# Patient Record
Sex: FEMALE | Race: WHITE | NOT HISPANIC OR LATINO | Employment: OTHER | URBAN - METROPOLITAN AREA
[De-identification: names, ages, dates, MRNs, and addresses within clinical notes are randomized per-mention and may not be internally consistent; named-entity substitution may affect disease eponyms.]

---

## 2023-01-01 ENCOUNTER — HOSPITAL ENCOUNTER (INPATIENT)
Facility: CLINIC | Age: 68
LOS: 10 days | Discharge: HOME | DRG: 919 | End: 2023-01-11
Attending: SURGERY | Admitting: TRANSPLANT SURGERY
Payer: COMMERCIAL

## 2023-01-01 DIAGNOSIS — E44.0 MODERATE MALNUTRITION (H): ICD-10-CM

## 2023-01-01 DIAGNOSIS — S36.13XD INJURY OF BILE DUCT, SUBSEQUENT ENCOUNTER: ICD-10-CM

## 2023-01-01 DIAGNOSIS — K83.8 BILE LEAK, POSTOPERATIVE: ICD-10-CM

## 2023-01-01 DIAGNOSIS — S36.13XA BILE DUCT INJURY: ICD-10-CM

## 2023-01-01 DIAGNOSIS — K91.89 BILE LEAK, POSTOPERATIVE: ICD-10-CM

## 2023-01-01 DIAGNOSIS — S36.13XA INJURY OF BILE DUCT, INITIAL ENCOUNTER: Primary | ICD-10-CM

## 2023-01-01 DIAGNOSIS — R11.0 NAUSEA: ICD-10-CM

## 2023-01-01 LAB
ALBUMIN SERPL BCG-MCNC: 2.7 G/DL (ref 3.5–5.2)
ALP SERPL-CCNC: 366 U/L (ref 35–104)
ALT SERPL W P-5'-P-CCNC: 354 U/L (ref 10–35)
ANION GAP SERPL CALCULATED.3IONS-SCNC: 14 MMOL/L (ref 7–15)
AST SERPL W P-5'-P-CCNC: 60 U/L (ref 10–35)
BILIRUB DIRECT SERPL-MCNC: 1.68 MG/DL (ref 0–0.3)
BILIRUB SERPL-MCNC: 2.4 MG/DL
BILIRUB SERPL-MCNC: 2.4 MG/DL
BUN SERPL-MCNC: 6.4 MG/DL (ref 8–23)
CA-I BLD-MCNC: 4.5 MG/DL (ref 4.4–5.2)
CALCIUM SERPL-MCNC: 8.3 MG/DL (ref 8.8–10.2)
CHLORIDE SERPL-SCNC: 100 MMOL/L (ref 98–107)
CREAT SERPL-MCNC: 0.65 MG/DL (ref 0.51–0.95)
DEPRECATED HCO3 PLAS-SCNC: 22 MMOL/L (ref 22–29)
GFR SERPL CREATININE-BSD FRML MDRD: >90 ML/MIN/1.73M2
GLUCOSE SERPL-MCNC: 69 MG/DL (ref 70–99)
MAGNESIUM SERPL-MCNC: 1.8 MG/DL (ref 1.7–2.3)
PHOSPHATE SERPL-MCNC: 2.9 MG/DL (ref 2.5–4.5)
POTASSIUM SERPL-SCNC: 3.7 MMOL/L (ref 3.4–5.3)
PROT SERPL-MCNC: 5.2 G/DL (ref 6.4–8.3)
SODIUM SERPL-SCNC: 136 MMOL/L (ref 136–145)

## 2023-01-01 PROCEDURE — 82248 BILIRUBIN DIRECT: CPT

## 2023-01-01 PROCEDURE — 84100 ASSAY OF PHOSPHORUS: CPT

## 2023-01-01 PROCEDURE — 120N000011 HC R&B TRANSPLANT UMMC

## 2023-01-01 PROCEDURE — 250N000013 HC RX MED GY IP 250 OP 250 PS 637

## 2023-01-01 PROCEDURE — 85730 THROMBOPLASTIN TIME PARTIAL: CPT

## 2023-01-01 PROCEDURE — 36592 COLLECT BLOOD FROM PICC: CPT

## 2023-01-01 PROCEDURE — 85027 COMPLETE CBC AUTOMATED: CPT

## 2023-01-01 PROCEDURE — 82330 ASSAY OF CALCIUM: CPT

## 2023-01-01 PROCEDURE — 82247 BILIRUBIN TOTAL: CPT

## 2023-01-01 PROCEDURE — 85610 PROTHROMBIN TIME: CPT

## 2023-01-01 PROCEDURE — 83735 ASSAY OF MAGNESIUM: CPT

## 2023-01-01 RX ORDER — LORAZEPAM 0.5 MG/1
0.5 TABLET ORAL DAILY PRN
COMMUNITY

## 2023-01-01 RX ORDER — HEPARIN SODIUM,PORCINE 10 UNIT/ML
5-20 VIAL (ML) INTRAVENOUS
Status: DISCONTINUED | OUTPATIENT
Start: 2023-01-01 | End: 2023-01-11 | Stop reason: HOSPADM

## 2023-01-01 RX ORDER — NALOXONE HYDROCHLORIDE 0.4 MG/ML
0.2 INJECTION, SOLUTION INTRAMUSCULAR; INTRAVENOUS; SUBCUTANEOUS
Status: DISCONTINUED | OUTPATIENT
Start: 2023-01-01 | End: 2023-01-11 | Stop reason: HOSPADM

## 2023-01-01 RX ORDER — HYDROMORPHONE HCL IN WATER/PF 6 MG/30 ML
.2-.4 PATIENT CONTROLLED ANALGESIA SYRINGE INTRAVENOUS
Status: DISCONTINUED | OUTPATIENT
Start: 2023-01-01 | End: 2023-01-04

## 2023-01-01 RX ORDER — NALOXONE HYDROCHLORIDE 0.4 MG/ML
0.4 INJECTION, SOLUTION INTRAMUSCULAR; INTRAVENOUS; SUBCUTANEOUS
Status: DISCONTINUED | OUTPATIENT
Start: 2023-01-01 | End: 2023-01-11 | Stop reason: HOSPADM

## 2023-01-01 RX ORDER — VENLAFAXINE HYDROCHLORIDE 37.5 MG/1
112.5 CAPSULE, EXTENDED RELEASE ORAL DAILY
COMMUNITY

## 2023-01-01 RX ORDER — KETOROLAC TROMETHAMINE 15 MG/ML
15 INJECTION, SOLUTION INTRAMUSCULAR; INTRAVENOUS EVERY 6 HOURS PRN
Status: DISPENSED | OUTPATIENT
Start: 2023-01-01 | End: 2023-01-06

## 2023-01-01 RX ORDER — CALCIUM CARBONATE 500 MG/1
500-1000 TABLET, CHEWABLE ORAL DAILY PRN
Status: DISCONTINUED | OUTPATIENT
Start: 2023-01-01 | End: 2023-01-11 | Stop reason: HOSPADM

## 2023-01-01 RX ORDER — VENLAFAXINE HYDROCHLORIDE 37.5 MG/1
37.5 CAPSULE, EXTENDED RELEASE ORAL DAILY
Status: DISCONTINUED | OUTPATIENT
Start: 2023-01-02 | End: 2023-01-01

## 2023-01-01 RX ORDER — SODIUM CHLORIDE, SODIUM LACTATE, POTASSIUM CHLORIDE, CALCIUM CHLORIDE 600; 310; 30; 20 MG/100ML; MG/100ML; MG/100ML; MG/100ML
INJECTION, SOLUTION INTRAVENOUS CONTINUOUS
Status: DISCONTINUED | OUTPATIENT
Start: 2023-01-01 | End: 2023-01-04

## 2023-01-01 RX ORDER — HEPARIN SODIUM 5000 [USP'U]/.5ML
5000 INJECTION, SOLUTION INTRAVENOUS; SUBCUTANEOUS EVERY 8 HOURS
Status: DISCONTINUED | OUTPATIENT
Start: 2023-01-02 | End: 2023-01-03

## 2023-01-01 RX ORDER — FLUORIDE TOOTHPASTE
15 TOOTHPASTE DENTAL 4 TIMES DAILY PRN
Status: DISCONTINUED | OUTPATIENT
Start: 2023-01-01 | End: 2023-01-11 | Stop reason: HOSPADM

## 2023-01-01 RX ORDER — SIMETHICONE 80 MG
80 TABLET,CHEWABLE ORAL EVERY 6 HOURS PRN
Status: DISCONTINUED | OUTPATIENT
Start: 2023-01-01 | End: 2023-01-01

## 2023-01-01 RX ORDER — LOSARTAN POTASSIUM 100 MG/1
100 TABLET ORAL DAILY
Status: ON HOLD | COMMUNITY
Start: 2022-10-21 | End: 2023-01-11

## 2023-01-01 RX ORDER — PRAVASTATIN SODIUM 40 MG
40 TABLET ORAL DAILY
COMMUNITY

## 2023-01-01 RX ORDER — PIPERACILLIN SODIUM, TAZOBACTAM SODIUM 3; .375 G/15ML; G/15ML
3.38 INJECTION, POWDER, LYOPHILIZED, FOR SOLUTION INTRAVENOUS EVERY 6 HOURS
Status: DISCONTINUED | OUTPATIENT
Start: 2023-01-02 | End: 2023-01-05

## 2023-01-01 RX ORDER — HEPARIN SODIUM,PORCINE 10 UNIT/ML
5-20 VIAL (ML) INTRAVENOUS EVERY 24 HOURS
Status: DISCONTINUED | OUTPATIENT
Start: 2023-01-02 | End: 2023-01-11 | Stop reason: HOSPADM

## 2023-01-01 RX ORDER — KETOROLAC TROMETHAMINE 15 MG/ML
15 INJECTION, SOLUTION INTRAMUSCULAR; INTRAVENOUS EVERY 6 HOURS PRN
Status: DISCONTINUED | OUTPATIENT
Start: 2023-01-01 | End: 2023-01-01

## 2023-01-01 RX ADMIN — Medication 5 MG: at 22:59

## 2023-01-01 ASSESSMENT — ACTIVITIES OF DAILY LIVING (ADL)
DRESSING/BATHING_DIFFICULTY: NO
DIFFICULTY_EATING/SWALLOWING: NO
TOILETING_ISSUES: NO
CHANGE_IN_FUNCTIONAL_STATUS_SINCE_ONSET_OF_CURRENT_ILLNESS/INJURY: YES
CHANGE_IN_FUNCTIONAL_STATUS_SINCE_ONSET_OF_CURRENT_ILLNESS/INJURY: YES
VISION_MANAGEMENT: PRESCRIPTION GLASSES
ADLS_ACUITY_SCORE: 20
WEAR_GLASSES_OR_BLIND: YES
TOILETING_ISSUES: NO
HEARING_DIFFICULTY_OR_DEAF: NO
FALL_HISTORY_WITHIN_LAST_SIX_MONTHS: NO
WEAR_GLASSES_OR_BLIND: YES
DIFFICULTY_EATING/SWALLOWING: NO
WALKING_OR_CLIMBING_STAIRS_DIFFICULTY: NO
DIFFICULTY_COMMUNICATING: NO
DRESSING/BATHING_DIFFICULTY: NO
CONCENTRATING,_REMEMBERING_OR_MAKING_DECISIONS_DIFFICULTY: NO
DIFFICULTY_COMMUNICATING: NO
CONCENTRATING,_REMEMBERING_OR_MAKING_DECISIONS_DIFFICULTY: NO
HEARING_DIFFICULTY_OR_DEAF: NO
WALKING_OR_CLIMBING_STAIRS_DIFFICULTY: NO
DOING_ERRANDS_INDEPENDENTLY_DIFFICULTY: NO
FALL_HISTORY_WITHIN_LAST_SIX_MONTHS: NO

## 2023-01-02 ENCOUNTER — APPOINTMENT (OUTPATIENT)
Dept: PHYSICAL THERAPY | Facility: CLINIC | Age: 68
DRG: 919 | End: 2023-01-02
Attending: SURGERY
Payer: COMMERCIAL

## 2023-01-02 ENCOUNTER — APPOINTMENT (OUTPATIENT)
Dept: GENERAL RADIOLOGY | Facility: CLINIC | Age: 68
DRG: 919 | End: 2023-01-02
Attending: SURGERY
Payer: COMMERCIAL

## 2023-01-02 ENCOUNTER — APPOINTMENT (OUTPATIENT)
Dept: CT IMAGING | Facility: CLINIC | Age: 68
DRG: 919 | End: 2023-01-02
Attending: PHYSICIAN ASSISTANT
Payer: COMMERCIAL

## 2023-01-02 LAB
ALBUMIN SERPL BCG-MCNC: 2.7 G/DL (ref 3.5–5.2)
ALP SERPL-CCNC: 355 U/L (ref 35–104)
ALT SERPL W P-5'-P-CCNC: 325 U/L (ref 10–35)
AMYLASE BODY FLUID SOURCE: NORMAL
AMYLASE FLD-CCNC: 5 U/L
ANION GAP SERPL CALCULATED.3IONS-SCNC: 14 MMOL/L (ref 7–15)
APTT PPP: 31 SECONDS (ref 22–38)
AST SERPL W P-5'-P-CCNC: 57 U/L (ref 10–35)
BILIRUB SERPL-MCNC: 2.4 MG/DL
BUN SERPL-MCNC: 6.9 MG/DL (ref 8–23)
CALCIUM SERPL-MCNC: 8.4 MG/DL (ref 8.8–10.2)
CHLORIDE SERPL-SCNC: 100 MMOL/L (ref 98–107)
CREAT SERPL-MCNC: 0.61 MG/DL (ref 0.51–0.95)
CYCLE THRESHOLD (CT): 42.8
DEPRECATED HCO3 PLAS-SCNC: 22 MMOL/L (ref 22–29)
ERYTHROCYTE [DISTWIDTH] IN BLOOD BY AUTOMATED COUNT: 12.1 % (ref 10–15)
ERYTHROCYTE [DISTWIDTH] IN BLOOD BY AUTOMATED COUNT: 12.4 % (ref 10–15)
GFR SERPL CREATININE-BSD FRML MDRD: >90 ML/MIN/1.73M2
GLUCOSE SERPL-MCNC: 73 MG/DL (ref 70–99)
HCT VFR BLD AUTO: 28.1 % (ref 35–47)
HCT VFR BLD AUTO: 28.6 % (ref 35–47)
HGB BLD-MCNC: 9.2 G/DL (ref 11.7–15.7)
HGB BLD-MCNC: 9.3 G/DL (ref 11.7–15.7)
INR PPP: 1.15 (ref 0.85–1.15)
MCH RBC QN AUTO: 29.8 PG (ref 26.5–33)
MCH RBC QN AUTO: 30.2 PG (ref 26.5–33)
MCHC RBC AUTO-ENTMCNC: 32.5 G/DL (ref 31.5–36.5)
MCHC RBC AUTO-ENTMCNC: 32.7 G/DL (ref 31.5–36.5)
MCV RBC AUTO: 92 FL (ref 78–100)
MCV RBC AUTO: 92 FL (ref 78–100)
PLATELET # BLD AUTO: 299 10E3/UL (ref 150–450)
PLATELET # BLD AUTO: 312 10E3/UL (ref 150–450)
POTASSIUM SERPL-SCNC: 3.8 MMOL/L (ref 3.4–5.3)
PROT SERPL-MCNC: 5.1 G/DL (ref 6.4–8.3)
RBC # BLD AUTO: 3.05 10E6/UL (ref 3.8–5.2)
RBC # BLD AUTO: 3.12 10E6/UL (ref 3.8–5.2)
SARS-COV-2 RNA RESP QL NAA+PROBE: POSITIVE
SODIUM SERPL-SCNC: 136 MMOL/L (ref 136–145)
WBC # BLD AUTO: 7 10E3/UL (ref 4–11)
WBC # BLD AUTO: 7.9 10E3/UL (ref 4–11)

## 2023-01-02 PROCEDURE — 250N000011 HC RX IP 250 OP 636: Performed by: TRANSPLANT SURGERY

## 2023-01-02 PROCEDURE — 71045 X-RAY EXAM CHEST 1 VIEW: CPT | Mod: 26 | Performed by: RADIOLOGY

## 2023-01-02 PROCEDURE — 999N000007 HC SITE CHECK

## 2023-01-02 PROCEDURE — U0005 INFEC AGEN DETEC AMPLI PROBE: HCPCS | Performed by: SURGERY

## 2023-01-02 PROCEDURE — 258N000003 HC RX IP 258 OP 636

## 2023-01-02 PROCEDURE — 82150 ASSAY OF AMYLASE: CPT | Performed by: PHYSICIAN ASSISTANT

## 2023-01-02 PROCEDURE — 74177 CT ABD & PELVIS W/CONTRAST: CPT | Mod: 26 | Performed by: STUDENT IN AN ORGANIZED HEALTH CARE EDUCATION/TRAINING PROGRAM

## 2023-01-02 PROCEDURE — 250N000013 HC RX MED GY IP 250 OP 250 PS 637

## 2023-01-02 PROCEDURE — 97162 PT EVAL MOD COMPLEX 30 MIN: CPT | Mod: GP

## 2023-01-02 PROCEDURE — 97530 THERAPEUTIC ACTIVITIES: CPT | Mod: GP

## 2023-01-02 PROCEDURE — 85027 COMPLETE CBC AUTOMATED: CPT

## 2023-01-02 PROCEDURE — 80053 COMPREHEN METABOLIC PANEL: CPT

## 2023-01-02 PROCEDURE — C9113 INJ PANTOPRAZOLE SODIUM, VIA: HCPCS

## 2023-01-02 PROCEDURE — 97116 GAIT TRAINING THERAPY: CPT | Mod: GP

## 2023-01-02 PROCEDURE — 71045 X-RAY EXAM CHEST 1 VIEW: CPT

## 2023-01-02 PROCEDURE — 250N000011 HC RX IP 250 OP 636

## 2023-01-02 PROCEDURE — 250N000013 HC RX MED GY IP 250 OP 250 PS 637: Performed by: PHYSICIAN ASSISTANT

## 2023-01-02 PROCEDURE — 120N000011 HC R&B TRANSPLANT UMMC

## 2023-01-02 PROCEDURE — 74177 CT ABD & PELVIS W/CONTRAST: CPT

## 2023-01-02 PROCEDURE — 36415 COLL VENOUS BLD VENIPUNCTURE: CPT

## 2023-01-02 RX ORDER — CALCIUM CARBONATE 500(1250)
1 TABLET ORAL DAILY
COMMUNITY

## 2023-01-02 RX ORDER — IOPAMIDOL 755 MG/ML
111 INJECTION, SOLUTION INTRAVASCULAR ONCE
Status: COMPLETED | OUTPATIENT
Start: 2023-01-02 | End: 2023-01-02

## 2023-01-02 RX ORDER — AMOXICILLIN 250 MG
1 CAPSULE ORAL ONCE
Status: COMPLETED | OUTPATIENT
Start: 2023-01-02 | End: 2023-01-02

## 2023-01-02 RX ORDER — LORAZEPAM 0.5 MG/1
0.5 TABLET ORAL DAILY PRN
Status: DISCONTINUED | OUTPATIENT
Start: 2023-01-02 | End: 2023-01-02

## 2023-01-02 RX ORDER — ONDANSETRON 4 MG/1
4 TABLET, ORALLY DISINTEGRATING ORAL EVERY 8 HOURS PRN
Status: ON HOLD | COMMUNITY
End: 2023-01-11

## 2023-01-02 RX ORDER — AMOXICILLIN 250 MG
1 CAPSULE ORAL 2 TIMES DAILY PRN
Status: DISCONTINUED | OUTPATIENT
Start: 2023-01-02 | End: 2023-01-11 | Stop reason: HOSPADM

## 2023-01-02 RX ORDER — LORAZEPAM 0.5 MG/1
0.5 TABLET ORAL EVERY 8 HOURS PRN
Status: DISCONTINUED | OUTPATIENT
Start: 2023-01-02 | End: 2023-01-11 | Stop reason: HOSPADM

## 2023-01-02 RX ORDER — VENLAFAXINE HYDROCHLORIDE 37.5 MG/1
112.5 CAPSULE, EXTENDED RELEASE ORAL DAILY
Status: DISCONTINUED | OUTPATIENT
Start: 2023-01-02 | End: 2023-01-03

## 2023-01-02 RX ORDER — POLYETHYLENE GLYCOL 3350 17 G/17G
17 POWDER, FOR SOLUTION ORAL DAILY PRN
Status: DISCONTINUED | OUTPATIENT
Start: 2023-01-02 | End: 2023-01-11 | Stop reason: HOSPADM

## 2023-01-02 RX ORDER — ACETAMINOPHEN 325 MG/1
325-650 TABLET ORAL EVERY 6 HOURS PRN
Status: ON HOLD | COMMUNITY
End: 2023-04-09

## 2023-01-02 RX ORDER — NICOTINE POLACRILEX 4 MG
15-30 LOZENGE BUCCAL
Status: DISCONTINUED | OUTPATIENT
Start: 2023-01-02 | End: 2023-01-11 | Stop reason: HOSPADM

## 2023-01-02 RX ORDER — DEXTROSE MONOHYDRATE 25 G/50ML
25-50 INJECTION, SOLUTION INTRAVENOUS
Status: DISCONTINUED | OUTPATIENT
Start: 2023-01-02 | End: 2023-01-11 | Stop reason: HOSPADM

## 2023-01-02 RX ADMIN — LORAZEPAM 0.5 MG: 0.5 TABLET ORAL at 20:46

## 2023-01-02 RX ADMIN — PIPERACILLIN AND TAZOBACTAM 3.38 G: 3; .375 INJECTION, POWDER, LYOPHILIZED, FOR SOLUTION INTRAVENOUS at 02:58

## 2023-01-02 RX ADMIN — HEPARIN SODIUM 5000 UNITS: 5000 INJECTION, SOLUTION INTRAVENOUS; SUBCUTANEOUS at 16:18

## 2023-01-02 RX ADMIN — PIPERACILLIN AND TAZOBACTAM 3.38 G: 3; .375 INJECTION, POWDER, LYOPHILIZED, FOR SOLUTION INTRAVENOUS at 13:53

## 2023-01-02 RX ADMIN — PIPERACILLIN AND TAZOBACTAM 3.38 G: 3; .375 INJECTION, POWDER, LYOPHILIZED, FOR SOLUTION INTRAVENOUS at 20:06

## 2023-01-02 RX ADMIN — PANTOPRAZOLE SODIUM 40 MG: 40 INJECTION, POWDER, FOR SOLUTION INTRAVENOUS at 07:57

## 2023-01-02 RX ADMIN — SODIUM CHLORIDE, POTASSIUM CHLORIDE, SODIUM LACTATE AND CALCIUM CHLORIDE: 600; 310; 30; 20 INJECTION, SOLUTION INTRAVENOUS at 01:12

## 2023-01-02 RX ADMIN — PANTOPRAZOLE SODIUM 40 MG: 40 INJECTION, POWDER, FOR SOLUTION INTRAVENOUS at 20:06

## 2023-01-02 RX ADMIN — Medication 5 ML: at 07:44

## 2023-01-02 RX ADMIN — PIPERACILLIN AND TAZOBACTAM 3.38 G: 3; .375 INJECTION, POWDER, LYOPHILIZED, FOR SOLUTION INTRAVENOUS at 07:50

## 2023-01-02 RX ADMIN — LORAZEPAM 0.5 MG: 0.5 TABLET ORAL at 11:14

## 2023-01-02 RX ADMIN — Medication 15 ML: at 05:38

## 2023-01-02 RX ADMIN — PANTOPRAZOLE SODIUM 40 MG: 40 INJECTION, POWDER, FOR SOLUTION INTRAVENOUS at 01:10

## 2023-01-02 RX ADMIN — SODIUM CHLORIDE, POTASSIUM CHLORIDE, SODIUM LACTATE AND CALCIUM CHLORIDE: 600; 310; 30; 20 INJECTION, SOLUTION INTRAVENOUS at 16:34

## 2023-01-02 RX ADMIN — SENNOSIDES AND DOCUSATE SODIUM 1 TABLET: 50; 8.6 TABLET ORAL at 16:18

## 2023-01-02 RX ADMIN — IOPAMIDOL 111 ML: 755 INJECTION, SOLUTION INTRAVENOUS at 15:30

## 2023-01-02 ASSESSMENT — ACTIVITIES OF DAILY LIVING (ADL)
ADLS_ACUITY_SCORE: 21
ADLS_ACUITY_SCORE: 20
ADLS_ACUITY_SCORE: 21
ADLS_ACUITY_SCORE: 20
ADLS_ACUITY_SCORE: 21
ADLS_ACUITY_SCORE: 20

## 2023-01-02 NOTE — PROGRESS NOTES
"   01/02/23 1034   Appointment Info   Signing Clinician's Name / Credentials (PT) Lizz Ribera DPT   Rehab Comments (PT) abdominal precautions   Living Environment   People in Home spouse   Current Living Arrangements house   Home Accessibility stairs to enter home   Number of Stairs, Main Entrance 4   Stair Railings, Main Entrance railings on both sides of stairs   Transportation Anticipated family or friend will provide   Living Environment Comments Pt spouse able to provide 24/7 assist at home, 4-5 ÁNGEL,B railings, all needs met on main level. Walk in shower no shower chair   Self-Care   Usual Activity Tolerance moderate   Current Activity Tolerance moderate   Equipment Currently Used at Home none   Fall history within last six months no   Activity/Exercise/Self-Care Comment For past week or two pt has been only pivoting (at outside hospital). Pt reports d/t fatigue at home only amb household distances, does not use AD. IND ADLS.   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\"   Existing Precautions/Restrictions abdominal   Cognition   Affect/Mental Status (Cognition) WFL   Orientation Status (Cognition) oriented x 4   Pain Assessment   Patient Currently in Pain Yes, see Vital Sign flowsheet   Integumentary/Edema   Integumentary/Edema Comments Pt with 2 drains noted from abdomen   Posture    Posture Protracted shoulders   Range of Motion (ROM)   Range of Motion ROM is WFL   Strength (Manual Muscle Testing)   Strength " (Manual Muscle Testing) strength is WFL   Bed Mobility   Comment, (Bed Mobility) Pt [place] supine>sit   Transfers   Comment, (Transfers) Pt sit<>stand CGA   Gait/Stairs (Locomotion)   Comment, (Gait/Stairs) Pt takes lateral steps to chair w/ FWW CGA   Balance   Balance Comments Pt stands breifly w/o UE support, dynamic balance requires B UE support on FWW   Sensory Examination   Sensory Perception patient reports no sensory changes   Coordination   Coordination no deficits were identified   Muscle Tone   Muscle Tone no deficits were identified   Clinical Impression   Criteria for Skilled Therapeutic Intervention Yes, treatment indicated   PT Diagnosis (PT) impaired activity tolerance in presence of 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   Influenced by the following impairments pain, new abdominal  precautions, fatigue, decreased activity tolerance   Functional limitations due to impairments transfers, bed mobility, amb, stairs   Clinical Presentation (PT Evaluation Complexity) Evolving/Changing   Clinical Presentation Rationale pt with evolving status undergoing liver transplant workup   Clinical Decision Making (Complexity) moderate complexity   Planned Therapy Interventions (PT) gait training;balance training;bed mobility training;home exercise program;patient/family education;stair training;strengthening;transfer training;progressive activity/exercise;risk factor education;home program guidelines   Anticipated Equipment Needs at Discharge (PT) walker, rolling   Risk & Benefits of therapy have been explained evaluation/treatment results reviewed;care plan/treatment goals reviewed;risks/benefits reviewed;current/potential barriers reviewed;participants voiced agreement with care plan;participants included;patient;spouse/significant other   PT Total Evaluation Time   PT Eval, Moderate Complexity Minutes (46126) 10   Physical Therapy Goals   PT Frequency  6x/week   PT Predicted Duration/Target Date for Goal Attainment 01/06/23   PT Goals Bed Mobility;Transfers;Gait;Stairs;PT Goal 1   PT: Bed Mobility Supervision/stand-by assist;Within precautions;Supine to/from sit   PT: Transfers Supervision/stand-by assist;Within precautions;Sit to/from stand   PT: Gait Greater than 200 feet;Supervision/stand-by assist;Rolling walker   PT: Stairs 4 stairs;Rail on both sides   PT: Goal 1 Pt will accuratley verbalize abdominal precautions   Interventions   Interventions Quick Adds Gait Training;Therapeutic Activity   Therapeutic Activity   Therapeutic Activities: dynamic activities to improve functional performance Minutes (62923) 18   Treatment Detail/Skilled Intervention Educated pt on abdomnial precautions, provided written instruction on modifications to functional activities to maintain precautions. Facilaited supine>sit with education and cuing provided for log roll technique, pt overall requiring Min A. Facilatied sit<>stand x4 throughout session , cuing for avoiding extensive UE push off (10# restriction). Provided education on avoiding valsalva, cuing for sequencing exhale with exertion during transfers.   Gait Training   Gait Training Minutes (42805) 10   Symptoms Noted During/After Treatment (Gait Training) fatigue;shortness of breath   Treatment Detail/Skilled Intervention Faciliated amb forward/backwards stepping within room 6'x4. Pt demonstrates keeping good proximity to FWW, overall SBA but limited by SOB and fatigue (pt demonstrates PLB throughout). Pt declines progression to hallway amb d/t fatigue. Encoraged amb up to bathroom as opposed to commode for short but frequent bouts of amb w/ RN staff. VSS throghout relativley unchanged from baseline- see vitals flowsheet   PT Discharge Planning   PT Plan OOR amb, progress to stairs as appropriate   PT Discharge Recommendation (DC Rec) home with assist;home with home care physical therapy;home with outpatient physical  therapy   PT Rationale for DC Rec At this time pt amb short distances CGA-SBA however anticipate pt will progress quickly to navigate household distances with Ax1 provided by . Pending LOS and successful navigation of stairs.   PT Brief overview of current status Ax1 and FWW to bathroom, short distance hallway amb   Total Session Time   Timed Code Treatment Minutes 28   Total Session Time (sum of timed and untimed services) 38

## 2023-01-02 NOTE — PLAN OF CARE
Recovered covid, low threshold.   Vitals: stable room air.   Blood glucose: NA.   Pain/nausea: denies.   Diet: regular diet, NPO @ MN for possible ERCP.   Lines: PICC TL infusing LR-75.   : Upton OP was 1050, removed per order. PIV X 2 negative.   GI: BM x 1 x small today.   Drains: MELITA 1. OP zero. MELITA 2 OP 70 ml. Drain LLQ OP zero., dressing all cdi.    Skin: incision staples had a abd on, cdi. Staples on. Abd binder on.   Mobility: a x 1 x walker.   CT with oral contrast done today.

## 2023-01-02 NOTE — PROGRESS NOTES
"SPIRITUAL HEALTH SERVICES Progress Note  [facility] ([place]) 7a    Saw pt Zeinab Bermudez per Admission Request.     Distress and Loss - Pt and pt's  in the room affirmed it has been \"a long road\". Pt stated \"I have a lot of anxiety\".     Strengths, Coping, and Resources - Pt and  stated \"we need to take it one minute at a time\". They both stated they are very grateful for their friends and family who are supporting them in this time. \"We are on so many prayer chains\".     Meaning, Beliefs, and Spirituality - Pt and  welcomed prayer from this     Plan of Care -  will follow up per pt/staff/family request     Joann Richardson M.Div.  Chaplain Resident  Pager: [phone]    * [facility] remains available 24/7 for emergent requests/referrals, either by having the switchboard page the on-call  or by entering an ASAP/STAT consult in Epic (this will also page the on-call ). Routine Epic consults receive an initial response within 24 hours.*   "

## 2023-01-02 NOTE — PROGRESS NOTES
Transplant Surgery  Inpatient Daily Progress Note  01/02/2023    Assessment & Plan: Zeinab Bermudez is a 67 year old female s/p laparoscopic cholecystectomy on 12/11/11. She presented to the OSH ED on 12/27/22 with sepsis and bile peritonitis, underwent exploratory laparotomy, washout and drain placement on 12/28. ERCP on 12/29 showed possible bile duct transection. IR placed 3rd drain (left side) on 12/29/22 d/t biloma.  She was transferred to [facility] on 1/1/23 for hepatobiliary evaluation and management of bile duct injury.      Cardiorespiratory: VSS  -Hx of HTN, HLD. Hold PTA medications.    Hematology:   Anemia: Presented to OSH with Hgb 13, 2 weeks PTA Hgb was 15. Hgb 9.3 (9.2). ERCP finding duodenal ulcer with oozing. Monitor Hgb. Transfuse for Hgb < 7. Continue PPI.   GI/Nutrition:   S/p sri complicated by bile duct injury, bile Leak, intra-abdominal Infection: 12/11/22 status post cholecystectomy c/b sepsis and bile peritonitis with exploratory laparotomy, washout and drain placement on 12/28. ERCP on 12/29 showed possible bile duct transection. IR placed 3rd drain (left side) on 12/29/22 d/t biloma. Continue 2 surgical drains to suction (right) and IR drain to gravity (left). Bilious output from drains, minimal amount. Repeat CT scan with IV/PO contrast.  Continue antibiotic, see ID.   -TB 2.5, , , .   Moderate malnutrition in the context of acute illness: Nutrition consult. Regular diet with supplements after CT scan. NPO after midnight.  Fluid/Electrolytes: MIVF: LR 75 ml/hr. Replete electrolyte PRN  Endocrine: No issues  : Remove livingston (placed OSH)  Infectious disease: AVSS, WBC 7. Continue Zosyn. Follow up fluid cultures OSH.   Sepsis and bile peritonitis: Weaned off pressor 12/30. 12/29 fluid gram stain no organism, 1+ PMNs, 1+RBC; fluid bacterial culture no growth (will be loaded into care everywhere).   Neuro:   Acute pain: dilaudid IV PRN  Depression with anxiety: Restart PTA  "venlafaxine 24hr once patient tolerating diet (per pt request). Ativan PO TID PRN anxiety.  Prophylaxis: Heparin subcutaneous ppx  Activity: PT/OT  Disposition: 7A    Medical Decision Making: Medium  Subsequent visit 83787 (moderate level decision making)    JOANN/Fellow/Resident Provider: Sasha Drew PA-C, [id number]    Faculty: Desmond Ruano M.D.    __________________________________________________________________  Transplant History: Admitted 1/1/2023 for bile duct injury.  N/A, Postoperative day:      Interval History: History is obtained from the patient  Overnight events: Abdominal pain control fair. Pain limiting movement. Denies nausea. No SOB.     ROS:   A 10-point review of systems was negative except as noted above.    Meds:   heparin ANTICOAGULANT  5,000 Units Subcutaneous Q8H    heparin lock flush  5-20 mL Intracatheter Q24H    pantoprazole  40 mg Intravenous BID    piperacillin-tazobactam  3.375 g Intravenous Q6H    senna-docusate  1 tablet Oral Once    sodium chloride (PF)  10-40 mL Intracatheter Q8H    sodium chloride (PF)  10-40 mL Intracatheter Q8H    [Held by provider] venlafaxine  112.5 mg Oral Daily       Physical Exam:     Admit Weight: 82.2 kg (181 lb 3.2 oz)    Current vitals:   BP (!) 148/84 (BP Location: Left arm)   Pulse 92   Temp 97.5  F (36.4  C) (Oral)   Resp 18   Ht 1.575 m (5' 2\")   Wt 82.2 kg (181 lb 3.2 oz)   SpO2 98%   BMI 33.14 kg/m           Vital sign ranges:    Temp:  [97.5  F (36.4  C)-98.9  F (37.2  C)] 97.5  F (36.4  C)  Pulse:  [79-92] 92  Resp:  [18] 18  BP: (128-149)/(74-89) 148/84  SpO2:  [95 %-98 %] 98 %  Patient Vitals for the past 24 hrs:   BP Temp Temp src Pulse Resp SpO2 Height Weight   01/02/23 1415 (!) 148/84 97.5  F (36.4  C) Oral 92 18 98 % -- --   01/02/23 1006 128/83 98.2  F (36.8  C) Oral 83 18 95 % -- --   01/02/23 0536 138/74 98.3  F (36.8  C) Oral 84 18 95 % -- --   01/02/23 0153 (!) 140/75 98  F (36.7  C) Oral 79 18 95 % -- --   01/02/23 0025 " "-- -- -- -- -- -- 1.575 m (5' 2\") 82.2 kg (181 lb 3.2 oz)   01/01/23 2140 (!) 149/89 98.9  F (37.2  C) Oral 89 18 95 % -- --     General Appearance: in no apparent distress.   Skin: normal, dry  Heart: regular rate and rhythm  Lungs: NLB on RA  Abdomen: The abdomen is soft, generalized ttp, no peritoneal signs. MELITA x 2 to suction on right side, bilious output. IR pigtail drain to gravity on left side, bilious output. The wound is Healing well, no signs of infection.  : livingston, yellow urine output.   Extremities: edema: absent. }  Neurologic: awake, alert and oriented.     Data:   CMP  Recent Labs   Lab 01/02/23  0749 01/01/23  2220    136   POTASSIUM 3.8 3.7   CHLORIDE 100 100   CO2 22 22   GLC 73 69*   BUN 6.9* 6.4*   CR 0.61 0.65   GFRESTIMATED >90 >90   NASRIN 8.4* 8.3*   ICAW  --  4.5   MAG  --  1.8   PHOS  --  2.9   ALBUMIN 2.7* 2.7*   BILITOTAL 2.4* 2.4*  2.4*   ALKPHOS 355* 366*   AST 57* 60*   * 354*     CBC  Recent Labs   Lab 01/02/23  0749 01/01/23  2220   HGB 9.3* 9.2*   WBC 7.0 7.9    299     COAGS  Recent Labs   Lab 01/01/23  2340   INR 1.15   PTT 31      Urinalysis  No lab results found.    Attestation:  Patient has been seen with team and evaluated by me.   Vital signs, labs, medications and orders were reviewed.   When obtained, diagnostic images were reviewed by me and interpreted as above.    The care plan was discussed with the multidisciplinary team and I agree with the findings and plan in this note, with any differences recorded in blue.    .    .  "

## 2023-01-02 NOTE — PROGRESS NOTES
Admitted/transferred from: [facility]  Time of arrival on unit 2130  2 RN full  skin assessment completed by CAMELIA Schneider and CAMELIA Will  Skin assessment finding: issues found Midline incision closed with staples,  Three abdominal drains with scant drainage at site, L abdominal scab from previous procedure, small bumps/lesion on L lateral thigh   Interventions/actions: skin interventions Dressings changed per routine, standard incision and drain cares     Will continue to monitor.

## 2023-01-02 NOTE — PROGRESS NOTES
"BP (!) 148/84 (BP Location: Left arm)   Pulse 92   Temp 97.5  F (36.4  C) (Oral)   Resp 18   Ht 1.575 m (5' 2\")   Wt 82.2 kg (181 lb 3.2 oz)   SpO2 98%   BMI 33.14 kg/m      Shift: 3292-7178  Isolation Status: none  VS: stable on room, afebrile  Neuro: Aox4  Behaviors: calm, pleasent  BG: none  Labs: reviewed  Pain/Nausea: denies  PRN: Ativan for anxiety  Diet: NPO   IV Access/lines & drains: triple lumen PICC, Right JPx2 to bulb suction. Left drain to bulb suction   Infusion(s): lactated ringers 75 mL/hr  Skin: Midline incision covered with ABD pad, clean/dry and intact. Drains have dressing. Right MELITA dressing changed.  Mobility: Assist of one with walker    "

## 2023-01-02 NOTE — PROGRESS NOTES
CLINICAL NUTRITION SERVICES - ASSESSMENT NOTE     Nutrition Prescription    RECOMMENDATIONS FOR MDs/PROVIDERS TO ORDER:  If oral intake continues to be difficult d/t GI discomfort, consider nutrition support.  Consider TF if appropriate vs PN.      Malnutrition Status:    Moderate malnutrition in the context of acute illness    Recommendations already ordered by Registered Dietitian (RD):  Start Ensure Plus High Protein BID with meals    Future/Additional Recommendations:  TF recommendations if needed: Vivonex RTF (prior to any surgery for bile leak) @ 60 ml/hr   -Start at 15 ml/hr and advance by 15 ml/hr q 8 hours to goal rate    If bile leak repaired, recommend Vital 1.5 Jason @ goal of  35 ml/hr  (840 ml/day) + 1 pkt Prosource TF20 will Vital 1.5 Jason @ goal of  35ml/hr  (840ml/day)  will provide: 1340 kcals (23 kcal/kg), 76 g PRO (1.3g/kg), 641 ml free H20, 157 g CHO, and 5 g fiber daily.  -Start at 15 ml/hr and advance by 10 ml/hr q 8 hours to goal rate    TPN recommendations if needed:   Clinimix at 70 mL/hr (1680 mL/day via central line) with 250 mL of 20% IV lipids five times weekly provides 1550 kcals, 84 g protein, 252 g CHO, and 23% of kcals from fat on average daily.    -Start at 45 ml/hr x 24 hours, then advance to goal rate of 70 ml/hr if well tolerated       REASON FOR ASSESSMENT  Zeinab Bermudez is a/an 67 year old female assessed by the dietitian for Pharmacy/Nutrition to Start and Manage PN    NUTRITION HISTORY  Patient reports poor oral intake d/t cramping/abdominal pain and distention/fullness since surgery on 12/11. She and her  estimate she was eating less than 50% of usual intake.  Her last oral intake was on 12/27, a chocolate Ensure.      OSH was planning to start PN (Clinimix E)  @40 ml/hr x 24 hours with advancement to 83 ml/hr (goal) with IV lipids 2x/week.      CURRENT NUTRITION ORDERS  Diet: NPO  Intake/Tolerance: Pt feeling hungry.     LABS  BUN 6.4 (low)    MEDICATIONS  Protonix  "BID    ANTHROPOMETRICS  Height: 157.5 cm (5' 2\")  Most Recent Weight: 82.2 kg (181 lb 3.2 oz)    IBW: 50 kg  BMI: Obesity Grade I BMI 30-34.9  Weight History: 12/10 - 82.6 kg, 6/2022 - 85.3 kg; She reports UBW of 184#  Dosing Weight: 58 kg (adj wt based on IBW of 50 kg and actual wt of 82.2 kg)    ASSESSED NUTRITION NEEDS  Estimated Energy Needs: 1242-9941 kcals/day (20 - 25 kcals/kg)  Justification: Obese  Estimated Protein Needs: 70-87 grams protein/day (1.2 - 1.5 grams of pro/kg)  Justification: Increased needs  Estimated Fluid Needs: per MD    PHYSICAL FINDINGS  See malnutrition section below.    MALNUTRITION  % Intake: </= 50% for >/= 5 days (severe)  % Weight Loss: None noted  Subcutaneous Fat Loss: None observed  Muscle Loss: Posterior calf:  moderate  Fluid Accumulation/Edema: Mild  Malnutrition Diagnosis: Moderate malnutrition in the context of acute illness    NUTRITION DIAGNOSIS  Inadequate oral intake related to abdominal pain, fullness as evidenced by patient eating <50% of usual intakes for >5 days.     INTERVENTIONS  Implementation  Nutrition Education: Provided education on RD role, TPN/TF for nutrition support, oral supplements available when diet appropriate.     Collaboration with other providers -> Spoke with primary team, plan to allow patient to eat/use oral supplements.     Goals  Total avg nutritional intake to meet a minimum of 20 kcal/kg and 1.2 g PRO/kg daily (per dosing wt 58 kg).     Monitoring/Evaluation  Progress toward goals will be monitored and evaluated per protocol.    Nilda Max, MS, RD, LD, CCTD, CNSC  7A/Obs unit pager [phone]  Weekend pager [phone]      "

## 2023-01-02 NOTE — PROVIDER NOTIFICATION
Provider Sasha STANLEY notified of covid +. Plan to put in viral threshold.     Viral threshold not high enough and now considered low risk with covid recovered status.

## 2023-01-02 NOTE — PLAN OF CARE
Goal Outcome Evaluation:     Plan of Care Reviewed With: patient, spouce  Overall Patient Progress: no change    Time: 8406-9323  Neuros: A&O x4, pleasant. 5/5 strengths throughout, denies numbness/tingling.  Cardiac: WDL  Respiratory: WDL, stable on room air.  GI/: Upton in place with adequate output. Last bowel movement prior to admission, pt believes it was on 12/26.  Diet/Nausea: Tolerating NPO status, denies nausea.   Skin: Midline abdominal incision covered with ABD pad, clean/dry/intact. Drain sites covered with dressings, clean/dry/intact. Edema present in bilateral lower extremities.  LDA: Right triple lumen PICC, white lumen infusing LR @ 75mL/hr, grey lumen saline locked, red lumen saline locked. Right MELITA drain x2 to bulb suction. Left drain to bulb suction.  Labs: Reviewed  Pain: Denies  Activity: Assist x1 with walker.  New changes this shift: Pt  at bedside overnight. Pt complaining of dry mouth, PRN mouthwash given x1 this AM.  Plan: Possible ERCP today.

## 2023-01-02 NOTE — H&P
Transplant Surgery History and Physical    PATIENT NAME: Zeinab Bermudez  MRN: [id number]  DATE: 01/01/2023 11:55 PM  ATTENDING: Desmond Ruano MD    Assessment & Plan   Zeinab Bermudez is a 67 year old female who was transferred on 1/1/23 for hepatobiliary evaluation and liver transplant evaluation d/t a 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    # Common Bile Duct Transection; Bile Leak, Intra-Abdominal Infection  - Septic Shock at OSH  - ABX: Zosyn (12/28-**), MRSA negative at OSH.  - ERCP on 12/29/22: CBD transection  - NPO  - Daily LFTs and Biliary Labs    # Diet: NPO for Medical/Clinical Reasons Except for: Meds, Ice Chips  - Plan to start TPN via PICC. Pharm Consult placed.    # Fluids: I/Os; LR mIVF     # Pain: Hydromorphone IV PRN, Ketoralac IV PRN    # HTN: Holding PTA meds d/t recent pressor requirements at OSH.  # HLD: Holding PTA Pravastatin  # MDD, EDGAR: Holding PTA Venlafaxine    # GI PPX: Protonix IV BID  # DVT PPX: SubC Heparin 5K Q8H, SCDs  # Activity: Ambulate with Assist  # Code Status: Full Code    Plan was discussed with transplant fellow, who discussed with attending staff.    Lalo Morrow MD   General Surgery Resident    --------------------------------------------------------------------------------------    Chief Complaint   Common Bile Duct Injury    History of Present Illness      Zeinab Bermudez is a 67 year old female that presented 1/1/2023 to [facility] as a transfer from [facility] d/t common bile duct leak. Prior to this episode, she had acute cholecystitis which was treated with a lap cholecystectomy on 12/11/22.    After her discharge, Zeinab has been struggling clinically since her surgery, reporting poor PO intake, fatigue, and severe sharp and cramping abdominal pain w/ associated distension. She would eventually present to her local ED with abdominal pain to on 12/27/22, there she became dyspneic, hypoxemia, and  hypotensive on admission. She required pressors and underwent laparotomy 12/28/22. She was found to have bile leak with 2.5 liters of bile in the abdomen distributed into complex, partially walled off bilomas. She then underwent ERCP on 12/29/22, identifying a common bile duct transection. At this time there was also some concern for hepatic artery injury, which was not clearly identifiable on CT liver protocol. She was then transferred to [facility] on 1/1/23 for hepatobiliary evaluation and liver transplant evaluation as mentioned above.     PMH notable for: COVID-19 positive (12/10/22), Morbid Obesity, HTN, HLD, SAH, MDD, EDGAR.      Past Medical History    I have reviewed this patient's medical history and updated it with pertinent information if needed.   Past Medical History:   Diagnosis Date    Covid 2019 Positive 12/11/2022    Depressive disorder     HLD (hyperlipidemia)     Hypertension     SAH (subarachnoid hemorrhage) (H) 2017       Past Surgical History   I have reviewed this patient's surgical history and updated it with pertinent information if needed.  Past Surgical History:   Procedure Laterality Date    HYSTERECTOMY  2005    LAPAROSCOPIC CHOLECYSTECTOMY  12/11/2022    LAPAROTOMY EXPLORATORY  12/28/2022       Prior to Admission Medications   Prior to Admission Medications   Prescriptions Last Dose Informant Patient Reported? Taking?   LORazepam (ATIVAN) 0.5 MG tablet   Yes No   Sig: Take 0.5 mg by mouth   losartan (COZAAR) 100 MG tablet   Yes Yes   Sig: Take 100 mg by mouth   pravastatin (PRAVACHOL) 40 MG tablet   Yes No   Sig: Take 40 mg by mouth   venlafaxine (EFFEXOR XR) 37.5 MG 24 hr capsule   Yes No   Sig: Take 112.5 mg by mouth daily      Facility-Administered Medications: None     Allergies   No Known Allergies    Social History   I have reviewed this patient's social history and updated it with pertinent information if needed. Zeinab Bermudez  reports that she has never smoked. She has never used  smokeless tobacco. She reports that she does not currently use alcohol. She reports that she does not use drugs.    Family History   I have reviewed this patient's family history and updated it with pertinent information if needed.   Family History   Problem Relation Age of Onset    Chronic Obstructive Pulmonary Disease Mother     Stomach Cancer Mother     Heart Disease Father     Chronic Obstructive Pulmonary Disease Father     CABG Brother        Review of Systems   The 10 point Review of Systems is negative other than noted in the HPI or here.     Physical Exam   Temp: 98.9  F (37.2  C) Temp src: Oral BP: (!) 149/89 Pulse: 89   Resp: 18 SpO2: 95 % O2 Device: None (Room air)      Physical Exam:  Gen: Appears stated age, NAD, resting comfortably  HEENT: NC/AT, PERRL, EOMI, Sclera Anicteric, Hearing intact  Neuro: AOx3 (person, place, date), no focal deficits  Psych: Affect appropriate, Behavior appropriate, Cooperative  CV: HD Stable, Hypertensive, Normoacardic  Pulm: NWOB on RA  ABD: Soft, NT, ND. 2 MELITA drains and 1 Bag drain in place. ML incision closed with staples. Incisions CDI.  MSK: MAEx4, warm, PPP  Skin: No obvious rashes, jaundice, erythema  Wounds: ABD Dressing CDI    Data   Results for orders placed or performed during the hospital encounter of 01/01/23 (from the past 24 hour(s))   Ionized Calcium   Result Value Ref Range    Calcium Ionized 4.5 4.4 - 5.2 mg/dL   Phosphorus   Result Value Ref Range    Phosphorus 2.9 2.5 - 4.5 mg/dL   Magnesium   Result Value Ref Range    Magnesium 1.8 1.7 - 2.3 mg/dL   Comprehensive metabolic panel   Result Value Ref Range    Sodium 136 136 - 145 mmol/L    Potassium 3.7 3.4 - 5.3 mmol/L    Chloride 100 98 - 107 mmol/L    Carbon Dioxide (CO2) 22 22 - 29 mmol/L    Anion Gap 14 7 - 15 mmol/L    Urea Nitrogen 6.4 (L) 8.0 - 23.0 mg/dL    Creatinine 0.65 0.51 - 0.95 mg/dL    Calcium 8.3 (L) 8.8 - 10.2 mg/dL    Glucose 69 (L) 70 - 99 mg/dL    Alkaline Phosphatase 366 (H) 35 -  104 U/L    AST 60 (H) 10 - 35 U/L     (H) 10 - 35 U/L    Protein Total 5.2 (L) 6.4 - 8.3 g/dL    Albumin 2.7 (L) 3.5 - 5.2 g/dL    Bilirubin Total 2.4 (H) <=1.2 mg/dL    GFR Estimate >90 >60 mL/min/1.73m2   Bilirubin Direct and Total   Result Value Ref Range    Bilirubin Direct 1.68 (H) 0.00 - 0.30 mg/dL    Bilirubin Total 2.4 (H) <=1.2 mg/dL   Attestation:  Patient has been seen and evaluated by me. Need eval of biliary tree before how to approach.  Will start with ERCP and move to PTC if they can't reach liver.  Vital signs, labs, medications and orders were reviewed.   When obtained, diagnostic images were reviewed by me and interpreted as above.    The care plan was discussed with the multidisciplinary team and I agree with the findings and plan in this note, with any differences recorded in blue.    .

## 2023-01-02 NOTE — PHARMACY-ADMISSION MEDICATION HISTORY
Admission Medication History Completed by Pharmacy    See [facility] Admission Navigator for allergy information, preferred outpatient pharmacy, prior to admission medications and immunization status.     Medication History Sources:     Based on med history done at [facility] - by Hope WorthingtonD.......................12/27/2022 7:28 PM    Changes made to PTA medication list (reason):    Added: all medications added this admission    Deleted: None    Changed: None    Additional Information:    Unknown date of last administrations    Prior to Admission medications    Medication Sig Last Dose Taking? Auth Provider Long Term End Date   acetaminophen (TYLENOL) 325 MG tablet Take 325-650 mg by mouth every 6 hours as needed for mild pain Unknown Yes Unknown, Entered By History     calcium carbonate (OS-NASRIN) 500 MG tablet Take 1 tablet by mouth daily Unknown Yes Unknown, Entered By History     LORazepam (ATIVAN) 0.5 MG tablet Take 0.5 mg by mouth daily as needed for anxiety Unknown Yes Reported, Patient     losartan (COZAAR) 100 MG tablet Take 100 mg by mouth daily Unknown Yes Reported, Patient Yes    ondansetron (ZOFRAN ODT) 4 MG ODT tab Take 4 mg by mouth every 8 hours as needed for nausea Unknown Yes Unknown, Entered By History     Pediatric Multiple Vitamins (MULTIVITAMIN CHILDRENS, W/ FA, PO) Take 1 tablet by mouth daily Unknown Yes Unknown, Entered By History     pravastatin (PRAVACHOL) 40 MG tablet Take 40 mg by mouth daily Unknown Yes Reported, Patient Yes    venlafaxine (EFFEXOR XR) 37.5 MG 24 hr capsule Take 112.5 mg by mouth daily   Reported, Patient         Date completed: 01/02/23    Medication history completed by: Enzo Robb [facility]

## 2023-01-02 NOTE — PLAN OF CARE
Goal Outcome Evaluation:      Plan of Care Reviewed With: patient, spouse    Overall Patient Progress: no changeOverall Patient Progress: no change    Outcome Evaluation: Difficulty eating over the last 3 weeks with no PO intake in the last ~6 days.  Monitor PO intake/tolerance, consider nutrition support.

## 2023-01-02 NOTE — PROGRESS NOTES
BP (!) 149/89 (BP Location: Left arm)   Pulse 89   Temp 98.9  F (37.2  C) (Oral)   Resp 18   SpO2 95%     Shift: Arrival on unit at 2130 to 2330  Isolation Status: NA  VS: WDL on RA, afebrile  Neuro: Aox4  Behaviors: pleasant, cooperative with cares, able to make her needs known  BG: NA  Labs: labs pending  Pain/Nausea: denied pain and nausea  PRN: melatonin  Diet: NPO except for ice chips and medications  IV Access: RUE PICC - x-ray verification needed  Infusion(s): NA  Lines/Drains: RJP 1 with scant serous output, RJP2 with 25 mL bilious output, L drain to gravity with no measurable output.  GI/: Upton catheter in place with 430 mL yellow urine output  Skin: Midline incision approximatd with staples, three abdominal drains, small bumps/lesion L lateral upper thigh  Mobility: Assist of 1 with walker, reportedly has not done more than pivot from bed since previous procedure on 12/28  Events/Education: Arrived on unit, skin check and admission complete, settled into room  Plan: Possible ERCP 1/2.

## 2023-01-03 ENCOUNTER — ANESTHESIA (OUTPATIENT)
Dept: SURGERY | Facility: CLINIC | Age: 68
DRG: 919 | End: 2023-01-03
Payer: COMMERCIAL

## 2023-01-03 ENCOUNTER — APPOINTMENT (OUTPATIENT)
Dept: GENERAL RADIOLOGY | Facility: CLINIC | Age: 68
DRG: 919 | End: 2023-01-03
Attending: INTERNAL MEDICINE
Payer: COMMERCIAL

## 2023-01-03 ENCOUNTER — ANESTHESIA EVENT (OUTPATIENT)
Dept: SURGERY | Facility: CLINIC | Age: 68
DRG: 919 | End: 2023-01-03
Payer: COMMERCIAL

## 2023-01-03 LAB
ERCP: NORMAL
ERYTHROCYTE [DISTWIDTH] IN BLOOD BY AUTOMATED COUNT: 12.7 % (ref 10–15)
HCT VFR BLD AUTO: 28.8 % (ref 35–47)
HGB BLD-MCNC: 9.3 G/DL (ref 11.7–15.7)
HOLD SPECIMEN: NORMAL
MAGNESIUM SERPL-MCNC: 1.8 MG/DL (ref 1.7–2.3)
MCH RBC QN AUTO: 29.4 PG (ref 26.5–33)
MCHC RBC AUTO-ENTMCNC: 32.3 G/DL (ref 31.5–36.5)
MCV RBC AUTO: 91 FL (ref 78–100)
PHOSPHATE SERPL-MCNC: 2.9 MG/DL (ref 2.5–4.5)
PLATELET # BLD AUTO: 308 10E3/UL (ref 150–450)
RBC # BLD AUTO: 3.16 10E6/UL (ref 3.8–5.2)
WBC # BLD AUTO: 7.5 10E3/UL (ref 4–11)

## 2023-01-03 PROCEDURE — 250N000011 HC RX IP 250 OP 636: Performed by: NURSE ANESTHETIST, CERTIFIED REGISTERED

## 2023-01-03 PROCEDURE — 250N000009 HC RX 250: Performed by: NURSE ANESTHETIST, CERTIFIED REGISTERED

## 2023-01-03 PROCEDURE — C1769 GUIDE WIRE: HCPCS | Performed by: INTERNAL MEDICINE

## 2023-01-03 PROCEDURE — 36415 COLL VENOUS BLD VENIPUNCTURE: CPT

## 2023-01-03 PROCEDURE — 85018 HEMOGLOBIN: CPT

## 2023-01-03 PROCEDURE — 99223 1ST HOSP IP/OBS HIGH 75: CPT | Mod: 25 | Performed by: PHYSICIAN ASSISTANT

## 2023-01-03 PROCEDURE — 250N000009 HC RX 250: Performed by: INTERNAL MEDICINE

## 2023-01-03 PROCEDURE — C9113 INJ PANTOPRAZOLE SODIUM, VIA: HCPCS

## 2023-01-03 PROCEDURE — 360N000083 HC SURGERY LEVEL 3 W/ FLUORO, PER MIN: Performed by: INTERNAL MEDICINE

## 2023-01-03 PROCEDURE — 120N000011 HC R&B TRANSPLANT UMMC

## 2023-01-03 PROCEDURE — 999N000157 HC STATISTIC RCP TIME EA 10 MIN

## 2023-01-03 PROCEDURE — 83735 ASSAY OF MAGNESIUM: CPT | Performed by: PHYSICIAN ASSISTANT

## 2023-01-03 PROCEDURE — 710N000010 HC RECOVERY PHASE 1, LEVEL 2, PER MIN: Performed by: INTERNAL MEDICINE

## 2023-01-03 PROCEDURE — 250N000025 HC SEVOFLURANE, PER MIN: Performed by: INTERNAL MEDICINE

## 2023-01-03 PROCEDURE — 255N000002 HC RX 255 OP 636: Performed by: INTERNAL MEDICINE

## 2023-01-03 PROCEDURE — 370N000017 HC ANESTHESIA TECHNICAL FEE, PER MIN: Performed by: INTERNAL MEDICINE

## 2023-01-03 PROCEDURE — 258N000003 HC RX IP 258 OP 636: Performed by: NURSE ANESTHETIST, CERTIFIED REGISTERED

## 2023-01-03 PROCEDURE — 250N000011 HC RX IP 250 OP 636

## 2023-01-03 PROCEDURE — 272N000001 HC OR GENERAL SUPPLY STERILE: Performed by: INTERNAL MEDICINE

## 2023-01-03 PROCEDURE — 999N000141 HC STATISTIC PRE-PROCEDURE NURSING ASSESSMENT: Performed by: INTERNAL MEDICINE

## 2023-01-03 PROCEDURE — 84100 ASSAY OF PHOSPHORUS: CPT | Performed by: PHYSICIAN ASSISTANT

## 2023-01-03 PROCEDURE — 258N000003 HC RX IP 258 OP 636

## 2023-01-03 PROCEDURE — 999N000179 XR SURGERY CARM FLUORO LESS THAN 5 MIN W STILLS: Mod: TC

## 2023-01-03 PROCEDURE — 0FJB8ZZ INSPECTION OF HEPATOBILIARY DUCT, VIA NATURAL OR ARTIFICIAL OPENING ENDOSCOPIC: ICD-10-PCS | Performed by: INTERNAL MEDICINE

## 2023-01-03 RX ORDER — HYDROMORPHONE HYDROCHLORIDE 1 MG/ML
0.4 INJECTION, SOLUTION INTRAMUSCULAR; INTRAVENOUS; SUBCUTANEOUS EVERY 5 MIN PRN
Status: DISCONTINUED | OUTPATIENT
Start: 2023-01-03 | End: 2023-01-03 | Stop reason: HOSPADM

## 2023-01-03 RX ORDER — HYDRALAZINE HYDROCHLORIDE 20 MG/ML
2.5-5 INJECTION INTRAMUSCULAR; INTRAVENOUS EVERY 10 MIN PRN
Status: DISCONTINUED | OUTPATIENT
Start: 2023-01-03 | End: 2023-01-03 | Stop reason: HOSPADM

## 2023-01-03 RX ORDER — PROPOFOL 10 MG/ML
INJECTION, EMULSION INTRAVENOUS PRN
Status: DISCONTINUED | OUTPATIENT
Start: 2023-01-03 | End: 2023-01-03

## 2023-01-03 RX ORDER — HALOPERIDOL 5 MG/ML
1 INJECTION INTRAMUSCULAR
Status: DISCONTINUED | OUTPATIENT
Start: 2023-01-03 | End: 2023-01-03 | Stop reason: HOSPADM

## 2023-01-03 RX ORDER — FENTANYL CITRATE 50 UG/ML
50 INJECTION, SOLUTION INTRAMUSCULAR; INTRAVENOUS EVERY 5 MIN PRN
Status: DISCONTINUED | OUTPATIENT
Start: 2023-01-03 | End: 2023-01-03 | Stop reason: HOSPADM

## 2023-01-03 RX ORDER — MEPERIDINE HYDROCHLORIDE 25 MG/ML
12.5 INJECTION INTRAMUSCULAR; INTRAVENOUS; SUBCUTANEOUS EVERY 5 MIN PRN
Status: DISCONTINUED | OUTPATIENT
Start: 2023-01-03 | End: 2023-01-03 | Stop reason: HOSPADM

## 2023-01-03 RX ORDER — FENTANYL CITRATE 50 UG/ML
INJECTION, SOLUTION INTRAMUSCULAR; INTRAVENOUS PRN
Status: DISCONTINUED | OUTPATIENT
Start: 2023-01-03 | End: 2023-01-03

## 2023-01-03 RX ORDER — SODIUM CHLORIDE, SODIUM LACTATE, POTASSIUM CHLORIDE, CALCIUM CHLORIDE 600; 310; 30; 20 MG/100ML; MG/100ML; MG/100ML; MG/100ML
INJECTION, SOLUTION INTRAVENOUS CONTINUOUS
Status: DISCONTINUED | OUTPATIENT
Start: 2023-01-03 | End: 2023-01-03 | Stop reason: HOSPADM

## 2023-01-03 RX ORDER — HYDROMORPHONE HYDROCHLORIDE 1 MG/ML
0.2 INJECTION, SOLUTION INTRAMUSCULAR; INTRAVENOUS; SUBCUTANEOUS EVERY 5 MIN PRN
Status: DISCONTINUED | OUTPATIENT
Start: 2023-01-03 | End: 2023-01-03 | Stop reason: HOSPADM

## 2023-01-03 RX ORDER — INDOMETHACIN 50 MG/1
SUPPOSITORY RECTAL PRN
Status: DISCONTINUED | OUTPATIENT
Start: 2023-01-03 | End: 2023-01-03 | Stop reason: HOSPADM

## 2023-01-03 RX ORDER — ONDANSETRON 2 MG/ML
INJECTION INTRAMUSCULAR; INTRAVENOUS PRN
Status: DISCONTINUED | OUTPATIENT
Start: 2023-01-03 | End: 2023-01-03

## 2023-01-03 RX ORDER — FENTANYL CITRATE 50 UG/ML
25 INJECTION, SOLUTION INTRAMUSCULAR; INTRAVENOUS EVERY 5 MIN PRN
Status: DISCONTINUED | OUTPATIENT
Start: 2023-01-03 | End: 2023-01-03 | Stop reason: HOSPADM

## 2023-01-03 RX ORDER — ONDANSETRON 4 MG/1
4 TABLET, ORALLY DISINTEGRATING ORAL EVERY 30 MIN PRN
Status: DISCONTINUED | OUTPATIENT
Start: 2023-01-03 | End: 2023-01-03 | Stop reason: HOSPADM

## 2023-01-03 RX ORDER — LABETALOL HYDROCHLORIDE 5 MG/ML
10 INJECTION, SOLUTION INTRAVENOUS
Status: DISCONTINUED | OUTPATIENT
Start: 2023-01-03 | End: 2023-01-03 | Stop reason: HOSPADM

## 2023-01-03 RX ORDER — ONDANSETRON 2 MG/ML
4 INJECTION INTRAMUSCULAR; INTRAVENOUS EVERY 30 MIN PRN
Status: DISCONTINUED | OUTPATIENT
Start: 2023-01-03 | End: 2023-01-03 | Stop reason: HOSPADM

## 2023-01-03 RX ORDER — DIAZEPAM 10 MG/2ML
2.5 INJECTION, SOLUTION INTRAMUSCULAR; INTRAVENOUS
Status: DISCONTINUED | OUTPATIENT
Start: 2023-01-03 | End: 2023-01-03 | Stop reason: HOSPADM

## 2023-01-03 RX ORDER — HEPARIN SODIUM 5000 [USP'U]/.5ML
5000 INJECTION, SOLUTION INTRAVENOUS; SUBCUTANEOUS EVERY 8 HOURS
Status: DISCONTINUED | OUTPATIENT
Start: 2023-01-04 | End: 2023-01-11 | Stop reason: HOSPADM

## 2023-01-03 RX ORDER — IOPAMIDOL 510 MG/ML
INJECTION, SOLUTION INTRAVASCULAR PRN
Status: DISCONTINUED | OUTPATIENT
Start: 2023-01-03 | End: 2023-01-03 | Stop reason: HOSPADM

## 2023-01-03 RX ORDER — SODIUM CHLORIDE, SODIUM LACTATE, POTASSIUM CHLORIDE, CALCIUM CHLORIDE 600; 310; 30; 20 MG/100ML; MG/100ML; MG/100ML; MG/100ML
INJECTION, SOLUTION INTRAVENOUS CONTINUOUS PRN
Status: DISCONTINUED | OUTPATIENT
Start: 2023-01-03 | End: 2023-01-03

## 2023-01-03 RX ADMIN — PANTOPRAZOLE SODIUM 40 MG: 40 INJECTION, POWDER, FOR SOLUTION INTRAVENOUS at 20:56

## 2023-01-03 RX ADMIN — PIPERACILLIN AND TAZOBACTAM 3.38 G: 3; .375 INJECTION, POWDER, LYOPHILIZED, FOR SOLUTION INTRAVENOUS at 20:56

## 2023-01-03 RX ADMIN — HEPARIN SODIUM 5000 UNITS: 5000 INJECTION, SOLUTION INTRAVENOUS; SUBCUTANEOUS at 02:24

## 2023-01-03 RX ADMIN — SODIUM CHLORIDE, POTASSIUM CHLORIDE, SODIUM LACTATE AND CALCIUM CHLORIDE: 600; 310; 30; 20 INJECTION, SOLUTION INTRAVENOUS at 14:53

## 2023-01-03 RX ADMIN — HEPARIN SODIUM 5000 UNITS: 5000 INJECTION, SOLUTION INTRAVENOUS; SUBCUTANEOUS at 08:47

## 2023-01-03 RX ADMIN — MIDAZOLAM 2 MG: 1 INJECTION INTRAMUSCULAR; INTRAVENOUS at 14:44

## 2023-01-03 RX ADMIN — PIPERACILLIN AND TAZOBACTAM 3.38 G: 3; .375 INJECTION, POWDER, LYOPHILIZED, FOR SOLUTION INTRAVENOUS at 02:24

## 2023-01-03 RX ADMIN — PANTOPRAZOLE SODIUM 40 MG: 40 INJECTION, POWDER, FOR SOLUTION INTRAVENOUS at 08:41

## 2023-01-03 RX ADMIN — SUGAMMADEX 200 MG: 100 INJECTION, SOLUTION INTRAVENOUS at 15:33

## 2023-01-03 RX ADMIN — FENTANYL CITRATE 100 MCG: 50 INJECTION, SOLUTION INTRAMUSCULAR; INTRAVENOUS at 14:49

## 2023-01-03 RX ADMIN — PROPOFOL 90 MG: 10 INJECTION, EMULSION INTRAVENOUS at 14:51

## 2023-01-03 RX ADMIN — SODIUM CHLORIDE, POTASSIUM CHLORIDE, SODIUM LACTATE AND CALCIUM CHLORIDE: 600; 310; 30; 20 INJECTION, SOLUTION INTRAVENOUS at 20:56

## 2023-01-03 RX ADMIN — ONDANSETRON 4 MG: 2 INJECTION INTRAMUSCULAR; INTRAVENOUS at 15:32

## 2023-01-03 RX ADMIN — PIPERACILLIN AND TAZOBACTAM 3.38 G: 3; .375 INJECTION, POWDER, LYOPHILIZED, FOR SOLUTION INTRAVENOUS at 14:28

## 2023-01-03 RX ADMIN — PIPERACILLIN AND TAZOBACTAM 3.38 G: 3; .375 INJECTION, POWDER, LYOPHILIZED, FOR SOLUTION INTRAVENOUS at 08:41

## 2023-01-03 RX ADMIN — Medication 50 MG: at 14:53

## 2023-01-03 ASSESSMENT — ACTIVITIES OF DAILY LIVING (ADL)
ADLS_ACUITY_SCORE: 28
ADLS_ACUITY_SCORE: 28
ADLS_ACUITY_SCORE: 21
ADLS_ACUITY_SCORE: 28
ADLS_ACUITY_SCORE: 21
ADLS_ACUITY_SCORE: 28

## 2023-01-03 NOTE — CONSULTS
Advanced Endoscopy/Pancreaticobiliary Consultation      Date of Admission:  1/1/2023  Reason for Admission: Bile leak, transferred for hepatobiliary surgery evaluation  Date of Consult  1/3/2023   Requesting Physician:  Desmond Ruano MD           ASSESSMENT AND RECOMMENDATIONS:   Assessment:  67 year old female with PMH of HTN, hyperlipidemia and obesity who presented to OSH 12/10/22 with c/f acute cholecystitis s/p laparoscopic cholecystectomy on 12/11/11. She returned to the OSH ED on 12/27/22 with sepsis and bile peritonitis, underwent exploratory laparotomy, washout and drain placement on 12/28. ERCP on 12/29 showed possible bile duct transection. IR placed 3rd drain (left side) on 12/29/22 d/t biloma.  She was transferred to [facility] on 1/1/23 for hepatobiliary evaluation and management of bile duct injury.  GI AE consulted for ERCP for ongoing bile leak and further eval of bile duct injury.    #. Post sri bile leak with bile peritonitis s/p XL, washout and drain placement  #. Bile duct injury  #. Sepsis r/t biliary peritonitis, improving  #. Elevated liver tests  Ongoing bilious output in percutaneous drains (particularly Drain #2). C/f complete transection of CBD. OSH ERCP images reviewed and cholangiogram did not fill intrahepatics. Also with large leak at the common duct. Planning for repeat ERCP today. Liver tests elevated, mixed pattern likely combination of bile duct injury/transection as well as sepsis and bile leak. On Zosyn for biliary peritonitis. Hepatobiliary surgery team following for possible surgical diversion if unable to treat endoscopically.    #. Moderate malnutrition in the context of acute illness  Poor oral intake with illness and hospitalization over the past month. Dietitian involved today and recommending supplements.     Recommendations:  ERCP today (on OR add on list)  Hold anticoagulation for remainder of the day  Drain management per IR/surgery  Continue NPO status    Analgesia/Antiemetics per primary team  Discussed with primary surgery team JOANN (Lynn Leon PA-C)    Gastroenterology follow up recommendations: TBD    Thank you for involving us in this patient's care. Please do not hesitate to contact the GI service with any questions or concerns.     Pt seen and care plan discussed with Dr. Ardon and Dr. Mann, GI staff physician.        Hortensia Mahajan PA-C  Advanced Endoscopy/Pancreaticobiliary GI Service  [facility]  Text Page  -------------------------------------------------------------------------------------------------------------------       Reason for Consultation:   Bile duct injury           History of Present Illness:   Patient seen and examined at 1130. History is obtained from the patient and her  at bedside.    Zeinab Bermudez is a 67 year old female with a PMH significant for HTN, hyperlipidemia who initially presented to OSH on 12/10/2022 for abdominal pain, found to have acute cholecystitis, underwent laparoscopic cholecystectomy on 12/11/22. She returned to the hospital on 12/27 per recommendation from surgeon because of fatigue, poor oral intake and severe abdominal pain. Found to have septic shock r/t bile peritonitis/bile leak, required vasopressors, went for lap washout (2.5L of bile in the abdomen distributed into complex partially walled off bilomas, two drains left). Underwent ERCP (below) with c/f complete CBD transection. Additional IR perc drain placed on 12/29 for biloma management. Transferred to [facility] for further evaluation and hepatobiliary surgery consultation.    Currently the patient reports moderate abdominal pain but hasn't been taking any opioids. Her nutrition has been poor and likely has been losing weight. Denies nausea or vomiting. No fevers or chills today. Denies prior abdominal surgical history.         Previous Procedures:  ERCP 12/29/2022 ([facility], Dr Yohan Rangel)  1.   Large biliary leak at the level of the common bile duct insertion. Nonopacification of the intrahepatic ducts.              Past Medical History:   Reviewed and edited as appropriate  Past Medical History:   Diagnosis Date     Covid 2019 Positive 12/11/2022     Depressive disorder      HLD (hyperlipidemia)      Hypertension      SAH (subarachnoid hemorrhage) (H) 2017            Past Surgical History:   Reviewed and edited as appropriate   Past Surgical History:   Procedure Laterality Date     HYSTERECTOMY  2005     LAPAROSCOPIC CHOLECYSTECTOMY  12/11/2022     LAPAROTOMY EXPLORATORY  12/28/2022              Social History:   The patient lives in [place], [place] with her spouse  Employer: retired           Allergies:   Reviewed and edited as appropriate   No Known Allergies         Medications:     Current Facility-Administered Medications   Medication     artificial saliva (BIOTENE DRY MOUTHWASH) liquid 15 mL     calcium carbonate (TUMS) chewable tablet 500-1,000 mg     glucose gel 15-30 g    Or     dextrose 50 % injection 25-50 mL    Or     glucagon injection 1 mg     heparin ANTICOAGULANT injection 5,000 Units     heparin lock flush 10 UNIT/ML injection 5-20 mL     heparin lock flush 10 UNIT/ML injection 5-20 mL     HYDROmorphone (DILAUDID) injection 0.2-0.4 mg     ketorolac (TORADOL) injection 15 mg     lactated ringers infusion     LORazepam (ATIVAN) tablet 0.5 mg     melatonin sublingual tablet 5 mg     naloxone (NARCAN) injection 0.2 mg    Or     naloxone (NARCAN) injection 0.4 mg    Or     naloxone (NARCAN) injection 0.2 mg    Or     naloxone (NARCAN) injection 0.4 mg     pantoprazole (PROTONIX) IV push injection 40 mg     piperacillin-tazobactam (ZOSYN) 3.375 g vial to attach to  mL bag     polyethylene glycol (MIRALAX) Packet 17 g     senna-docusate (SENOKOT-S/PERICOLACE) 8.6-50 MG per tablet 1 tablet     sodium chloride (PF) 0.9% PF flush 10-20 mL     sodium chloride (PF) 0.9% PF flush 10-20 mL      sodium chloride (PF) 0.9% PF flush 10-40 mL     sodium chloride (PF) 0.9% PF flush 10-40 mL     [Held by provider] venlafaxine (EFFEXOR XR) 24 hr capsule 112.5 mg               Physical Exam:   Temp: 97.7  F (36.5  C) Temp src: Oral BP: 133/86 Pulse: 91   Resp: 18 SpO2: 96 % O2 Device: None (Room air)    Wt:   Wt Readings from Last 2 Encounters:   01/02/23 82.2 kg (181 lb 3.2 oz)        General: Pleasant female in NAD.  Answers appropriately.    HEENT: Head is AT/NC. Sclera mildly icteric. No conjunctival injection.  Oropharynx is clear, moist and w/o exudate or lesions.  Neck: No masses or thyromegaly.  Lungs: Clear to auscultation bilaterally.  No wheezes, rhonchi or crackles.    Heart: Regular rate and rhythm.  No murmurs, gallops or rubs.  Normal S1 and S2.  Abdomen: Soft, distended, mild tenderness upper abdomen, RUQ MELITA bulb Drain #1 with serosanguinous/bile tinged fluid, RUQ MELITA Drain  bulb #2 with large amount of dark bilious, MELITA Drain #3 LUQ with serous fluid  Skin: mild jaundice  Neurologic: Grossly non-focal.  CN 2-12 grossly intact.            Data:   Labs and imaging below were independently reviewed and interpreted    LAB WORK:    BMP  Recent Labs   Lab 01/02/23  0749 01/01/23  2220    136   POTASSIUM 3.8 3.7   CHLORIDE 100 100   NASRIN 8.4* 8.3*   CO2 22 22   BUN 6.9* 6.4*   CR 0.61 0.65   GLC 73 69*     CBC  Recent Labs   Lab 01/03/23  0612 01/02/23  0749 01/01/23  2220   WBC 7.5 7.0 7.9   RBC 3.16* 3.12* 3.05*   HGB 9.3* 9.3* 9.2*   HCT 28.8* 28.6* 28.1*   MCV 91 92 92   MCH 29.4 29.8 30.2   MCHC 32.3 32.5 32.7   RDW 12.7 12.4 12.1    312 299     INR  Recent Labs   Lab 01/01/23  2340   INR 1.15     LFTs  Recent Labs   Lab 01/02/23  0749 01/01/23  2220   ALKPHOS 355* 366*   AST 57* 60*   * 354*   BILITOTAL 2.4* 2.4*  2.4*   PROTTOTAL 5.1* 5.2*   ALBUMIN 2.7* 2.7*      PANCNo lab results found in last 7 days.    IMAGING:  EXAMINATION: CT ABDOMEN PELVIS W CONTRAST  1/2/2023 3:43 PM        HISTORY: Re-eval fluid collections, concern for CBD injury     COMPARISON: Outside CT abdomen and pelvis 12/29/2022     PROCEDURE COMMENTS: CT of the abdomen and pelvis was performed with  111 mL Isovue-370 intravenous contrast. Coronal and sagittal  reformatted images were obtained.     FINDINGS:  Lower thorax:   Slightly decreased bilateral pleural effusions, greater on the left  with associated compressive atelectasis.     Abdomen and pelvis:  Unchanged 2.3 cm hypoattenuating cyst in the right hepatic dome.  Unchanged geographic hypoattenuation of the right hepatic lobe. Stable  surgical drains terminating over the right hepatic dome and near the  gallbladder fossa with slightly decreased subcapsular fluid  collection. Interval placement of a new surgical drain within the  fluid collection along the greater curvature of the stomach which has  significantly decreased in size since prior exam of 12/29/2022 with  the largest pocket measuring 5.5 x 2.4 cm, previously measuring 10.9 x  9.4 cm when measured in a similar fashion. Cholecystectomy. Mild fatty  atrophy of the pancreas. The spleen and adrenal glands are normal in  appearance. Symmetric renal cortical enhancement. No hydronephrosis or  nephrolithiasis. The bladder is incompletely distended and contains a  Upton catheter and a small amount of intracystic gas, likely due to  recent catheterization. Decreased small volume simple ascites within  the abdomen and pelvis. Decreased inflammatory changes about the  duodenum and throughout the colon. There are no abnormally dilated  loops of large and small bowel. Oral contrast is seen throughout the  distal small bowel and throughout the colon. Multiple prominent but  nonenlarged retroperitoneal and portacaval lymph nodes. The major  intra-abdominal vasculature is widely patent. The infrarenal aorta is  normal in caliber. Small pneumoperitoneum, similar to prior possibly  from  intervention.     Bones:  Multilevel degenerative changes of the spine. No suspicious or  aggressive appearing bone lesions.                                                                      IMPRESSION:  1. Interval placement of surgical drain within the fluid collection  along the greater curvature of the stomach which has significantly  decreased in size compared to prior exam of 12/29/2022.  2. Additional surgical drains are unchanged in position with slight  decrease in the subhepatic fluid collection. No new fluid collections.  3. Decreased small volume simple ascites.  4. Decreased bilateral pleural effusions, greater on the left.  5. Decreased inflammatory changes about the duodenum and throughout  the colon.  6. Slight decrease in geographic appearing hepatic hypoattenuation in  the the inferior right lobe, possibly posttraumatic/inflammatory. May  consider attention on follow-up.        =======================================================================

## 2023-01-03 NOTE — PLAN OF CARE
"RN [id number]:    /86 (BP Location: Left arm)   Pulse 91   Temp 97.7  F (36.5  C) (Oral)   Resp 18   Ht 1.575 m (5' 2\")   Wt 82.2 kg (181 lb 3.2 oz)   SpO2 96%   BMI 33.14 kg/m      Status: admitted on 1/1/23 for hepatobiliary evaluation and management of bile duct injury  Pain/Nausea: denies pain and nausea  Mobility: SBA  Diet: NPO  Labs: reviewed  LDAs: MELITA x 3, R TL PICC  Skin/incisions: no new deficits found  Neuro: AOX4  Respiratory: WNL on room air  Cardiac: WNL  GI/: voids spontaneously without difficulty. Last BM 1/2  New Changes: patient left for ERCP today at 1320  Plan: continue with plan of care when patient gets back from procedure  "

## 2023-01-03 NOTE — CONSULTS
"    Interventional Radiology Consult Service Note  23     Consult Requested: Evaluate for drain repositioning s/p bile duct inury    67 year old female with history of lap CCY for acute cholecystitis 2022 c/b bile leak/sepsis/peritonitis necessitating ex lap, washout and two surgical MELITA on 2022.    Patient underwent ERCP  which showed possible bile duct transection and IR placed left sided pigtail drain on 2022 into biloma.      Patient transferred to [facility] 23 for management of bile duct injury.      Denies any trauma to drain, leaking, fevers or chills.      O:  /86 (BP Location: Left arm)   Pulse 91   Temp 97.7  F (36.5  C) (Oral)   Resp 18   Ht 1.575 m (5' 2\")   Wt 82.2 kg (181 lb 3.2 oz)   SpO2 96%   BMI 33.14 kg/m    General:  Stable.  In no acute distress.   at bedside  Neuro:  A&O x 3. Moves all extremities equally.  Abdomen:  Abdominal binder in place.  Soft, non-distended, non-tender  Drain:  Dressing CDI, retention suture intact.  accorddian collection bag with small amount of bilious fluid.          IMAGIN2023:  CT abdomen pelvis reviewed.      LABS:  Lab Results   Component Value Date    WBC 7.5 2023    HGB 9.3 (L) 2023    HCT 28.8 (L) 2023    MCV 91 2023     2023       Drain Outputs      A:  S/p IR aspiration and drainage catheter placement into perigastric fluid collection/biloma on 2022 at [facility] transferred here for management of bile leak.    Abdominal binder taken down and catheter oriented in a cephalad position and constricted by binder.  3 way stopcock and new MELITA bulb attached.  Catheter flushes and aspirates well. Catheter re-affixed to drain by gravity.       P:    -  Biloma Drain Management (with RN in room)              -Continue to flush 10 ml normal saline TID. Orders are in.                -if catheter appears clogged:                     -Flush 10 ml toward the patient " then aspirate and push fluid toward MELITA bulb.                           Always clear the line with 5-10 ml normal saline after aspiration.              -if leaking around the catheter, ok to flush 3-5ml toward the catheter BID.                -Record NET output under I/Os.  -can consider IR sinogram and or drain reposition if there is no output from drain after 24 hours.           Rani Nicolas PA-C  Physician Assistant  Interventional Radiology   Pager: [phone]             Statement Selected

## 2023-01-03 NOTE — PROGRESS NOTES
Transplant Surgery  Inpatient Daily Progress Note  01/03/2023    Assessment & Plan: Zeinab Bermudez is a 67 year old female s/p laparoscopic cholecystectomy on 12/11/11. She presented to the OSH ED on 12/27/22 with sepsis and bile peritonitis, underwent exploratory laparotomy, washout and drain placement on 12/28. ERCP on 12/29 showed possible bile duct transection. IR placed 3rd drain (left side) on 12/29/22 d/t biloma. She was transferred to [facility] on 1/1/23 for hepatobiliary evaluation and management of bile duct injury.      Cardiorespiratory: VSS  -Hx of HTN, HLD. Hold PTA medications.    Hematology:   Anemia: Presented to OSH with Hgb 13, 2 weeks PTA Hgb was 15. Hgb 9.3 (9.2). ERCP finding duodenal ulcer with oozing. Monitor Hgb. Transfuse for Hgb < 7. Continue PPI.   GI/Nutrition:   S/p sri complicated by bile duct injury, bile leak, intra-abdominal Infection: 12/11/22 status post cholecystectomy c/b sepsis and bile peritonitis with exploratory laparotomy, washout and drain placement on 12/28. ERCP on 12/29 showed possible bile duct transection. IR placed 3rd drain (left side) on 12/29/22 d/t biloma. Continue 2 surgical drains to suction (right) and IR drain to gravity on admission, changed to suction by IR (left). Bilious output from drains, minimal amount.   -1/2 CT scan with IV/PO contrast  -1/3 IR consulted and can consider IR sinogram and or drain reposition if there is no output from drain in next 24 hours.      -1/3 ERCP   -Continue antibiotic, see ID.   -TB 2.4, , , AST 57.   Moderate malnutrition in the context of acute illness: Nutrition consult. Regular diet with supplements after ERCP.   Fluid/Electrolytes: MIVF: LR 75 ml/hr. Replete electrolyte PRN  Endocrine: No issues  : Removed [place] (placed OSH)  Infectious disease: AVSS, WBC 7. Continue Zosyn.   Sepsis and bile peritonitis: Weaned off pressor 12/30. 12/29 fluid gram stain no organism, 1+ PMNs, 1+RBC; fluid bacterial culture no  "growth (will be loaded into care everywhere).   COVID-19: 12/10 COVID-19 positive at OSH, repeat 1/2 positive with CT 42.8. Currently asymptomatic.   Neuro:   Acute pain: dilaudid IV PRN  Depression with anxiety: Restart PTA venlafaxine 24hr once patient tolerating diet (per pt request). Ativan PO TID PRN anxiety.  Prophylaxis: Heparin subcutaneous ppx-on hold for ERCP today   Activity: PT/OT  Disposition: 7A    Medical Decision Making: Medium  Subsequent visit 84729 (moderate level decision making)    JOANN/Fellow/Resident Provider: Lynn Leon PA-C, [id number]    Faculty: Nelson Richard M.D.    __________________________________________________________________  Transplant History: Admitted 1/1/2023 for bile duct injury.    Interval History: History is obtained from the patient  Overnight events: Abdominal pain control fair. Denies nausea. No SOB.     ROS:   A 10-point review of systems was negative except as noted above.    Meds:    [Auto Hold] heparin ANTICOAGULANT  5,000 Units Subcutaneous Q8H     [Auto Hold] heparin lock flush  5-20 mL Intracatheter Q24H     [Auto Hold] pantoprazole  40 mg Intravenous BID     [Auto Hold] piperacillin-tazobactam  3.375 g Intravenous Q6H     [Auto Hold] sodium chloride (PF)  10-40 mL Intracatheter Q8H     [Auto Hold] sodium chloride (PF)  10-40 mL Intracatheter Q8H     [Held by provider] venlafaxine  112.5 mg Oral Daily       Physical Exam:     Admit Weight: 82.2 kg (181 lb 3.2 oz)    Current vitals:   /86 (BP Location: Left arm)   Pulse 91   Temp 97.7  F (36.5  C) (Oral)   Resp 18   Ht 1.575 m (5' 2\")   Wt 82.2 kg (181 lb 3.2 oz)   SpO2 96%   BMI 33.14 kg/m           Vital sign ranges:    Temp:  [97.7  F (36.5  C)-97.8  F (36.6  C)] 97.7  F (36.5  C)  Pulse:  [] 91  Resp:  [18] 18  BP: (132-136)/(74-86) 133/86  SpO2:  [95 %-96 %] 96 %  Patient Vitals for the past 24 hrs:   BP Temp Temp src Pulse Resp SpO2   01/03/23 1027 133/86 97.7  F (36.5  C) Oral 91 18 96 " %   01/03/23 0552 136/74 97.7  F (36.5  C) Oral 87 18 95 %   01/02/23 1809 132/84 97.8  F (36.6  C) Oral 101 18 95 %     General Appearance: in no apparent distress.   Skin: normal, dry  Heart: regular rate and rhythm  Lungs: NLB on RA  Abdomen: The abdomen is soft, generalized ttp, no peritoneal signs. MELITA x 2 to suction on right side, bilious output. IR pigtail drain to gravity on left side, bilious output. The wound is Healing well, no signs of infection.  : good UOP  Extremities: edema: absent.  Neurologic: awake, alert and oriented.     Data:   CMP  Recent Labs   Lab 01/03/23  0612 01/02/23  1351 01/02/23  0749 01/01/23  2220   NA  --   --  136 136   POTASSIUM  --   --  3.8 3.7   CHLORIDE  --   --  100 100   CO2  --   --  22 22   GLC  --   --  73 69*   BUN  --   --  6.9* 6.4*   CR  --   --  0.61 0.65   GFRESTIMATED  --   --  >90 >90   NASRIN  --   --  8.4* 8.3*   ICAW  --   --   --  4.5   MAG 1.8  --   --  1.8   PHOS 2.9  --   --  2.9   ALBUMIN  --   --  2.7* 2.7*   BILITOTAL  --   --  2.4* 2.4*  2.4*   ALKPHOS  --   --  355* 366*   AST  --   --  57* 60*   ALT  --   --  325* 354*   FAMY  --  5.0  --   --      CBC  Recent Labs   Lab 01/03/23  0612 01/02/23  0749   HGB 9.3* 9.3*   WBC 7.5 7.0    312     COAGS  Recent Labs   Lab 01/01/23  2340   INR 1.15   PTT 31      Urinalysis  No lab results found.

## 2023-01-03 NOTE — ANESTHESIA CARE TRANSFER NOTE
Patient: Zeinab Bermudez    Procedure: Procedure(s):  ENDOSCOPIC RETROGRADE CHOLANGIOPANCREATOGRAPHY       Diagnosis: Injury of bile duct, initial encounter [S36.13XA]  Diagnosis Additional Information: No value filed.    Anesthesia Type:   General     Note:    Oropharynx: oropharynx clear of all foreign objects  Level of Consciousness: awake  Oxygen Supplementation: nasal cannula    Independent Airway: airway patency satisfactory and stable  Dentition: dentition unchanged  Vital Signs Stable: post-procedure vital signs reviewed and stable  Report to RN Given: handoff report given  Patient transferred to: PACU    Handoff Report: Identifed the Patient, Identified the Reponsible Provider, Reviewed the pertinent medical history, Discussed the surgical course, Reviewed Intra-OP anesthesia mangement and issues during anesthesia, Set expectations for post-procedure period and Allowed opportunity for questions and acknowledgement of understanding      Vitals:  Vitals Value Taken Time   /60 01/03/23 1546   Temp 36.4  C (97.6  F) 01/03/23 1546   Pulse 100 01/03/23 1554   Resp 14 01/03/23 1554   SpO2 98 % 01/03/23 1554   Vitals shown include unvalidated device data.    Electronically Signed By: TOMY Mcdaniel CRNA  January 3, 2023  3:55 PM

## 2023-01-03 NOTE — OP NOTE
ERCP 01/03/2023  2:28 PM [address] [place] [address] [phone]     Endoscopy Department   _______________________________________________________________________________   Patient Name: Zeinab Bermudez           Procedure Date: 1/3/2023 2:28 PM   MRN: [id number]                       Account Number: [id number]   YOB: 1955              Admit Type: Inpatient   Age: 67                               Room:  OR    Gender: Female                        Note Status: Finalized   Attending MD: KENJI VIRGEN MD  Total Sedation Time:   _______________________________________________________________________________       Procedure:             ERCP   Indications:           Follow-up of bile leak, Treatment of bile leak   Providers:             KNEJI VIRGEN MD, Jennifer Vanderheyden   Patient Profile:       Ms Parisi is a 68yo woman transferred for management                          of a bile leak following a complex cholecystectomy.                          She has surgical drains and a recently placed IR                          drain. Previous ERCP suggested transection. She now                          proceeds to repeat ERCP for further evaluation and                          management.   Referring MD:          AJIME FERNANDEZ MD   Requesting Provider:   ADEEL NUGENT MD   Medicines:             Indomethacin 100 mg AK, General Anesthesia,                          Antibiotics as scheduled   Complications:         No immediate complications.   _______________________________________________________________________________   Procedure:             Pre-Anesthesia Assessment:                          - Prior to the procedure, a History and Physical was                          performed, and patient medications and allergies were                          reviewed. The patient is competent. The risks and                           benefits of the procedure and the sedation options and                          risks were discussed with the patient. All questions                          were answered and informed consent was obtained.                          Patient identification and proposed procedure were                          verified by the nurse in the pre-procedure area.                          Mental Status Examination: alert and oriented. Airway                          Examination: Mallampati Class II (the uvula but not                          tonsillar pillars visualized). Respiratory                          Examination: clear to auscultation. CV Examination:                          normal. ASA Grade Assessment: III - A patient with                          severe systemic disease. After reviewing the risks and                          benefits, the patient was deemed in satisfactory                          condition to undergo the procedure. The anesthesia                          plan was to use general anesthesia. Immediately prior                          to administration of medications, the patient was                          re-assessed for adequacy to receive sedatives. The                          heart rate, respiratory rate, oxygen saturations,                          blood pressure, adequacy of pulmonary ventilation, and                          response to care were monitored throughout the                          procedure. The physical status of the patient was                          re-assessed after the procedure. After obtaining                          informed consent, the scope was passed under direct                          vision. Throughout the procedure, the patient's blood                          pressure, pulse, and oxygen saturations were monitored                          continuously. The duodenoscope was introduced through                          the mouth, and used to  "inject contrast into and used                          to inject contrast into the bile duct. The ERCP was                          accomplished without difficulty. The patient tolerated                          the procedure well.                                                                                     Findings:        The patient was under general and prone. Limited white light imaging of        the foregut demonstrated a very large nonbleeding ulceraton of the sweep        without stigmata. There was a preexisting biliary sphincterotomy. The        bile duct was selectively deeply cannulated with the short-nosed        traction sphincterotome in concert with an 0.025\" Visiglide wire.        Contrast was injected and I personally interpreted the bile duct images.        Ductal flow of contrast was adequate, image quality was excellent and        contrast extended only to the common bile duct at the level of two        horizontal clips. There was significant extravasation of contrast        originating just below the clips. Neither the wire nor the contrast        injected under pressure delineated duct upstream of the clips. No        intraheptics opacified.                                                                                     Impression:            - Massive duodenal sweep ulceration                          - Patent biliary sphincterotomy                          - A complete transection is suspected at the level of                          two horizontal clips seemingly placed across the                          common duct with a marked defect just below, perhaps                          secondary to an original injury or erosion of the                          surgical drain   Recommendation:        - General anesthesia recovery with return to the floor                          when appropriate                          - All medications may resume without delay, as may her             "              previous diet and activity                          - Twice daily PPI for at least the next 30 days                          - A PTC may be consider as either a bridge to surgery                          and or possible means to recannulation though this                          will be difficult as the intrahepatics are likely                          decompressed                          - Our team will sign off but be happy to reconsult as                          requested                          - The findings and recommendations were discussed with                          the patient and their family                                                                                       electronically signed by PASCUAL Mann

## 2023-01-03 NOTE — ANESTHESIA PREPROCEDURE EVALUATION
Anesthesia Pre-Procedure Evaluation    Patient: Zeinab Bermudez   MRN: [id number] : 1955        Procedure : Procedure(s):  ENDOSCOPIC RETROGRADE CHOLANGIOPANCREATOGRAPHY          Past Medical History:   Diagnosis Date     Covid 2019 Positive 2022     Depressive disorder      HLD (hyperlipidemia)      Hypertension      SAH (subarachnoid hemorrhage) (H) 2017      Past Surgical History:   Procedure Laterality Date     HYSTERECTOMY  2005     LAPAROSCOPIC CHOLECYSTECTOMY  2022     LAPAROTOMY EXPLORATORY  2022      No Known Allergies   Social History     Tobacco Use     Smoking status: Never     Smokeless tobacco: Never   Substance Use Topics     Alcohol use: Not Currently      Wt Readings from Last 1 Encounters:   23 82.2 kg (181 lb 3.2 oz)        Anesthesia Evaluation   Pt has had prior anesthetic. Type: General.    No history of anesthetic complications       ROS/MED HX  ENT/Pulmonary:     (+) recent URI, resolved, Covid-19 12/10/22:     Neurologic: Comment: Hx SAH       Cardiovascular:     (+) hypertension-----    METS/Exercise Tolerance:     Hematologic:       Musculoskeletal:       GI/Hepatic: Comment: Bile leak after cholecystectomy and transection of CBD.  Concern for hepatic artery injury.      Renal/Genitourinary:       Endo:     (+) Obesity,     Psychiatric/Substance Use:     (+) psychiatric history anxiety and depression     Infectious Disease:       Malignancy:       Other:            Physical Exam    Airway        Mallampati: II   TM distance: > 3 FB   Neck ROM: full   Mouth opening: > 3 cm    Respiratory Devices and Support         Dental  no notable dental history         Cardiovascular   cardiovascular exam normal          Pulmonary   pulmonary exam normal                OUTSIDE LABS:  CBC:   Lab Results   Component Value Date    WBC 7.5 2023    WBC 7.0 2023    HGB 9.3 (L) 2023    HGB 9.3 (L) 2023    HCT 28.8 (L) 2023    HCT 28.6 (L)  01/02/2023     01/03/2023     01/02/2023     BMP:   Lab Results   Component Value Date     01/02/2023     01/01/2023    POTASSIUM 3.8 01/02/2023    POTASSIUM 3.7 01/01/2023    CHLORIDE 100 01/02/2023    CHLORIDE 100 01/01/2023    CO2 22 01/02/2023    CO2 22 01/01/2023    BUN 6.9 (L) 01/02/2023    BUN 6.4 (L) 01/01/2023    CR 0.61 01/02/2023    CR 0.65 01/01/2023    GLC 73 01/02/2023    GLC 69 (L) 01/01/2023     COAGS:   Lab Results   Component Value Date    PTT 31 01/01/2023    INR 1.15 01/01/2023     POC: No results found for: BGM, HCG, HCGS  HEPATIC:   Lab Results   Component Value Date    ALBUMIN 2.7 (L) 01/02/2023    PROTTOTAL 5.1 (L) 01/02/2023     (H) 01/02/2023    AST 57 (H) 01/02/2023    ALKPHOS 355 (H) 01/02/2023    BILITOTAL 2.4 (H) 01/02/2023     OTHER:   Lab Results   Component Value Date    NASRIN 8.4 (L) 01/02/2023    PHOS 2.9 01/03/2023    MAG 1.8 01/03/2023       Anesthesia Plan    ASA Status:  2   NPO Status:  NPO Appropriate    Anesthesia Type: General.     - Airway: ETT   Induction: Intravenous, Propofol.           Consents    Anesthesia Plan(s) and associated risks, benefits, and realistic alternatives discussed. Questions answered and patient/representative(s) expressed understanding.    - Discussed:     - Discussed with:  Patient      - Extended Intubation/Ventilatory Support Discussed: No.      - Patient is DNR/DNI Status: No    Use of blood products discussed: No .     Postoperative Care    Pain management: IV analgesics.   PONV prophylaxis: Ondansetron (or other 5HT-3), Dexamethasone or Solumedrol     Comments:           H&P reviewed: Unable to attach H&P to encounter due to EHR limitations. H&P Update: appropriate H&P reviewed, patient examined. No interval changes since H&P (within 30 days).         Wai Clemons MD

## 2023-01-03 NOTE — ANESTHESIA PROCEDURE NOTES
Airway       Patient location during procedure: OR       Procedure Start/Stop Times: 1/3/2023 2:59 PM  Staff -        CRNA: Marion Hall APRN CRNA       Performed By: CRNAIndications and Patient Condition       Induction type:intravenous       Mask difficulty assessment: 1 - vent by mask    Final Airway Details       Final airway type: endotracheal airway       Successful airway: ETT - single  Endotracheal Airway Details        ETT size (mm): 7.0       Cuffed: yes       Successful intubation technique: direct laryngoscopy       DL Blade Type: Rodriguez 2       Grade View of Cords: 1       Adjucts: stylet    Post intubation assessment        Placement verified by: capnometry, equal breath sounds and chest rise        Number of attempts at approach: 1       Number of other approaches attempted: 0       Secured with: pink tape       Ease of procedure: easy       Dentition: Intact and Unchanged    Medication(s) Administered   Medication Administration Time: 1/3/2023 2:59 PM

## 2023-01-03 NOTE — PROGRESS NOTES
CLINICAL NUTRITION SERVICES - BRIEF NOTE    See RD note from 1/2/2023 for full assessment.      Nutrition Prescription    Malnutrition Status:    Moderate malnutrition in the context of acute illness    Recommendations already ordered by Registered Dietitian (RD):  Oral supplement list provided, pt can order as desired.     Continue Ensure Enlive BID in addition when diet allowed.        EVALUATION OF THE PROGRESS TOWARD GOALS   Diet: NPO for procedures    Intake: Pt tolerated 100% of a turkey burger and bites of fruit/vegetables.  Also drank 1 Ensure and 4 oz of cranberry juice.       INTERVENTIONS  Spoke with patient/ re: PO tolerance.  She reports an improvement in her tolerance compared to prior to drain placement.  Not experiencing as much fullness.  She is hungry.  Provided list of oral supplement options, discussed that she may tolerate and absorb lower fat items better.      Monitoring/Evaluation  Progress toward goals will be monitored and evaluated per protocol.     Nilda Max, MS, RD, LD, CCTD, CNSC  7A/Obs unit pager [phone]  Weekend pager [phone]

## 2023-01-03 NOTE — PLAN OF CARE
"/84 (BP Location: Left arm)   Pulse 101   Temp 97.8  F (36.6  C) (Oral)   Resp 18   Ht 1.575 m (5' 2\")   Wt 82.2 kg (181 lb 3.2 oz)   SpO2 95%   BMI 33.14 kg/m      Shift: [id number]  Isolation Status: NA  Diet: NPO @ 0000 for IR procedure.  LDA: R TL PICC, 2 R JPs, L Bile drain  Infusion(s): LR @ 75mL/hr.  VS: AVSS on RA.  Pain/Nausea/PRN: No reported pain or nausea.  Lab: Alk Phos 355, , Bilirubin 2.4.  BG: NA  Neuro: A&O x4. Calls appropriately.  Behaviors: Calm, cooperative, and pleasant  Respiratory: Regular depth and pattern; unlabored; expansion symmetrical; breath sounds clear and equal bilaterally; no cough  Cardiac: Regular rhythm, S1, S2; no reported chest pain  GI/: LBM: 1/2. Voiding adequately. Upton removed yesterday. PVRs were both negative.  Skin: Drains CDI, Abdominal incision stapled and CHARLES.  Mobility: Up w/ SBA and a walker.  Plan: ERCP today.      "

## 2023-01-04 ENCOUNTER — APPOINTMENT (OUTPATIENT)
Dept: PHYSICAL THERAPY | Facility: CLINIC | Age: 68
DRG: 919 | End: 2023-01-04
Attending: SURGERY
Payer: COMMERCIAL

## 2023-01-04 LAB
ALBUMIN SERPL BCG-MCNC: 2.4 G/DL (ref 3.5–5.2)
ALP SERPL-CCNC: 247 U/L (ref 35–104)
ALT SERPL W P-5'-P-CCNC: 188 U/L (ref 10–35)
ANION GAP SERPL CALCULATED.3IONS-SCNC: 9 MMOL/L (ref 7–15)
AST SERPL W P-5'-P-CCNC: 58 U/L (ref 10–35)
BILIRUB DIRECT SERPL-MCNC: 1.16 MG/DL (ref 0–0.3)
BILIRUB SERPL-MCNC: 1.9 MG/DL
BUN SERPL-MCNC: 9.6 MG/DL (ref 8–23)
CALCIUM SERPL-MCNC: 7.9 MG/DL (ref 8.8–10.2)
CHLORIDE SERPL-SCNC: 110 MMOL/L (ref 98–107)
CREAT SERPL-MCNC: 0.72 MG/DL (ref 0.51–0.95)
DEPRECATED HCO3 PLAS-SCNC: 23 MMOL/L (ref 22–29)
ERYTHROCYTE [DISTWIDTH] IN BLOOD BY AUTOMATED COUNT: 13.3 % (ref 10–15)
GFR SERPL CREATININE-BSD FRML MDRD: >90 ML/MIN/1.73M2
GLUCOSE SERPL-MCNC: 91 MG/DL (ref 70–99)
HCT VFR BLD AUTO: 28 % (ref 35–47)
HGB BLD-MCNC: 8.9 G/DL (ref 11.7–15.7)
MAGNESIUM SERPL-MCNC: 2 MG/DL (ref 1.7–2.3)
MCH RBC QN AUTO: 29.9 PG (ref 26.5–33)
MCHC RBC AUTO-ENTMCNC: 31.8 G/DL (ref 31.5–36.5)
MCV RBC AUTO: 94 FL (ref 78–100)
PHOSPHATE SERPL-MCNC: 2.8 MG/DL (ref 2.5–4.5)
PLATELET # BLD AUTO: 260 10E3/UL (ref 150–450)
POTASSIUM SERPL-SCNC: 3.5 MMOL/L (ref 3.4–5.3)
PROT SERPL-MCNC: 4.7 G/DL (ref 6.4–8.3)
RBC # BLD AUTO: 2.98 10E6/UL (ref 3.8–5.2)
SODIUM SERPL-SCNC: 142 MMOL/L (ref 136–145)
WBC # BLD AUTO: 7.6 10E3/UL (ref 4–11)

## 2023-01-04 PROCEDURE — 250N000013 HC RX MED GY IP 250 OP 250 PS 637: Performed by: INTERNAL MEDICINE

## 2023-01-04 PROCEDURE — 83735 ASSAY OF MAGNESIUM: CPT | Performed by: INTERNAL MEDICINE

## 2023-01-04 PROCEDURE — 85027 COMPLETE CBC AUTOMATED: CPT | Performed by: INTERNAL MEDICINE

## 2023-01-04 PROCEDURE — 250N000011 HC RX IP 250 OP 636: Performed by: PHYSICIAN ASSISTANT

## 2023-01-04 PROCEDURE — 97530 THERAPEUTIC ACTIVITIES: CPT | Mod: GP

## 2023-01-04 PROCEDURE — 258N000003 HC RX IP 258 OP 636: Performed by: INTERNAL MEDICINE

## 2023-01-04 PROCEDURE — 36592 COLLECT BLOOD FROM PICC: CPT | Performed by: INTERNAL MEDICINE

## 2023-01-04 PROCEDURE — 250N000011 HC RX IP 250 OP 636: Performed by: INTERNAL MEDICINE

## 2023-01-04 PROCEDURE — 84100 ASSAY OF PHOSPHORUS: CPT | Performed by: INTERNAL MEDICINE

## 2023-01-04 PROCEDURE — C9113 INJ PANTOPRAZOLE SODIUM, VIA: HCPCS | Performed by: INTERNAL MEDICINE

## 2023-01-04 PROCEDURE — 97116 GAIT TRAINING THERAPY: CPT | Mod: GP

## 2023-01-04 PROCEDURE — 36415 COLL VENOUS BLD VENIPUNCTURE: CPT | Performed by: INTERNAL MEDICINE

## 2023-01-04 PROCEDURE — 120N000011 HC R&B TRANSPLANT UMMC

## 2023-01-04 PROCEDURE — 80053 COMPREHEN METABOLIC PANEL: CPT | Performed by: INTERNAL MEDICINE

## 2023-01-04 PROCEDURE — 99232 SBSQ HOSP IP/OBS MODERATE 35: CPT | Performed by: TRANSPLANT SURGERY

## 2023-01-04 PROCEDURE — 82248 BILIRUBIN DIRECT: CPT | Performed by: SURGERY

## 2023-01-04 RX ORDER — VENLAFAXINE HYDROCHLORIDE 37.5 MG/1
112.5 CAPSULE, EXTENDED RELEASE ORAL DAILY
Status: DISCONTINUED | OUTPATIENT
Start: 2023-01-04 | End: 2023-01-11 | Stop reason: HOSPADM

## 2023-01-04 RX ADMIN — HEPARIN SODIUM 5000 UNITS: 5000 INJECTION, SOLUTION INTRAVENOUS; SUBCUTANEOUS at 23:39

## 2023-01-04 RX ADMIN — PIPERACILLIN AND TAZOBACTAM 3.38 G: 3; .375 INJECTION, POWDER, LYOPHILIZED, FOR SOLUTION INTRAVENOUS at 15:04

## 2023-01-04 RX ADMIN — PIPERACILLIN AND TAZOBACTAM 3.38 G: 3; .375 INJECTION, POWDER, LYOPHILIZED, FOR SOLUTION INTRAVENOUS at 21:16

## 2023-01-04 RX ADMIN — PANTOPRAZOLE SODIUM 40 MG: 40 INJECTION, POWDER, FOR SOLUTION INTRAVENOUS at 21:15

## 2023-01-04 RX ADMIN — PANTOPRAZOLE SODIUM 40 MG: 40 INJECTION, POWDER, FOR SOLUTION INTRAVENOUS at 10:14

## 2023-01-04 RX ADMIN — SODIUM CHLORIDE, PRESERVATIVE FREE 10 ML: 5 INJECTION INTRAVENOUS at 23:39

## 2023-01-04 RX ADMIN — SODIUM CHLORIDE, POTASSIUM CHLORIDE, SODIUM LACTATE AND CALCIUM CHLORIDE: 600; 310; 30; 20 INJECTION, SOLUTION INTRAVENOUS at 09:17

## 2023-01-04 RX ADMIN — HEPARIN SODIUM 5000 UNITS: 5000 INJECTION, SOLUTION INTRAVENOUS; SUBCUTANEOUS at 17:46

## 2023-01-04 RX ADMIN — PIPERACILLIN AND TAZOBACTAM 3.38 G: 3; .375 INJECTION, POWDER, LYOPHILIZED, FOR SOLUTION INTRAVENOUS at 04:07

## 2023-01-04 RX ADMIN — PIPERACILLIN AND TAZOBACTAM 3.38 G: 3; .375 INJECTION, POWDER, LYOPHILIZED, FOR SOLUTION INTRAVENOUS at 10:19

## 2023-01-04 RX ADMIN — Medication 5 MG: at 22:35

## 2023-01-04 ASSESSMENT — ACTIVITIES OF DAILY LIVING (ADL)
ADLS_ACUITY_SCORE: 28

## 2023-01-04 NOTE — PLAN OF CARE
VS: stable  Neuro: A&O  Mobility: up assist x1  Discomfort: no complaints of pain or nausea   LDAs: PIV intact with MIVF and Anbx      MELITA #1 - yellow output, MELITA #2 - brown output, Drain #3 - yellow output.       Abdominal incision intact   Labs: hgb 8.9  : voiding  GI: last BM 1/3  Plan:  PTC procedure 1/6

## 2023-01-04 NOTE — PROGRESS NOTES
Immunosuppression Management Note:    Zeinab Bermudez is a 67 year old female who is seen today  for immunosuppression management     INelson MD, I have examined the patient with our JOANN/Fellow as part of a shared visit.   I participated in the rounds,  discussed and agree with the note and findings and  reviewed today's vital signs, medications, labs and imaging as noted in this note.  I  reviewed the  immunosuppression medications.  I personally provided a substantive portion of the care of this patient. I personally performed the immunosuppressive management of this patient, reviewed the overall  immunosuppression including drug levels, allograft function and provided the recommendations to adjust the dose to provide optimal levels to prevent rejection of the allograft and prevent toxicity to the organs. This was complex care due to the fresh allograft.      I spoke to the patient/family and explained below clinical details and answered all the questions    Transplant Surgery  Inpatient Daily Progress Note  01/04/2023    Assessment & Plan: Zeinab Bermudez is a 67 year old female s/p laparoscopic cholecystectomy on 12/11/11. She presented to the OSH ED on 12/27/22 with sepsis and bile peritonitis, underwent exploratory laparotomy, washout and drain placement on 12/28. ERCP on 12/29 showed possible bile duct transection. IR placed 3rd drain (left side) on 12/29/22 d/t biloma. She was transferred to [facility] on 1/1/23 for hepatobiliary evaluation and management of bile duct injury.      Cardiorespiratory: VSS  Hx HTN/HLD: Hold PTA medications.      Hematology:   Acute blood loss anemia: Presented to OSH with Hgb 13, 2 weeks PTA Hgb was 15. Hgb now ~9. ERCP finding duodenal ulcer with oozing. Monitor, transfuse for Hgb < 7. Continue PPI BID.     GI/Nutrition:   S/p sri complicated by bile duct injury, bile leak, intra-abdominal Infection: 12/11/22 status post cholecystectomy c/b sepsis and bile  peritonitis with exploratory laparotomy, washout and drain placement on 12/28. ERCP on 12/29 showed possible bile duct transection. IR placed 3rd drain (left side) on 12/29/22 d/t biloma. Continue 2 surgical drains to suction (right) and IR drain to gravity on admission, changed to suction by IR (left). Bilious output from drains, minimal amount.   -1/2 CT scan with IV/PO contrast  -1/3 IR consulted and can consider IR sinogram and or drain reposition if there is no output from drain in next 24 hours.      -1/3 ERCP: Complete transection is suspected at the level of two horizontal clips seemingly placed across the common duct with a marked defect just below. Consider PTC as a bridge to surgery.   -Continue antibiotic, see ID  -Tbili 1.9 (2.4), Alk Phos 247 (355),  (325), AST 58 (57)  -Plan for PTC placement 1/6  Moderate malnutrition in the context of acute illness: Nutrition consult. Regular diet with supplements.   Duodenal ulcer: Continue PPI BID x 30 days.     Fluid/Electrolytes: MIVF: LR 75 ml/hr. Replete electrolyte PRN    Endocrine: No issues    : No issues    Infectious disease: Afebrile, WBC 7  Sepsis and bile peritonitis: Weaned off pressor 12/30. 12/29 fluid gram stain no organism, 1+ PMNs, 1+RBC; fluid bacterial culture no growth (will be loaded into care everywhere). Continue Zosyn.   COVID-19: 12/10 COVID-19 positive at OSH, repeat 1/2 positive with CT 42.8. Currently asymptomatic. Recovered.     Neuro/Psych:  Acute pain: Stop dilaudid IV PRN (not using).   Depression with anxiety: Restart PTA venlafaxine 24hr once. Ativan PO TID PRN anxiety.    Prophylaxis: Heparin subcutaneous ppx    Activity: PT/OT    Disposition: 7A; PT recommending home with outpatient PT.     Medical Decision Making: Medium  Subsequent visit 22268 (moderate level decision making)    JOANN/Fellow/Resident Provider: Nilda Ruiz, NP [id number]    Faculty: Nelson Richard  "M.D.    __________________________________________________________________  Transplant History: Admitted 1/1/2023 for bile duct injury.    Interval History: History is obtained from the patient  Overnight events: No acute events overnight. Denies pain/nausea. Hungry.     ROS:   A 10-point review of systems was negative except as noted above.    Meds:   heparin ANTICOAGULANT  5,000 Units Subcutaneous Q8H    heparin lock flush  5-20 mL Intracatheter Q24H    pantoprazole  40 mg Intravenous BID    piperacillin-tazobactam  3.375 g Intravenous Q6H    sodium chloride (PF)  10 mL Irrigation Q8H    sodium chloride (PF)  10-40 mL Intracatheter Q8H    sodium chloride (PF)  10-40 mL Intracatheter Q8H       Physical Exam:     Admit Weight: 82.2 kg (181 lb 3.2 oz)    Current vitals:   /60 (BP Location: Left arm)   Pulse 95   Temp 98.5  F (36.9  C) (Oral)   Resp 20   Ht 1.575 m (5' 2\")   Wt 82.2 kg (181 lb 3.2 oz)   SpO2 96%   BMI 33.14 kg/m           Vital sign ranges:    Temp:  [97.6  F (36.4  C)-98.5  F (36.9  C)] 98.5  F (36.9  C)  Pulse:  [] 95  Resp:  [14-22] 20  BP: (105-130)/(60-83) 105/60  SpO2:  [93 %-98 %] 96 %  Patient Vitals for the past 24 hrs:   BP Temp Temp src Pulse Resp SpO2   01/04/23 1415 105/60 98.5  F (36.9  C) Oral 95 20 96 %   01/04/23 1039 105/64 97.6  F (36.4  C) Oral 82 20 93 %   01/04/23 0140 109/66 98  F (36.7  C) Oral 80 22 96 %   01/03/23 2126 113/64 97.8  F (36.6  C) Oral 85 22 95 %   01/03/23 1743 130/83 98.2  F (36.8  C) Axillary 81 18 95 %   01/03/23 1630 116/70 -- -- 91 16 97 %   01/03/23 1624 -- -- -- -- -- 96 %   01/03/23 1615 121/77 -- -- 96 18 96 %   01/03/23 1600 117/69 -- -- 97 22 98 %   01/03/23 1546 111/60 97.6  F (36.4  C) Oral 104 14 97 %     General Appearance: in no apparent distress.   Skin: normal, dry  Heart: regular rate and rhythm  Lungs: NLB on RA  Abdomen: The abdomen is soft, generalized ttp, no peritoneal signs. MELITA x 2 to suction on right side, bilious " output. IR pigtail drain to gravity on left side, bilious output. The wound is Healing well, no signs of infection.  : good UOP  Extremities: edema: absent.  Neurologic: awake, alert and oriented.     Data:   CMP  Recent Labs   Lab 01/04/23  0626 01/03/23  0612 01/02/23  1351 01/02/23  0749 01/01/23  2220     --   --  136 136   POTASSIUM 3.5  --   --  3.8 3.7   CHLORIDE 110*  --   --  100 100   CO2 23  --   --  22 22   GLC 91  --   --  73 69*   BUN 9.6  --   --  6.9* 6.4*   CR 0.72  --   --  0.61 0.65   GFRESTIMATED >90  --   --  >90 >90   NASRIN 7.9*  --   --  8.4* 8.3*   ICAW  --   --   --   --  4.5   MAG 2.0 1.8  --   --  1.8   PHOS 2.8 2.9  --   --  2.9   ALBUMIN 2.4*  --   --  2.7* 2.7*   BILITOTAL 1.9*  --   --  2.4* 2.4*  2.4*   ALKPHOS 247*  --   --  355* 366*   AST 58*  --   --  57* 60*   *  --   --  325* 354*   FAMY  --   --  5.0  --   --      CBC  Recent Labs   Lab 01/04/23  0626 01/03/23  0612   HGB 8.9* 9.3*   WBC 7.6 7.5    308     COAGS  Recent Labs   Lab 01/01/23  2340   INR 1.15   PTT 31      Urinalysis  No lab results found.

## 2023-01-04 NOTE — PLAN OF CARE
"/66 (BP Location: Left arm)   Pulse 80   Temp 98  F (36.7  C) (Oral)   Resp 22   Ht 1.575 m (5' 2\")   Wt 82.2 kg (181 lb 3.2 oz)   SpO2 96%   BMI 33.14 kg/m      Shift: [id number]  Isolation Status: NA  Diet: NPO @ 0000 for IR procedure.  LDA: R TL PICC, 2 R JPs, L MELITA w/ stopcock for q8h irrigation.  Infusion(s): LR @ 75mL/hr.  VS: AVSS on RA.  Pain/Nausea/PRN: No reported pain or nausea.  Lab:   BG: NA  Neuro: A&O x4. Calls appropriately.  Behaviors: Calm, cooperative, and pleasant  Respiratory: Regular depth and pattern; unlabored; expansion symmetrical; breath sounds clear and equal bilaterally; no cough  Cardiac: Regular rhythm, S1, S2; no reported chest pain  GI/: LBM: 1/3. Voiding adequately. Upton removed yesterday. PVRs were both negative.  Skin: Drains CDI, Abdominal incision stapled and CHARLES.  Mobility: Up w/ SBA and a walker.  Plan: PTC today.  "

## 2023-01-04 NOTE — CONSULTS
"    Interventional Radiology Consult Service Note  01/04/23     Patient is on IR schedule Friday 1/6/2023 with anesthesia for a IR biliary drain placement  Labs WNL for procedure. COVID record  Orders entered for procedure, NPO, and antibiotics (already on IV zosyn) have been entered.   Medications to be held include: subcutaneous heparin   Consent will be done prior to procedure.     Please contact the IR [place] RN at [phone] for estimated time of procedure.     Case discussed with IR attending Dr. Jozef Burton MD.     67 year old female with history of lap CCY for acute cholecystitis 12/11/2022 c/b bile leak/sepsis/peritonitis necessitating ex lap, washout and two surgical MELITA on 12/28/2022.     Patient underwent ERCP 12/29 which showed possible bile duct transection and IR placed left sided pigtail drain on 12/29/2022 into biloma.       Patient transferred to [facility] 1/1/23 for management of bile duct injury.      ERCP 1/3/2022 (Dr. Mann) demonstrated:  \"  Ductal flow of contrast was adequate, image quality was excellent and        contrast extended only to the common bile duct at the level of two        horizontal clips. There was significant extravasation of contrast        originating just below the clips. \"     Expected date of discharge: TBD    Vitals:   /64 (BP Location: Left arm)   Pulse 82   Temp 97.6  F (36.4  C) (Oral)   Resp 20   Ht 1.575 m (5' 2\")   Wt 82.2 kg (181 lb 3.2 oz)   SpO2 93%   BMI 33.14 kg/m      Pertinent Labs:     Lab Results   Component Value Date    WBC 7.6 01/04/2023    WBC 7.5 01/03/2023    WBC 7.0 01/02/2023     Lab Results   Component Value Date    HGB 8.9 01/04/2023    HGB 9.3 01/03/2023    HGB 9.3 01/02/2023     Lab Results   Component Value Date     01/04/2023     01/03/2023     01/02/2023     Lab Results   Component Value Date    INR 1.15 01/01/2023    PTT 31 01/01/2023     Lab Results   Component Value Date    POTASSIUM 3.5 01/04/2023    "   COVID-19 Antibody Results, Testing for Immunity    COVID-19 Antibody Results, Testing for Immunity   No data to display.         COVID-19 PCR Results    COVID-19 PCR Results 12/10/22 1/2/23   COVID-19 Virus by PCR (External Result) Positive (A)    SARS CoV2 PCR  Positive (A)   Cycle threshold  42.8   (A) Abnormal value       Comments are available for some flowsheets but are not being displayed.             Rani Nicolas PALeoncioC  Interventional Radiology  Pager: [phone]

## 2023-01-05 ENCOUNTER — APPOINTMENT (OUTPATIENT)
Dept: PHYSICAL THERAPY | Facility: CLINIC | Age: 68
DRG: 919 | End: 2023-01-05
Attending: SURGERY
Payer: COMMERCIAL

## 2023-01-05 LAB
ALBUMIN SERPL BCG-MCNC: 2.7 G/DL (ref 3.5–5.2)
ALP SERPL-CCNC: 236 U/L (ref 35–104)
ALT SERPL W P-5'-P-CCNC: 155 U/L (ref 10–35)
ANION GAP SERPL CALCULATED.3IONS-SCNC: 10 MMOL/L (ref 7–15)
AST SERPL W P-5'-P-CCNC: 45 U/L (ref 10–35)
BILIRUB DIRECT SERPL-MCNC: 1.09 MG/DL (ref 0–0.3)
BILIRUB FLD-MCNC: 4.3 MG/DL
BILIRUB SERPL-MCNC: 1.7 MG/DL
BILIRUBIN BODY FLUID SOURCE: NORMAL
BUN SERPL-MCNC: 9.7 MG/DL (ref 8–23)
CALCIUM SERPL-MCNC: 8.1 MG/DL (ref 8.8–10.2)
CHLORIDE SERPL-SCNC: 108 MMOL/L (ref 98–107)
CREAT SERPL-MCNC: 0.75 MG/DL (ref 0.51–0.95)
DEPRECATED HCO3 PLAS-SCNC: 22 MMOL/L (ref 22–29)
ERYTHROCYTE [DISTWIDTH] IN BLOOD BY AUTOMATED COUNT: 13.8 % (ref 10–15)
GFR SERPL CREATININE-BSD FRML MDRD: 88 ML/MIN/1.73M2
GLUCOSE SERPL-MCNC: 100 MG/DL (ref 70–99)
HCT VFR BLD AUTO: 28.6 % (ref 35–47)
HGB BLD-MCNC: 8.9 G/DL (ref 11.7–15.7)
MAGNESIUM SERPL-MCNC: 2.1 MG/DL (ref 1.7–2.3)
MCH RBC QN AUTO: 29.9 PG (ref 26.5–33)
MCHC RBC AUTO-ENTMCNC: 31.1 G/DL (ref 31.5–36.5)
MCV RBC AUTO: 96 FL (ref 78–100)
PHOSPHATE SERPL-MCNC: 2.7 MG/DL (ref 2.5–4.5)
PLATELET # BLD AUTO: 279 10E3/UL (ref 150–450)
POTASSIUM SERPL-SCNC: 3.3 MMOL/L (ref 3.4–5.3)
PROT SERPL-MCNC: 5 G/DL (ref 6.4–8.3)
RBC # BLD AUTO: 2.98 10E6/UL (ref 3.8–5.2)
SODIUM SERPL-SCNC: 140 MMOL/L (ref 136–145)
WBC # BLD AUTO: 7.1 10E3/UL (ref 4–11)

## 2023-01-05 PROCEDURE — 83735 ASSAY OF MAGNESIUM: CPT | Performed by: INTERNAL MEDICINE

## 2023-01-05 PROCEDURE — 82247 BILIRUBIN TOTAL: CPT | Performed by: PHYSICIAN ASSISTANT

## 2023-01-05 PROCEDURE — 250N000011 HC RX IP 250 OP 636: Performed by: INTERNAL MEDICINE

## 2023-01-05 PROCEDURE — 97530 THERAPEUTIC ACTIVITIES: CPT | Mod: GP

## 2023-01-05 PROCEDURE — 120N000011 HC R&B TRANSPLANT UMMC

## 2023-01-05 PROCEDURE — 84100 ASSAY OF PHOSPHORUS: CPT | Performed by: INTERNAL MEDICINE

## 2023-01-05 PROCEDURE — 97116 GAIT TRAINING THERAPY: CPT | Mod: GP

## 2023-01-05 PROCEDURE — 36415 COLL VENOUS BLD VENIPUNCTURE: CPT | Performed by: INTERNAL MEDICINE

## 2023-01-05 PROCEDURE — 250N000013 HC RX MED GY IP 250 OP 250 PS 637: Performed by: NURSE PRACTITIONER

## 2023-01-05 PROCEDURE — 250N000013 HC RX MED GY IP 250 OP 250 PS 637: Performed by: INTERNAL MEDICINE

## 2023-01-05 PROCEDURE — 250N000011 HC RX IP 250 OP 636: Performed by: PHYSICIAN ASSISTANT

## 2023-01-05 PROCEDURE — 99232 SBSQ HOSP IP/OBS MODERATE 35: CPT | Performed by: TRANSPLANT SURGERY

## 2023-01-05 PROCEDURE — C9113 INJ PANTOPRAZOLE SODIUM, VIA: HCPCS | Performed by: INTERNAL MEDICINE

## 2023-01-05 PROCEDURE — 82248 BILIRUBIN DIRECT: CPT | Performed by: NURSE PRACTITIONER

## 2023-01-05 PROCEDURE — 250N000013 HC RX MED GY IP 250 OP 250 PS 637: Performed by: PHYSICIAN ASSISTANT

## 2023-01-05 PROCEDURE — 85027 COMPLETE CBC AUTOMATED: CPT | Performed by: INTERNAL MEDICINE

## 2023-01-05 RX ORDER — POTASSIUM CHLORIDE 750 MG/1
40 TABLET, EXTENDED RELEASE ORAL ONCE
Status: COMPLETED | OUTPATIENT
Start: 2023-01-05 | End: 2023-01-05

## 2023-01-05 RX ORDER — URSODIOL 300 MG/1
300 CAPSULE ORAL 2 TIMES DAILY
Status: DISCONTINUED | OUTPATIENT
Start: 2023-01-05 | End: 2023-01-11 | Stop reason: HOSPADM

## 2023-01-05 RX ADMIN — POTASSIUM CHLORIDE 40 MEQ: 750 TABLET, EXTENDED RELEASE ORAL at 10:28

## 2023-01-05 RX ADMIN — AMOXICILLIN AND CLAVULANATE POTASSIUM 1 TABLET: 875; 125 TABLET, FILM COATED ORAL at 12:31

## 2023-01-05 RX ADMIN — SODIUM CHLORIDE, PRESERVATIVE FREE 5 ML: 5 INJECTION INTRAVENOUS at 23:59

## 2023-01-05 RX ADMIN — PIPERACILLIN AND TAZOBACTAM 3.38 G: 3; .375 INJECTION, POWDER, LYOPHILIZED, FOR SOLUTION INTRAVENOUS at 02:21

## 2023-01-05 RX ADMIN — PIPERACILLIN AND TAZOBACTAM 3.38 G: 3; .375 INJECTION, POWDER, LYOPHILIZED, FOR SOLUTION INTRAVENOUS at 08:37

## 2023-01-05 RX ADMIN — URSODIOL 300 MG: 300 CAPSULE ORAL at 21:29

## 2023-01-05 RX ADMIN — HEPARIN SODIUM 5000 UNITS: 5000 INJECTION, SOLUTION INTRAVENOUS; SUBCUTANEOUS at 08:36

## 2023-01-05 RX ADMIN — URSODIOL 300 MG: 300 CAPSULE ORAL at 12:31

## 2023-01-05 RX ADMIN — HEPARIN SODIUM 5000 UNITS: 5000 INJECTION, SOLUTION INTRAVENOUS; SUBCUTANEOUS at 17:33

## 2023-01-05 RX ADMIN — VENLAFAXINE HYDROCHLORIDE 112.5 MG: 37.5 CAPSULE, EXTENDED RELEASE ORAL at 08:34

## 2023-01-05 RX ADMIN — PANTOPRAZOLE SODIUM 40 MG: 40 INJECTION, POWDER, FOR SOLUTION INTRAVENOUS at 08:36

## 2023-01-05 RX ADMIN — POLYETHYLENE GLYCOL 3350 17 G: 17 POWDER, FOR SOLUTION ORAL at 08:34

## 2023-01-05 RX ADMIN — SENNOSIDES AND DOCUSATE SODIUM 1 TABLET: 8.6; 5 TABLET ORAL at 08:34

## 2023-01-05 RX ADMIN — AMOXICILLIN AND CLAVULANATE POTASSIUM 1 TABLET: 875; 125 TABLET, FILM COATED ORAL at 21:29

## 2023-01-05 RX ADMIN — LORAZEPAM 0.5 MG: 0.5 TABLET ORAL at 08:34

## 2023-01-05 RX ADMIN — PANTOPRAZOLE SODIUM 40 MG: 40 INJECTION, POWDER, FOR SOLUTION INTRAVENOUS at 21:29

## 2023-01-05 ASSESSMENT — ACTIVITIES OF DAILY LIVING (ADL)
ADLS_ACUITY_SCORE: 28

## 2023-01-05 NOTE — PROGRESS NOTES
Immunosuppression Management Note:    Zeinab Bermudez is a 67 year old female who is seen today  for immunosuppression management     INelson MD, I have examined the patient with our JOANN/Fellow as part of a shared visit.   I participated in the rounds,  discussed and agree with the note and findings and  reviewed today's vital signs, medications, labs and imaging as noted in this note.  I  reviewed the  immunosuppression medications.  I personally provided a substantive portion of the care of this patient. I personally performed the immunosuppressive management of this patient, reviewed the overall  immunosuppression including drug levels, allograft function and provided the recommendations to adjust the dose to provide optimal levels to prevent rejection of the allograft and prevent toxicity to the organs. This was complex care due to the fresh allograft.      I spoke to the patient/family and explained below clinical details and answered all the questions    Transplant Surgery  Inpatient Daily Progress Note  01/05/2023    Assessment & Plan: Zeinab Bermudez is a 67 year old female s/p laparoscopic cholecystectomy on 12/11/11. She presented to the OSH ED on 12/27/22 with sepsis and bile peritonitis, underwent exploratory laparotomy, washout and drain placement on 12/28. ERCP on 12/29 showed possible bile duct transection. IR placed 3rd drain (left side) on 12/29/22 d/t biloma. She was transferred to [facility] on 1/1/23 for hepatobiliary evaluation and management of bile duct injury.      Cardiorespiratory: VSS  Hx HTN/HLD: Hold PTA medications.      Hematology:   Anemia: Presented to OSH with Hgb 13, 2 weeks PTA Hgb was 15. Hgb now ~9. ERCP finding duodenal ulcer with oozing. Monitor, transfuse for Hgb < 7. Continue PPI BID.     GI/Nutrition:   S/p sri complicated by bile duct injury, bile leak, intra-abdominal Infection: 12/11/22 status post cholecystectomy c/b sepsis and bile peritonitis with  exploratory laparotomy, washout and drain placement on 12/28. ERCP on 12/29 showed possible bile duct transection. IR placed 3rd drain (left side) on 12/29/22 d/t biloma. Continue 2 surgical drains to suction (right) and IR drain to gravity on admission, changed to suction by IR (left). Bilious output from drains, minimal amount, 0.7 L total MELITA output yesterday.    -1/2 CT scan with IV/PO contrast  -1/3 IR consulted and can consider IR sinogram and or drain reposition if there is no output from drain in next 24 hours.      -1/3 ERCP: Complete transection is suspected at the level of two horizontal clips seemingly placed across the common duct with a marked defect just below. Consider PTC as a bridge to surgery.   -Change from Zosyn to Augmentin  -Tbili 1.7<-1.9 (2.4), Alk Phos 236<-247 (355), <-188 (325), AST 45<-58 (57)  -US abdomen to evaluate fluid collections  -Plan for PTC placement 1/6  -Send left MELITA for fluid bilirubin today  Moderate malnutrition in the context of acute illness: Nutrition consult. Regular diet with supplements.   Duodenal ulcer: Continue PPI BID x 30 days.     Fluid/Electrolytes: MIVF: LR 75 ml/hr.   Hypokalemia: replace    Endocrine: No issues    : No issues    Infectious disease: Afebrile, WBC ~7  Sepsis and bile peritonitis: Weaned off pressor 12/30. 12/29 fluid gram stain no organism, 1+ PMNs, 1+RBC; fluid bacterial culture no growth (will be loaded into care everywhere). Zosyn started on admission, change to Augmentin today.   COVID-19: 12/10 COVID-19 positive at OSH, repeat 1/2 positive with CT 42.8. Currently asymptomatic. Recovered.     Neuro/Psych:  Acute pain: Stopped dilaudid IV PRN (not using).   Depression with anxiety: Restart PTA venlafaxine 24hr once. Ativan PO TID PRN anxiety.    Prophylaxis: Heparin subcutaneous ppx    Activity: PT/OT    Disposition: 7A; PT recommending home with outpatient PT.     Medical Decision Making: Medium  Subsequent visit 09019 (moderate  "level decision making)    JOANN/Fellow/Resident Provider: Lynn Leon PA-C [id number]    Faculty: Nelson Richard M.D.    __________________________________________________________________  Transplant History: Admitted 1/1/2023 for bile duct injury.    Interval History: History is obtained from the patient  Overnight events: No acute events overnight. Denies pain/nausea. Hungry.     ROS:   A 10-point review of systems was negative except as noted above.    Meds:   heparin ANTICOAGULANT  5,000 Units Subcutaneous Q8H    heparin lock flush  5-20 mL Intracatheter Q24H    pantoprazole  40 mg Intravenous BID    piperacillin-tazobactam  3.375 g Intravenous Q6H    sodium chloride (PF)  10 mL Irrigation Q8H    sodium chloride (PF)  10-40 mL Intracatheter Q8H    sodium chloride (PF)  10-40 mL Intracatheter Q8H    ursodiol  300 mg Oral BID    venlafaxine  112.5 mg Oral Daily       Physical Exam:     Admit Weight: 82.2 kg (181 lb 3.2 oz)    Current vitals:   /73 (BP Location: Left arm)   Pulse 73   Temp 97.6  F (36.4  C) (Oral)   Resp 15   Ht 1.575 m (5' 2\")   Wt 82.2 kg (181 lb 3.2 oz)   SpO2 96%   BMI 33.14 kg/m           Vital sign ranges:    Temp:  [97.6  F (36.4  C)-98.5  F (36.9  C)] 97.6  F (36.4  C)  Pulse:  [73-95] 73  Resp:  [15-20] 15  BP: ()/(60-81) 116/73  SpO2:  [93 %-98 %] 96 %  Patient Vitals for the past 24 hrs:   BP Temp Temp src Pulse Resp SpO2   01/05/23 0647 116/73 97.6  F (36.4  C) Oral 73 15 96 %   01/05/23 0220 114/61 98  F (36.7  C) Oral 78 18 93 %   01/04/23 2133 117/69 98.5  F (36.9  C) Oral 89 20 94 %   01/04/23 1900 93/81 98.4  F (36.9  C) Oral 88 20 98 %   01/04/23 1415 105/60 98.5  F (36.9  C) Oral 95 20 96 %     General Appearance: in no apparent distress.   Skin: normal, dry  Heart: regular rate and rhythm  Lungs: NLB on RA  Abdomen: The abdomen is soft, generalized ttp, no peritoneal signs. MELITA x 2 to suction on right side, bilious output. IR pigtail drain to gravity on left " side, bilious output. The wound is Healing well, no signs of infection.  : good UOP  Extremities: edema: absent.  Neurologic: awake, alert and oriented.     Data:   CMP  Recent Labs   Lab 01/05/23  0611 01/04/23  0626 01/03/23  0612 01/02/23  1351 01/02/23  0749 01/01/23  2220    142  --   --    < > 136   POTASSIUM 3.3* 3.5  --   --    < > 3.7   CHLORIDE 108* 110*  --   --    < > 100   CO2 22 23  --   --    < > 22   * 91  --   --    < > 69*   BUN 9.7 9.6  --   --    < > 6.4*   CR 0.75 0.72  --   --    < > 0.65   GFRESTIMATED 88 >90  --   --    < > >90   NASRIN 8.1* 7.9*  --   --    < > 8.3*   ICAW  --   --   --   --   --  4.5   MAG 2.1 2.0   < >  --   --  1.8   PHOS 2.7 2.8   < >  --   --  2.9   ALBUMIN 2.7* 2.4*  --   --    < > 2.7*   BILITOTAL 1.7* 1.9*  --   --    < > 2.4*  2.4*   ALKPHOS 236* 247*  --   --    < > 366*   AST 45* 58*  --   --    < > 60*   * 188*  --   --    < > 354*   FAMY  --   --   --  5.0  --   --     < > = values in this interval not displayed.     CBC  Recent Labs   Lab 01/05/23  0611 01/04/23  0626   HGB 8.9* 8.9*   WBC 7.1 7.6    260     COAGS  Recent Labs   Lab 01/01/23  2340   INR 1.15   PTT 31      Urinalysis  No lab results found.

## 2023-01-05 NOTE — PLAN OF CARE
"BP 93/81 (BP Location: Left arm)   Pulse 88   Temp 98.4  F (36.9  C) (Oral)   Resp 20   Ht 1.575 m (5' 2\")   Wt 82.2 kg (181 lb 3.2 oz)   SpO2 98%   BMI 33.14 kg/m         0249-3731  AVSS on RA. Denies pain and nausea. Up with assist of one and walker. Adequate urine output. No BM today; but has been passing flatus. Regular diet this evening- tolerating food well.  by bedside- good support system; has permission to stay overnight. Right double lumen PICC saline locked. Receiving intermittent antibiotics. Abdominal incision- stapled and remains open to air. No drainage. 2 Right MELITA's to bulb suction. 1st MELITA bulb- small yellow output; 2nd MELITA bulb moderate output; remains brown. Left MELITA bulb to bulb suction with a stop cock; flushing Q8 hrs- per MD order. small output; yellow. Plan is NPO tomm night at midnight (1/5) in anticipating of having a percutaneous transhepatic cholangiography completed on 1/6. Patient and  are aware of plan. Continue plan of care, please notify MD with any changes.                           "

## 2023-01-05 NOTE — PLAN OF CARE
"/61 (BP Location: Left arm)   Pulse 78   Temp 98  F (36.7  C) (Oral)   Resp 18   Ht 1.575 m (5' 2\")   Wt 82.2 kg (181 lb 3.2 oz)   SpO2 93%   BMI 33.14 kg/m      Shift: 3076-6825  VS: VSS on RA, afebrile  Neuro: AOx4  BG: none  Labs: awaiting am labs  Respiratory: WNL  Pain/Nausea/PRN: denies nausea, not complaints of pain   Diet: regular, low appetite  LDA: PICC with TKO for abx, 2 grey lumens hep locked         2 R MELITA- one with generous brown output, other with minimal yellow         L drain with yellow/brown output, irrigating every shift  GI/: voids, LBM 1/2, passing gas  Skin: dressing change on bulb 2 x1,other dressings CDI  Mobility: SBA with walker  Plan: plan for IR drain placement 1/6    Handoff given to following RN.                          "

## 2023-01-05 NOTE — PLAN OF CARE
"/65 (BP Location: Left arm)   Pulse 81   Temp 97.9  F (36.6  C) (Oral)   Resp 18   Ht 1.575 m (5' 2\")   Wt 83.6 kg (184 lb 3.2 oz)   SpO2 98%   BMI 33.69 kg/m     Neuro: A/Ox3. C/o anxiety and had PRN Ativan   VS: VSS on RA  Pain: Denies  GI: On regular diet and tolerating good. Denies nausea. BMx1  : Voiding without difficulty  Skin/Drain: Abdominal incision intact, RLQ JPx2 intact with green/brown drainage, LLQ drain with serous drainage and sample sent to lab  R PICC intact SL  Activity - SBA with walker  Education - Infection prevention  Plan of Care - NPO after midnight for IR procedure and Abd ultrasound. Continue with POC    "

## 2023-01-06 ENCOUNTER — ANESTHESIA EVENT (OUTPATIENT)
Dept: SURGERY | Facility: CLINIC | Age: 68
DRG: 919 | End: 2023-01-06
Payer: COMMERCIAL

## 2023-01-06 ENCOUNTER — APPOINTMENT (OUTPATIENT)
Dept: ULTRASOUND IMAGING | Facility: CLINIC | Age: 68
DRG: 919 | End: 2023-01-06
Attending: NURSE PRACTITIONER
Payer: COMMERCIAL

## 2023-01-06 ENCOUNTER — ANESTHESIA (OUTPATIENT)
Dept: SURGERY | Facility: CLINIC | Age: 68
DRG: 919 | End: 2023-01-06
Payer: COMMERCIAL

## 2023-01-06 ENCOUNTER — APPOINTMENT (OUTPATIENT)
Dept: INTERVENTIONAL RADIOLOGY/VASCULAR | Facility: CLINIC | Age: 68
DRG: 919 | End: 2023-01-06
Attending: PHYSICIAN ASSISTANT
Payer: COMMERCIAL

## 2023-01-06 LAB
ABO/RH(D): NORMAL
ALBUMIN SERPL BCG-MCNC: 2.8 G/DL (ref 3.5–5.2)
ALP SERPL-CCNC: 218 U/L (ref 35–104)
ALT SERPL W P-5'-P-CCNC: 121 U/L (ref 10–35)
ANION GAP SERPL CALCULATED.3IONS-SCNC: 13 MMOL/L (ref 7–15)
ANTIBODY SCREEN: NEGATIVE
AST SERPL W P-5'-P-CCNC: 36 U/L (ref 10–35)
BILIRUB DIRECT SERPL-MCNC: 1.02 MG/DL (ref 0–0.3)
BILIRUB SERPL-MCNC: 1.6 MG/DL
BUN SERPL-MCNC: 7.6 MG/DL (ref 8–23)
CALCIUM SERPL-MCNC: 8.5 MG/DL (ref 8.8–10.2)
CHLORIDE SERPL-SCNC: 110 MMOL/L (ref 98–107)
CREAT SERPL-MCNC: 0.74 MG/DL (ref 0.51–0.95)
DEPRECATED CALCIDIOL+CALCIFEROL SERPL-MC: 28 UG/L (ref 20–75)
DEPRECATED HCO3 PLAS-SCNC: 19 MMOL/L (ref 22–29)
ERYTHROCYTE [DISTWIDTH] IN BLOOD BY AUTOMATED COUNT: 13.9 % (ref 10–15)
GFR SERPL CREATININE-BSD FRML MDRD: 89 ML/MIN/1.73M2
GLUCOSE SERPL-MCNC: 115 MG/DL (ref 70–99)
HCT VFR BLD AUTO: 29.2 % (ref 35–47)
HGB BLD-MCNC: 10 G/DL (ref 11.7–15.7)
HGB BLD-MCNC: 9.2 G/DL (ref 11.7–15.7)
HGB BLD-MCNC: 9.4 G/DL (ref 11.7–15.7)
MAGNESIUM SERPL-MCNC: 2 MG/DL (ref 1.7–2.3)
MCH RBC QN AUTO: 30.2 PG (ref 26.5–33)
MCHC RBC AUTO-ENTMCNC: 31.5 G/DL (ref 31.5–36.5)
MCV RBC AUTO: 96 FL (ref 78–100)
PHOSPHATE SERPL-MCNC: 2.8 MG/DL (ref 2.5–4.5)
PLATELET # BLD AUTO: 248 10E3/UL (ref 150–450)
POTASSIUM SERPL-SCNC: 3.7 MMOL/L (ref 3.4–5.3)
PROT SERPL-MCNC: 5.1 G/DL (ref 6.4–8.3)
RBC # BLD AUTO: 3.05 10E6/UL (ref 3.8–5.2)
SODIUM SERPL-SCNC: 142 MMOL/L (ref 136–145)
SPECIMEN EXPIRATION DATE: NORMAL
WBC # BLD AUTO: 6.5 10E3/UL (ref 4–11)

## 2023-01-06 PROCEDURE — 250N000011 HC RX IP 250 OP 636: Performed by: ANESTHESIOLOGY

## 2023-01-06 PROCEDURE — 86901 BLOOD TYPING SEROLOGIC RH(D): CPT | Performed by: NURSE ANESTHETIST, CERTIFIED REGISTERED

## 2023-01-06 PROCEDURE — C9113 INJ PANTOPRAZOLE SODIUM, VIA: HCPCS | Performed by: INTERNAL MEDICINE

## 2023-01-06 PROCEDURE — 250N000011 HC RX IP 250 OP 636: Performed by: NURSE ANESTHETIST, CERTIFIED REGISTERED

## 2023-01-06 PROCEDURE — 250N000011 HC RX IP 250 OP 636: Performed by: INTERNAL MEDICINE

## 2023-01-06 PROCEDURE — 258N000003 HC RX IP 258 OP 636: Performed by: ANESTHESIOLOGY

## 2023-01-06 PROCEDURE — 76705 ECHO EXAM OF ABDOMEN: CPT

## 2023-01-06 PROCEDURE — 84100 ASSAY OF PHOSPHORUS: CPT | Performed by: INTERNAL MEDICINE

## 2023-01-06 PROCEDURE — 80053 COMPREHEN METABOLIC PANEL: CPT | Performed by: INTERNAL MEDICINE

## 2023-01-06 PROCEDURE — 120N000011 HC R&B TRANSPLANT UMMC

## 2023-01-06 PROCEDURE — 83735 ASSAY OF MAGNESIUM: CPT | Performed by: INTERNAL MEDICINE

## 2023-01-06 PROCEDURE — 82248 BILIRUBIN DIRECT: CPT | Performed by: NURSE PRACTITIONER

## 2023-01-06 PROCEDURE — 36415 COLL VENOUS BLD VENIPUNCTURE: CPT

## 2023-01-06 PROCEDURE — 250N000013 HC RX MED GY IP 250 OP 250 PS 637: Performed by: INTERNAL MEDICINE

## 2023-01-06 PROCEDURE — 85018 HEMOGLOBIN: CPT

## 2023-01-06 PROCEDURE — 36592 COLLECT BLOOD FROM PICC: CPT | Performed by: PHYSICIAN ASSISTANT

## 2023-01-06 PROCEDURE — 250N000013 HC RX MED GY IP 250 OP 250 PS 637: Performed by: PHYSICIAN ASSISTANT

## 2023-01-06 PROCEDURE — 99232 SBSQ HOSP IP/OBS MODERATE 35: CPT | Performed by: TRANSPLANT SURGERY

## 2023-01-06 PROCEDURE — 47532 INJECTION FOR CHOLANGIOGRAM: CPT

## 2023-01-06 PROCEDURE — 250N000011 HC RX IP 250 OP 636: Performed by: PHYSICIAN ASSISTANT

## 2023-01-06 PROCEDURE — 0FJB3ZZ INSPECTION OF HEPATOBILIARY DUCT, PERCUTANEOUS APPROACH: ICD-10-PCS | Performed by: RADIOLOGY

## 2023-01-06 PROCEDURE — 250N000009 HC RX 250: Performed by: NURSE ANESTHETIST, CERTIFIED REGISTERED

## 2023-01-06 PROCEDURE — 999N000141 HC STATISTIC PRE-PROCEDURE NURSING ASSESSMENT

## 2023-01-06 PROCEDURE — 36592 COLLECT BLOOD FROM PICC: CPT

## 2023-01-06 PROCEDURE — 47531 INJECTION FOR CHOLANGIOGRAM: CPT | Mod: GC | Performed by: RADIOLOGY

## 2023-01-06 PROCEDURE — 255N000002 HC RX 255 OP 636: Performed by: STUDENT IN AN ORGANIZED HEALTH CARE EDUCATION/TRAINING PROGRAM

## 2023-01-06 PROCEDURE — C1769 GUIDE WIRE: HCPCS

## 2023-01-06 PROCEDURE — 250N000009 HC RX 250: Performed by: ANESTHESIOLOGY

## 2023-01-06 PROCEDURE — 370N000017 HC ANESTHESIA TECHNICAL FEE, PER MIN

## 2023-01-06 PROCEDURE — 85027 COMPLETE CBC AUTOMATED: CPT | Performed by: INTERNAL MEDICINE

## 2023-01-06 PROCEDURE — 710N000010 HC RECOVERY PHASE 1, LEVEL 2, PER MIN

## 2023-01-06 PROCEDURE — 255N000002 HC RX 255 OP 636: Performed by: RADIOLOGY

## 2023-01-06 PROCEDURE — 84446 ASSAY OF VITAMIN E: CPT | Performed by: PHYSICIAN ASSISTANT

## 2023-01-06 PROCEDURE — 250N000025 HC SEVOFLURANE, PER MIN

## 2023-01-06 PROCEDURE — 258N000003 HC RX IP 258 OP 636: Performed by: NURSE ANESTHETIST, CERTIFIED REGISTERED

## 2023-01-06 PROCEDURE — 272N000508 HC NEEDLE CR8

## 2023-01-06 PROCEDURE — 76705 ECHO EXAM OF ABDOMEN: CPT | Mod: 26 | Performed by: RADIOLOGY

## 2023-01-06 PROCEDURE — 82306 VITAMIN D 25 HYDROXY: CPT | Performed by: PHYSICIAN ASSISTANT

## 2023-01-06 PROCEDURE — 84590 ASSAY OF VITAMIN A: CPT | Performed by: PHYSICIAN ASSISTANT

## 2023-01-06 RX ORDER — HALOPERIDOL 5 MG/ML
1 INJECTION INTRAMUSCULAR
Status: DISCONTINUED | OUTPATIENT
Start: 2023-01-06 | End: 2023-01-06 | Stop reason: HOSPADM

## 2023-01-06 RX ORDER — SODIUM CHLORIDE, SODIUM GLUCONATE, SODIUM ACETATE, POTASSIUM CHLORIDE AND MAGNESIUM CHLORIDE 526; 502; 368; 37; 30 MG/100ML; MG/100ML; MG/100ML; MG/100ML; MG/100ML
INJECTION, SOLUTION INTRAVENOUS CONTINUOUS PRN
Status: DISCONTINUED | OUTPATIENT
Start: 2023-01-06 | End: 2023-01-06

## 2023-01-06 RX ORDER — LABETALOL HYDROCHLORIDE 5 MG/ML
10 INJECTION, SOLUTION INTRAVENOUS
Status: DISCONTINUED | OUTPATIENT
Start: 2023-01-06 | End: 2023-01-06 | Stop reason: HOSPADM

## 2023-01-06 RX ORDER — FENTANYL CITRATE 50 UG/ML
25 INJECTION, SOLUTION INTRAMUSCULAR; INTRAVENOUS EVERY 5 MIN PRN
Status: DISCONTINUED | OUTPATIENT
Start: 2023-01-06 | End: 2023-01-06 | Stop reason: HOSPADM

## 2023-01-06 RX ORDER — ONDANSETRON 2 MG/ML
INJECTION INTRAMUSCULAR; INTRAVENOUS PRN
Status: DISCONTINUED | OUTPATIENT
Start: 2023-01-06 | End: 2023-01-06

## 2023-01-06 RX ORDER — HYDROMORPHONE HCL IN WATER/PF 6 MG/30 ML
0.2 PATIENT CONTROLLED ANALGESIA SYRINGE INTRAVENOUS EVERY 5 MIN PRN
Status: DISCONTINUED | OUTPATIENT
Start: 2023-01-06 | End: 2023-01-06 | Stop reason: HOSPADM

## 2023-01-06 RX ORDER — DIAZEPAM 10 MG/2ML
2.5 INJECTION, SOLUTION INTRAMUSCULAR; INTRAVENOUS
Status: DISCONTINUED | OUTPATIENT
Start: 2023-01-06 | End: 2023-01-06 | Stop reason: HOSPADM

## 2023-01-06 RX ORDER — LIDOCAINE 40 MG/G
CREAM TOPICAL
Status: DISCONTINUED | OUTPATIENT
Start: 2023-01-06 | End: 2023-01-06

## 2023-01-06 RX ORDER — DEXAMETHASONE SODIUM PHOSPHATE 4 MG/ML
INJECTION, SOLUTION INTRA-ARTICULAR; INTRALESIONAL; INTRAMUSCULAR; INTRAVENOUS; SOFT TISSUE PRN
Status: DISCONTINUED | OUTPATIENT
Start: 2023-01-06 | End: 2023-01-06

## 2023-01-06 RX ORDER — HYDROMORPHONE HCL IN WATER/PF 6 MG/30 ML
0.4 PATIENT CONTROLLED ANALGESIA SYRINGE INTRAVENOUS EVERY 5 MIN PRN
Status: DISCONTINUED | OUTPATIENT
Start: 2023-01-06 | End: 2023-01-06 | Stop reason: HOSPADM

## 2023-01-06 RX ORDER — HYDRALAZINE HYDROCHLORIDE 20 MG/ML
2.5-5 INJECTION INTRAMUSCULAR; INTRAVENOUS EVERY 10 MIN PRN
Status: DISCONTINUED | OUTPATIENT
Start: 2023-01-06 | End: 2023-01-06 | Stop reason: HOSPADM

## 2023-01-06 RX ORDER — SODIUM CHLORIDE, SODIUM LACTATE, POTASSIUM CHLORIDE, CALCIUM CHLORIDE 600; 310; 30; 20 MG/100ML; MG/100ML; MG/100ML; MG/100ML
INJECTION, SOLUTION INTRAVENOUS CONTINUOUS
Status: DISCONTINUED | OUTPATIENT
Start: 2023-01-06 | End: 2023-01-06 | Stop reason: HOSPADM

## 2023-01-06 RX ORDER — MEPERIDINE HYDROCHLORIDE 50 MG/ML
12.5 INJECTION INTRAMUSCULAR; INTRAVENOUS; SUBCUTANEOUS EVERY 5 MIN PRN
Status: DISCONTINUED | OUTPATIENT
Start: 2023-01-06 | End: 2023-01-06 | Stop reason: HOSPADM

## 2023-01-06 RX ORDER — POTASSIUM CHLORIDE 750 MG/1
20 TABLET, EXTENDED RELEASE ORAL ONCE
Status: COMPLETED | OUTPATIENT
Start: 2023-01-06 | End: 2023-01-06

## 2023-01-06 RX ORDER — ONDANSETRON 2 MG/ML
4 INJECTION INTRAMUSCULAR; INTRAVENOUS EVERY 30 MIN PRN
Status: DISCONTINUED | OUTPATIENT
Start: 2023-01-06 | End: 2023-01-06 | Stop reason: HOSPADM

## 2023-01-06 RX ORDER — IODIXANOL 320 MG/ML
100 INJECTION, SOLUTION INTRAVASCULAR ONCE
Status: COMPLETED | OUTPATIENT
Start: 2023-01-06 | End: 2023-01-06

## 2023-01-06 RX ORDER — PROPOFOL 10 MG/ML
INJECTION, EMULSION INTRAVENOUS PRN
Status: DISCONTINUED | OUTPATIENT
Start: 2023-01-06 | End: 2023-01-06

## 2023-01-06 RX ORDER — LORAZEPAM 2 MG/ML
1 INJECTION INTRAMUSCULAR ONCE
Status: COMPLETED | OUTPATIENT
Start: 2023-01-06 | End: 2023-01-06

## 2023-01-06 RX ORDER — AMPICILLIN AND SULBACTAM 2; 1 G/1; G/1
3 INJECTION, POWDER, FOR SOLUTION INTRAMUSCULAR; INTRAVENOUS ONCE
Status: COMPLETED | OUTPATIENT
Start: 2023-01-06 | End: 2023-01-06

## 2023-01-06 RX ORDER — ONDANSETRON 4 MG/1
4 TABLET, ORALLY DISINTEGRATING ORAL EVERY 30 MIN PRN
Status: DISCONTINUED | OUTPATIENT
Start: 2023-01-06 | End: 2023-01-06 | Stop reason: HOSPADM

## 2023-01-06 RX ORDER — FENTANYL CITRATE 50 UG/ML
INJECTION, SOLUTION INTRAMUSCULAR; INTRAVENOUS PRN
Status: DISCONTINUED | OUTPATIENT
Start: 2023-01-06 | End: 2023-01-06

## 2023-01-06 RX ORDER — FENTANYL CITRATE 50 UG/ML
50 INJECTION, SOLUTION INTRAMUSCULAR; INTRAVENOUS EVERY 5 MIN PRN
Status: DISCONTINUED | OUTPATIENT
Start: 2023-01-06 | End: 2023-01-06 | Stop reason: HOSPADM

## 2023-01-06 RX ORDER — SODIUM CHLORIDE, SODIUM LACTATE, POTASSIUM CHLORIDE, CALCIUM CHLORIDE 600; 310; 30; 20 MG/100ML; MG/100ML; MG/100ML; MG/100ML
INJECTION, SOLUTION INTRAVENOUS CONTINUOUS PRN
Status: DISCONTINUED | OUTPATIENT
Start: 2023-01-06 | End: 2023-01-06

## 2023-01-06 RX ADMIN — PHENYLEPHRINE HYDROCHLORIDE 100 MCG: 10 INJECTION INTRAVENOUS at 13:51

## 2023-01-06 RX ADMIN — URSODIOL 300 MG: 300 CAPSULE ORAL at 21:36

## 2023-01-06 RX ADMIN — HEPARIN SODIUM 5000 UNITS: 5000 INJECTION, SOLUTION INTRAVENOUS; SUBCUTANEOUS at 08:50

## 2023-01-06 RX ADMIN — POTASSIUM CHLORIDE 20 MEQ: 750 TABLET, EXTENDED RELEASE ORAL at 22:30

## 2023-01-06 RX ADMIN — PHENYLEPHRINE HYDROCHLORIDE 100 MCG: 10 INJECTION INTRAVENOUS at 14:24

## 2023-01-06 RX ADMIN — Medication 20 MG: at 14:34

## 2023-01-06 RX ADMIN — PHENYLEPHRINE HYDROCHLORIDE 100 MCG: 10 INJECTION INTRAVENOUS at 14:03

## 2023-01-06 RX ADMIN — PANTOPRAZOLE SODIUM 40 MG: 40 INJECTION, POWDER, FOR SOLUTION INTRAVENOUS at 08:50

## 2023-01-06 RX ADMIN — AMPICILLIN AND SULBACTAM 3 G: 2; 1 INJECTION, POWDER, FOR SOLUTION INTRAMUSCULAR; INTRAVENOUS at 15:49

## 2023-01-06 RX ADMIN — PHENYLEPHRINE HYDROCHLORIDE 100 MCG: 10 INJECTION INTRAVENOUS at 16:44

## 2023-01-06 RX ADMIN — AMPICILLIN AND SULBACTAM 3 G: 2; 1 INJECTION, POWDER, FOR SOLUTION INTRAMUSCULAR; INTRAVENOUS at 13:37

## 2023-01-06 RX ADMIN — ONDANSETRON 4 MG: 2 INJECTION INTRAMUSCULAR; INTRAVENOUS at 16:03

## 2023-01-06 RX ADMIN — Medication 50 MG: at 13:25

## 2023-01-06 RX ADMIN — AMOXICILLIN AND CLAVULANATE POTASSIUM 1 TABLET: 875; 125 TABLET, FILM COATED ORAL at 21:36

## 2023-01-06 RX ADMIN — PHENYLEPHRINE HYDROCHLORIDE 100 MCG: 10 INJECTION INTRAVENOUS at 14:13

## 2023-01-06 RX ADMIN — HYDROMORPHONE HYDROCHLORIDE 0.5 MG: 1 INJECTION, SOLUTION INTRAMUSCULAR; INTRAVENOUS; SUBCUTANEOUS at 16:38

## 2023-01-06 RX ADMIN — AMOXICILLIN AND CLAVULANATE POTASSIUM 1 TABLET: 875; 125 TABLET, FILM COATED ORAL at 08:50

## 2023-01-06 RX ADMIN — SODIUM CHLORIDE, POTASSIUM CHLORIDE, SODIUM LACTATE AND CALCIUM CHLORIDE: 600; 310; 30; 20 INJECTION, SOLUTION INTRAVENOUS at 16:38

## 2023-01-06 RX ADMIN — IODIXANOL 100 ML: 320 INJECTION, SOLUTION INTRAVASCULAR at 16:59

## 2023-01-06 RX ADMIN — Medication 10 MG: at 15:21

## 2023-01-06 RX ADMIN — PHENYLEPHRINE HYDROCHLORIDE 100 MCG: 10 INJECTION INTRAVENOUS at 13:40

## 2023-01-06 RX ADMIN — URSODIOL 300 MG: 300 CAPSULE ORAL at 08:50

## 2023-01-06 RX ADMIN — SODIUM CHLORIDE, SODIUM GLUCONATE, SODIUM ACETATE, POTASSIUM CHLORIDE AND MAGNESIUM CHLORIDE: 526; 502; 368; 37; 30 INJECTION, SOLUTION INTRAVENOUS at 13:20

## 2023-01-06 RX ADMIN — HEPARIN SODIUM 5000 UNITS: 5000 INJECTION, SOLUTION INTRAVENOUS; SUBCUTANEOUS at 00:00

## 2023-01-06 RX ADMIN — HEPARIN SODIUM 5000 UNITS: 5000 INJECTION, SOLUTION INTRAVENOUS; SUBCUTANEOUS at 21:35

## 2023-01-06 RX ADMIN — KETOROLAC TROMETHAMINE 15 MG: 15 INJECTION, SOLUTION INTRAMUSCULAR; INTRAVENOUS at 19:59

## 2023-01-06 RX ADMIN — PROPOFOL 20 MG: 10 INJECTION, EMULSION INTRAVENOUS at 14:34

## 2023-01-06 RX ADMIN — PHENYLEPHRINE HYDROCHLORIDE 100 MCG: 10 INJECTION INTRAVENOUS at 13:46

## 2023-01-06 RX ADMIN — Medication 10 MG: at 16:35

## 2023-01-06 RX ADMIN — SODIUM CHLORIDE, PRESERVATIVE FREE 20 ML: 5 INJECTION INTRAVENOUS at 23:03

## 2023-01-06 RX ADMIN — DEXAMETHASONE SODIUM PHOSPHATE 4 MG: 4 INJECTION, SOLUTION INTRA-ARTICULAR; INTRALESIONAL; INTRAMUSCULAR; INTRAVENOUS; SOFT TISSUE at 13:24

## 2023-01-06 RX ADMIN — PHENYLEPHRINE HYDROCHLORIDE 150 MCG: 10 INJECTION INTRAVENOUS at 16:49

## 2023-01-06 RX ADMIN — PANTOPRAZOLE SODIUM 40 MG: 40 INJECTION, POWDER, FOR SOLUTION INTRAVENOUS at 21:35

## 2023-01-06 RX ADMIN — LORAZEPAM 0.5 MG: 0.5 TABLET ORAL at 08:49

## 2023-01-06 RX ADMIN — IODIXANOL 20 ML: 320 INJECTION, SOLUTION INTRAVASCULAR at 16:59

## 2023-01-06 RX ADMIN — PHENYLEPHRINE HYDROCHLORIDE 100 MCG: 10 INJECTION INTRAVENOUS at 16:56

## 2023-01-06 RX ADMIN — PROPOFOL 110 MG: 10 INJECTION, EMULSION INTRAVENOUS at 13:24

## 2023-01-06 RX ADMIN — LORAZEPAM 1 MG: 2 INJECTION INTRAMUSCULAR at 12:10

## 2023-01-06 RX ADMIN — PHENYLEPHRINE HYDROCHLORIDE 100 MCG: 10 INJECTION INTRAVENOUS at 16:58

## 2023-01-06 RX ADMIN — PROPOFOL 20 MG: 10 INJECTION, EMULSION INTRAVENOUS at 15:21

## 2023-01-06 RX ADMIN — PHENYLEPHRINE HYDROCHLORIDE 100 MCG: 10 INJECTION INTRAVENOUS at 13:57

## 2023-01-06 RX ADMIN — FENTANYL CITRATE 100 MCG: 50 INJECTION, SOLUTION INTRAMUSCULAR; INTRAVENOUS at 13:23

## 2023-01-06 RX ADMIN — SUGAMMADEX 200 MG: 100 INJECTION, SOLUTION INTRAVENOUS at 16:59

## 2023-01-06 RX ADMIN — SODIUM CHLORIDE, POTASSIUM CHLORIDE, SODIUM LACTATE AND CALCIUM CHLORIDE: 600; 310; 30; 20 INJECTION, SOLUTION INTRAVENOUS at 18:41

## 2023-01-06 ASSESSMENT — ACTIVITIES OF DAILY LIVING (ADL)
ADLS_ACUITY_SCORE: 28
ADLS_ACUITY_SCORE: 25
ADLS_ACUITY_SCORE: 28
ADLS_ACUITY_SCORE: 28
ADLS_ACUITY_SCORE: 25
ADLS_ACUITY_SCORE: 28
ADLS_ACUITY_SCORE: 25
ADLS_ACUITY_SCORE: 25

## 2023-01-06 NOTE — PLAN OF CARE
"/62 (BP Location: Left arm)   Pulse 89   Temp 98.4  F (36.9  C) (Oral)   Resp 18   Ht 1.575 m (5' 2\")   Wt 83.6 kg (184 lb 3.2 oz)   SpO2 97%   BMI 33.69 kg/m      Shift: 0037-2157  Isolation Status: none  VS: stable on room air, afebrile  Neuro: Aox4  Behaviors: calm, cooperative  BG: none  Labs: K+=3.3  Respiratory: WDL  Cardiac: WDL  Pain/Nausea: denies  Diet: NPO at midnight  IV Access: right triple lumen IJ  Infusion(s): none  Lines/Drains: 2 right Jps to bulb suction. Left drain - irrigate Q2  GI/: BM 1/5, voiding spontaneously without difficulty  Skin: midline abd incision  Mobility: SBA + walker   Plan: IR tomorrow      approved to stay overnights.   "

## 2023-01-06 NOTE — ANESTHESIA CARE TRANSFER NOTE
Patient: Zeinab Bermudez    Procedure: Procedure(s):  ANESTHESIA OUT OF OR FOR A BILLIARY DRAIN PLACEMENT@1200       Diagnosis: Bile leak [K83.9]  Diagnosis Additional Information: No value filed.    Anesthesia Type:   General     Note:    Oropharynx: oropharynx clear of all foreign objects and spontaneously breathing  Level of Consciousness: awake  Oxygen Supplementation: face mask  Level of Supplemental Oxygen (L/min / FiO2): 6  Independent Airway: airway patency satisfactory and stable  Dentition: dentition unchanged  Vital Signs Stable: post-procedure vital signs reviewed and stable  Report to RN Given: handoff report given  Patient transferred to: PACU    Handoff Report: Identifed the Patient, Identified the Reponsible Provider, Reviewed the pertinent medical history, Discussed the surgical course, Reviewed Intra-OP anesthesia mangement and issues during anesthesia, Set expectations for post-procedure period and Allowed opportunity for questions and acknowledgement of understanding      Vitals:  Vitals Value Taken Time   /75    Temp 37.1C    Pulse 78    Resp 16    SpO2 99        Electronically Signed By: TOMY Willard CRNA  January 6, 2023  5:22 PM

## 2023-01-06 NOTE — PLAN OF CARE
"/72 (BP Location: Left arm)   Pulse 76   Temp 98  F (36.7  C) (Oral)   Resp 18   Ht 1.575 m (5' 2\")   Wt 80.4 kg (177 lb 3.2 oz)   SpO2 97%   BMI 32.41 kg/m     Neuro: A/Ox3  VS: VSS on RA  Pain: c/o tolerable abdominal discomfort and declined pain meds  GI: NPO. Denies nausea. BMx1  : Voiding without difficulty  Skin/Drain: RLQ MELITA#1 no output, MELITA #2 site leaking/dressing changed 130ml green output. LLQ drain irrigated and has serous drainage  R PICC SL  Activity - SBA  Education: MELITA drain care to pt and her   Plan of Care - Pt is in IR getting another drain place in    "

## 2023-01-06 NOTE — PLAN OF CARE
"/78 (BP Location: Left arm)   Pulse 86   Temp 97.9  F (36.6  C) (Oral)   Resp 18   Ht 1.575 m (5' 2\")   Wt 80.4 kg (177 lb 3.2 oz)   SpO2 98%   BMI 32.41 kg/m      1933-2093  Neuro: Pt. alert & Ox4  Behavior: Pt. calm & cooperative with cares.  at bedside.  Activity: Pt. up with 1 & walker.  Vital: AVSS on RA  BG: N/A  LDAs: Right PICC SL, #1 MELITA w/ scant drainage, #2 MELITA w/ 50cc, left drain with 30.  Cardiac: WNL  Respiratory: LS clear  GI/: Pt. voiding good amounts, 1 stool this shift.   Skin: Incision stapled & CHARLES  Pain/Nausea: Pt. denies pain. Pt. c/o intermittent nausea & controlled with deep breathing.  Diet: NPO at midnight.   Labs/Imaging: Morning labs pending.   Plan: IR procedure & abdominal ultrasound. Continue to follow POC & notify provider with change in status.                         "

## 2023-01-06 NOTE — PROGRESS NOTES
Immunosuppression Management Note:    Zeinab Bermudez is a 67 year old female who is seen today  for immunosuppression management     INelson MD, I have examined the patient with our JOANN/Fellow as part of a shared visit.   I participated in the rounds,  discussed and agree with the note and findings and  reviewed today's vital signs, medications, labs and imaging as noted in this note.  I  reviewed the  immunosuppression medications.  I personally provided a substantive portion of the care of this patient. I personally performed the immunosuppressive management of this patient, reviewed the overall  immunosuppression including drug levels, allograft function and provided the recommendations to adjust the dose to provide optimal levels to prevent rejection of the allograft and prevent toxicity to the organs. This was complex care due to the fresh allograft.      I spoke to the patient/family and explained below clinical details and answered all the questions    Transplant Surgery  Inpatient Daily Progress Note  01/06/2023    Assessment & Plan: Zeinab Bermudez is a 67 year old female s/p laparoscopic cholecystectomy on 12/11/11. She presented to the OSH ED on 12/27/22 with sepsis and bile peritonitis, underwent exploratory laparotomy, washout and drain placement on 12/28. ERCP on 12/29 showed possible bile duct transection. IR placed 3rd drain (left side) on 12/29/22 d/t biloma. She was transferred to [facility] on 1/1/23 for hepatobiliary evaluation and management of bile duct injury.      Cardiorespiratory: VSS  Hx HTN/HLD: Hold PTA medications.      Hematology:   Anemia: Presented to OSH with Hgb 13, 2 weeks PTA Hgb was 15. Hgb now ~9. ERCP finding duodenal ulcer with oozing. Monitor, transfuse for Hgb < 7. Continue PPI BID.     GI/Nutrition:   S/p sri complicated by bile duct injury, bile leak, intra-abdominal Infection: 12/11/22 status post cholecystectomy c/b sepsis and bile peritonitis with  exploratory laparotomy, washout and drain placement on 12/28. ERCP on 12/29 showed possible bile duct transection. IR placed 3rd drain (left side) on 12/29/22 d/t biloma. Continue 2 surgical drains to suction (right) and IR drain to gravity on admission, changed to suction by IR (left). Bilious output from drains, minimal amount, 0.4 L total MELITA output yesterday.    -1/2 CT scan with IV/PO contrast  -1/3 IR consulted and can consider IR sinogram and or drain reposition if there is no output from drain in next 24 hours.      -1/3 ERCP: Complete transection is suspected at the level of two horizontal clips seemingly placed across the common duct with a marked defect just below. Consider PTC as a bridge to surgery.   -Changed from Zosyn to Augmentin on 1/5  -LFTs improving  -US abdomen to evaluate fluid collections  -Plan for PTC placement 1/6  -MELITA fluid bilirubin 4.3 (serum 1.7)   Moderate malnutrition in the context of acute illness: Nutrition consult. Regular diet with supplements.   Duodenal ulcer: Continue PPI BID x 30 days.     Fluid/Electrolytes: MIVF: LR 75 ml/hr.   Hypokalemia: replace    Endocrine: No issues    : No issues    Infectious disease: Afebrile, WBC ~7  Sepsis and bile peritonitis: Weaned off pressor 12/30. 12/29 fluid gram stain no organism, 1+ PMNs, 1+RBC; fluid bacterial culture no growth (will be loaded into care everywhere). Zosyn started on admission, change to Augmentin 1/6.   COVID-19: 12/10 COVID-19 positive at OSH, repeat 1/2 positive with CT 42.8. Currently asymptomatic. Recovered.     Neuro/Psych:  Acute pain: Stopped dilaudid IV PRN (not using).   Depression with anxiety: Restart PTA venlafaxine 24hr once. Ativan PO TID PRN anxiety.    Prophylaxis: Heparin subcutaneous ppx    Activity: PT/OT    Disposition: 7A; PT recommending home with outpatient PT.     Medical Decision Making: Medium  Subsequent visit 98514 (moderate level decision making)    JOANN/Fellow/Resident Provider: Lynn  "CARMEN Leon [id number]    Faculty: Nelson Richard M.D.    __________________________________________________________________  Transplant History: Admitted 1/1/2023 for bile duct injury.    Interval History: History is obtained from the patient  Overnight events: No acute events overnight. Denies pain/nausea. Hungry.     ROS:   A 10-point review of systems was negative except as noted above.    Meds:   amoxicillin-clavulanate  1 tablet Oral Q12H PRINCE (08/20)    heparin ANTICOAGULANT  5,000 Units Subcutaneous Q8H    heparin lock flush  5-20 mL Intracatheter Q24H    pantoprazole  40 mg Intravenous BID    sodium chloride (PF)  10 mL Irrigation Q8H    sodium chloride (PF)  10-40 mL Intracatheter Q8H    sodium chloride (PF)  10-40 mL Intracatheter Q8H    ursodiol  300 mg Oral BID    venlafaxine  112.5 mg Oral Daily       Physical Exam:     Admit Weight: 82.2 kg (181 lb 3.2 oz)    Current vitals:   /72 (BP Location: Left arm)   Pulse 76   Temp 98  F (36.7  C) (Oral)   Resp 18   Ht 1.575 m (5' 2\")   Wt 80.4 kg (177 lb 3.2 oz)   SpO2 97%   BMI 32.41 kg/m           Vital sign ranges:    Temp:  [97.9  F (36.6  C)-98.4  F (36.9  C)] 98  F (36.7  C)  Pulse:  [76-89] 76  Resp:  [18] 18  BP: (107-123)/(62-89) 118/72  SpO2:  [96 %-99 %] 97 %  Patient Vitals for the past 24 hrs:   BP Temp Temp src Pulse Resp SpO2 Weight   01/06/23 0925 118/72 98  F (36.7  C) Oral 76 18 97 % --   01/06/23 0516 -- -- -- -- -- -- 80.4 kg (177 lb 3.2 oz)   01/06/23 0515 116/78 97.9  F (36.6  C) Oral 86 18 98 % --   01/06/23 0119 107/65 98  F (36.7  C) Oral 88 18 97 % --   01/05/23 2210 118/62 98.4  F (36.9  C) Oral 89 18 97 % --   01/05/23 1848 123/79 98.3  F (36.8  C) Oral 89 18 96 % --   01/05/23 1436 113/65 97.9  F (36.6  C) Oral 81 18 98 % --   01/05/23 1124 114/89 97.9  F (36.6  C) Oral 77 18 99 % 83.6 kg (184 lb 3.2 oz)     General Appearance: in no apparent distress.   Skin: normal, dry  Heart: regular rate and rhythm  Lungs: NLB on " RA  Abdomen: The abdomen is soft, generalized ttp, no peritoneal signs. MELITA x 2 to suction on right side, bilious output. IR pigtail drain to gravity on left side, bilious output. The wound is Healing well, no signs of infection.  : good UOP  Extremities: edema: absent.  Neurologic: awake, alert and oriented.     Data:   CMP  Recent Labs   Lab 01/06/23  0453 01/05/23  0611 01/03/23  0612 01/02/23  1351 01/02/23  0749 01/01/23  2220    140   < >  --    < > 136   POTASSIUM 3.7 3.3*   < >  --    < > 3.7   CHLORIDE 110* 108*   < >  --    < > 100   CO2 19* 22   < >  --    < > 22   * 100*   < >  --    < > 69*   BUN 7.6* 9.7   < >  --    < > 6.4*   CR 0.74 0.75   < >  --    < > 0.65   GFRESTIMATED 89 88   < >  --    < > >90   NASRIN 8.5* 8.1*   < >  --    < > 8.3*   ICAW  --   --   --   --   --  4.5   MAG 2.0 2.1   < >  --   --  1.8   PHOS 2.8 2.7   < >  --   --  2.9   ALBUMIN 2.8* 2.7*   < >  --    < > 2.7*   BILITOTAL 1.6* 1.7*   < >  --    < > 2.4*  2.4*   ALKPHOS 218* 236*   < >  --    < > 366*   AST 36* 45*   < >  --    < > 60*   * 155*   < >  --    < > 354*   FAMY  --   --   --  5.0  --   --     < > = values in this interval not displayed.     CBC  Recent Labs   Lab 01/06/23  0453 01/05/23  0611   HGB 9.2* 8.9*   WBC 6.5 7.1    279     COAGS  Recent Labs   Lab 01/01/23  2340   INR 1.15   PTT 31      Urinalysis  No lab results found.

## 2023-01-06 NOTE — ANESTHESIA PROCEDURE NOTES
Airway       Patient location during procedure: OR       Procedure Start/Stop Times: 1/6/2023 1:26 PM  Staff -        CRNA: Hallie Isaac APRN CRNA       Performed By: CRNA  Consent for Airway        Urgency: elective  Indications and Patient Condition       Indications for airway management: ubaldo-procedural       Induction type:intravenous       Mask difficulty assessment: 1 - vent by mask    Final Airway Details       Final airway type: endotracheal airway       Successful airway: ETT - single  Endotracheal Airway Details        ETT size (mm): 7.0       Cuffed: yes       Successful intubation technique: direct laryngoscopy       DL Blade Type: Rodriguez 2       Grade View of Cords: 1       Adjucts: stylet       Position: Right       Measured from: gums/teeth       Secured at (cm): 21       Bite block used: None    Post intubation assessment        Placement verified by: capnometry, equal breath sounds and chest rise        Number of attempts at approach: 1       Secured with: pink tape       Ease of procedure: easy       Dentition: Intact and Unchanged    Medication(s) Administered   Medication Administration Time: 1/6/2023 1:26 PM

## 2023-01-06 NOTE — IR NOTE
Patient Name: Zeinab Bermudez  Medical Record Number: [id number]  Today's Date: 1/6/2023    Procedure: Biliary drain placement  Proceduralist: Dr. Burton, Dr. Albarran, Dr. Albright  Pathology present: NA    Procedure Start: 1350  Procedure end: 1700  Sedation medications administered: Anesthesia     Report given to: PACU, RN  : BENEDICT    Other Notes: Pt arrived to IR room 4 from . Consent reviewed. Pt denies any questions or concerns regarding procedure. Pt positioned supine and monitored per protocol. Pt tolerated procedure without any noted complications. Pt transferred back to PACU.

## 2023-01-06 NOTE — ANESTHESIA PREPROCEDURE EVALUATION
Anesthesia Pre-Procedure Evaluation    Patient: Zeinab Bermudez   MRN: [id number] : 1955        Procedure : Procedure(s):  ANESTHESIA OUT OF OR FOR A BILLIARY DRAIN PLACEMENT@1200          Past Medical History:   Diagnosis Date     Covid 2019 Positive 2022     Depressive disorder      HLD (hyperlipidemia)      Hypertension      SAH (subarachnoid hemorrhage) (H)       Past Surgical History:   Procedure Laterality Date     ENDOSCOPIC RETROGRADE CHOLANGIOPANCREATOGRAM N/A 1/3/2023    Procedure: ENDOSCOPIC RETROGRADE CHOLANGIOPANCREATOGRAPHY;  Surgeon: Michael Mann MD;  Location: [facility] OR     HYSTERECTOMY       LAPAROSCOPIC CHOLECYSTECTOMY  2022     LAPAROTOMY EXPLORATORY  2022      No Known Allergies   Social History     Tobacco Use     Smoking status: Never     Smokeless tobacco: Never   Substance Use Topics     Alcohol use: Not Currently      Wt Readings from Last 1 Encounters:   23 80.4 kg (177 lb 3.2 oz)        Anesthesia Evaluation   Pt has had prior anesthetic. Type: General.    No history of anesthetic complications       ROS/MED HX  ENT/Pulmonary:     (+) recent URI, resolved, Covid-19 12/10/22:     Neurologic: Comment: Hx SAH 2017      Cardiovascular:     (+) hypertension-----    METS/Exercise Tolerance:     Hematologic:       Musculoskeletal:       GI/Hepatic: Comment: Bile leak after cholecystectomy and transection of CBD.  Concern for hepatic artery injury.      Renal/Genitourinary:       Endo:     (+) Obesity,     Psychiatric/Substance Use:     (+) psychiatric history anxiety and depression     Infectious Disease:       Malignancy:       Other:            Physical Exam    Airway        Mallampati: II   TM distance: > 3 FB   Neck ROM: full   Mouth opening: > 3 cm    Respiratory Devices and Support         Dental  no notable dental history         Cardiovascular   cardiovascular exam normal          Pulmonary   pulmonary exam normal                OUTSIDE  LABS:  CBC:   Lab Results   Component Value Date    WBC 6.5 01/06/2023    WBC 7.1 01/05/2023    HGB 9.2 (L) 01/06/2023    HGB 8.9 (L) 01/05/2023    HCT 29.2 (L) 01/06/2023    HCT 28.6 (L) 01/05/2023     01/06/2023     01/05/2023     BMP:   Lab Results   Component Value Date     01/06/2023     01/05/2023    POTASSIUM 3.7 01/06/2023    POTASSIUM 3.3 (L) 01/05/2023    CHLORIDE 110 (H) 01/06/2023    CHLORIDE 108 (H) 01/05/2023    CO2 19 (L) 01/06/2023    CO2 22 01/05/2023    BUN 7.6 (L) 01/06/2023    BUN 9.7 01/05/2023    CR 0.74 01/06/2023    CR 0.75 01/05/2023     (H) 01/06/2023     (H) 01/05/2023     COAGS:   Lab Results   Component Value Date    PTT 31 01/01/2023    INR 1.15 01/01/2023     POC: No results found for: BGM, HCG, HCGS  HEPATIC:   Lab Results   Component Value Date    ALBUMIN 2.8 (L) 01/06/2023    PROTTOTAL 5.1 (L) 01/06/2023     (H) 01/06/2023    AST 36 (H) 01/06/2023    ALKPHOS 218 (H) 01/06/2023    BILITOTAL 1.6 (H) 01/06/2023     OTHER:   Lab Results   Component Value Date    NASRIN 8.5 (L) 01/06/2023    PHOS 2.8 01/06/2023    MAG 2.0 01/06/2023       Anesthesia Plan    ASA Status:  2      Anesthesia Type: General.     - Airway: ETT   Induction: Intravenous, Propofol.   Maintenance: Balanced.        Consents    Anesthesia Plan(s) and associated risks, benefits, and realistic alternatives discussed. Questions answered and patient/representative(s) expressed understanding.    - Discussed:     - Discussed with:  Patient      - Extended Intubation/Ventilatory Support Discussed: No.      - Patient is DNR/DNI Status: No    Use of blood products discussed: No .     Postoperative Care    Pain management: IV analgesics.     - Plan for long acting post-op opioid use   PONV prophylaxis: Ondansetron (or other 5HT-3), Dexamethasone or Solumedrol     Comments:                Wai Clemons MD

## 2023-01-06 NOTE — PROCEDURES
[facility]    Procedure: IR Procedure Note    Date/Time: 1/6/2023 5:07 PM  Performed by: Bud Albright MD  Authorized by: Magdiel Albarran MD       UNIVERSAL PROTOCOL   Site Marked: NA  Prior Images Obtained and Reviewed:  Yes  Required items: Required blood products, implants, devices and special equipment available    Patient identity confirmed:  Verbally with patient, arm band, provided demographic data and hospital-assigned identification number  Patient was reevaluated immediately before administering moderate or deep sedation or anesthesia  Confirmation Checklist:  Patient's identity using two indicators, relevant allergies, procedure was appropriate and matched the consent or emergent situation and correct equipment/implants were available  Time out: Immediately prior to the procedure a time out was called    Universal Protocol: the Joint Commission Universal Protocol was followed    Preparation: Patient was prepped and draped in usual sterile fashion       ANESTHESIA    Anesthesia: Local infiltration  Local Anesthetic:  Lidocaine 1% without epinephrine      SEDATION  Patient Sedated: Yes    Sedation Type:  Deep  Vital signs: Vital signs monitored during sedation    See dictated procedure note for full details.  Findings: Unsuccessful biliary drain placement. Central cholangiogram performed demonstrates transection of the peripheral common hepatic duct near bifurcation. Biliary leakage predominantly appears to be into the pigtail drain.     Specimens: none    Complications: None    Condition: Stable    Plan: Unsuccessful biliary drain placement. Central cholangiogram performed demonstrates transection of the peripheral common hepatic duct near bifurcation. Biliary leakage predominantly appears to be into the pigtail drain.       PROCEDURE  Describe Procedure: Unsuccessful biliary drain placement. Central cholangiogram performed demonstrates transection of  the peripheral common hepatic duct near bifurcation. Biliary leakage predominantly appears to be into the pigtail drain.   Patient Tolerance:  Patient tolerated the procedure well with no immediate complications  Length of time physician/provider present for 1:1 monitoring during sedation: 45

## 2023-01-07 LAB
ALBUMIN SERPL BCG-MCNC: 2.7 G/DL (ref 3.5–5.2)
ALP SERPL-CCNC: 201 U/L (ref 35–104)
ALT SERPL W P-5'-P-CCNC: 116 U/L (ref 10–35)
ANION GAP SERPL CALCULATED.3IONS-SCNC: 10 MMOL/L (ref 7–15)
AST SERPL W P-5'-P-CCNC: 58 U/L (ref 10–35)
BILIRUB DIRECT SERPL-MCNC: 0.85 MG/DL (ref 0–0.3)
BILIRUB SERPL-MCNC: 1.4 MG/DL
BUN SERPL-MCNC: 9.7 MG/DL (ref 8–23)
CALCIUM SERPL-MCNC: 8.5 MG/DL (ref 8.8–10.2)
CHLORIDE SERPL-SCNC: 107 MMOL/L (ref 98–107)
CREAT SERPL-MCNC: 0.67 MG/DL (ref 0.51–0.95)
DEPRECATED HCO3 PLAS-SCNC: 21 MMOL/L (ref 22–29)
ERYTHROCYTE [DISTWIDTH] IN BLOOD BY AUTOMATED COUNT: 14.3 % (ref 10–15)
GFR SERPL CREATININE-BSD FRML MDRD: >90 ML/MIN/1.73M2
GLUCOSE SERPL-MCNC: 81 MG/DL (ref 70–99)
HCT VFR BLD AUTO: 28.6 % (ref 35–47)
HGB BLD-MCNC: 8.6 G/DL (ref 11.7–15.7)
HGB BLD-MCNC: 8.8 G/DL (ref 11.7–15.7)
HGB BLD-MCNC: 8.8 G/DL (ref 11.7–15.7)
HGB BLD-MCNC: 9.6 G/DL (ref 11.7–15.7)
MAGNESIUM SERPL-MCNC: 2.2 MG/DL (ref 1.7–2.3)
MCH RBC QN AUTO: 29.4 PG (ref 26.5–33)
MCHC RBC AUTO-ENTMCNC: 30.1 G/DL (ref 31.5–36.5)
MCV RBC AUTO: 98 FL (ref 78–100)
PHOSPHATE SERPL-MCNC: 2.9 MG/DL (ref 2.5–4.5)
PLATELET # BLD AUTO: 243 10E3/UL (ref 150–450)
POTASSIUM SERPL-SCNC: 3.8 MMOL/L (ref 3.4–5.3)
PROT SERPL-MCNC: 5 G/DL (ref 6.4–8.3)
RBC # BLD AUTO: 2.93 10E6/UL (ref 3.8–5.2)
SODIUM SERPL-SCNC: 138 MMOL/L (ref 136–145)
WBC # BLD AUTO: 6.3 10E3/UL (ref 4–11)

## 2023-01-07 PROCEDURE — 36592 COLLECT BLOOD FROM PICC: CPT

## 2023-01-07 PROCEDURE — 250N000011 HC RX IP 250 OP 636: Performed by: PHYSICIAN ASSISTANT

## 2023-01-07 PROCEDURE — 250N000013 HC RX MED GY IP 250 OP 250 PS 637: Performed by: PHYSICIAN ASSISTANT

## 2023-01-07 PROCEDURE — 83735 ASSAY OF MAGNESIUM: CPT | Performed by: INTERNAL MEDICINE

## 2023-01-07 PROCEDURE — 82248 BILIRUBIN DIRECT: CPT | Performed by: NURSE PRACTITIONER

## 2023-01-07 PROCEDURE — 85018 HEMOGLOBIN: CPT | Performed by: INTERNAL MEDICINE

## 2023-01-07 PROCEDURE — 84100 ASSAY OF PHOSPHORUS: CPT | Performed by: INTERNAL MEDICINE

## 2023-01-07 PROCEDURE — 250N000011 HC RX IP 250 OP 636

## 2023-01-07 PROCEDURE — 80053 COMPREHEN METABOLIC PANEL: CPT | Performed by: NURSE PRACTITIONER

## 2023-01-07 PROCEDURE — 250N000011 HC RX IP 250 OP 636: Performed by: INTERNAL MEDICINE

## 2023-01-07 PROCEDURE — 36592 COLLECT BLOOD FROM PICC: CPT | Performed by: INTERNAL MEDICINE

## 2023-01-07 PROCEDURE — C9113 INJ PANTOPRAZOLE SODIUM, VIA: HCPCS | Performed by: INTERNAL MEDICINE

## 2023-01-07 PROCEDURE — 120N000011 HC R&B TRANSPLANT UMMC

## 2023-01-07 PROCEDURE — 250N000013 HC RX MED GY IP 250 OP 250 PS 637: Performed by: NURSE PRACTITIONER

## 2023-01-07 PROCEDURE — 36415 COLL VENOUS BLD VENIPUNCTURE: CPT

## 2023-01-07 PROCEDURE — 85027 COMPLETE CBC AUTOMATED: CPT

## 2023-01-07 PROCEDURE — 999N000157 HC STATISTIC RCP TIME EA 10 MIN

## 2023-01-07 PROCEDURE — 85018 HEMOGLOBIN: CPT

## 2023-01-07 PROCEDURE — 250N000013 HC RX MED GY IP 250 OP 250 PS 637: Performed by: INTERNAL MEDICINE

## 2023-01-07 PROCEDURE — 99232 SBSQ HOSP IP/OBS MODERATE 35: CPT | Performed by: TRANSPLANT SURGERY

## 2023-01-07 RX ORDER — PROCHLORPERAZINE MALEATE 5 MG
5 TABLET ORAL EVERY 6 HOURS PRN
Status: DISCONTINUED | OUTPATIENT
Start: 2023-01-07 | End: 2023-01-11 | Stop reason: HOSPADM

## 2023-01-07 RX ORDER — ONDANSETRON 4 MG/1
4 TABLET, ORALLY DISINTEGRATING ORAL EVERY 6 HOURS PRN
Status: DISCONTINUED | OUTPATIENT
Start: 2023-01-07 | End: 2023-01-11 | Stop reason: HOSPADM

## 2023-01-07 RX ORDER — HYDROMORPHONE HYDROCHLORIDE 2 MG/1
2 TABLET ORAL
Status: DISCONTINUED | OUTPATIENT
Start: 2023-01-07 | End: 2023-01-11 | Stop reason: HOSPADM

## 2023-01-07 RX ORDER — ACETAMINOPHEN 325 MG/1
650 TABLET ORAL EVERY 4 HOURS PRN
Status: DISCONTINUED | OUTPATIENT
Start: 2023-01-07 | End: 2023-01-11 | Stop reason: HOSPADM

## 2023-01-07 RX ORDER — ONDANSETRON 2 MG/ML
4 INJECTION INTRAMUSCULAR; INTRAVENOUS EVERY 6 HOURS PRN
Status: DISCONTINUED | OUTPATIENT
Start: 2023-01-07 | End: 2023-01-11 | Stop reason: HOSPADM

## 2023-01-07 RX ORDER — OXYCODONE HYDROCHLORIDE 5 MG/1
5 TABLET ORAL EVERY 4 HOURS PRN
Status: DISCONTINUED | OUTPATIENT
Start: 2023-01-07 | End: 2023-01-07

## 2023-01-07 RX ORDER — PROCHLORPERAZINE 25 MG
12.5 SUPPOSITORY, RECTAL RECTAL EVERY 12 HOURS PRN
Status: DISCONTINUED | OUTPATIENT
Start: 2023-01-07 | End: 2023-01-11 | Stop reason: HOSPADM

## 2023-01-07 RX ORDER — ONDANSETRON 2 MG/ML
4 INJECTION INTRAMUSCULAR; INTRAVENOUS EVERY 6 HOURS PRN
Status: DISCONTINUED | OUTPATIENT
Start: 2023-01-07 | End: 2023-01-07

## 2023-01-07 RX ADMIN — URSODIOL 300 MG: 300 CAPSULE ORAL at 07:33

## 2023-01-07 RX ADMIN — HYDROMORPHONE HYDROCHLORIDE 2 MG: 2 TABLET ORAL at 09:01

## 2023-01-07 RX ADMIN — AMOXICILLIN AND CLAVULANATE POTASSIUM 1 TABLET: 875; 125 TABLET, FILM COATED ORAL at 07:32

## 2023-01-07 RX ADMIN — HEPARIN SODIUM 5000 UNITS: 5000 INJECTION, SOLUTION INTRAVENOUS; SUBCUTANEOUS at 06:26

## 2023-01-07 RX ADMIN — AMOXICILLIN AND CLAVULANATE POTASSIUM 1 TABLET: 875; 125 TABLET, FILM COATED ORAL at 19:55

## 2023-01-07 RX ADMIN — SODIUM CHLORIDE, PRESERVATIVE FREE 5 ML: 5 INJECTION INTRAVENOUS at 23:10

## 2023-01-07 RX ADMIN — HEPARIN SODIUM 5000 UNITS: 5000 INJECTION, SOLUTION INTRAVENOUS; SUBCUTANEOUS at 15:18

## 2023-01-07 RX ADMIN — PANTOPRAZOLE SODIUM 40 MG: 40 INJECTION, POWDER, FOR SOLUTION INTRAVENOUS at 19:56

## 2023-01-07 RX ADMIN — PANTOPRAZOLE SODIUM 40 MG: 40 INJECTION, POWDER, FOR SOLUTION INTRAVENOUS at 07:32

## 2023-01-07 RX ADMIN — SENNOSIDES AND DOCUSATE SODIUM 1 TABLET: 8.6; 5 TABLET ORAL at 19:55

## 2023-01-07 RX ADMIN — URSODIOL 300 MG: 300 CAPSULE ORAL at 19:55

## 2023-01-07 RX ADMIN — ONDANSETRON 4 MG: 4 TABLET, ORALLY DISINTEGRATING ORAL at 20:42

## 2023-01-07 RX ADMIN — HEPARIN SODIUM 5000 UNITS: 5000 INJECTION, SOLUTION INTRAVENOUS; SUBCUTANEOUS at 23:11

## 2023-01-07 ASSESSMENT — ACTIVITIES OF DAILY LIVING (ADL)
ADLS_ACUITY_SCORE: 28

## 2023-01-07 NOTE — PROGRESS NOTES
Immunosuppression Management Note:    Zeinab Bermudez is a 67 year old female who is seen today  for immunosuppression management     INelson MD, I have examined the patient with our JOANN/Fellow as part of a shared visit.   I participated in the rounds,  discussed and agree with the note and findings and  reviewed today's vital signs, medications, labs and imaging as noted in this note.  I  reviewed the  immunosuppression medications.  I personally provided a substantive portion of the care of this patient. I personally performed the immunosuppressive management of this patient, reviewed the overall  immunosuppression including drug levels, allograft function and provided the recommendations to adjust the dose to provide optimal levels to prevent rejection of the allograft and prevent toxicity to the organs. This was complex care due to the fresh allograft.      I spoke to the patient/family and explained below clinical details and answered all the questions    Transplant Surgery  Inpatient Daily Progress Note  01/07/2023    Assessment & Plan: Zeinab Bermudez is a 67 year old female s/p laparoscopic cholecystectomy on 12/11/11. She presented to the OSH ED on 12/27/22 with sepsis and bile peritonitis, underwent exploratory laparotomy, washout and drain placement on 12/28. ERCP on 12/29 showed possible bile duct transection. IR placed 3rd drain (left side) on 12/29/22 d/t biloma. She was transferred to [facility] on 1/1/23 for hepatobiliary evaluation and management of bile duct injury.      Cardiorespiratory: VSS  Hx HTN/HLD: Hold PTA medications.      Hematology:   Anemia: Presented to OSH with Hgb 13, 2 weeks PTA Hgb was 15. Hgb now ~9. ERCP finding duodenal ulcer with oozing. Monitor, transfuse for Hgb < 7. Continue PPI BID.   -Hgb stable after IR procedure yesterday.      GI/Nutrition:   S/p sri complicated by bile duct injury, bile leak, intra-abdominal Infection: 12/11/22 status post cholecystectomy  c/b sepsis and bile peritonitis with exploratory laparotomy, washout and drain placement on 12/28. ERCP on 12/29 showed possible bile duct transection. IR placed 3rd drain (left side) on 12/29/22 d/t biloma. Continue 2 surgical drains to suction (right) and IR drain to gravity on admission, changed to suction by IR (left). Bilious output from drains, minimal amount, from drain. LFTs stable.  MELITA fluid bilirubin 4.3 (serum 1.7)    -1/2 CT scan with IV/PO contrast  -1/3 IR consulted and can consider IR sinogram and or drain reposition if there is no output from drain in next 24 hours.      -1/3 ERCP: Complete transection is suspected at the level of two horizontal clips seemingly placed across the common duct with a marked defect just below. Consider PTC as a bridge to surgery.   -Changed from Zosyn to Augmentin on 1/5  -LFTs improving  -US abdomen to evaluate fluid collections  -Unable to place PTC placement on 1/6, plan to repeat attempt for PTC placement next week. Transplant surgeon will discuss with IR staff.     Moderate malnutrition in the context of acute illness: Nutrition consult. Regular diet with supplements.   Duodenal ulcer: Continue PPI BID x 30 days.     Fluid/Electrolytes: Encourage PO fluids. Replete electrolytes PRN.   Hypokalemia: resolved    Endocrine: No issues    : No issues    Infectious disease: Afebrile, WBC ~7  Sepsis and bile peritonitis: Weaned off pressor 12/30. 12/29 fluid gram stain no organism, 1+ PMNs, 1+RBC; fluid bacterial culture no growth (will be loaded into care everywhere). Zosyn started on admission, change to Augmentin 1/6.   COVID-19: 12/10 COVID-19 positive at OSH, repeat 1/2 positive with CT 42.8. Currently asymptomatic. Recovered.     Neuro/Psych:  Acute pain: Pain after PTC attempt. Dilaudid PO PRN and tylenol PRN.  Depression with anxiety:  PTA venlafaxine 24hr HOLD per patient. Ativan PO TID PRN anxiety.    Prophylaxis: Heparin subcutaneous ppx    Activity:  "PT/OT    Disposition: 7A; PT recommending home with outpatient PT.     Medical Decision Making: Medium  Subsequent visit 61910 (moderate level decision making)    JOANN/Fellow/Resident Provider: ROMERO Loyola [id number]    Faculty: Nelson Richard M.D.    __________________________________________________________________  Transplant History: Admitted 1/1/2023 for bile duct injury.    Interval History: History is obtained from the patient  Overnight events: No acute events overnight. Pain after procedure yesterday.     ROS:   A 10-point review of systems was negative except as noted above.    Meds:   amoxicillin-clavulanate  1 tablet Oral Q12H PRINCE (08/20)    heparin ANTICOAGULANT  5,000 Units Subcutaneous Q8H    heparin lock flush  5-20 mL Intracatheter Q24H    pantoprazole  40 mg Intravenous BID    sodium chloride (PF)  10 mL Irrigation Q8H    sodium chloride (PF)  10-40 mL Intracatheter Q8H    sodium chloride (PF)  10-40 mL Intracatheter Q8H    ursodiol  300 mg Oral BID    [Held by provider] venlafaxine  112.5 mg Oral Daily       Physical Exam:     Admit Weight: 82.2 kg (181 lb 3.2 oz)    Current vitals:   /60 (BP Location: Left arm)   Pulse 78   Temp 98.3  F (36.8  C) (Oral)   Resp 20   Ht 1.575 m (5' 2\")   Wt 80.4 kg (177 lb 3.2 oz)   SpO2 94%   BMI 32.41 kg/m           Vital sign ranges:    Temp:  [97.6  F (36.4  C)-99.1  F (37.3  C)] 98.3  F (36.8  C)  Pulse:  [73-88] 78  Resp:  [12-21] 20  BP: ()/(54-69) 108/60  FiO2 (%):  [100 %] 100 %  SpO2:  [94 %-99 %] 94 %  Patient Vitals for the past 24 hrs:   BP Temp Temp src Pulse Resp SpO2   01/07/23 1024 108/60 98.3  F (36.8  C) Oral 78 20 94 %   01/07/23 0533 98/62 -- -- 73 -- --   01/07/23 0155 94/60 97.7  F (36.5  C) Oral 73 21 94 %   01/06/23 2230 95/57 97.6  F (36.4  C) Oral 78 15 96 %   01/06/23 2000 108/67 -- -- 87 14 96 %   01/06/23 1930 96/54 -- -- 88 14 96 %   01/06/23 1900 112/62 -- -- 84 17 --   01/06/23 1830 109/69 -- -- 84 14 94 % "   01/06/23 1815 107/63 98.2  F (36.8  C) Temporal 86 18 94 %   01/06/23 1800 114/69 -- -- 85 12 99 %   01/06/23 1745 114/68 -- -- 86 17 --   01/06/23 1716 119/62 99.1  F (37.3  C) Temporal 86 -- --     General Appearance: in no apparent distress.   Skin: normal, dry  Heart: regular rate and rhythm  Lungs: NLB on RA  Abdomen: The abdomen is soft, generalized ttp, no peritoneal signs. MELITA x 2 to suction on right side, bilious output. IR pigtail drain to gravity on left side, bilious output. The wound is Healing well, no signs of infection.  : good UOP  Extremities: edema: absent.  Neurologic: awake, alert and oriented.     Data:   CMP  Recent Labs   Lab 01/07/23  0532 01/06/23  0453 01/03/23  0612 01/02/23  1351 01/02/23  0749 01/01/23  2220    142   < >  --    < > 136   POTASSIUM 3.8 3.7   < >  --    < > 3.7   CHLORIDE 107 110*   < >  --    < > 100   CO2 21* 19*   < >  --    < > 22   GLC 81 115*   < >  --    < > 69*   BUN 9.7 7.6*   < >  --    < > 6.4*   CR 0.67 0.74   < >  --    < > 0.65   GFRESTIMATED >90 89   < >  --    < > >90   NASRIN 8.5* 8.5*   < >  --    < > 8.3*   ICAW  --   --   --   --   --  4.5   MAG 2.2 2.0   < >  --   --  1.8   PHOS 2.9 2.8   < >  --   --  2.9   ALBUMIN 2.7* 2.8*   < >  --    < > 2.7*   BILITOTAL 1.4* 1.6*   < >  --    < > 2.4*  2.4*   ALKPHOS 201* 218*   < >  --    < > 366*   AST 58* 36*   < >  --    < > 60*   * 121*   < >  --    < > 354*   FAMY  --   --   --  5.0  --   --     < > = values in this interval not displayed.     CBC  Recent Labs   Lab 01/07/23  1331 01/07/23  1115 01/07/23  0532 01/06/23  1754 01/06/23  0453   HGB 9.6* 8.8* 8.6*   < > 9.2*   WBC  --   --  6.3  --  6.5   PLT  --   --  243  --  248    < > = values in this interval not displayed.     COAGS  Recent Labs   Lab 01/01/23  2340   INR 1.15   PTT 31      Urinalysis  No lab results found.

## 2023-01-07 NOTE — ANESTHESIA POSTPROCEDURE EVALUATION
Patient: Zeinab Bermudez    Procedure: Procedure(s):  ANESTHESIA OUT OF OR FOR A BILLIARY DRAIN PLACEMENT@1200       Anesthesia Type:  General    Note:  Disposition: Inpatient   Postop Pain Control: Uneventful            Sign Out: Well controlled pain   PONV: No   Neuro/Psych: Uneventful            Sign Out: Acceptable/Baseline neuro status   Airway/Respiratory: Uneventful            Sign Out: Acceptable/Baseline resp. status   CV/Hemodynamics: Uneventful            Sign Out: Acceptable CV status; No obvious hypovolemia; No obvious fluid overload   Other NRE: NONE   DID A NON-ROUTINE EVENT OCCUR? No           Last vitals:  Vitals Value Taken Time   BP 97/78 01/06/23 1845   Temp 36.8  C (98.2  F) 01/06/23 1815   Pulse 84 01/06/23 1849   Resp 11 01/06/23 1849   SpO2 96 % 01/06/23 1849   Vitals shown include unvalidated device data.    Electronically Signed By: René Wahl MD  January 6, 2023  6:50 PM

## 2023-01-07 NOTE — PLAN OF CARE
"VS: BP 95/57 (BP Location: Left arm)   Pulse 78   Temp 97.6  F (36.4  C) (Oral)   Resp 15   Ht 1.575 m (5' 2\")   Wt 80.4 kg (177 lb 3.2 oz)   SpO2 96%   BMI 32.41 kg/m      Shift [id number]  Neuro: alert and oriented   Cardio: systolic in the 90's   Respiratory: RA   GI/: continent of bladder and LBM was 1/6  Skin: has midline incision that is intact with staples and 2 lap sites with surgical glue   Diet:   Clears and advance as tolerated, was able to tolerate the clears   Labs: HGB 8.8 checked every 4 hours   BG: none   LDA:  Triple lumen PICC Hep lock   Mobility:  Assist 1 with walker   Pain: abdominal pain   PRN medications: Toradol X1 was effective   Changes: had a dizzy spell while standing at the toilet and has been encouraged to call when needing help   Plan of Care:  Pending                         "

## 2023-01-07 NOTE — PLAN OF CARE
"/69 (BP Location: Left arm)   Pulse 83   Temp 99.1  F (37.3  C) (Oral)   Resp 20   Ht 1.575 m (5' 2\")   Wt 80.4 kg (177 lb 3.2 oz)   SpO2 96%   BMI 32.41 kg/m      Shift: 7a-730p  VS: stable on RA, afebrile  Cardiac: WDL, HRR, CMS intact. Cap refill < 3 seconds.   Neuro: Aox4, calm and cooperative.   BG: none, 81 with AM labs.   Labs: Hgb 9.6, , AST 58, Total bili 1.4  Respiratory: WDL, maintains oxygen saturation > 90% on RA  Pain/Nausea/PRN: Had some abdominal pain this AM, received oral dilaudid PRN. Denies nausea this shift.   Diet: Regular and tolerates well.   LDA: PICC hep locked between medications. MELITA x 3 with small to moderate output dependent on site, see flowsheets. Left abdominal MELITA drain irrigated.   GI/: Continent of bowel and bladder. Voids urine without difficulty. LBM was 1/6/23  Skin: Midline abdominal incision approximated with staples, no drainage or s/s of infection noted. Has two lap sites of abdomen that are approximated with staples with no drainage or s/s of infection noted. MELITA drain sites x 3 covered with dressing that is C/D/I, site cares done.   Mobility: Up with SBA  Plan: Continue to monitor.     Will give report to oncoming nurse. Pt left in stable condition, care relinquished at this time.     "

## 2023-01-08 ENCOUNTER — APPOINTMENT (OUTPATIENT)
Dept: EDUCATION SERVICES | Facility: CLINIC | Age: 68
DRG: 919 | End: 2023-01-08
Attending: TRANSPLANT SURGERY
Payer: COMMERCIAL

## 2023-01-08 ENCOUNTER — APPOINTMENT (OUTPATIENT)
Dept: PHYSICAL THERAPY | Facility: CLINIC | Age: 68
DRG: 919 | End: 2023-01-08
Attending: SURGERY
Payer: COMMERCIAL

## 2023-01-08 LAB
A-TOCOPHEROL VIT E SERPL-MCNC: 7.3 MG/L
ALBUMIN SERPL BCG-MCNC: 3 G/DL (ref 3.5–5.2)
ALP SERPL-CCNC: 207 U/L (ref 35–104)
ALT SERPL W P-5'-P-CCNC: 96 U/L (ref 10–35)
ANION GAP SERPL CALCULATED.3IONS-SCNC: 11 MMOL/L (ref 7–15)
ANNOTATION COMMENT IMP: NORMAL
AST SERPL W P-5'-P-CCNC: 36 U/L (ref 10–35)
BETA+GAMMA TOCOPHEROL SERPL-MCNC: 0.8 MG/L
BILIRUB DIRECT SERPL-MCNC: 0.91 MG/DL (ref 0–0.3)
BILIRUB SERPL-MCNC: 1.5 MG/DL
BUN SERPL-MCNC: 11.9 MG/DL (ref 8–23)
CALCIUM SERPL-MCNC: 8.5 MG/DL (ref 8.8–10.2)
CHLORIDE SERPL-SCNC: 108 MMOL/L (ref 98–107)
CREAT SERPL-MCNC: 0.72 MG/DL (ref 0.51–0.95)
DEPRECATED HCO3 PLAS-SCNC: 20 MMOL/L (ref 22–29)
ERYTHROCYTE [DISTWIDTH] IN BLOOD BY AUTOMATED COUNT: 14.5 % (ref 10–15)
GFR SERPL CREATININE-BSD FRML MDRD: >90 ML/MIN/1.73M2
GLUCOSE SERPL-MCNC: 98 MG/DL (ref 70–99)
HCT VFR BLD AUTO: 29.7 % (ref 35–47)
HGB BLD-MCNC: 9.1 G/DL (ref 11.7–15.7)
MAGNESIUM SERPL-MCNC: 2.1 MG/DL (ref 1.7–2.3)
MCH RBC QN AUTO: 30.1 PG (ref 26.5–33)
MCHC RBC AUTO-ENTMCNC: 30.6 G/DL (ref 31.5–36.5)
MCV RBC AUTO: 98 FL (ref 78–100)
PHOSPHATE SERPL-MCNC: 3 MG/DL (ref 2.5–4.5)
PLATELET # BLD AUTO: 238 10E3/UL (ref 150–450)
POTASSIUM SERPL-SCNC: 3.9 MMOL/L (ref 3.4–5.3)
PROT SERPL-MCNC: 5.4 G/DL (ref 6.4–8.3)
RBC # BLD AUTO: 3.02 10E6/UL (ref 3.8–5.2)
RETINYL PALMITATE SERPL-MCNC: 0.03 MG/L
SODIUM SERPL-SCNC: 139 MMOL/L (ref 136–145)
VIT A SERPL-MCNC: 0.48 MG/L
WBC # BLD AUTO: 6.4 10E3/UL (ref 4–11)

## 2023-01-08 PROCEDURE — 83735 ASSAY OF MAGNESIUM: CPT | Performed by: INTERNAL MEDICINE

## 2023-01-08 PROCEDURE — 250N000013 HC RX MED GY IP 250 OP 250 PS 637: Performed by: INTERNAL MEDICINE

## 2023-01-08 PROCEDURE — 120N000011 HC R&B TRANSPLANT UMMC

## 2023-01-08 PROCEDURE — 999N000044 HC STATISTIC CVC DRESSING CHANGE

## 2023-01-08 PROCEDURE — 999N000007 HC SITE CHECK

## 2023-01-08 PROCEDURE — 250N000011 HC RX IP 250 OP 636

## 2023-01-08 PROCEDURE — 97116 GAIT TRAINING THERAPY: CPT | Mod: GP | Performed by: REHABILITATION PRACTITIONER

## 2023-01-08 PROCEDURE — 85027 COMPLETE CBC AUTOMATED: CPT | Performed by: INTERNAL MEDICINE

## 2023-01-08 PROCEDURE — 82248 BILIRUBIN DIRECT: CPT | Performed by: NURSE PRACTITIONER

## 2023-01-08 PROCEDURE — 84100 ASSAY OF PHOSPHORUS: CPT | Performed by: INTERNAL MEDICINE

## 2023-01-08 PROCEDURE — 250N000013 HC RX MED GY IP 250 OP 250 PS 637: Performed by: PHYSICIAN ASSISTANT

## 2023-01-08 PROCEDURE — 250N000011 HC RX IP 250 OP 636: Performed by: PHYSICIAN ASSISTANT

## 2023-01-08 PROCEDURE — 80053 COMPREHEN METABOLIC PANEL: CPT | Performed by: NURSE PRACTITIONER

## 2023-01-08 PROCEDURE — 250N000013 HC RX MED GY IP 250 OP 250 PS 637: Performed by: NURSE PRACTITIONER

## 2023-01-08 PROCEDURE — 36415 COLL VENOUS BLD VENIPUNCTURE: CPT | Performed by: INTERNAL MEDICINE

## 2023-01-08 PROCEDURE — 97530 THERAPEUTIC ACTIVITIES: CPT | Mod: GP | Performed by: REHABILITATION PRACTITIONER

## 2023-01-08 RX ORDER — PANTOPRAZOLE SODIUM 40 MG/1
40 TABLET, DELAYED RELEASE ORAL
Status: DISCONTINUED | OUTPATIENT
Start: 2023-01-08 | End: 2023-01-11 | Stop reason: HOSPADM

## 2023-01-08 RX ADMIN — URSODIOL 300 MG: 300 CAPSULE ORAL at 08:17

## 2023-01-08 RX ADMIN — VENLAFAXINE HYDROCHLORIDE 112.5 MG: 37.5 CAPSULE, EXTENDED RELEASE ORAL at 08:22

## 2023-01-08 RX ADMIN — ONDANSETRON 4 MG: 4 TABLET, ORALLY DISINTEGRATING ORAL at 14:41

## 2023-01-08 RX ADMIN — AMOXICILLIN AND CLAVULANATE POTASSIUM 1 TABLET: 875; 125 TABLET, FILM COATED ORAL at 19:33

## 2023-01-08 RX ADMIN — SENNOSIDES AND DOCUSATE SODIUM 1 TABLET: 8.6; 5 TABLET ORAL at 19:32

## 2023-01-08 RX ADMIN — HEPARIN SODIUM 5000 UNITS: 5000 INJECTION, SOLUTION INTRAVENOUS; SUBCUTANEOUS at 06:14

## 2023-01-08 RX ADMIN — HEPARIN SODIUM 5000 UNITS: 5000 INJECTION, SOLUTION INTRAVENOUS; SUBCUTANEOUS at 22:07

## 2023-01-08 RX ADMIN — AMOXICILLIN AND CLAVULANATE POTASSIUM 1 TABLET: 875; 125 TABLET, FILM COATED ORAL at 08:17

## 2023-01-08 RX ADMIN — PROCHLORPERAZINE EDISYLATE 5 MG: 5 INJECTION INTRAMUSCULAR; INTRAVENOUS at 20:19

## 2023-01-08 RX ADMIN — HEPARIN SODIUM 5000 UNITS: 5000 INJECTION, SOLUTION INTRAVENOUS; SUBCUTANEOUS at 13:56

## 2023-01-08 RX ADMIN — PANTOPRAZOLE SODIUM 40 MG: 40 TABLET, DELAYED RELEASE ORAL at 16:23

## 2023-01-08 RX ADMIN — HYDROMORPHONE HYDROCHLORIDE 2 MG: 2 TABLET ORAL at 06:08

## 2023-01-08 RX ADMIN — PANTOPRAZOLE SODIUM 40 MG: 40 TABLET, DELAYED RELEASE ORAL at 08:21

## 2023-01-08 RX ADMIN — URSODIOL 300 MG: 300 CAPSULE ORAL at 19:33

## 2023-01-08 ASSESSMENT — ACTIVITIES OF DAILY LIVING (ADL)
ADLS_ACUITY_SCORE: 28

## 2023-01-08 NOTE — PLAN OF CARE
"VS: /69 (BP Location: Left arm)   Pulse 77   Temp 98.7  F (37.1  C) (Oral)   Resp 16   Ht 1.575 m (5' 2\")   Wt 80.4 kg (177 lb 3.2 oz)   SpO2 92%   BMI 32.41 kg/m        Neuro: alert and oriented X4   Cardio: WNL   Respiratory: RA   GI/: continent of bladder and no BM   Skin: abdominal incision midline intact with staples and 3 lap sites intact    Diet:   Regular and is tolerating it   Labs: pending AM labs   BG: none   LDA: X2 MELITA on R and X1 on L has triple lumen PICC hep lock     Mobility:  SBA with walker   Pain:incisional pain   PRN medications: Dilaudid X1   Changes: none   Plan of Care:  IR possible Monday for another drain placement.            "

## 2023-01-08 NOTE — CONSULTS
Met with patient and her  Karan for drain care education. Demonstrated on a model site cares, dressing change, anchoring the tube, flushing with saline, and emptying the drain. We discussed the differences between to tubes on her right and the tube on her left in that the one on the left is to gravity and being irrigated with saline and the ones on the right are to suction with no irrigation. We also discussed tube safety and when to call with concerns. Karan plans to assist with drain cares at home. He was able to return demonstrate on the model all skills without difficulty. Both learners were engaged in learning and asked good questions throughout class. Zeinab verbalized feeling anxious about caring for the drains at home. I encouraged both of them to be hands on with drain cares along with their bedside nurse as able in order to gain confidence in their skills. Zeinab and Karan both verbalized understanding of the information given.     Literature given: Handwashing and Skin Care, Drainage Tube Home Care Instructions, Flushing Your Drain with Saline.

## 2023-01-08 NOTE — PROGRESS NOTES
Transplant Surgery  Inpatient Daily Progress Note  01/08/2023    Assessment & Plan: Zeinab Bermudez is a 67 year old female s/p laparoscopic cholecystectomy on 12/11/11. She presented to the OSH ED on 12/27/22 with sepsis and bile peritonitis, underwent exploratory laparotomy, washout and drain placement on 12/28. ERCP on 12/29 showed possible bile duct transection. IR placed 3rd drain (left side) on 12/29/22 d/t biloma. She was transferred to [facility] on 1/1/23 for hepatobiliary evaluation and management of bile duct injury.      Cardiorespiratory: VSS  Hx HTN/HLD: Hold PTA medications.      Hematology:   Anemia: Presented to OSH with Hgb 13, 2 weeks PTA Hgb was 15. Hgb now ~9. ERCP finding duodenal ulcer with oozing. Monitor, transfuse for Hgb < 7. Continue PPI BID.   -Hgb stable after IR procedure.     GI/Nutrition:   S/p sri complicated by bile duct injury, bile leak, intra-abdominal Infection: 12/11/22 status post cholecystectomy c/b sepsis and bile peritonitis with exploratory laparotomy, washout and drain placement on 12/28. ERCP on 12/29 showed possible bile duct transection. IR placed 3rd drain (left side) on 12/29/22 d/t biloma. Continue 2 surgical drains to suction (right) and IR drain to gravity on admission, changed to suction by IR (left). Bilious output from drains, minimal amount, from drain. LFTs stable.  MELITA fluid bilirubin 4.3 (serum 1.7). LFTs stable, TB 0.9, Alk phos 207.     -1/2 CT scan with IV/PO contrast  -1/3 IR consulted and can consider IR sinogram and or drain reposition if there is no output from drain in next 24 hours.      -1/3 ERCP: Complete transection is suspected at the level of two horizontal clips seemingly placed across the common duct with a marked defect just below. Consider PTC as a bridge to surgery.   -Changed from Zosyn to Augmentin on 1/5  -LFTs improving  -US abdomen to evaluate fluid collections  -Unable to place PTC placement on 1/6, plan to repeat attempt for PTC  placement next week. Transplant surgeon will discuss with IR staff.     Moderate malnutrition in the context of acute illness: Nutrition consult. Regular diet with supplements.   Duodenal ulcer: Continue PPI BID x 30 days.     Fluid/Electrolytes: Encourage PO fluids. Replete electrolytes PRN.   Hypokalemia: resolved    Endocrine: No issues    : No issues    Infectious disease: Afebrile, WBC ~7  Sepsis and bile peritonitis: Weaned off pressor 12/30. 12/29 fluid gram stain no organism, 1+ PMNs, 1+RBC; fluid bacterial culture no growth (will be loaded into care everywhere). Zosyn started on admission, change to Augmentin 1/6, continue today.   COVID-19: 12/10 COVID-19 positive at OSH, repeat 1/2 positive with CT 42.8. Currently asymptomatic. Recovered.     Neuro/Psych:  Acute pain: Pain after PTC attempt. Dilaudid PO PRN and tylenol PRN.  Depression with anxiety:  PTA venlafaxine 24hr, restarted. Ativan PO TID PRN anxiety.    Prophylaxis: Heparin subcutaneous ppx    Activity: PT/OT    Disposition: 7A; PT recommending home with outpatient PT.     Medical Decision Making: Medium  Subsequent visit 29333 (moderate level decision making)    JOANN/Fellow/Resident Provider: ROMERO Loyola [id number]    Faculty: Nelson Richard M.D.    __________________________________________________________________  Transplant History: Admitted 1/1/2023 for bile duct injury.    Interval History: History is obtained from the patient  Overnight events: No acute events overnight. Encouraged ambulation and improving nutrition intake. Patient requested to see dietitian for ideas. Pt having difficulty finding supplement she likes.     ROS:   A 10-point review of systems was negative except as noted above.    Meds:    amoxicillin-clavulanate  1 tablet Oral Q12H PRINCE (08/20)     heparin ANTICOAGULANT  5,000 Units Subcutaneous Q8H     heparin lock flush  5-20 mL Intracatheter Q24H     pantoprazole  40 mg Intravenous BID     sodium chloride (PF)  10  "mL Irrigation Q8H     sodium chloride (PF)  10-40 mL Intracatheter Q8H     sodium chloride (PF)  10-40 mL Intracatheter Q8H     ursodiol  300 mg Oral BID     [Held by provider] venlafaxine  112.5 mg Oral Daily       Physical Exam:     Admit Weight: 82.2 kg (181 lb 3.2 oz)    Current vitals:   /62 (BP Location: Left arm)   Pulse 76   Temp 98  F (36.7  C) (Oral)   Resp 16   Ht 1.575 m (5' 2\")   Wt 80.4 kg (177 lb 3.2 oz)   SpO2 94%   BMI 32.41 kg/m           Vital sign ranges:    Temp:  [98  F (36.7  C)-99.1  F (37.3  C)] 98  F (36.7  C)  Pulse:  [76-83] 76  Resp:  [16-20] 16  BP: (106-120)/(60-69) 106/62  SpO2:  [92 %-98 %] 94 %  Patient Vitals for the past 24 hrs:   BP Temp Temp src Pulse Resp SpO2   01/08/23 0208 106/62 98  F (36.7  C) Oral 76 16 94 %   01/07/23 2214 120/69 98.7  F (37.1  C) Oral 77 16 92 %   01/07/23 1749 114/67 98  F (36.7  C) Oral 82 18 98 %   01/07/23 1418 111/69 99.1  F (37.3  C) Oral 83 20 96 %   01/07/23 1024 108/60 98.3  F (36.8  C) Oral 78 20 94 %     General Appearance: in no apparent distress.   Skin: normal, dry  Heart: well perfused  Lungs: NLB on RA  Abdomen: The abdomen is soft, generalized ttp, no peritoneal signs.MELITA x 2 to suction on right side, bilious output. IR pigtail drain to gravity on left side, bilious output. The wound is Healing well, no signs of infection.  : good UOP  Extremities: edema: absent.  Neurologic: awake, alert and oriented.     Data:   CMP  Recent Labs   Lab 01/07/23  0532 01/06/23  0453 01/03/23  0612 01/02/23  1351 01/02/23  0749 01/01/23  2220    142   < >  --    < > 136   POTASSIUM 3.8 3.7   < >  --    < > 3.7   CHLORIDE 107 110*   < >  --    < > 100   CO2 21* 19*   < >  --    < > 22   GLC 81 115*   < >  --    < > 69*   BUN 9.7 7.6*   < >  --    < > 6.4*   CR 0.67 0.74   < >  --    < > 0.65   GFRESTIMATED >90 89   < >  --    < > >90   NASRIN 8.5* 8.5*   < >  --    < > 8.3*   ICAW  --   --   --   --   --  4.5   MAG 2.2 2.0   < >  --   --  " 1.8   PHOS 2.9 2.8   < >  --   --  2.9   ALBUMIN 2.7* 2.8*   < >  --    < > 2.7*   BILITOTAL 1.4* 1.6*   < >  --    < > 2.4*  2.4*   ALKPHOS 201* 218*   < >  --    < > 366*   AST 58* 36*   < >  --    < > 60*   * 121*   < >  --    < > 354*   FAMY  --   --   --  5.0  --   --     < > = values in this interval not displayed.     CBC  Recent Labs   Lab 01/07/23  1331 01/07/23  1115 01/07/23  0532 01/06/23  1754 01/06/23  0453   HGB 9.6* 8.8* 8.6*   < > 9.2*   WBC  --   --  6.3  --  6.5   PLT  --   --  243  --  248    < > = values in this interval not displayed.     COAGS  Recent Labs   Lab 01/01/23  2340   INR 1.15   PTT 31      Urinalysis  No lab results found.

## 2023-01-08 NOTE — PLAN OF CARE
"/67 (BP Location: Left arm)   Pulse 79   Temp 97.8  F (36.6  C) (Oral)   Resp 18   Ht 1.575 m (5' 2\")   Wt 80.4 kg (177 lb 3.2 oz)   SpO2 95%   BMI 32.41 kg/m      Shift: 7a-3p  VS: stable on RA, afebrile  Cardiac: WDL, HRR, CMS intact. Cap refill < 3 seconds.   Neuro: Aox4, calm and cooperative.   BG: none, 81 with AM labs.   Labs: Hgb 9.1, ALT 96, AST 36, Total bili 1.5  Respiratory: WDL, maintains oxygen saturation > 90% on RA  Pain/Nausea/PRN: Had some abdominal pain this AM, Has oral dilaudid PRN. Denies nausea this shift.   Diet: Regular and tolerates well.   LDA: PICC hep locked between medications. MELITA x 3 with small to moderate output dependent on site, see flowsheets. Left abdominal MELITA drain irrigated and to gravity.   GI/: Continent of bowel and bladder. Voids urine without difficulty. LBM was 1/8/23 and noted to be soft formed heron colored stool  Skin: Midline abdominal incision approximated with staples, no drainage or s/s of infection noted. Has two lap sites of abdomen that are approximated with staples with no drainage or s/s of infection noted. MELITA drain sites x 3 covered with dressing that is C/D/I, site cares done.   Mobility: Up with SBA  Plan: Continue to monitor.      Will give report to oncoming nurse. Pt left in stable condition, care relinquished at this time.  "

## 2023-01-09 ENCOUNTER — APPOINTMENT (OUTPATIENT)
Dept: PHYSICAL THERAPY | Facility: CLINIC | Age: 68
DRG: 919 | End: 2023-01-09
Attending: SURGERY
Payer: COMMERCIAL

## 2023-01-09 LAB
ALBUMIN SERPL BCG-MCNC: 3 G/DL (ref 3.5–5.2)
ALP SERPL-CCNC: 207 U/L (ref 35–104)
ALT SERPL W P-5'-P-CCNC: 78 U/L (ref 10–35)
ANION GAP SERPL CALCULATED.3IONS-SCNC: 10 MMOL/L (ref 7–15)
AST SERPL W P-5'-P-CCNC: 26 U/L (ref 10–35)
BILIRUB DIRECT SERPL-MCNC: 0.9 MG/DL (ref 0–0.3)
BILIRUB SERPL-MCNC: 1.4 MG/DL
BUN SERPL-MCNC: 9.1 MG/DL (ref 8–23)
CALCIUM SERPL-MCNC: 8.6 MG/DL (ref 8.8–10.2)
CHLORIDE SERPL-SCNC: 108 MMOL/L (ref 98–107)
CREAT SERPL-MCNC: 0.63 MG/DL (ref 0.51–0.95)
DEPRECATED HCO3 PLAS-SCNC: 21 MMOL/L (ref 22–29)
ERYTHROCYTE [DISTWIDTH] IN BLOOD BY AUTOMATED COUNT: 14.4 % (ref 10–15)
GFR SERPL CREATININE-BSD FRML MDRD: >90 ML/MIN/1.73M2
GLUCOSE SERPL-MCNC: 95 MG/DL (ref 70–99)
HCT VFR BLD AUTO: 30.5 % (ref 35–47)
HGB BLD-MCNC: 9.5 G/DL (ref 11.7–15.7)
MAGNESIUM SERPL-MCNC: 2.1 MG/DL (ref 1.7–2.3)
MCH RBC QN AUTO: 30.3 PG (ref 26.5–33)
MCHC RBC AUTO-ENTMCNC: 31.1 G/DL (ref 31.5–36.5)
MCV RBC AUTO: 97 FL (ref 78–100)
PHOSPHATE SERPL-MCNC: 3 MG/DL (ref 2.5–4.5)
PLATELET # BLD AUTO: 236 10E3/UL (ref 150–450)
POTASSIUM SERPL-SCNC: 3.8 MMOL/L (ref 3.4–5.3)
PROT SERPL-MCNC: 5.4 G/DL (ref 6.4–8.3)
RBC # BLD AUTO: 3.14 10E6/UL (ref 3.8–5.2)
SODIUM SERPL-SCNC: 139 MMOL/L (ref 136–145)
WBC # BLD AUTO: 5.6 10E3/UL (ref 4–11)

## 2023-01-09 PROCEDURE — 250N000011 HC RX IP 250 OP 636: Performed by: INTERNAL MEDICINE

## 2023-01-09 PROCEDURE — 97116 GAIT TRAINING THERAPY: CPT | Mod: GP

## 2023-01-09 PROCEDURE — 84100 ASSAY OF PHOSPHORUS: CPT | Performed by: INTERNAL MEDICINE

## 2023-01-09 PROCEDURE — 120N000011 HC R&B TRANSPLANT UMMC

## 2023-01-09 PROCEDURE — 250N000013 HC RX MED GY IP 250 OP 250 PS 637: Performed by: PHYSICIAN ASSISTANT

## 2023-01-09 PROCEDURE — 99232 SBSQ HOSP IP/OBS MODERATE 35: CPT | Mod: FS | Performed by: PHYSICIAN ASSISTANT

## 2023-01-09 PROCEDURE — 250N000013 HC RX MED GY IP 250 OP 250 PS 637: Performed by: NURSE PRACTITIONER

## 2023-01-09 PROCEDURE — 250N000011 HC RX IP 250 OP 636: Performed by: PHYSICIAN ASSISTANT

## 2023-01-09 PROCEDURE — 80053 COMPREHEN METABOLIC PANEL: CPT | Performed by: NURSE PRACTITIONER

## 2023-01-09 PROCEDURE — 97530 THERAPEUTIC ACTIVITIES: CPT | Mod: GP

## 2023-01-09 PROCEDURE — 83735 ASSAY OF MAGNESIUM: CPT | Performed by: INTERNAL MEDICINE

## 2023-01-09 PROCEDURE — 85027 COMPLETE CBC AUTOMATED: CPT | Performed by: INTERNAL MEDICINE

## 2023-01-09 PROCEDURE — 82248 BILIRUBIN DIRECT: CPT | Performed by: NURSE PRACTITIONER

## 2023-01-09 PROCEDURE — 36415 COLL VENOUS BLD VENIPUNCTURE: CPT | Performed by: INTERNAL MEDICINE

## 2023-01-09 RX ORDER — MULTIPLE VITAMINS W/ MINERALS TAB 9MG-400MCG
1 TAB ORAL DAILY
Status: DISCONTINUED | OUTPATIENT
Start: 2023-01-09 | End: 2023-01-11 | Stop reason: HOSPADM

## 2023-01-09 RX ADMIN — AMOXICILLIN AND CLAVULANATE POTASSIUM 1 TABLET: 875; 125 TABLET, FILM COATED ORAL at 09:18

## 2023-01-09 RX ADMIN — VENLAFAXINE HYDROCHLORIDE 112.5 MG: 37.5 CAPSULE, EXTENDED RELEASE ORAL at 09:19

## 2023-01-09 RX ADMIN — PANTOPRAZOLE SODIUM 40 MG: 40 TABLET, DELAYED RELEASE ORAL at 17:02

## 2023-01-09 RX ADMIN — URSODIOL 300 MG: 300 CAPSULE ORAL at 09:19

## 2023-01-09 RX ADMIN — Medication 1 TABLET: at 15:06

## 2023-01-09 RX ADMIN — HEPARIN SODIUM 5000 UNITS: 5000 INJECTION, SOLUTION INTRAVENOUS; SUBCUTANEOUS at 22:13

## 2023-01-09 RX ADMIN — URSODIOL 300 MG: 300 CAPSULE ORAL at 19:56

## 2023-01-09 RX ADMIN — PANTOPRAZOLE SODIUM 40 MG: 40 TABLET, DELAYED RELEASE ORAL at 09:18

## 2023-01-09 RX ADMIN — SODIUM CHLORIDE, PRESERVATIVE FREE 15 ML: 5 INJECTION INTRAVENOUS at 00:15

## 2023-01-09 RX ADMIN — AMOXICILLIN AND CLAVULANATE POTASSIUM 1 TABLET: 875; 125 TABLET, FILM COATED ORAL at 19:56

## 2023-01-09 RX ADMIN — HEPARIN SODIUM 5000 UNITS: 5000 INJECTION, SOLUTION INTRAVENOUS; SUBCUTANEOUS at 06:37

## 2023-01-09 RX ADMIN — HEPARIN SODIUM 5000 UNITS: 5000 INJECTION, SOLUTION INTRAVENOUS; SUBCUTANEOUS at 15:06

## 2023-01-09 ASSESSMENT — ACTIVITIES OF DAILY LIVING (ADL)
ADLS_ACUITY_SCORE: 24
ADLS_ACUITY_SCORE: 28
ADLS_ACUITY_SCORE: 28
ADLS_ACUITY_SCORE: 24
ADLS_ACUITY_SCORE: 28
ADLS_ACUITY_SCORE: 24
ADLS_ACUITY_SCORE: 28
ADLS_ACUITY_SCORE: 24
ADLS_ACUITY_SCORE: 28
ADLS_ACUITY_SCORE: 24
DEPENDENT_IADLS:: INDEPENDENT

## 2023-01-09 NOTE — ANESTHESIA POSTPROCEDURE EVALUATION
Patient: Zeinab Bermudez    Procedure: Procedure(s):  ENDOSCOPIC RETROGRADE CHOLANGIOPANCREATOGRAPHY       Anesthesia Type:  General    Note:  Disposition: Outpatient   Postop Pain Control: Uneventful            Sign Out: Well controlled pain   PONV: No   Neuro/Psych: Uneventful            Sign Out: Acceptable/Baseline neuro status   Airway/Respiratory: Uneventful            Sign Out: Acceptable/Baseline resp. status   CV/Hemodynamics: Uneventful            Sign Out: Acceptable CV status   Other NRE: NONE   DID A NON-ROUTINE EVENT OCCUR? No           Last vitals:  Vitals Value Taken Time   /70 01/03/23 1630   Temp 36.4  C (97.6  F) 01/03/23 1546   Pulse 91 01/03/23 1630   Resp 16 01/03/23 1630   SpO2 97 % 01/03/23 1630       Electronically Signed By: Christopher J. Behrens, MD  January 9, 2023  2:26 PM

## 2023-01-09 NOTE — CONSULTS
Care Management Initial Consult    General Information  Assessment completed with: Patient, Care Team Member, -chart review, Tima  Type of CM/SW Visit: Initial Assessment    Primary Care Provider verified and updated as needed: Yes (Dr Jairo Ocampo, [facility])   Readmission within the last 30 days:        Reason for Consult: discharge planning, health care directive  Advance Care Planning: Advance Care Planning Reviewed: questions answered (discussed with patient, short and long form given)          Communication Assessment  Patient's communication style: spoken language (English or Bilingual)    Hearing Difficulty or Deaf: no   Wear Glasses or Blind: yes    Cognitive  Cognitive/Neuro/Behavioral: WDL  Level of Consciousness: alert  Arousal Level: opens eyes spontaneously  Orientation: oriented x 4     Best Language: 0 - No aphasia  Speech: clear, spontaneous, logical    Living Environment:   People in home: spouse     Current living Arrangements: house      Able to return to prior arrangements: yes       Family/Social Support:  Care provided by: self  Provides care for: no one  Marital Status:             Description of Support System: Supportive, Involved    Support Assessment: Adequate family and caregiver support    Current Resources:   Patient receiving home care services: No     Community Resources: None  Equipment currently used at home: walker, rolling  Supplies currently used at home: None    Employment/Financial:  Employment Status: retired        Financial Concerns: No concerns identified           Lifestyle & Psychosocial Needs:  Social Determinants of Health     Tobacco Use: Low Risk      Smoking Tobacco Use: Never     Smokeless Tobacco Use: Never     Passive Exposure: Not on file   Alcohol Use: Not on file   Financial Resource Strain: Not on file   Food Insecurity: Not on file   Transportation Needs: Not on file   Physical Activity: Not on file   Stress: Not  on file   Social Connections: Not on file   Intimate Partner Violence: Not on file   Depression: Not on file   Housing Stability: Not on file       Functional Status:  Prior to admission patient needed assistance:   Dependent ADLs:: Ambulation-walker  Dependent IADLs:: Independent  Assesssment of Functional Status: Not at baseline with ADL Functioning    Mental Health Status:  Mental Health Status: Current Concern  Mental Health Management: Medication, Psychiatrist, Other (see comment) (client states some concern with anxiety at recent health issues, She resumed anti anxiety and anti depressant meds today)    Chemical Dependency Status:  Chemical Dependency Status: No Current Concerns             Values/Beliefs:  Spiritual, Cultural Beliefs, Taoism Practices, Values that affect care:    Description of Beliefs that Will Affect Care: Alex            Additional Information:  Patient transferred on 1/1/23 from [facility] s/p laparoscopic cholecysctectomy on 12/11/22.  She had  sepsis and bile peritonitis. She underwent exploratory laparotomy, washout and drain placement on 12/28/22. IR consulted.   She has 3 lori drains, per care team rounds anticipate that client will be discharged to home with home RN and PT services.  Final discontinue date pending medical stability.  Possible PTC drain placement.  Patient completed PLC drain education on 1/8/23.  Noted per chart review patient requesting information on Health Care Directives.    Met with patient,  Introduced RN Care Coordination role.  Reviewed anticipated plan for discharge.  Patient aware of final discharge plan pending medical stability.  Reviewed/discussed recommendation for PT and RN home services, provided patient with Medicare.gov star rated homecare agencies.  Agreed to follow up with patient closer to discharge to address homecare plan.  Provided patient with healthcare documents.  Patient notes no other concerns at this time.      Mi  Apryl RN

## 2023-01-09 NOTE — PROGRESS NOTES
Transplant Surgery  Inpatient Daily Progress Note  01/09/2023    Assessment & Plan: Zeinab Bermudez is a 67 year old female s/p laparoscopic cholecystectomy on 12/11/11. She presented to the OSH ED on 12/27/22 with sepsis and bile peritonitis, underwent exploratory laparotomy, washout and drain placement on 12/28. ERCP on 12/29 showed possible bile duct transection. IR placed 3rd drain (left side) on 12/29/22 d/t biloma. She was transferred to [facility] on 1/1/23 for hepatobiliary evaluation and management of bile duct injury.      Cardiorespiratory: VSS  Hx HTN/HLD: Hold PTA medications.      Hematology:   Anemia: Presented to OSH with Hgb 13, 2 weeks PTA Hgb was 15. Hgb now ~9. ERCP finding duodenal ulcer with oozing. Monitor, transfuse for Hgb < 7. Continue PPI BID.   -Hgb stable after IR procedure.      GI/Nutrition:   S/p sri complicated by bile duct injury, bile leak, intra-abdominal Infection: 12/11/22 status post cholecystectomy c/b sepsis and bile peritonitis with exploratory laparotomy, washout and drain placement on 12/28. ERCP on 12/29 showed possible bile duct transection. IR placed 3rd drain (left side) on 12/29/22 d/t biloma. Continue 2 surgical drains to suction (right) and IR drain to gravity on admission, changed to suction by IR (left). Bilious output from drains, minimal amount, from drain. LFTs stable. MELITA fluid bilirubin 4.3 (serum 1.7). LFTs stable, TB 1.4, Alk phos 207.     -1/2 CT scan with IV/PO contrast  -1/3 IR consulted and can consider IR sinogram and or drain reposition if there is no output from drain in next 24 hours.      -1/3 ERCP: Complete transection is suspected at the level of two horizontal clips seemingly placed across the common duct with a marked defect just below. Consider PTC as a bridge to surgery.   -Changed from Zosyn to Augmentin on 1/5  -LFTs improving  -US abdomen to evaluate fluid collections  -Unable to place PTC placement on 1/6. Transplant surgeon will discuss plan  with IR staff.     Moderate malnutrition in the context of acute illness: Nutrition consult. Regular diet with supplements.   Duodenal ulcer: Continue PPI BID x 30 days.     Fluid/Electrolytes: Encourage PO fluids. Replete electrolytes PRN.   Hypokalemia: resolved    Endocrine: No issues    : No issues    Infectious disease: Afebrile, WBC WNL  Sepsis and bile peritonitis: Weaned off pressor 12/30. 12/29 fluid gram stain no organism, 1+ PMNs, 1+RBC; fluid bacterial culture no growth (will be loaded into care everywhere). Zosyn started on admission, changed to Augmentin 1/6, continue today.   COVID-19: 12/10 COVID-19 positive at OSH, repeat 1/2 positive with CT 42.8. Currently asymptomatic. Recovered.     Neuro/Psych:  Acute pain: Pain after PTC attempt. Dilaudid PO PRN and tylenol PRN.  Depression with anxiety:  PTA venlafaxine 24hr, restarted. Ativan PO TID PRN anxiety.    Prophylaxis: Heparin subcutaneous ppx    Activity: PT/OT    Disposition: 7A; PT recommending home with outpatient PT.     Medical Decision Making: Medium  Subsequent visit 39859 (moderate level decision making)    JOANN/Fellow/Resident Provider: Lynn Leon, PAC 6425    Faculty: Simon Carrillo M.D.    __________________________________________________________________  Transplant History: Admitted 1/1/2023 for bile duct injury.    Interval History: History is obtained from the patient  Overnight events: No acute events overnight. Minimal appetite and nausea over the past 2 days.     ROS:   A 10-point review of systems was negative except as noted above.    Meds:    amoxicillin-clavulanate  1 tablet Oral Q12H PRINCE (08/20)     heparin ANTICOAGULANT  5,000 Units Subcutaneous Q8H     heparin lock flush  5-20 mL Intracatheter Q24H     pantoprazole  40 mg Oral BID AC     sodium chloride (PF)  10 mL Irrigation Q8H     sodium chloride (PF)  10-40 mL Intracatheter Q8H     sodium chloride (PF)  10-40 mL Intracatheter Q8H     ursodiol  300 mg Oral BID      "venlafaxine  112.5 mg Oral Daily       Physical Exam:     Admit Weight: 82.2 kg (181 lb 3.2 oz)    Current vitals:   /71 (BP Location: Left arm)   Pulse 80   Temp 97.6  F (36.4  C) (Oral)   Resp 18   Ht 1.575 m (5' 2\")   Wt 80.4 kg (177 lb 3.2 oz)   SpO2 95%   BMI 32.41 kg/m           Vital sign ranges:    Temp:  [97.6  F (36.4  C)-98.4  F (36.9  C)] 97.6  F (36.4  C)  Pulse:  [71-84] 80  Resp:  [16-18] 18  BP: (104-123)/(61-76) 117/71  SpO2:  [93 %-99 %] 95 %  Patient Vitals for the past 24 hrs:   BP Temp Temp src Pulse Resp SpO2   01/09/23 0941 117/71 97.6  F (36.4  C) Oral 80 18 95 %   01/09/23 0557 123/69 98.4  F (36.9  C) Oral 71 18 93 %   01/09/23 0151 118/73 98  F (36.7  C) Oral 77 18 94 %   01/08/23 2143 104/61 97.9  F (36.6  C) Oral 77 18 93 %   01/08/23 1743 112/71 97.8  F (36.6  C) Oral 83 16 96 %   01/08/23 1348 117/76 98.1  F (36.7  C) Oral 84 16 99 %     General Appearance: in no apparent distress.   Skin: normal, dry  Heart: well perfused  Lungs: NLB on RA  Abdomen: The abdomen is soft, generalized ttp, no peritoneal signs.MELITA x 2 to suction on right side, bilious output. IR pigtail drain to suction on left side, bilious output. The wound is Healing well, no signs of infection.  : good UOP  Extremities: edema: absent.  Neurologic: awake, alert and oriented.     Data:   CMP  Recent Labs   Lab 01/09/23  0556 01/08/23  0858 01/03/23  0612 01/02/23  1351    139   < >  --    POTASSIUM 3.8 3.9   < >  --    CHLORIDE 108* 108*   < >  --    CO2 21* 20*   < >  --    GLC 95 98   < >  --    BUN 9.1 11.9   < >  --    CR 0.63 0.72   < >  --    GFRESTIMATED >90 >90   < >  --    NASRIN 8.6* 8.5*   < >  --    MAG 2.1 2.1   < >  --    PHOS 3.0 3.0   < >  --    ALBUMIN 3.0* 3.0*   < >  --    BILITOTAL 1.4* 1.5*   < >  --    ALKPHOS 207* 207*   < >  --    AST 26 36*   < >  --    ALT 78* 96*   < >  --    FAMY  --   --   --  5.0    < > = values in this interval not displayed.     CBC  Recent Labs   Lab " 01/09/23  0556 01/08/23  0858   HGB 9.5* 9.1*   WBC 5.6 6.4    238     COAGS  No lab results found in last 7 days.    Invalid input(s): XA   Urinalysis  No lab results found.

## 2023-01-09 NOTE — PLAN OF CARE
"/78 (BP Location: Left arm)   Pulse 80   Temp 98  F (36.7  C) (Oral)   Resp 18   Ht 1.575 m (5' 2\")   Wt 80.4 kg (177 lb 3.2 oz)   SpO2 97%   BMI 32.41 kg/m     Neuro: A/Ox3  VS: VSS on RA  Pain: c/o abdominal discomfort but declined pain meds  GI: On regular diet and tolerating good. Denies nausea. BMx1  : Voiding without difficulty  Skin/Drain: RLQ MELITA intact #1 with serous drainage, #2 with green/bile drainage, LLQ intact with serous drainage  R PICC SL  Activity - Denies  Plan of Care - Continue with POC    "

## 2023-01-09 NOTE — PROGRESS NOTES
"CLINICAL NUTRITION SERVICES - REASSESSMENT NOTE     Nutrition Prescription    RECOMMENDATIONS FOR MDs/PROVIDERS TO ORDER:  Recommend resuming Vitamin D3 supplement - patient reported taking 2,000 international unit(s) at home.    Consider supplemental TF if not meeting 60% of energy and protein needs at 870 kcal and 42 g protein.    Malnutrition Status:    Moderate malnutrition in the context of acute illness or injury.    Recommendations already ordered by Registered Dietitian (RD):  Medical food supplement therapy - ordered Special K bar with meals  Ordered updated zinc level  Multivitamin/Mineral: ordered multivitamin.  Ordered calorie counts.    Future/Additional Recommendations:  TF recommendations if needed: Vivonex RTF (prior to any surgery for bile leak) @ 60 ml/hr   -Start at 15 ml/hr and advance by 15 ml/hr q 8 hours to goal rate     EVALUATION OF THE PROGRESS TOWARD GOALS   Diet: Regular  Ensure with meals  Intake:   - The patient reports eating between 25-50% of meals. She reports some nausea after meals and feels full sooner.  - Patient complains of altered taste.  - Does not like strawberry or chocolate Ensure but tolerates 1 ensure clear per day.  -  Patient reported taking 2,000 international unit(s) of Vitamin D3 at home.       NEW FINDINGS   1/3: ERCP - Complete transection of common bile duct, \"massive\" duodenal sweep ulcer  1/6: Unsuccessful biliary drain placement. Plan is to reattempt drain placement soon.   Potential surgery in 2 months to repair bile duct injury.      Labs:  LFTs improving  Fat soluble vitamin levels checked 1/6:  Vit A: 0.48 (normal)  Vit D: 28 (low normal)  Vit E: 7.3 (normal)    Medications:  Senna-docusate tablet x2/day.    BMs:  x2 on 1/8.  x1 on 1/9.    Weights:  01/06/23  80.4 kg (177 lb 3.2 oz) Standing scale   01/05/23  83.6 kg (184 lb 3.2 oz) --   01/02/23  82.2 kg (181 lb 3.2 oz) Standing scale       MALNUTRITION  % Intake: </= 50% for >/= 5 days (severe)  % Weight " Loss: 1-2% in 1 week (moderate)  Subcutaneous Fat Loss: None observed  Muscle Loss: Posterior calf:  mild  Fluid Accumulation/Edema: None noted  Malnutrition Diagnosis: Moderate malnutrition in the context of acute illness or injury.    Previous Goals   Total avg nutritional intake to meet a minimum of 20 kcal/kg and 1.2 g PRO/kg daily (per dosing wt 58 kg).  Evaluation: Not met    Previous Nutrition Diagnosis  Inadequate oral intake related to abdominal pain, fullness as evidenced by patient eating <50% of usual intakes for >5 days.   Evaluation: No change    CURRENT NUTRITION DIAGNOSIS  Inadequate oral intake related to abdominal pain, fullness as evidenced by patient eating <50% of usual intakes for >5 days.     INTERVENTIONS  Implementation  Medical food supplement therapy - ordered Special K bar with meals, Ensure between meals  Nutrition education for recommended modifications - educated on food composition to help optimize nutrition, provided fat content of menu items.  Ordered calorie counts.  Ordered zinc level.  Briefly discussed [place] tube for supplemental nutrition.  Ordered multivitamin.    Goals  Total avg nutritional intake to meet 696 kcal and 42 g protein (60% of energy and protein needs).    Monitoring/Evaluation  Progress toward goals will be monitored and evaluated per protocol.    Haley Middleton MS  Dietetic Intern    I have read and agree with the above re-assessment, evaluation, interventions and recommendations.    Nilda Max, MS, RD, LD, CCTD, CNSC  7A/Obs unit pager [phone]  Weekend pager [phone]

## 2023-01-09 NOTE — PLAN OF CARE
"VS: /61 (BP Location: Left arm)   Pulse 77   Temp 97.9  F (36.6  C) (Oral)   Resp 18   Ht 1.575 m (5' 2\")   Wt 80.4 kg (177 lb 3.2 oz)   SpO2 93%   BMI 32.41 kg/m        Neuro: alert and oriented X4    Cardio: WNL   Respiratory: RA   GI/: had small BM and pass some gas, has abdominal fullness and some nausea, continent of bladder   Skin: has midline inision with staples   Diet:   NPO at midnight   Labs: pendinf AM   BG: none   LDA: triple lumen PICC hep lock    Mobility:  SBA walker   Pain: denied pain   PRN medications: compazine and was effective, senna X1   Changes: none   Plan of Care:  possible drain placement in IR.            "

## 2023-01-09 NOTE — PLAN OF CARE
"/71 (BP Location: Left arm)   Pulse 83   Temp 97.8  F (36.6  C) (Oral)   Resp 16   Ht 1.575 m (5' 2\")   Wt 80.4 kg (177 lb 3.2 oz)   SpO2 96%   BMI 32.41 kg/m      Shift: 6825-8306  VS: VSS on RA, afebrile  Neuro: AOx4  BG: none  Respiratory: WNL  Pain/Nausea/PRN: denies pain, nausea with meals, zofran given before my shift. Pt with abdominal fullness and not passing gas,  paged about simethicone, Dr came to assess, soft abdomen, continue to monitor  Diet: reg, minimal appetite. Pt likes clear mixed berry ensure, order modified; NPO at midnight  LDA: PICC hep locked           L MELITA (irrigated)           R Jpx2 with large bulb with 150 ml output  GI/: voids, small BM today  Skin: MELITA sites CDI  Mobility: SBA  Plan: potential IR procedure tomorrow    Handoff given to following RN.                   "

## 2023-01-10 ENCOUNTER — APPOINTMENT (OUTPATIENT)
Dept: PHYSICAL THERAPY | Facility: CLINIC | Age: 68
DRG: 919 | End: 2023-01-10
Attending: SURGERY
Payer: COMMERCIAL

## 2023-01-10 ENCOUNTER — APPOINTMENT (OUTPATIENT)
Dept: ULTRASOUND IMAGING | Facility: CLINIC | Age: 68
DRG: 919 | End: 2023-01-10
Attending: SURGERY
Payer: COMMERCIAL

## 2023-01-10 PROBLEM — K83.8 BILE LEAK, POSTOPERATIVE: Status: ACTIVE | Noted: 2023-01-10

## 2023-01-10 PROBLEM — K91.89 BILE LEAK, POSTOPERATIVE: Status: ACTIVE | Noted: 2023-01-10

## 2023-01-10 PROBLEM — E44.0 MODERATE MALNUTRITION (H): Status: ACTIVE | Noted: 2023-01-10

## 2023-01-10 PROBLEM — K65.3: Status: ACTIVE | Noted: 2023-01-10

## 2023-01-10 LAB
ALBUMIN SERPL BCG-MCNC: 3.3 G/DL (ref 3.5–5.2)
ALP SERPL-CCNC: 224 U/L (ref 35–104)
ALT SERPL W P-5'-P-CCNC: 67 U/L (ref 10–35)
ANION GAP SERPL CALCULATED.3IONS-SCNC: 11 MMOL/L (ref 7–15)
AST SERPL W P-5'-P-CCNC: 36 U/L (ref 10–35)
BILIRUB DIRECT SERPL-MCNC: 0.96 MG/DL (ref 0–0.3)
BILIRUB SERPL-MCNC: 1.6 MG/DL
BUN SERPL-MCNC: 10.1 MG/DL (ref 8–23)
CALCIUM SERPL-MCNC: 8.4 MG/DL (ref 8.8–10.2)
CHLORIDE SERPL-SCNC: 106 MMOL/L (ref 98–107)
CREAT SERPL-MCNC: 0.61 MG/DL (ref 0.51–0.95)
DEPRECATED HCO3 PLAS-SCNC: 20 MMOL/L (ref 22–29)
ERYTHROCYTE [DISTWIDTH] IN BLOOD BY AUTOMATED COUNT: 14.7 % (ref 10–15)
GFR SERPL CREATININE-BSD FRML MDRD: >90 ML/MIN/1.73M2
GLUCOSE SERPL-MCNC: 95 MG/DL (ref 70–99)
HCT VFR BLD AUTO: 30.9 % (ref 35–47)
HGB BLD-MCNC: 10 G/DL (ref 11.7–15.7)
MAGNESIUM SERPL-MCNC: 2.3 MG/DL (ref 1.7–2.3)
MCH RBC QN AUTO: 30.5 PG (ref 26.5–33)
MCHC RBC AUTO-ENTMCNC: 32.4 G/DL (ref 31.5–36.5)
MCV RBC AUTO: 94 FL (ref 78–100)
PHOSPHATE SERPL-MCNC: 2.5 MG/DL (ref 2.5–4.5)
PLATELET # BLD AUTO: 218 10E3/UL (ref 150–450)
POTASSIUM SERPL-SCNC: 3.9 MMOL/L (ref 3.4–5.3)
PROT SERPL-MCNC: 5.8 G/DL (ref 6.4–8.3)
RBC # BLD AUTO: 3.28 10E6/UL (ref 3.8–5.2)
SODIUM SERPL-SCNC: 137 MMOL/L (ref 136–145)
WBC # BLD AUTO: 6.3 10E3/UL (ref 4–11)

## 2023-01-10 PROCEDURE — 82248 BILIRUBIN DIRECT: CPT | Performed by: NURSE PRACTITIONER

## 2023-01-10 PROCEDURE — 250N000013 HC RX MED GY IP 250 OP 250 PS 637: Performed by: PHYSICIAN ASSISTANT

## 2023-01-10 PROCEDURE — 85027 COMPLETE CBC AUTOMATED: CPT | Performed by: INTERNAL MEDICINE

## 2023-01-10 PROCEDURE — 83735 ASSAY OF MAGNESIUM: CPT | Performed by: INTERNAL MEDICINE

## 2023-01-10 PROCEDURE — 84630 ASSAY OF ZINC: CPT | Performed by: PHYSICIAN ASSISTANT

## 2023-01-10 PROCEDURE — 36592 COLLECT BLOOD FROM PICC: CPT | Performed by: PHYSICIAN ASSISTANT

## 2023-01-10 PROCEDURE — 93975 VASCULAR STUDY: CPT | Mod: 26 | Performed by: RADIOLOGY

## 2023-01-10 PROCEDURE — 250N000011 HC RX IP 250 OP 636: Performed by: PHYSICIAN ASSISTANT

## 2023-01-10 PROCEDURE — 250N000013 HC RX MED GY IP 250 OP 250 PS 637: Performed by: NURSE PRACTITIONER

## 2023-01-10 PROCEDURE — 120N000011 HC R&B TRANSPLANT UMMC

## 2023-01-10 PROCEDURE — 250N000011 HC RX IP 250 OP 636: Performed by: INTERNAL MEDICINE

## 2023-01-10 PROCEDURE — 93975 VASCULAR STUDY: CPT

## 2023-01-10 PROCEDURE — 99232 SBSQ HOSP IP/OBS MODERATE 35: CPT | Mod: FS | Performed by: PHYSICIAN ASSISTANT

## 2023-01-10 PROCEDURE — 97116 GAIT TRAINING THERAPY: CPT | Mod: GP

## 2023-01-10 PROCEDURE — 84100 ASSAY OF PHOSPHORUS: CPT | Performed by: INTERNAL MEDICINE

## 2023-01-10 PROCEDURE — 250N000013 HC RX MED GY IP 250 OP 250 PS 637: Performed by: INTERNAL MEDICINE

## 2023-01-10 PROCEDURE — 250N000009 HC RX 250: Performed by: PHYSICIAN ASSISTANT

## 2023-01-10 PROCEDURE — 80053 COMPREHEN METABOLIC PANEL: CPT | Performed by: NURSE PRACTITIONER

## 2023-01-10 PROCEDURE — 250N000011 HC RX IP 250 OP 636

## 2023-01-10 RX ORDER — LIDOCAINE HYDROCHLORIDE 10 MG/ML
10 INJECTION, SOLUTION EPIDURAL; INFILTRATION; INTRACAUDAL; PERINEURAL ONCE
Status: COMPLETED | OUTPATIENT
Start: 2023-01-10 | End: 2023-01-10

## 2023-01-10 RX ADMIN — Medication 5 MG: at 21:25

## 2023-01-10 RX ADMIN — Medication 1 TABLET: at 08:36

## 2023-01-10 RX ADMIN — AMOXICILLIN AND CLAVULANATE POTASSIUM 1 TABLET: 875; 125 TABLET, FILM COATED ORAL at 20:20

## 2023-01-10 RX ADMIN — URSODIOL 300 MG: 300 CAPSULE ORAL at 08:35

## 2023-01-10 RX ADMIN — AMOXICILLIN AND CLAVULANATE POTASSIUM 1 TABLET: 875; 125 TABLET, FILM COATED ORAL at 08:36

## 2023-01-10 RX ADMIN — URSODIOL 300 MG: 300 CAPSULE ORAL at 20:20

## 2023-01-10 RX ADMIN — LORAZEPAM 0.5 MG: 0.5 TABLET ORAL at 05:21

## 2023-01-10 RX ADMIN — SODIUM CHLORIDE, PRESERVATIVE FREE 5 ML: 5 INJECTION INTRAVENOUS at 23:48

## 2023-01-10 RX ADMIN — PANTOPRAZOLE SODIUM 40 MG: 40 TABLET, DELAYED RELEASE ORAL at 17:06

## 2023-01-10 RX ADMIN — HEPARIN SODIUM 5000 UNITS: 5000 INJECTION, SOLUTION INTRAVENOUS; SUBCUTANEOUS at 06:40

## 2023-01-10 RX ADMIN — LIDOCAINE HYDROCHLORIDE 10 ML: 10 INJECTION, SOLUTION EPIDURAL; INFILTRATION; INTRACAUDAL; PERINEURAL at 14:51

## 2023-01-10 RX ADMIN — ONDANSETRON 4 MG: 4 TABLET, ORALLY DISINTEGRATING ORAL at 08:35

## 2023-01-10 RX ADMIN — PANTOPRAZOLE SODIUM 40 MG: 40 TABLET, DELAYED RELEASE ORAL at 08:37

## 2023-01-10 RX ADMIN — SODIUM CHLORIDE, PRESERVATIVE FREE 15 ML: 5 INJECTION INTRAVENOUS at 00:30

## 2023-01-10 RX ADMIN — VENLAFAXINE HYDROCHLORIDE 112.5 MG: 37.5 CAPSULE, EXTENDED RELEASE ORAL at 08:36

## 2023-01-10 ASSESSMENT — ACTIVITIES OF DAILY LIVING (ADL)
ADLS_ACUITY_SCORE: 24
ADLS_ACUITY_SCORE: 25
ADLS_ACUITY_SCORE: 24
ADLS_ACUITY_SCORE: 28
ADLS_ACUITY_SCORE: 24
ADLS_ACUITY_SCORE: 24
ADLS_ACUITY_SCORE: 25
ADLS_ACUITY_SCORE: 28
ADLS_ACUITY_SCORE: 28
ADLS_ACUITY_SCORE: 25
ADLS_ACUITY_SCORE: 24
ADLS_ACUITY_SCORE: 24

## 2023-01-10 NOTE — DISCHARGE SUMMARY
[facility]    Discharge Summary  Solid Organ Transplant    Date of Admission:  1/1/2023  Date of Discharge:  1/11/2023  Discharging Provider: Susan Pisano NP    Discharge Diagnoses   Principal Problem:    Bile duct injury  Active Problems:    Moderate malnutrition (H)    Bile leak, postoperative    Peritonitis due to bile (H)    Gastrointestinal hemorrhage associated with duodenal ulcer      Follow-ups Needed After Discharge       Unresulted Labs Ordered in the Past 30 Days of this Admission     Date and Time Order Name Status Description    1/9/2023 11:30 PM Zinc In process       These results will be followed up by transplant    Discharge Disposition   Discharged to home  Condition at discharge: Stable     Hospital Course   Zeinab Bermudez is a 67 year old female who underwent a laparoscopic cholecystectomy on 12/11/22 who presented to her local ED with peritonitis, and sepsis due to a bile leak. She was transferred to [facility] 1/1/2023 for hepatobiliary evaluation and management of bile duct injury.  The following problems were addressed during her hospitalization:    Hx HTN/HLD: Hold PTA medications.       Anemia of chronic illness:  Presented to OSH with Hgb 13. ERCP identified a duodenal ulcer with oozing.  Continue PPI BID. Hgb stable ~10 at discharge. No active bleeding.     S/p sri complicated by bile duct injury, bile leak, intra-abdominal Infection: 12/11/22 status post cholecystectomy c/b sepsis and bile peritonitis with exploratory laparotomy, washout and drain placement on 12/28. ERCP on 12/29 showed possible bile duct transection. IR placed 3rd drain (left side) on 12/29/22 d/t biloma. Continue 2 surgical drains to suction (right) , and IR drain to suction (left). Bilious output from drains, minimal amount except for right lateral drain with ~300 output/day. MELITA fluid bilirubin 4.3 (serum 1.7).    -Transaminases remain mildly elevated but trending down  by discharge, TB stable 1.4-1.6, Alk Phos 220s    Imaging/Procedures:   -1/2 CT scan with IV/PO contrast.  -1/3 IR consulted  -1/3 ERCP: Complete transection is suspected at the level of two horizontal clips seemingly placed across the common duct with a marked defect just below. Consider PTC as a bridge to surgery.   -Unable to place PTC placement on 1/6, will attempt again in ~1week, awaiting schedule by IR   - 1/10 US: anterior branch of the right hepatic artery is patent but with a somewhat blunted waveform and spectral broadening. This is nonspecific and could represent possible upstream stenosis/spasm or other abnormality affecting hemodynamics      Moderate malnutrition in the context of acute illness: Nutrition consulted. Regular diet with supplements. Consider supplemental TF if not meeting 60% of energy and protein needs at 870 kcal and 42 g protein. At discharge, calorie counts were averaging 600 ofe and 30g P.     Duodenal ulcer: Continue PPI BID x 30 days. Antiemetic PRN.     Hypokalemia: resolved, K 3.9 at discharge    Sepsis and bile peritonitis: Presented to OSH 12/27 septic requiring pressors. Weaned off pressors 12/30 prior to transfer. 12/29 fluid gram stain no organism, 1+ PMNs, 1+RBC; fluid bacterial culture no growth. Empirc Zosyn started on admission, transitioned to  Augmentin 1/6,plan to continue an additional 1 week duration with EOT 1/18. Remains Afebrile, normal WBC.    COVID-19: 12/10 COVID-19 positive at OSH, repeat 1/2 positive with CT 42.8. Currently asymptomatic. Recovered.      Acute pain: following PTC attempt requiring tylenol and dilaudid. Resolved at discharge. She will not discharge with a pain prescription. Continue Tylenol PRN.    Depression with anxiety:  PTA venlafaxine 24hr, restarted. Ativan PO TID PRN anxiety.        Consultations This Hospital Stay   ADVANCE DIRECTIVE IP CONSULT  MEDICATION HISTORY IP PHARMACY CONSULT  PHYSICAL THERAPY ADULT IP  CONSULT  PHARMACY/NUTRITION TO START AND MANAGE TPN  INTERVENTIONAL RADIOLOGY ADULT/PEDS IP CONSULT  NUTRITION SERVICES ADULT IP CONSULT  INTERVENTIONAL RADIOLOGY ADULT/PEDS IP CONSULT  GI PANCREATICOBILIARY ADULT IP CONSULT  INTERVENTIONAL RADIOLOGY ADULT/PEDS IP CONSULT  PATIENT LEARNING CENTER IP CONSULT  CARE MANAGEMENT / SOCIAL WORK IP CONSULT    Code Status   Full Code    Time Spent on this Encounter   I, Susan Pisano NP, personally saw the patient today and spent greater than 30 minutes discharging this patient.       Susan Pisano NP  [facility]  ______________________________________________________________________    Physical Exam   Temp: 98.4  F (36.9  C) Temp src: Oral BP: 113/71 Pulse: 78   Resp: 16 SpO2: 97 % O2 Device: None (Room air)    Vitals:    01/02/23 0025 01/05/23 1124 01/06/23 0516   Weight: 82.2 kg (181 lb 3.2 oz) 83.6 kg (184 lb 3.2 oz) 80.4 kg (177 lb 3.2 oz)     Vital Signs with Ranges  Temp:  [97.8  F (36.6  C)-98.6  F (37  C)] 98.4  F (36.9  C)  Pulse:  [66-80] 78  Resp:  [16] 16  BP: ()/(55-71) 113/71  SpO2:  [95 %-97 %] 97 %  I/O last 3 completed shifts:  In: 630 [P.O.:600; I.V.:30]  Out: 1645 [Urine:1200; Drains:445]      General Appearance: in no apparent distress.   Skin: normal, dry  Heart: well perfused  Lungs: NLB on RA  Abdomen: The abdomen is soft, generalized ttp, no peritoneal signs. MELITA x 2 to suction on right side, bilious output. IR pigtail drain to suction on left side, bilious output. The wound is well approximated. Staples removed on 1/11.Healing well, no signs of infection.  : good UOP  Extremities: edema: absent.   Neurologic: awake, alert and oriented.     Primary Care Physician   Jairo Ocampo    Discharge Orders      IR Biliary Drain Placement    Non-dilated biliary system with bile leak post cholecystectomy, needs diversion. Failed 1st attempt 1/6.     Physical Therapy Referral      Home Care Referral       Reason for your hospital stay    Bile duct injury     Activity    .[facility]     Adult [facility] Follow-up and recommended labs and tests    Follow up with Dr. Desmond Ruano, at the [facility] surgery center within 1-2 weeks for hospital follow- up. You are currently scheduled for 1/19/22 @ 10:15am with labs: CBC with diff, CMP, INR    Follow up with Interventional Radiology 2/10 @ 6am    Appointments on [place] and/or [facility] (with [facility] or [facility] provider or service). Call [phone] if you haven't heard regarding these appointments within 7 days of discharge.     When to contact your care team    WHEN TO CONTACT YOUR  COORDINATOR:  Notify the fellow if you have pain over your liver, increased redness or drainage from your MELITA insertion sites, change in MELITA drain output to include increase in output, cloudy, bloody drainage, fever greater than 101F, or jaundice (yellowing of the skin).  Notify the fellow immediately if you are ever unable to take your immunosuppressive medications for any reason.  To reach the fellow, please call the hospital switchboard at [phone] and ask to have the liver transplant surgery fellow paged for urgent medical questions, or present to the emergency department.     Tubes and drains    You are going home with the following tubes or drains: MELITA drain.  Follow these instructions  to care for your tube:  Empty and record MELITA drain daily and as needed. Record color and volumes of output. Bring volumes to clinic.     Diet    Follow this diet upon discharge:  Regular Diet Adult       Significant Results and Procedures   Most Recent 3 CBC's:  Recent Labs   Lab Test 01/11/23  0644 01/10/23  0937 01/09/23  0556   WBC 5.5 6.3 5.6   HGB 10.4* 10.0* 9.5*   MCV 96 94 97    218 236     Most Recent 3 BMP's:  Recent Labs   Lab Test 01/11/23  0644 01/10/23  0937 01/09/23  0556    137 139   POTASSIUM 3.9 3.9 3.8   CHLORIDE 107 106 108*   CO2 21* 20* 21*   BUN 12.5 10.1 9.1   CR 0.67  0.61 0.63   ANIONGAP 11 11 10   NASRIN 8.7* 8.4* 8.6*   GLC 92 95 95     Most Recent 2 LFT's:  Recent Labs   Lab Test 01/11/23  0644 01/10/23  0937   AST 37* 36*   ALT 63* 67*   ALKPHOS 220* 224*   BILITOTAL 1.5* 1.6*     Most Recent 3 INR's:  Recent Labs   Lab Test 01/01/23  2340   INR 1.15   ,   Results for orders placed or performed during the hospital encounter of 01/01/23   XR Chest Port 1 View    Narrative    Exam: XR CHEST PORT 1 VIEW, 1/2/2023 12:19 AM    Indication: PICC positioning, placed at OSH    Comparison: None    Findings:   Portable semiupright frontal view of the chest. Partially visualized  right upper quadrant abdominal drain. Right upper extremity PICC tip  near the cavoatrial junction. Right greater than left perihilar and  bibasilar opacities. No appreciable pneumothorax. Trachea is midline.  Heart size is normal.       Impression    Impression:   1. Right upper extremity PICC tip near the cavoatrial junction.  2. Right greater than left perihilar and bibasilar opacities, likely  combination of edema and atelectasis. Infection is in the  differential.    I have personally reviewed the examination and initial interpretation  and I agree with the findings.    KLAUDIA CONDON MD         SYSTEM ID:  [id number]   CT Abdomen Pelvis w Contrast    Narrative    EXAMINATION: CT ABDOMEN PELVIS W CONTRAST  1/2/2023 3:43 PM      HISTORY: Re-eval fluid collections, concern for CBD injury    COMPARISON: Outside CT abdomen and pelvis 12/29/2022    PROCEDURE COMMENTS: CT of the abdomen and pelvis was performed with  111 mL Isovue-370 intravenous contrast. Coronal and sagittal  reformatted images were obtained.    FINDINGS:  Lower thorax:   Slightly decreased bilateral pleural effusions, greater on the left  with associated compressive atelectasis.    Abdomen and pelvis:    Unchanged 2.3 cm hypoattenuation along the right hepatic dome (series  3, image 17). Slight decreased geographic hypoattenuation of the  right  hepatic lobe, measuring 4.5 cm compared to 6.5 cm when measured in a  similar manner (series 3, image 62). Stable surgical drains  terminating over the right hepatic dome and near the gallbladder fossa  with slightly decreased subcapsular fluid collection. Similar  appearing subcapsular fluid along the left lobe measuring about 3.8 cm  (series 3, image 39). Interval placement of a new surgical drain  within the fluid collection along the greater curvature of the stomach  which has significantly decreased in size since prior exam of  12/29/2022 with the largest pocket measuring 5.5 x 2.4 cm, previously  measuring 10.9 x 9.4 cm when measured in a similar fashion.  Cholecystectomy. Mild fatty atrophy of the pancreas. The spleen and  adrenal glands are normal in appearance. Symmetric renal cortical  enhancement. No hydronephrosis or nephrolithiasis. The bladder is  incompletely distended and contains a Upton catheter and a small  amount of intraluminal gas, likely due to recent catheterization.  Decreased small volume simple ascites within the abdomen and pelvis.  Decreased inflammatory changes about the duodenum and throughout the  colon. There are no abnormally dilated loops of large and small bowel.  Oral contrast is seen throughout the distal small bowel and throughout  the colon. Multiple prominent but nonenlarged retroperitoneal and  portacaval lymph nodes. The major intra-abdominal vasculature is  widely patent. The infrarenal aorta is normal in caliber. Small  pneumoperitoneum, similar to prior possibly from intervention.    Bones:  Multilevel degenerative changes of the spine. No suspicious or  aggressive appearing bone lesions.      Impression    IMPRESSION:  1. Interval placement of surgical drain within the fluid collection  along the greater curvature of the stomach which has significantly  decreased in size compared to prior exam of 12/29/2022.  2. Additional surgical drains are unchanged in  position with similar  perihepatic fluid collection. No new fluid collections.  3. Slight decreased small volume simple ascites.  4. Slightly decreased bilateral pleural effusions, greater on the  left.  5. Mild decreased inflammatory changes/edema about the duodenum and  throughout the colon.  6. Slight decrease in geographic appearing hepatic hypoattenuation in  the the inferior right lobe, indeterminate, possibly  posttraumatic/inflammatory. Consider attention on follow-up.    I have personally reviewed the examination and initial interpretation  and I agree with the findings.    GIOVANY BARKLEY MD         SYSTEM ID:  [id number]   XR Surgery SURINDER Fluoro Less Than 5 Min w Stills    Narrative    This exam was marked as non-reportable because it will not be read by a   radiologist or a [facility] non-radiologist provider.         IR Biliary Drain Placement    Narrative    PROCEDURES 1/6/2023:  1. Percutaneous transhepatic cholangiogram.  2. Unsuccessful placement of biliary drain, aborted    History: Bile leak with transected bile duct after ccy    Comparison: 1/2/2023    Staff: Magdiel Albarran MD, Jozef Burton MD    Fellow/Resident: LAILA Albright M.D.    Sedation: Patient was placed under general anesthesia for the  procedure and monitored by the General anesthesia team    Medications:  1% lidocaine 10 ml local anesthesia    Fluoroscopy time: 44.5 minutes    Complications: None    Contrast: No intravenous contrast administered.    PROCEDURE: The patient understood the limitations, alternatives, and  risks of the procedure and requested the procedure be performed. Both  written and oral consent were obtained.    The right upper quadrant was prepped and draped in the usual sterile  fashion. Lidocaine solution was used for local anesthesia.     Using ultrasound and fluoroscopic guidance numerous attempts were made  to perform percutaneous transhepatic access of peripheral biliary duct  however unsuccessful in opacifying  peripheral biliary duct.    Then under ultrasound guidance the more central right hepatic duct was  accessed. Cholangiogram performed through the access needle  demonstrated opacification of the central right and left hepatic duct  and segmental hepatic branches. There is noted complete transection of  the peripheral common hepatic duct near the bifurcation. There is  experiencing of flow of contrast directly into the peritoneal pigtail  drain without significant stasis of contrast in the peritoneum.    Despite additional attempts at fluoroscopic access of the opacified  hepatic ducts, access into a peripheral biliary duct was unsuccessful  due to a limited suitable opacified peripheral biliary ducts for  access. Decision made to complete the procedure.      Impression    IMPRESSION:   1. Unsuccessful drain placement due to inability to access a  peripheral anterior duct despite numerous attempts under ultrasound  fluoroscopic guidance.  2. Central right hepatic duct accessed and cholangiogram performed  demonstrating peripheral common hepatic duct transection just below  the right and left duct confluence. There is noted contrast  extravasation entering into the surgical drain without free leakage  into the peritoneum.    Plan:  - Anterior right duct could not be accessed. Left sided ducts are too  high to access.     I have personally reviewed the examination and initial interpretation  and I agree with the findings.    [facility]         SYSTEM ID:  [id number]   US Abdomen Limited    Narrative    EXAMINATION: Limited Abdominal Ultrasound, 1/6/2023 8:54 AM     COMPARISON: CT abdomen and pelvis 1/2/2023    HISTORY: lap?CCY?for acute cholecystitis 12/11/2022?c/b bile  leak/sepsis s/p ex lap, washout and two surgical MELITA on 12/28/2022.  ERCP with transection of CBD. Assess for interval change of fluid  collections.     FINDINGS: Limited sonographic evaluation of the right upper quadrant  secondary to midline  incision and right-sided drain and bandaging.    Fluid: Trace ascites. Small right pleural effusion.     Liver: The left lobe of the liver is not well seen. Limited  visualization of known subcapsular fluid collection along the left  hepatic lobe measuring approximately 2.1 x 0.5 x 1.9 cm.  Redemonstration of known hypoechoic focus at the dome of the right  liver lobe measuring 2.5 x 2.3 x 1.5 cm. Echogenic focus in the right  liver lobe measuring up to 7 mm, not definitively visualized on prior  imaging, possibly correlating with focal hypoattenuating area in the  central right hepatic lobe, series 4 image 63.The previously described  geographic hypoattenuation in the inferior right lobe is not well  visualized on today's exam. The liver demonstrates normal echotexture,  measuring 14.3 cm in length. The main portal vein is patent with flow  toward the liver.    Gallbladder: Surgically absent.    Bile Ducts: Both the intra- and extrahepatic biliary system are of  normal caliber.  The common bile duct measures 4 mm in diameter.    Pancreas: Not well seen.    Kidney: The right kidney measures 9.4 cm long. There is no  hydronephrosis or hydroureter, no shadowing renal calculi, cystic  lesion or mass.       Impression    IMPRESSION:     1.  Redemonstration of subcapsular fluid collection and hypoechoic  right dome focus. Difficult to evaluate interval change given exam  limitations as described above and differences in imaging modality.  Recommend follow up ultrasound if clinically indicated.    2.  Echogenic right liver lobe focus measuring 7mm, not definitely  previously seen, possibly correlating with a low-density punctate area  in the central right hepatic lobe. Attention on follow-up.    3.  Trace ascites.    4.  Small right pleural effusion.     I have personally reviewed the examination and initial interpretation  and I agree with the findings.    CARLIE SANTOS MD         SYSTEM ID:  [id number]   US Abdominal  Doppler Complete    Narrative    EXAMINATION: US ABDOMEN OR PELVIS DOPPLER COMPLETE 1/10/2023 8:13 AM     COMPARISON: Right upper quadrant ultrasound 1/6/2023    HISTORY: Evaluate for right hepatic artery injury.    TECHNIQUE: The right upper quadrant vasculature was scanned in  standard fashion with specialized ultrasound transducer(s) using both  gray-scale, color Doppler, and spectral flow techniques.    Findings:    Extrahepatic portal vein flow is antegrade at 23 cm/s.  Right portal vein flow is antegrade, measuring 38 cm/s.  Left portal vein flow is antegrade, measuring 12 cm/s.    Flow in the hepatic artery is towards the liver and:  94 cm/s peak systolic  0.61 resistive index.   The proximal right hepatic artery is patent with peak systolic flow of  116 cm/s and a resistive index of 0.63.   The posterior branch of the right hepatic artery is patent with peak  systolic flow of 68 cm/s and a resistive index of 0.8. The distal  anterior branch of the right hepatic artery is patent with peak  systolic flow of 28 cm/s and resistive index of 0.62, however there is  blunting of the waveform and spectral broadening when compared to the  posterior right hepatic artery branch.    The left hepatic artery is not well seen.    The splenic vein is not visualized.  The left, middle, and right  hepatic veins are patent with flow towards the IVC. The IVC is patent  with flow towards the heart.        Impression    Impression:   The anterior branch of the right hepatic artery is patent but with a  somewhat blunted waveform and spectral broadening. This is nonspecific  and could represent possible upstream stenosis/spasm or other  abnormality affecting hemodynamics. Recommend CTA if there is  continued clinical concern for vascular injury.    I have personally reviewed the examination and initial interpretation  and I agree with the findings.    CARLIE SANTOS MD         SYSTEM ID:  [id number]       Discharge Medications    Current Discharge Medication List      START taking these medications    Details   amoxicillin-clavulanate (AUGMENTIN) 875-125 MG tablet Take 1 tablet by mouth every 12 hours  Qty: 14 tablet, Refills: 0    Associated Diagnoses: Bile leak, postoperative      melatonin 5 MG sublingual tablet Place 1 tablet (5 mg) under the tongue At Bedtime  Qty: 30 tablet, Refills: 0    Associated Diagnoses: Injury of bile duct, initial encounter      pantoprazole (PROTONIX) 40 MG EC tablet Take 1 tablet (40 mg) by mouth 2 times daily (before meals)  Qty: 51 tablet, Refills: 0    Associated Diagnoses: Injury of bile duct, initial encounter      ursodiol (ACTIGALL) 300 MG capsule Take 1 capsule (300 mg) by mouth 2 times daily  Qty: 60 capsule, Refills: 1    Associated Diagnoses: Injury of bile duct, initial encounter         CONTINUE these medications which have CHANGED    Details   ondansetron (ZOFRAN ODT) 4 MG ODT tab Take 1 tablet (4 mg) by mouth every 6 hours as needed for nausea or vomiting  Qty: 15 tablet, Refills: 0    Associated Diagnoses: Nausea         CONTINUE these medications which have NOT CHANGED    Details   acetaminophen (TYLENOL) 325 MG tablet Take 325-650 mg by mouth every 6 hours as needed for mild pain      calcium carbonate (OS-NASRIN) 500 MG tablet Take 1 tablet by mouth daily      LORazepam (ATIVAN) 0.5 MG tablet Take 0.5 mg by mouth daily as needed for anxiety      Pediatric Multiple Vitamins (MULTIVITAMIN CHILDRENS, W/ FA, PO) Take 1 tablet by mouth daily      pravastatin (PRAVACHOL) 40 MG tablet Take 40 mg by mouth daily      venlafaxine (EFFEXOR XR) 37.5 MG 24 hr capsule Take 112.5 mg by mouth daily         STOP taking these medications       losartan (COZAAR) 100 MG tablet Comments:   Reason for Stopping:             Allergies   No Known Allergies

## 2023-01-10 NOTE — PROGRESS NOTES
Transplant Surgery  Inpatient Daily Progress Note  01/10/2023    Assessment & Plan: Zeinab Bermudez is a 67 year old female s/p laparoscopic cholecystectomy on 12/11/11. She presented to the OSH ED on 12/27/22 with sepsis and bile peritonitis, underwent exploratory laparotomy, washout and drain placement on 12/28. ERCP on 12/29 showed possible bile duct transection. IR placed 3rd drain (left side) on 12/29/22 d/t biloma. She was transferred to [facility] on 1/1/23 for hepatobiliary evaluation and management of bile duct injury.      Cardiorespiratory: VSS  Hx HTN/HLD: Hold PTA medications.      Hematology:   Anemia: Presented to OSH with Hgb 13, 2 weeks PTA Hgb was 15. Hgb now ~9. ERCP finding duodenal ulcer with oozing. Monitor, transfuse for Hgb < 7. Continue PPI BID.   -Hgb stable after IR procedure.      GI/Nutrition:   S/p sri complicated by bile duct injury, bile leak, intra-abdominal Infection: 12/11/22 status post cholecystectomy c/b sepsis and bile peritonitis with exploratory laparotomy, washout and drain placement on 12/28. ERCP on 12/29 showed possible bile duct transection. IR placed 3rd drain (left side) on 12/29/22 d/t biloma. Continue 2 surgical drains to suction (right) and IR drain to gravity on admission, changed to suction by IR (left). Bilious output from drains, minimal amount, from drain. LFTs stable. MELITA fluid bilirubin 4.3 (serum 1.7). LFTs stable, TB 1.4, Alk phos 207.     -1/2 CT scan with IV/PO contrast  -1/3 IR consulted and can consider IR sinogram and or drain reposition if there is no output from drain in next 24 hours.      -1/3 ERCP: Complete transection is suspected at the level of two horizontal clips seemingly placed across the common duct with a marked defect just below. Consider PTC as a bridge to surgery.   -Changed from Zosyn to Augmentin on 1/5  -LFTs improving  -US abdomen to evaluate fluid collections  -Unable to place PTC placement on 1/6, will attempt again in ~1week,  awaiting schedule by IR   - 1/10 repeat US     Moderate malnutrition in the context of acute illness: Nutrition consult. Regular diet with supplements.   Duodenal ulcer: Continue PPI BID x 30 days.     Fluid/Electrolytes: Encourage PO fluids. Replete electrolytes PRN.   Hypokalemia: resolved    Endocrine: No issues    : No issues    Infectious disease: Afebrile, WBC WNL  Sepsis and bile peritonitis: Weaned off pressor 12/30. 12/29 fluid gram stain no organism, 1+ PMNs, 1+RBC; fluid bacterial culture no growth (will be loaded into care everywhere). Zosyn started on admission, changed to Augmentin 1/6, continue today.   COVID-19: 12/10 COVID-19 positive at OSH, repeat 1/2 positive with CT 42.8. Currently asymptomatic. Recovered.     Neuro/Psych:  Acute pain: Pain after PTC attempt. Dilaudid PO PRN and tylenol PRN.  Depression with anxiety:  PTA venlafaxine 24hr, restarted. Ativan PO TID PRN anxiety.    Prophylaxis: Heparin subcutaneous ppx    Activity: PT/OT    Disposition: 7A; PT recommending home with outpatient PT.     Medical Decision Making: Medium  Subsequent visit 44382 (moderate level decision making)    JOANN/Fellow/Resident Provider: Lynn Leon, PAC [id number]    Faculty: Simon Carrillo M.D.    __________________________________________________________________  Transplant History: Admitted 1/1/2023 for bile duct injury.    Interval History: History is obtained from the patient  Overnight events: No acute events overnight. Minimal appetite and nausea over the past 2 days.     ROS:   A 10-point review of systems was negative except as noted above.    Meds:    amoxicillin-clavulanate  1 tablet Oral Q12H [facility] (08/20)     heparin ANTICOAGULANT  5,000 Units Subcutaneous Q8H     heparin lock flush  5-20 mL Intracatheter Q24H     melatonin  5 mg Sublingual At Bedtime     multivitamin w/minerals  1 tablet Oral Daily     pantoprazole  40 mg Oral BID AC     sodium chloride (PF)  10 mL Irrigation Q8H     sodium chloride (PF)   "10-40 mL Intracatheter Q8H     sodium chloride (PF)  10-40 mL Intracatheter Q8H     ursodiol  300 mg Oral BID     venlafaxine  112.5 mg Oral Daily       Physical Exam:     Admit Weight: 82.2 kg (181 lb 3.2 oz)    Current vitals:   /74 (BP Location: Left arm)   Pulse 88   Temp 98.7  F (37.1  C) (Oral)   Resp 16   Ht 1.575 m (5' 2\")   Wt 80.4 kg (177 lb 3.2 oz)   SpO2 97%   BMI 32.41 kg/m           Vital sign ranges:    Temp:  [98  F (36.7  C)-99.3  F (37.4  C)] 98.7  F (37.1  C)  Pulse:  [75-88] 88  Resp:  [16-18] 16  BP: (106-129)/(74-78) 129/74  SpO2:  [97 %-98 %] 97 %  Patient Vitals for the past 24 hrs:   BP Temp Temp src Pulse Resp SpO2   01/10/23 0548 129/74 98.7  F (37.1  C) Oral 88 16 97 %   01/10/23 0200 115/75 98  F (36.7  C) Oral 75 18 98 %   01/09/23 2210 106/77 98.9  F (37.2  C) Oral 80 16 98 %   01/09/23 1809 123/77 99.3  F (37.4  C) Oral 79 16 97 %   01/09/23 1353 125/78 98  F (36.7  C) Oral 80 18 97 %     General Appearance: in no apparent distress.   Skin: normal, dry  Heart: well perfused  Lungs: NLB on RA  Abdomen: The abdomen is soft, generalized ttp, no peritoneal signs. MELITA x 2 to suction on right side, bilious output. IR pigtail drain to suction on left side, bilious output. The wound is Healing well, no signs of infection.  : good UOP  Extremities: edema: absent.  Neurologic: awake, alert and oriented.     Data:   CMP  Recent Labs   Lab 01/10/23  0937 01/09/23  0556    139   POTASSIUM 3.9 3.8   CHLORIDE 106 108*   CO2 20* 21*   GLC 95 95   BUN 10.1 9.1   CR 0.61 0.63   GFRESTIMATED >90 >90   NASRIN 8.4* 8.6*   MAG 2.3 2.1   PHOS 2.5 3.0   ALBUMIN 3.3* 3.0*   BILITOTAL 1.6* 1.4*   ALKPHOS 224* 207*   AST 36* 26   ALT 67* 78*     CBC  Recent Labs   Lab 01/10/23  0937 01/09/23  0556   HGB 10.0* 9.5*   WBC 6.3 5.6    236     COAGS  No lab results found in last 7 days.    Invalid input(s): XA   Urinalysis  No lab results found.    "

## 2023-01-10 NOTE — PROGRESS NOTES
CLINICAL NUTRITION SERVICES - BRIEF NOTE     Nutrition Prescription    RECOMMENDATIONS FOR MDs/PROVIDERS TO ORDER:  Recommend continuing MVI with minerals + vitamin D 2000 international unit(s) (pt reported taking this at home) at discharge.      Consider supplemental TF if not meeting 60% of energy and protein needs at 870 kcal and 42 g protein.    Malnutrition Status:    Moderate malnutrition in the context of acute illness or injury    Recommendations already ordered by Registered Dietitian (RD):  Continue oral supplements as ordered    Future/Additional Recommendations:  Smaller, more frequent meals.  Drink Ensure Clear (sips throughout the day).  Avoid excessive water/fluids around meals to allow for adequate intake.      Encourage monitoring PO intake to assess adequacy.       EVALUATION OF THE PROGRESS TOWARD GOALS   Diet: Regular + Ensure Clear + special K protein bar    Intake: Yesterday ate 532 kcal (46% needs), 30 grams protein (43% needs)     NEW FINDINGS   Reviewed calorie count from yesterday with patient.  Discussed strategies to increase oral intake (small/frequent meals, sipping on Ensure Clear over water, calorie/protein goals, oral supplement choices).      She notes that her insurance notified her that she can receive mom's meals after discharge.  Discussed appropriate choices/options through this.      Provided coupons for oral supplements.     Monitoring/Evaluation  Progress toward goals will be monitored and evaluated per protocol.     Nilda Max, MS, RD, LD, CCTD, CNSC  7A/Obs unit pager [phone]  Weekend pager [phone]

## 2023-01-10 NOTE — PROGRESS NOTES
IR follow-up note.    Discussion between Dr. Ruano and Dr. Babin 1/9 regarding willingness to re-attempt biliary drain placement with Dr. Haney and Dr. Babin. Original attempt 1/6 was technically challenging given overlying stomach, non-dilated biliary system and high puncture even into small, central ducts. To have both providers available and general anesthesia with CT room, first available is 2/10 at 0800. Patient should check in at 0600 to 3C here at the hospital.    RN updated by IR scheduling regarding appts and page to [phone].    Martha Hsu DNP, APRN  Interventional Radiology   IR on-call pager: [phone]

## 2023-01-10 NOTE — DISCHARGE INSTRUCTIONS
"Nutrition recommendations:  Aim for ~1200 calories per day.    Aim for 70-90 grams protein per day.   Consider using \"my fitness pal\" tobi on phone/tablet to help monitor your intake for a few days.     Inpatient dietitian email: [email or web address]  "
normal...

## 2023-01-10 NOTE — PROGRESS NOTES
Calorie Count  Intake recorded for: 1/9  Total Kcals: 532 Total Protein: 30g  Kcals from Hospital Food: 532  Protein: 30g  Kcals from Outside Food (average):0 Protein: 0g  # Meals Ordered from Kitchen: 2 meals   # Meals Recorded: 2 meals (First - 50% grilled chicken sandwich w/ fixings, less than 25% irina food cake w/ strawberries)       (Second - 100% turkey sausage link, 75% coffee, 50% Burkinan toast w/ syrup, apple, blueberry muffin)   # Supplements Recorded: 75% 1 Special K Protein Bar

## 2023-01-10 NOTE — PROGRESS NOTES
Care Management Follow Up    Length of Stay (days): 9    Expected Discharge Date: 01/11/2023     Concerns to be Addressed: discharge planning, other (see comments) (health care directive information given to patient)     Patient plan of care discussed at interdisciplinary rounds: Yes    Anticipated Discharge Disposition: Home     Anticipated Discharge Services: home care   Anticipated Discharge DME:  Dressing supplies    Patient/family educated on Medicare website which has current facility and service quality ratings:  yes  Education Provided on the Discharge Plan:  yes  Patient/Family in Agreement with the Plan:  yes    Referrals Placed by CM/SW:  Home care      Additional Information:  Per care team rounds anticipate discharge to home tomorrow.  Plan for home health RN, PT services.      Met with pt and spouse.  Pt open to having home care RN, PT services.  Pt requests we initially refer to the following agencies.  Pt aware we may need to expand to other home care agencies as well.    Initiated referrals to:    [facility] [phone]  [facility] [phone]  [facility] [phone]  [facility] [phone]    Pt and spouse requesting if they would be able to have a couple of hospital gowns for home use as it would be easier for pt to manage her drains at home.   Pt requesting a commode and gait belt for discharge.  Discussed that drain flushes are not generally covered by insurance cost around $1 per syringe.  Discussed that we can send initial dressing supplies with patient and pt can work with home care on securing additional supplies once home.    Updated Estela RN and paged PT regarding equipment request.    Updated BC ALICIA Mcallister [phone] on discharge plan.      1545: Received call from [facility].  Agency is able to accept with estimated start of care within 24-48 hours of hospital discharge.   Updated pt/spouse and ALMA Bailey.  Plan for discharge on 1/11.       Home Care Agency Accepted:  [facility] [phone], fax [phone]      Home Care Referrals pending:  [facility]  [facility]  [facility]  [facility]    Home Care declined:  [facility] d/t staffing  [facility] d/t staffing  [facility] - staffing and at capacity with pt payor          Shabnam Ludwig, RN BSN, PHN, ACM-RN  7A RN Care Coordinator  Phone: [phone]  Pager [phone]    To contact the weekend RNCC  [place] (0800 - 1630) Saturday and Sunday    Units: 4A, 4C, 4E, 5A and 5B- Pager 1: [phone]    Units: 6A, 6B, 6C, 6D- Pager 2: [phone]    Units: 7A, 7B, 7C, 7D, and 5C-Pager 3: [phone]    [facility] ([phone]) Saturday and Sunday    Units: 5 Ortho, 8A, 10 ICU, & Pediatric Units-Pager 4: [phone]    1/10/2023 1:34 PM

## 2023-01-10 NOTE — PLAN OF CARE
"VS: /77 (BP Location: Left arm)   Pulse 79   Temp 99.3  F (37.4  C) (Oral)   Resp 16   Ht 1.575 m (5' 2\")   Wt 80.4 kg (177 lb 3.2 oz)   SpO2 97%   BMI 32.41 kg/m      [id number]  Neuro: alert and oriented X4   Cardio: WNL   Respiratory: RA   GI/: continent of bowel and bladder LBM was 1/9/23  Skin: has abdominal incision with staples intact and 2 lap sites   Diet:  regular and NPO at midnight.    BG: none   LDA: PICC is SL, X2 MELITA and X1 L MELITA irrigated very shift   Mobility:  SBA   Pain: abdominal pain no pain medication.     PRN medications: none   Changes: T Max 99.3  Plan of Care:  possible IR in the AM for possible drain placement. US for ABD.            "

## 2023-01-10 NOTE — PLAN OF CARE
"/77   Pulse 82   Temp 98.5  F (36.9  C)   Resp 16   Ht 1.575 m (5' 2\")   Wt 80.4 kg (177 lb 3.2 oz)   SpO2 97%   BMI 32.41 kg/m     Neuro: A/Ox3  VS: VSS on RA  Pain: Denies  GI: NPO most of the morning now on Regular diet and tolerating good. C/o nausea in AM treated by Zofran PO. BMx1  : Voiding without difficulty  PICC SL  Skin/Drains - RLQ MELITA intact, #1 no drainage, MELITA #2 with bile/green drainage. LLQ with serous drainage and it was irrigated  Activity - Independent in room  Education - Pt's  demonstrated understanding/ good practice of MELITA care/dressing change  Plan of Care - Discharging tomorrow and MELITA care supplies given to pt's     "

## 2023-01-10 NOTE — PLAN OF CARE
"/74 (BP Location: Left arm)   Pulse 88   Temp 98.7  F (37.1  C) (Oral)   Resp 16   Ht 1.575 m (5' 2\")   Wt 80.4 kg (177 lb 3.2 oz)   SpO2 97%   BMI 32.41 kg/m       9826-3497  Neuro: Pt. alert & Ox4  Behavior: Pt. calm & cooperative with cares. Anxious this am & given prn Ativan x1.  at bedside.  Activity: Pt. up with 1 & walker.  Vital: AVSS on RA  BG: N/A  LDAs: Right PICC HL, 2 right JPs, #1 with 0cc, #2 with 150cc, left MELITA irrigated per order with 10cc output.  Cardiac: WNL  Respiratory: LS clear  GI/: Pt. voiding good amounts,  no stool this shift. Last BM 1/9.  Skin: Incision stapled & CHARLES  Pain/Nausea: Pt. denies pain. Pt. denies nausea.  Diet: NPO at midnight.   Labs/Imaging: Morning labs pending.   Plan: IR procedure & abdominal ultrasound. Continue to follow POC & notify provider with change in status.     "

## 2023-01-11 VITALS
HEART RATE: 79 BPM | DIASTOLIC BLOOD PRESSURE: 69 MMHG | SYSTOLIC BLOOD PRESSURE: 98 MMHG | HEIGHT: 62 IN | BODY MASS INDEX: 32.61 KG/M2 | TEMPERATURE: 98.4 F | RESPIRATION RATE: 16 BRPM | OXYGEN SATURATION: 97 % | WEIGHT: 177.2 LBS

## 2023-01-11 PROBLEM — K26.4 GASTROINTESTINAL HEMORRHAGE ASSOCIATED WITH DUODENAL ULCER: Status: ACTIVE | Noted: 2023-01-11

## 2023-01-11 LAB
ALBUMIN SERPL BCG-MCNC: 3.2 G/DL (ref 3.5–5.2)
ALP SERPL-CCNC: 220 U/L (ref 35–104)
ALT SERPL W P-5'-P-CCNC: 63 U/L (ref 10–35)
ANION GAP SERPL CALCULATED.3IONS-SCNC: 11 MMOL/L (ref 7–15)
AST SERPL W P-5'-P-CCNC: 37 U/L (ref 10–35)
BILIRUB DIRECT SERPL-MCNC: 0.87 MG/DL (ref 0–0.3)
BILIRUB SERPL-MCNC: 1.5 MG/DL
BUN SERPL-MCNC: 12.5 MG/DL (ref 8–23)
CALCIUM SERPL-MCNC: 8.7 MG/DL (ref 8.8–10.2)
CHLORIDE SERPL-SCNC: 107 MMOL/L (ref 98–107)
CREAT SERPL-MCNC: 0.67 MG/DL (ref 0.51–0.95)
DEPRECATED HCO3 PLAS-SCNC: 21 MMOL/L (ref 22–29)
ERYTHROCYTE [DISTWIDTH] IN BLOOD BY AUTOMATED COUNT: 15 % (ref 10–15)
GFR SERPL CREATININE-BSD FRML MDRD: >90 ML/MIN/1.73M2
GLUCOSE SERPL-MCNC: 92 MG/DL (ref 70–99)
HCT VFR BLD AUTO: 32.4 % (ref 35–47)
HGB BLD-MCNC: 10.4 G/DL (ref 11.7–15.7)
MAGNESIUM SERPL-MCNC: 2.3 MG/DL (ref 1.7–2.3)
MCH RBC QN AUTO: 30.7 PG (ref 26.5–33)
MCHC RBC AUTO-ENTMCNC: 32.1 G/DL (ref 31.5–36.5)
MCV RBC AUTO: 96 FL (ref 78–100)
PHOSPHATE SERPL-MCNC: 3.1 MG/DL (ref 2.5–4.5)
PLATELET # BLD AUTO: 218 10E3/UL (ref 150–450)
POTASSIUM SERPL-SCNC: 3.9 MMOL/L (ref 3.4–5.3)
PROT SERPL-MCNC: 5.6 G/DL (ref 6.4–8.3)
RBC # BLD AUTO: 3.39 10E6/UL (ref 3.8–5.2)
SODIUM SERPL-SCNC: 139 MMOL/L (ref 136–145)
WBC # BLD AUTO: 5.5 10E3/UL (ref 4–11)
ZINC SERPL-MCNC: 99.4 UG/DL

## 2023-01-11 PROCEDURE — 250N000013 HC RX MED GY IP 250 OP 250 PS 637: Performed by: PHYSICIAN ASSISTANT

## 2023-01-11 PROCEDURE — 82248 BILIRUBIN DIRECT: CPT | Performed by: NURSE PRACTITIONER

## 2023-01-11 PROCEDURE — 36415 COLL VENOUS BLD VENIPUNCTURE: CPT | Performed by: INTERNAL MEDICINE

## 2023-01-11 PROCEDURE — 85027 COMPLETE CBC AUTOMATED: CPT | Performed by: INTERNAL MEDICINE

## 2023-01-11 PROCEDURE — 80053 COMPREHEN METABOLIC PANEL: CPT | Performed by: INTERNAL MEDICINE

## 2023-01-11 PROCEDURE — 84100 ASSAY OF PHOSPHORUS: CPT | Performed by: INTERNAL MEDICINE

## 2023-01-11 PROCEDURE — 83735 ASSAY OF MAGNESIUM: CPT | Performed by: INTERNAL MEDICINE

## 2023-01-11 PROCEDURE — 250N000011 HC RX IP 250 OP 636: Performed by: INTERNAL MEDICINE

## 2023-01-11 PROCEDURE — 250N000013 HC RX MED GY IP 250 OP 250 PS 637: Performed by: NURSE PRACTITIONER

## 2023-01-11 RX ORDER — PANTOPRAZOLE SODIUM 40 MG/1
40 TABLET, DELAYED RELEASE ORAL
Qty: 51 TABLET | Refills: 0 | Status: SHIPPED | OUTPATIENT
Start: 2023-01-11 | End: 2023-02-06

## 2023-01-11 RX ORDER — ONDANSETRON 4 MG/1
4 TABLET, ORALLY DISINTEGRATING ORAL EVERY 6 HOURS PRN
Qty: 15 TABLET | Refills: 0 | Status: SHIPPED | OUTPATIENT
Start: 2023-01-11

## 2023-01-11 RX ORDER — URSODIOL 300 MG/1
300 CAPSULE ORAL 2 TIMES DAILY
Qty: 60 CAPSULE | Refills: 1 | Status: ON HOLD | OUTPATIENT
Start: 2023-01-11 | End: 2023-03-24

## 2023-01-11 RX ADMIN — Medication 5 ML: at 06:39

## 2023-01-11 RX ADMIN — Medication 1 TABLET: at 09:07

## 2023-01-11 RX ADMIN — URSODIOL 300 MG: 300 CAPSULE ORAL at 09:07

## 2023-01-11 RX ADMIN — VENLAFAXINE HYDROCHLORIDE 112.5 MG: 37.5 CAPSULE, EXTENDED RELEASE ORAL at 09:07

## 2023-01-11 RX ADMIN — AMOXICILLIN AND CLAVULANATE POTASSIUM 1 TABLET: 875; 125 TABLET, FILM COATED ORAL at 09:07

## 2023-01-11 RX ADMIN — PANTOPRAZOLE SODIUM 40 MG: 40 TABLET, DELAYED RELEASE ORAL at 09:07

## 2023-01-11 ASSESSMENT — ACTIVITIES OF DAILY LIVING (ADL)
ADLS_ACUITY_SCORE: 28

## 2023-01-11 NOTE — PROGRESS NOTES
Care Management Discharge Note    Discharge Date: 01/11/2023       Discharge Disposition: Home Care    Discharge Services: None    Discharge DME: None    Discharge Transportation: family or friend will provide    Patient/family educated on Medicare website which has current facility and service quality ratings: yes    Education Provided on the Discharge Plan:  yes  Persons Notified of Discharge Plans: patient  Patient/Family in Agreement with the Plan: yes    Handoff Referral Completed: Yes    Additional Information:  Pt cleared to discharge home today. Transplant team requesting pt be scheduled with Dr. Ruano in transplant clinic in two weeks.  Updated HELDER Vallejo/B CHW.  Updated [facility] [phone], Fax [phone].  Edgardo is able to pull discharge orders from Epic.  No additional needs noted.    1050: Notified by CHW unable to schedule appointment.  Writer spoke with [facility] Transplant clinic scheduler.  First available clinic appointment 1/19 at 10:30 a.m.  Appointment confirmed for 1/19 at 10:30 a.m..        Shabnam Ludwig RN BSN, PHN, ACM-RN  7A RN Care Coordinator  Phone: [phone]  Pager [phone]    To contact the weekend RNCC  [place] (0800 - 1630) Saturday and Sunday    Units: 4A, 4C, 4E, 5A and 5B- Pager 1: [phone]    Units: 6A, 6B, 6C, 6D- Pager 2: [phone]    Units: 7A, 7B, 7C, 7D, and 5C-Pager 3: [phone]    [facility] ([phone]) Saturday and Sunday    Units: 5 Ortho, 8A, 10 ICU, & Pediatric Units-Pager 4: [phone]    1/11/2023 10:36 AM

## 2023-01-11 NOTE — PLAN OF CARE
"Vital signs:  Temp: 98.4  F (36.9  C) Temp src: Oral BP: 98/69 Pulse: 79   Resp: 16 SpO2: 97 % O2 Device: None (Room air) Oxygen Delivery: 1 LPM Height: 157.5 cm (5' 2\") Weight: 80.4 kg (177 lb 3.2 oz)    Discharge orders received, AVS reviewed with patient and spouse, all questions answered. PICC line removed, staples removed from midline incision per order. Lower incision appearing to not be fully healed, NP notifed, steri strips applied. Patient off the unit in stable condition via wheelchair to discharge pharmacy, all personal items taken from the room.     Problem: Pain Acute  Goal: Optimal Pain Control and Function  Outcome: Met  Intervention: Prevent or Manage Pain  Recent Flowsheet Documentation  Taken 1/11/2023 0900 by Halle Gaines, RN  Medication Review/Management: medications reviewed   Goal Outcome Evaluation:      Plan of Care Reviewed With: patient, spouse    Overall Patient Progress: improvingOverall Patient Progress: improving           "

## 2023-01-11 NOTE — PROGRESS NOTES
Pt being discharged 1/11. Orders from 1/10 to have RN pull PICC. Pt requested to keep PICC in until  after am lab draws. Provider notified. Provider ok ed PICC to stay in until 1/11.

## 2023-01-11 NOTE — PROGRESS NOTES
Calorie Count  Intake recorded for: 1/10  Total Kcals: 674 Total Protein: 27g  Kcals from Hospital Food: 674  Protein: 27g  Kcals from Outside Food (average):0 Protein: 0g  # Meals Ordered from Kitchen: 2 meals   # Meals Recorded: 2 meals (First - 100% tomato soup w/ saltines, 50% deli sandwich w/ ham, lettuce & tomato)    (Second - 100% cranberry juice, mashed potatoes w/ gravy, 45% chicken tenders, 30% wheat roll, less than 25% carrots)  # Supplements Recorded: 30% 1 Ensure Clear

## 2023-01-11 NOTE — PLAN OF CARE
Physical Therapy Discharge Summary    Reason for therapy discharge:    Discharged to home with home therapy.    Progress towards therapy goal(s). See goals on Care Plan in [facility] electronic health record for goal details.  Goals partially met.  Barriers to achieving goals:   discharge from facility.    Therapy recommendation(s):    Continued therapy is recommended.  Rationale/Recommendations:  Pt will benefit from further PT to continue to improve safety and independence with functional mobility.

## 2023-01-11 NOTE — PLAN OF CARE
"Goal Outcome Evaluation:       /68 (BP Location: Left arm)   Pulse 69   Temp 98.6  F (37  C) (Oral)   Resp 16   Ht 1.575 m (5' 2\")   Wt 80.4 kg (177 lb 3.2 oz)   SpO2 96%   BMI 32.41 kg/m      Shift: 6421-9656  VS: Vitals stable on room air, afebrile  Neuro: Alert and oriented x4   Labs: Awaiting AM labs   Pain/Nausea: Denies pain and nausea   Diet: Regular diet w/ ofe counts   IV Access: PICC heparin locked   Lines/Drains: L MELITA irrigated with 10cc, 2 R MELITA drains, RLQ 5 ml output, RLQ #2 200 ml  GI/: Voiding without difficulty, last BM 1/10    Skin: Incision w/ staples- CDI   Mobility: Independent in room   Plan: Continue with POC and notify team with any changes. Discharge 1/11                  "

## 2023-01-12 ENCOUNTER — PATIENT OUTREACH (OUTPATIENT)
Dept: CARE COORDINATION | Facility: CLINIC | Age: 68
End: 2023-01-12

## 2023-01-12 NOTE — PROGRESS NOTES
Clinic Care Coordination Contact  [facility]: Post-Discharge Note  SITUATION                                                      Admission:    Admission Date: 01/01/23   Reason for Admission: Bile duct injury  Discharge:   Discharge Date: 01/11/23  Discharge Diagnosis: Bile duct injury    Moderate malnutrition (H)    Bile leak, postoperative    Peritonitis due to bile (H)    Gastrointestinal hemorrhage associated with duodenal ulcer    BACKGROUND                                                      Per hospital discharge summary and inpatient provider notes:    Hospital Course     Zeinab Bermudez is a 67 year old female who underwent a laparoscopic cholecystectomy on 12/11/22 who presented to her local ED with peritonitis, and sepsis due to a bile leak. She was transferred to [facility] 1/1/2023 for hepatobiliary evaluation and management of bile duct injury.  The following problems were addressed during her hospitalization:     Hx HTN/HLD: Hold PTA medications.       Anemia of chronic illness:  Presented to OSH with Hgb 13. ERCP identified a duodenal ulcer with oozing.  Continue PPI BID. Hgb stable ~10 at discharge. No active bleeding.     S/p sri complicated by bile duct injury, bile leak, intra-abdominal Infection: 12/11/22 status post cholecystectomy c/b sepsis and bile peritonitis with exploratory laparotomy, washout and drain placement on 12/28. ERCP on 12/29 showed possible bile duct transection. IR placed 3rd drain (left side) on 12/29/22 d/t biloma. Continue 2 surgical drains to suction (right) , and IR drain to suction (left). Bilious output from drains, minimal amount except for right lateral drain with ~300 output/day. MELITA fluid bilirubin 4.3 (serum 1.7).    -Transaminases remain mildly elevated but trending down by discharge, TB stable 1.4-1.6, Alk Phos 220s     Imaging/Procedures:   -1/2 CT scan with IV/PO contrast.  -1/3 IR consulted  -1/3 ERCP: Complete transection is suspected at the level of two  "horizontal clips seemingly placed across the common duct with a marked defect just below. Consider PTC as a bridge to surgery.   -Unable to place PTC placement on 1/6, will attempt again in ~1week, awaiting schedule by IR   - 1/10 US: anterior branch of the right hepatic artery is patent but with a somewhat blunted waveform and spectral broadening. This is nonspecific and could represent possible upstream stenosis/spasm or other abnormality affecting hemodynamics      Moderate malnutrition in the context of acute illness: Nutrition consulted. Regular diet with supplements. Consider supplemental TF if not meeting 60% of energy and protein needs at 870 kcal and 42 g protein. At discharge, calorie counts were averaging 600 ofe and 30g P.      Duodenal ulcer: Continue PPI BID x 30 days. Antiemetic PRN.     Hypokalemia: resolved, K 3.9 at discharge     Sepsis and bile peritonitis: Presented to OSH 12/27 septic requiring pressors. Weaned off pressors 12/30 prior to transfer. 12/29 fluid gram stain no organism, 1+ PMNs, 1+RBC; fluid bacterial culture no growth. Empirc Zosyn started on admission, transitioned to  Augmentin 1/6,plan to continue an additional 1 week duration with EOT 1/18. Remains Afebrile, normal WBC.     COVID-19: 12/10 COVID-19 positive at OSH, repeat 1/2 positive with CT 42.8. Currently asymptomatic. Recovered.      Acute pain: following PTC attempt requiring tylenol and dilaudid. Resolved at discharge. She will not discharge with a pain prescription. Continue Tylenol PRN.     Depression with anxiety:  PTA venlafaxine 24hr, restarted. Ativan PO TID PRN anxiety.    ASSESSMENT           Discharge Assessment  How are you doing now that you are home?: Pt states feeling \"about the same, had a fairly good night sleep; woke up with some pain and took Tylenol and slept good after that.\"  Home care RN & PT were both out today for SOC visits.  Will be getting meals delivered from [employer] starting tomorrow.  " "Working on eating but still doesn't have much of an appetite, foods \"don't taste good always, eating what I can and trying different foods to see what I like/can eat.\"  Has 3 drains in place, and on 2/10/23 IR will attempt to place a 4th drain in preparation to repair the bile duct, no date scheduled yet.   is helping with the drain and wound cares.  States she has some \"general discomfort but not pain.\"  States no questions/concerns and feels she's doing okay overall.  How are your symptoms? (Red Flag symptoms escalate to triage hotline per guidelines): Unchanged  Do you feel your condition is stable enough to be safe at home until your provider visit?: Yes  Does the patient have their discharge instructions? : Yes  Does the patient have questions regarding their discharge instructions? : No  Were you started on any new medications or were there changes to any of your previous medications? : Yes  Does the patient have all of their medications?: Yes  Do you have questions regarding any of your medications? : No  Do you have all of your needed medical supplies or equipment (DME)?  (i.e. oxygen tank, CPAP, cane, etc.): Yes  Discharge follow-up appointment scheduled within 14 calendar days? : Yes  Discharge Follow Up Appointment Date: 01/19/23  Discharge Follow Up Appointment Scheduled with?: Specialty Care Provider         Post-op (Clinicians Only)  Did the patient have surgery or a procedure: Yes  Incision: healing;closed (\"Staples removed yesterday before d/c - placed steri strips over the area and otherwise incision looks good.\")  Drainage: Yes (Has 3 MELITA drains)  Drainage Color: yellow (\"One drains dark green bile, and other 2 drains are draining an orangish yellow color.\")  Incision Drainage Amount:  (No incision drainage, only drainage is from the MELITA drains)  Bleeding: none  Fever: No  Chills: No  Redness: No  Warmth: No  Swelling: No  Incision site pain: No  Closure: other (Steri strips)  Eating & " "Drinking:  (Eating \"what I can, not much tastes good yet but trying different foods.\")  PO Intake: regular diet  Additional Symptoms: nausea;decreased appetite (Nausea off/on, no vomiting aside from \"gagging\" at times.  Has Zofran at home if needed, hasn't taken any yet)  Bowel Function: loose stools (Passing a little bit of gas, doesn't feel constipated but hasn't had a BM in 2 days.)  Date of last BM: 01/10/23  Urinary Status: voiding without complaint/concerns        PLAN                                                      Outpatient Plan:      Follow up with Dr. Desmond Ruano, at the [facility] surgery center within 1-2 weeks for hospital follow- up. You are currently  scheduled for 1/19/22 @ 10:15am with labs: CBC with diff, CMP, INR    Follow up with Interventional Radiology 2/10 @ 6am    Home Care Referral    Future Appointments   Date Time Provider Department Center   1/19/2023 10:30 AM Desmond Ruano MD [facility]   2/10/2023  8:00 AM [address]         For any urgent concerns, please contact our 24 hour nurse triage line: [phone] ([phone])         Julieta Cortez RN                "

## 2023-01-19 ENCOUNTER — LAB (OUTPATIENT)
Dept: LAB | Facility: CLINIC | Age: 68
End: 2023-01-19
Payer: COMMERCIAL

## 2023-01-19 ENCOUNTER — OFFICE VISIT (OUTPATIENT)
Dept: TRANSPLANT | Facility: CLINIC | Age: 68
End: 2023-01-19
Attending: TRANSPLANT SURGERY
Payer: COMMERCIAL

## 2023-01-19 VITALS
SYSTOLIC BLOOD PRESSURE: 127 MMHG | BODY MASS INDEX: 29.98 KG/M2 | HEART RATE: 106 BPM | OXYGEN SATURATION: 96 % | WEIGHT: 163.9 LBS | DIASTOLIC BLOOD PRESSURE: 84 MMHG

## 2023-01-19 DIAGNOSIS — S36.13XD INJURY OF BILE DUCT, SUBSEQUENT ENCOUNTER: Primary | ICD-10-CM

## 2023-01-19 DIAGNOSIS — S36.13XD INJURY OF BILE DUCT, SUBSEQUENT ENCOUNTER: ICD-10-CM

## 2023-01-19 LAB
ALBUMIN SERPL BCG-MCNC: 4.2 G/DL (ref 3.5–5.2)
ALP SERPL-CCNC: 207 U/L (ref 35–104)
ALT SERPL W P-5'-P-CCNC: 34 U/L (ref 10–35)
AST SERPL W P-5'-P-CCNC: 27 U/L (ref 10–35)
BILIRUB DIRECT SERPL-MCNC: 0.59 MG/DL (ref 0–0.3)
BILIRUB SERPL-MCNC: 1.4 MG/DL
ERYTHROCYTE [DISTWIDTH] IN BLOOD BY AUTOMATED COUNT: 14.5 % (ref 10–15)
HCT VFR BLD AUTO: 42 % (ref 35–47)
HGB BLD-MCNC: 14.2 G/DL (ref 11.7–15.7)
MCH RBC QN AUTO: 30.3 PG (ref 26.5–33)
MCHC RBC AUTO-ENTMCNC: 33.8 G/DL (ref 31.5–36.5)
MCV RBC AUTO: 90 FL (ref 78–100)
PLATELET # BLD AUTO: 300 10E3/UL (ref 150–450)
PROT SERPL-MCNC: 7.5 G/DL (ref 6.4–8.3)
RBC # BLD AUTO: 4.68 10E6/UL (ref 3.8–5.2)
WBC # BLD AUTO: 6.8 10E3/UL (ref 4–11)

## 2023-01-19 PROCEDURE — 82040 ASSAY OF SERUM ALBUMIN: CPT | Performed by: TRANSPLANT SURGERY

## 2023-01-19 PROCEDURE — 99213 OFFICE O/P EST LOW 20 MIN: CPT | Performed by: TRANSPLANT SURGERY

## 2023-01-19 PROCEDURE — 85027 COMPLETE CBC AUTOMATED: CPT | Performed by: PATHOLOGY

## 2023-01-19 PROCEDURE — 36415 COLL VENOUS BLD VENIPUNCTURE: CPT | Performed by: TRANSPLANT SURGERY

## 2023-01-19 PROCEDURE — 99212 OFFICE O/P EST SF 10 MIN: CPT | Performed by: TRANSPLANT SURGERY

## 2023-01-19 PROCEDURE — G0463 HOSPITAL OUTPT CLINIC VISIT: HCPCS | Performed by: TRANSPLANT SURGERY

## 2023-01-19 ASSESSMENT — PAIN SCALES - GENERAL: PAINLEVEL: NO PAIN (0)

## 2023-01-19 NOTE — PROGRESS NOTES
Ms Bermudez is a 68 yo woman with transected bile duct awaiting PTC tube  Placement to control bile drainage.  Now has a surgically placed tube next to bile duct.  Drainage about 400--500 ml/d  No fever chills  No viet pain.  Eating ok.  Off antibiotics, no sig drainage from other two perc drains.  One with < 10 ml/d pulled.  LFTS stable, no evidence of biliary obstruction at present    Currently scheduled for PTC placement 10 Feb.  Will see if can be moved up.    /84   Pulse 106   Wt 74.3 kg (163 lb 14.4 oz)   SpO2 96%   BMI 29.98 kg/m    Abd soft non tender    Current Outpatient Medications   Medication     acetaminophen (TYLENOL) 325 MG tablet     calcium carbonate (OS-NASRIN) 500 MG tablet     LORazepam (ATIVAN) 0.5 MG tablet     melatonin 5 MG sublingual tablet     ondansetron (ZOFRAN ODT) 4 MG ODT tab     pantoprazole (PROTONIX) 40 MG EC tablet     Pediatric Multiple Vitamins (MULTIVITAMIN CHILDRENS, W/ FA, PO)     pravastatin (PRAVACHOL) 40 MG tablet     ursodiol (ACTIGALL) 300 MG capsule     venlafaxine (EFFEXOR XR) 37.5 MG 24 hr capsule     amoxicillin-clavulanate (AUGMENTIN) 875-125 MG tablet     No current facility-administered medications for this visit.        Latest Reference Range & Units 01/19/23 11:48   Alkaline Phosphatase 35 - 104 U/L 207 (H)   ALT 10 - 35 U/L 34   AST 10 - 35 U/L 27   Bilirubin Direct 0.00 - 0.30 mg/dL 0.59 (H)   Bilirubin Total <=1.2 mg/dL 1.4 (H)   (H): Data is abnormally high   Latest Reference Range & Units 01/19/23 11:48   WBC 4.0 - 11.0 10e3/uL 6.8   Hemoglobin 11.7 - 15.7 g/dL 14.2   Hematocrit 35.0 - 47.0 % 42.0   Platelet Count 150 - 450 10e3/uL 300       RTC 2 weeks.  Repeat CBC and LFTs.  Will contact IR to see if they can move scheduled PTC earlier.  Want to control biliary tree.    20 min visit: 5 prep, 10 f2f, 10 documentation and orders.

## 2023-01-19 NOTE — LETTER
1/19/2023         RE: Zeinab Bermudez  [address] [address] [facility] [address]        Dear Colleague,    Thank you for referring your patient, Zeinab Bermudez, to the [facility]. Please see a copy of my visit note below.    Ms Bermudez is a 68 yo woman with transected bile duct awaiting PTC tube  Placement to control bile drainage.  Now has a surgically placed tube next to bile duct.  Drainage about 400--500 ml/d  No fever chills  No viet pain.  Eating ok.  Off antibiotics, no sig drainage from other two perc drains.  One with < 10 ml/d pulled.  LFTS stable, no evidence of biliary obstruction at present    Currently scheduled for PTC placement 10 Feb.  Will see if can be moved up.    /84   Pulse 106   Wt 74.3 kg (163 lb 14.4 oz)   SpO2 96%   BMI 29.98 kg/m    Abd soft non tender    Current Outpatient Medications   Medication     acetaminophen (TYLENOL) 325 MG tablet     calcium carbonate (OS-NASRIN) 500 MG tablet     LORazepam (ATIVAN) 0.5 MG tablet     melatonin 5 MG sublingual tablet     ondansetron (ZOFRAN ODT) 4 MG ODT tab     pantoprazole (PROTONIX) 40 MG EC tablet     Pediatric Multiple Vitamins (MULTIVITAMIN CHILDRENS, W/ FA, PO)     pravastatin (PRAVACHOL) 40 MG tablet     ursodiol (ACTIGALL) 300 MG capsule     venlafaxine (EFFEXOR XR) 37.5 MG 24 hr capsule     amoxicillin-clavulanate (AUGMENTIN) 875-125 MG tablet     No current facility-administered medications for this visit.        Latest Reference Range & Units 01/19/23 11:48   Alkaline Phosphatase 35 - 104 U/L 207 (H)   ALT 10 - 35 U/L 34   AST 10 - 35 U/L 27   Bilirubin Direct 0.00 - 0.30 mg/dL 0.59 (H)   Bilirubin Total <=1.2 mg/dL 1.4 (H)   (H): Data is abnormally high   Latest Reference Range & Units 01/19/23 11:48   WBC 4.0 - 11.0 10e3/uL 6.8   Hemoglobin 11.7 - 15.7 g/dL 14.2   Hematocrit 35.0 - 47.0 % 42.0   Platelet Count 150 - 450 10e3/uL 300       RTC 2 weeks.  Repeat CBC and LFTs.  Will contact IR to see if  they can move scheduled PTC earlier.  Want to control biliary tree.    20 min visit: 5 prep, 10 f2f, 10 documentation and orders.      Again, thank you for allowing me to participate in the care of your patient.        Sincerely,        Desmond Ruano MD

## 2023-01-30 DIAGNOSIS — S36.13XD INJURY OF BILE DUCT, SUBSEQUENT ENCOUNTER: Primary | ICD-10-CM

## 2023-02-02 ENCOUNTER — OFFICE VISIT (OUTPATIENT)
Dept: TRANSPLANT | Facility: CLINIC | Age: 68
End: 2023-02-02
Attending: TRANSPLANT SURGERY
Payer: COMMERCIAL

## 2023-02-02 ENCOUNTER — LAB (OUTPATIENT)
Dept: LAB | Facility: CLINIC | Age: 68
End: 2023-02-02
Payer: COMMERCIAL

## 2023-02-02 VITALS
HEART RATE: 98 BPM | WEIGHT: 161.2 LBS | TEMPERATURE: 97.8 F | RESPIRATION RATE: 18 BRPM | BODY MASS INDEX: 29.48 KG/M2 | DIASTOLIC BLOOD PRESSURE: 74 MMHG | SYSTOLIC BLOOD PRESSURE: 107 MMHG | OXYGEN SATURATION: 98 %

## 2023-02-02 DIAGNOSIS — S36.13XD INJURY OF BILE DUCT, SUBSEQUENT ENCOUNTER: ICD-10-CM

## 2023-02-02 DIAGNOSIS — S36.13XD INJURY OF BILE DUCT, SUBSEQUENT ENCOUNTER: Primary | ICD-10-CM

## 2023-02-02 LAB
ALBUMIN SERPL BCG-MCNC: 3.9 G/DL (ref 3.5–5.2)
ALP SERPL-CCNC: 123 U/L (ref 35–104)
ALT SERPL W P-5'-P-CCNC: 23 U/L (ref 10–35)
AST SERPL W P-5'-P-CCNC: 23 U/L (ref 10–35)
BASOPHILS # BLD AUTO: 0 10E3/UL (ref 0–0.2)
BASOPHILS NFR BLD AUTO: 0 %
BILIRUB DIRECT SERPL-MCNC: 0.48 MG/DL (ref 0–0.3)
BILIRUB SERPL-MCNC: 0.9 MG/DL
EOSINOPHIL # BLD AUTO: 0.1 10E3/UL (ref 0–0.7)
EOSINOPHIL NFR BLD AUTO: 1 %
ERYTHROCYTE [DISTWIDTH] IN BLOOD BY AUTOMATED COUNT: 13.7 % (ref 10–15)
HCT VFR BLD AUTO: 40 % (ref 35–47)
HGB BLD-MCNC: 13.4 G/DL (ref 11.7–15.7)
IMM GRANULOCYTES # BLD: 0 10E3/UL
IMM GRANULOCYTES NFR BLD: 0 %
LYMPHOCYTES # BLD AUTO: 1.9 10E3/UL (ref 0.8–5.3)
LYMPHOCYTES NFR BLD AUTO: 21 %
MCH RBC QN AUTO: 29.6 PG (ref 26.5–33)
MCHC RBC AUTO-ENTMCNC: 33.5 G/DL (ref 31.5–36.5)
MCV RBC AUTO: 89 FL (ref 78–100)
MONOCYTES # BLD AUTO: 0.6 10E3/UL (ref 0–1.3)
MONOCYTES NFR BLD AUTO: 7 %
NEUTROPHILS # BLD AUTO: 6.6 10E3/UL (ref 1.6–8.3)
NEUTROPHILS NFR BLD AUTO: 71 %
NRBC # BLD AUTO: 0 10E3/UL
NRBC BLD AUTO-RTO: 0 /100
PLATELET # BLD AUTO: 218 10E3/UL (ref 150–450)
PROT SERPL-MCNC: 7 G/DL (ref 6.4–8.3)
RBC # BLD AUTO: 4.52 10E6/UL (ref 3.8–5.2)
WBC # BLD AUTO: 9.2 10E3/UL (ref 4–11)

## 2023-02-02 PROCEDURE — 36415 COLL VENOUS BLD VENIPUNCTURE: CPT | Performed by: PATHOLOGY

## 2023-02-02 PROCEDURE — 85025 COMPLETE CBC W/AUTO DIFF WBC: CPT | Performed by: PATHOLOGY

## 2023-02-02 PROCEDURE — 99213 OFFICE O/P EST LOW 20 MIN: CPT | Performed by: TRANSPLANT SURGERY

## 2023-02-02 PROCEDURE — 80076 HEPATIC FUNCTION PANEL: CPT | Performed by: PATHOLOGY

## 2023-02-02 PROCEDURE — G0463 HOSPITAL OUTPT CLINIC VISIT: HCPCS | Performed by: TRANSPLANT SURGERY

## 2023-02-02 ASSESSMENT — PAIN SCALES - GENERAL: PAINLEVEL: NO PAIN (1)

## 2023-02-02 NOTE — LETTER
2/2/2023         RE: Zeinab Bermudez  [address] [address] [address]        Dear Colleague,    Thank you for referring your patient, Zeinab Bermudez, to the [facility]. Please see a copy of my visit note below.    Woman with transected bile duct.  Awaiting PTC and control of BD drainage.  No fever, no change in drainage.  Off antibiotics.  Eating ok.   Latest Reference Range & Units 02/02/23 08:56   Alkaline Phosphatase 35 - 104 U/L 123 (H)   ALT 10 - 35 U/L 23   AST 10 - 35 U/L 23   Bilirubin Direct 0.00 - 0.30 mg/dL 0.48 (H)   Bilirubin Total <=1.2 mg/dL 0.9   (H): Data is abnormally high    /74 (BP Location: Left arm, Patient Position: Sitting, Cuff Size: Adult Large)   Pulse 98   Temp 97.8  F (36.6  C) (Oral)   Resp 18   Wt 73.1 kg (161 lb 3.2 oz)   SpO2 98%   BMI 29.48 kg/m    Abd soft, nontender  L drain removed as there is/was no drainage.    I/P  1. Transected BD. Assx at present.  Is almost 2 months now.  2. Await PTC.  3. Get contrast CT after PTC to assess clearance of abd fluid collections.    20 min. 5 min review, 15 f2f and discussion, 10 doc      Again, thank you for allowing me to participate in the care of your patient.        Sincerely,        Desmond Ruano MD

## 2023-02-02 NOTE — PROGRESS NOTES
Woman with transected bile duct.  Awaiting PTC and control of BD drainage.  No fever, no change in drainage.  Off antibiotics.  Eating ok.   Latest Reference Range & Units 02/02/23 08:56   Alkaline Phosphatase 35 - 104 U/L 123 (H)   ALT 10 - 35 U/L 23   AST 10 - 35 U/L 23   Bilirubin Direct 0.00 - 0.30 mg/dL 0.48 (H)   Bilirubin Total <=1.2 mg/dL 0.9   (H): Data is abnormally high    /74 (BP Location: Left arm, Patient Position: Sitting, Cuff Size: Adult Large)   Pulse 98   Temp 97.8  F (36.6  C) (Oral)   Resp 18   Wt 73.1 kg (161 lb 3.2 oz)   SpO2 98%   BMI 29.48 kg/m    Abd soft, nontender  L drain removed as there is/was no drainage.    I/P  1. Transected BD. Assx at present.  Is almost 2 months now.  2. Await PTC.  3. Get contrast CT after PTC to assess clearance of abd fluid collections.    20 min. 5 min review, 15 f2f and discussion, 10 doc

## 2023-02-02 NOTE — NURSING NOTE
"Chief Complaint   Patient presents with     RECHECK     transected bile duct      Vital signs:  Temp: 97.8  F (36.6  C) Temp src: Oral BP: 107/74 Pulse: 98   Resp: 18 SpO2: 98 %       Weight: 73.1 kg (161 lb 3.2 oz)  Estimated body mass index is 29.48 kg/m  as calculated from the following:    Height as of 1/2/23: 1.575 m (5' 2\").    Weight as of this encounter: 73.1 kg (161 lb 3.2 oz).        Hortensia Cardoza, [facility]  2/2/2023 10:50 AM      "

## 2023-02-03 DIAGNOSIS — S36.13XA INJURY OF BILE DUCT, INITIAL ENCOUNTER: Primary | ICD-10-CM

## 2023-02-06 ENCOUNTER — APPOINTMENT (OUTPATIENT)
Dept: CT IMAGING | Facility: CLINIC | Age: 68
DRG: 920 | End: 2023-02-06
Attending: EMERGENCY MEDICINE
Payer: COMMERCIAL

## 2023-02-06 ENCOUNTER — HOSPITAL ENCOUNTER (INPATIENT)
Facility: CLINIC | Age: 68
LOS: 1 days | Discharge: HOME HEALTH | DRG: 920 | End: 2023-02-07
Attending: EMERGENCY MEDICINE | Admitting: SURGERY
Payer: COMMERCIAL

## 2023-02-06 DIAGNOSIS — K91.89 BILE LEAK, POSTOPERATIVE: Primary | ICD-10-CM

## 2023-02-06 DIAGNOSIS — Z46.82 ADMISSION FOR BILIARY DRAINAGE TUBE PLACEMENT: ICD-10-CM

## 2023-02-06 DIAGNOSIS — Z11.52 ENCOUNTER FOR SCREENING LABORATORY TESTING FOR SEVERE ACUTE RESPIRATORY SYNDROME CORONAVIRUS 2 (SARS-COV-2): ICD-10-CM

## 2023-02-06 DIAGNOSIS — K83.8 BILE LEAK, POSTOPERATIVE: Primary | ICD-10-CM

## 2023-02-06 PROBLEM — T85.520A BILIARY DRAIN DISPLACEMENT, INITIAL ENCOUNTER: Status: ACTIVE | Noted: 2023-02-06

## 2023-02-06 LAB
ALBUMIN SERPL BCG-MCNC: 3.5 G/DL (ref 3.5–5.2)
ALP SERPL-CCNC: 348 U/L (ref 35–104)
ALT SERPL W P-5'-P-CCNC: 130 U/L (ref 10–35)
ANION GAP SERPL CALCULATED.3IONS-SCNC: 13 MMOL/L (ref 7–15)
AST SERPL W P-5'-P-CCNC: 201 U/L (ref 10–35)
ATRIAL RATE - MUSE: 118 BPM
BASOPHILS # BLD AUTO: 0 10E3/UL (ref 0–0.2)
BASOPHILS NFR BLD AUTO: 0 %
BILIRUB SERPL-MCNC: 1 MG/DL
BUN SERPL-MCNC: 10.8 MG/DL (ref 8–23)
CALCIUM SERPL-MCNC: 9.4 MG/DL (ref 8.8–10.2)
CHLORIDE SERPL-SCNC: 106 MMOL/L (ref 98–107)
CREAT SERPL-MCNC: 0.94 MG/DL (ref 0.51–0.95)
DEPRECATED HCO3 PLAS-SCNC: 18 MMOL/L (ref 22–29)
DIASTOLIC BLOOD PRESSURE - MUSE: NORMAL MMHG
EOSINOPHIL # BLD AUTO: 0 10E3/UL (ref 0–0.7)
EOSINOPHIL NFR BLD AUTO: 0 %
ERYTHROCYTE [DISTWIDTH] IN BLOOD BY AUTOMATED COUNT: 13.2 % (ref 10–15)
GFR SERPL CREATININE-BSD FRML MDRD: 67 ML/MIN/1.73M2
GLUCOSE SERPL-MCNC: 133 MG/DL (ref 70–99)
HCT VFR BLD AUTO: 36.8 % (ref 35–47)
HGB BLD-MCNC: 11.9 G/DL (ref 11.7–15.7)
IMM GRANULOCYTES # BLD: 0 10E3/UL
IMM GRANULOCYTES NFR BLD: 0 %
INTERPRETATION ECG - MUSE: NORMAL
LACTATE SERPL-SCNC: 1.5 MMOL/L (ref 0.7–2)
LIPASE SERPL-CCNC: 17 U/L (ref 13–60)
LIPASE SERPL-CCNC: 22 U/L (ref 13–60)
LYMPHOCYTES # BLD AUTO: 0.8 10E3/UL (ref 0.8–5.3)
LYMPHOCYTES NFR BLD AUTO: 20 %
MCH RBC QN AUTO: 29.3 PG (ref 26.5–33)
MCHC RBC AUTO-ENTMCNC: 32.3 G/DL (ref 31.5–36.5)
MCV RBC AUTO: 91 FL (ref 78–100)
MONOCYTES # BLD AUTO: 0.3 10E3/UL (ref 0–1.3)
MONOCYTES NFR BLD AUTO: 9 %
NEUTROPHILS # BLD AUTO: 2.9 10E3/UL (ref 1.6–8.3)
NEUTROPHILS NFR BLD AUTO: 71 %
NRBC # BLD AUTO: 0 10E3/UL
NRBC BLD AUTO-RTO: 0 /100
P AXIS - MUSE: 46 DEGREES
PLATELET # BLD AUTO: 212 10E3/UL (ref 150–450)
POTASSIUM SERPL-SCNC: 4.2 MMOL/L (ref 3.4–5.3)
PR INTERVAL - MUSE: 118 MS
PROT SERPL-MCNC: 6.6 G/DL (ref 6.4–8.3)
QRS DURATION - MUSE: 62 MS
QT - MUSE: 312 MS
QTC - MUSE: 437 MS
R AXIS - MUSE: 30 DEGREES
RBC # BLD AUTO: 4.06 10E6/UL (ref 3.8–5.2)
SARS-COV-2 RNA RESP QL NAA+PROBE: NEGATIVE
SODIUM SERPL-SCNC: 137 MMOL/L (ref 136–145)
SYSTOLIC BLOOD PRESSURE - MUSE: NORMAL MMHG
T AXIS - MUSE: 48 DEGREES
VENTRICULAR RATE- MUSE: 118 BPM
WBC # BLD AUTO: 4 10E3/UL (ref 4–11)

## 2023-02-06 PROCEDURE — 93005 ELECTROCARDIOGRAM TRACING: CPT

## 2023-02-06 PROCEDURE — 99222 1ST HOSP IP/OBS MODERATE 55: CPT | Performed by: SURGERY

## 2023-02-06 PROCEDURE — 36415 COLL VENOUS BLD VENIPUNCTURE: CPT | Performed by: EMERGENCY MEDICINE

## 2023-02-06 PROCEDURE — 93010 ELECTROCARDIOGRAM REPORT: CPT | Performed by: EMERGENCY MEDICINE

## 2023-02-06 PROCEDURE — 250N000013 HC RX MED GY IP 250 OP 250 PS 637: Performed by: NURSE PRACTITIONER

## 2023-02-06 PROCEDURE — 83690 ASSAY OF LIPASE: CPT | Performed by: NURSE PRACTITIONER

## 2023-02-06 PROCEDURE — 258N000003 HC RX IP 258 OP 636

## 2023-02-06 PROCEDURE — 99285 EMERGENCY DEPT VISIT HI MDM: CPT | Mod: 25

## 2023-02-06 PROCEDURE — 83690 ASSAY OF LIPASE: CPT | Performed by: EMERGENCY MEDICINE

## 2023-02-06 PROCEDURE — 250N000011 HC RX IP 250 OP 636: Performed by: EMERGENCY MEDICINE

## 2023-02-06 PROCEDURE — 83605 ASSAY OF LACTIC ACID: CPT | Performed by: NURSE PRACTITIONER

## 2023-02-06 PROCEDURE — 258N000003 HC RX IP 258 OP 636: Performed by: EMERGENCY MEDICINE

## 2023-02-06 PROCEDURE — 74177 CT ABD & PELVIS W/CONTRAST: CPT | Mod: 26 | Performed by: RADIOLOGY

## 2023-02-06 PROCEDURE — 120N000011 HC R&B TRANSPLANT UMMC

## 2023-02-06 PROCEDURE — 85025 COMPLETE CBC W/AUTO DIFF WBC: CPT | Performed by: EMERGENCY MEDICINE

## 2023-02-06 PROCEDURE — U0005 INFEC AGEN DETEC AMPLI PROBE: HCPCS | Performed by: NURSE PRACTITIONER

## 2023-02-06 PROCEDURE — 250N000011 HC RX IP 250 OP 636: Performed by: NURSE PRACTITIONER

## 2023-02-06 PROCEDURE — 250N000013 HC RX MED GY IP 250 OP 250 PS 637

## 2023-02-06 PROCEDURE — 80053 COMPREHEN METABOLIC PANEL: CPT | Performed by: EMERGENCY MEDICINE

## 2023-02-06 PROCEDURE — 36415 COLL VENOUS BLD VENIPUNCTURE: CPT | Performed by: NURSE PRACTITIONER

## 2023-02-06 PROCEDURE — 99285 EMERGENCY DEPT VISIT HI MDM: CPT | Mod: 25 | Performed by: EMERGENCY MEDICINE

## 2023-02-06 PROCEDURE — 74177 CT ABD & PELVIS W/CONTRAST: CPT

## 2023-02-06 PROCEDURE — C9803 HOPD COVID-19 SPEC COLLECT: HCPCS

## 2023-02-06 RX ORDER — CALCIUM CARBONATE 500(1250)
1 TABLET ORAL DAILY
Status: DISCONTINUED | OUTPATIENT
Start: 2023-02-07 | End: 2023-02-07 | Stop reason: HOSPADM

## 2023-02-06 RX ORDER — SODIUM CHLORIDE, SODIUM LACTATE, POTASSIUM CHLORIDE, CALCIUM CHLORIDE 600; 310; 30; 20 MG/100ML; MG/100ML; MG/100ML; MG/100ML
INJECTION, SOLUTION INTRAVENOUS CONTINUOUS
Status: DISCONTINUED | OUTPATIENT
Start: 2023-02-06 | End: 2023-02-07

## 2023-02-06 RX ORDER — PRAVASTATIN SODIUM 20 MG
40 TABLET ORAL DAILY
Status: DISCONTINUED | OUTPATIENT
Start: 2023-02-07 | End: 2023-02-07 | Stop reason: HOSPADM

## 2023-02-06 RX ORDER — PIPERACILLIN SODIUM, TAZOBACTAM SODIUM 3; .375 G/15ML; G/15ML
3.38 INJECTION, POWDER, LYOPHILIZED, FOR SOLUTION INTRAVENOUS EVERY 6 HOURS
Status: DISCONTINUED | OUTPATIENT
Start: 2023-02-06 | End: 2023-02-07

## 2023-02-06 RX ORDER — DIPHENHYDRAMINE HCL 50 MG
50 CAPSULE ORAL ONCE
Status: DISCONTINUED | OUTPATIENT
Start: 2023-02-06 | End: 2023-02-07

## 2023-02-06 RX ORDER — ACETAMINOPHEN 325 MG/1
975 TABLET ORAL EVERY 8 HOURS PRN
Status: DISCONTINUED | OUTPATIENT
Start: 2023-02-06 | End: 2023-02-07 | Stop reason: HOSPADM

## 2023-02-06 RX ORDER — VENLAFAXINE HYDROCHLORIDE 37.5 MG/1
112.5 CAPSULE, EXTENDED RELEASE ORAL DAILY
Status: DISCONTINUED | OUTPATIENT
Start: 2023-02-07 | End: 2023-02-07 | Stop reason: HOSPADM

## 2023-02-06 RX ORDER — ONDANSETRON 4 MG/1
4 TABLET, ORALLY DISINTEGRATING ORAL EVERY 6 HOURS PRN
Status: DISCONTINUED | OUTPATIENT
Start: 2023-02-06 | End: 2023-02-07 | Stop reason: HOSPADM

## 2023-02-06 RX ORDER — LIDOCAINE 40 MG/G
CREAM TOPICAL
Status: DISCONTINUED | OUTPATIENT
Start: 2023-02-06 | End: 2023-02-07 | Stop reason: HOSPADM

## 2023-02-06 RX ORDER — PIPERACILLIN SODIUM, TAZOBACTAM SODIUM 4; .5 G/20ML; G/20ML
4.5 INJECTION, POWDER, LYOPHILIZED, FOR SOLUTION INTRAVENOUS ONCE
Status: COMPLETED | OUTPATIENT
Start: 2023-02-06 | End: 2023-02-06

## 2023-02-06 RX ORDER — URSODIOL 300 MG/1
300 CAPSULE ORAL 2 TIMES DAILY
Status: DISCONTINUED | OUTPATIENT
Start: 2023-02-06 | End: 2023-02-07 | Stop reason: HOSPADM

## 2023-02-06 RX ORDER — DIPHENHYDRAMINE HYDROCHLORIDE 50 MG/ML
25 INJECTION INTRAMUSCULAR; INTRAVENOUS EVERY 6 HOURS PRN
Status: DISCONTINUED | OUTPATIENT
Start: 2023-02-06 | End: 2023-02-07 | Stop reason: HOSPADM

## 2023-02-06 RX ORDER — DIPHENHYDRAMINE HCL 25 MG
25 CAPSULE ORAL EVERY 6 HOURS PRN
Status: DISCONTINUED | OUTPATIENT
Start: 2023-02-06 | End: 2023-02-07 | Stop reason: HOSPADM

## 2023-02-06 RX ORDER — ACETAMINOPHEN 325 MG/1
650 TABLET ORAL EVERY 4 HOURS PRN
Status: DISCONTINUED | OUTPATIENT
Start: 2023-02-06 | End: 2023-02-07 | Stop reason: HOSPADM

## 2023-02-06 RX ORDER — LORAZEPAM 0.5 MG/1
0.5 TABLET ORAL DAILY PRN
Status: DISCONTINUED | OUTPATIENT
Start: 2023-02-06 | End: 2023-02-07 | Stop reason: HOSPADM

## 2023-02-06 RX ORDER — IOPAMIDOL 755 MG/ML
97 INJECTION, SOLUTION INTRAVASCULAR ONCE
Status: COMPLETED | OUTPATIENT
Start: 2023-02-06 | End: 2023-02-06

## 2023-02-06 RX ADMIN — DIPHENHYDRAMINE HYDROCHLORIDE 25 MG: 25 CAPSULE ORAL at 23:07

## 2023-02-06 RX ADMIN — IOPAMIDOL 97 ML: 755 INJECTION, SOLUTION INTRAVENOUS at 11:43

## 2023-02-06 RX ADMIN — SODIUM CHLORIDE, POTASSIUM CHLORIDE, SODIUM LACTATE AND CALCIUM CHLORIDE: 600; 310; 30; 20 INJECTION, SOLUTION INTRAVENOUS at 20:07

## 2023-02-06 RX ADMIN — ONDANSETRON 4 MG: 4 TABLET, ORALLY DISINTEGRATING ORAL at 16:57

## 2023-02-06 RX ADMIN — URSODIOL 300 MG: 300 CAPSULE ORAL at 19:38

## 2023-02-06 RX ADMIN — PIPERACILLIN AND TAZOBACTAM 3.38 G: 3; .375 INJECTION, POWDER, LYOPHILIZED, FOR SOLUTION INTRAVENOUS at 23:48

## 2023-02-06 RX ADMIN — ACETAMINOPHEN 650 MG: 325 TABLET ORAL at 20:02

## 2023-02-06 RX ADMIN — SODIUM CHLORIDE, POTASSIUM CHLORIDE, SODIUM LACTATE AND CALCIUM CHLORIDE 1000 ML: 600; 310; 30; 20 INJECTION, SOLUTION INTRAVENOUS at 18:58

## 2023-02-06 RX ADMIN — PIPERACILLIN SODIUM AND TAZOBACTAM SODIUM 4.5 G: 4; .5 INJECTION, POWDER, LYOPHILIZED, FOR SOLUTION INTRAVENOUS at 16:17

## 2023-02-06 ASSESSMENT — ACTIVITIES OF DAILY LIVING (ADL)
ADLS_ACUITY_SCORE: 35

## 2023-02-06 NOTE — H&P
[facility]    History and Physical  Solid Organ Transplant     Date of Admission:  2/6/2023    Assessment & Plan   Zeinab Bermudez is a 67 year old female who presents with a non functioning bile drain. She has a known history of laparoscopic cholecystectomy on 12/11/22 with known bile duct injury and history of bile leak with a non functioning biliary drain and a new abdominal fluid collection and Elevated LFTs.     S/p sri complicated by bile duct injury, bile leak, intra-abdominal Infection: 12/11/22 status post cholecystectomy c/b sepsis and bile peritonitis with exploratory laparotomy, washout and drain placement on 12/28. ERCP on 12/29 showed possible bile duct transection. IR placed 3rd drain (left side) on 12/29/22 d/t biloma.  2 surgical drains were placed,  to suction (right) , and IR drain to suction (left). The Left drain was removed on 2/2/23 due to lack of drainage. The right drain had been draining bilious output until  2/5 ~1600. Planned to undergo a PTC on 2/10.  Will consult IR today. Start zosyn.    Transaminitis/Elevated Alk Phos:  -Transaminases had trended down and had normalized by 1/19.  They were normal at last OP appt on 2/2. They are elevated today with /. (123 on 2/2) with normal bilirubin (1). Continue Andrea BID.     Moderate malnutrition in the context of acute illness: Admits to keeping up with a low fat diet, attempting a high protein diet. Declined a nutrition consult . Resume low fat diet. Npo at midnight for IR.   Resume Antiemetics PRN.     Depression with anxiety: Resume  PTA venlafaxine 24hr, restarted. Ativan PO PRN anxiety.         Diet: NPO per Anesthesia Guidelines for Procedure/Surgery Except for: Meds  Low Fat Diet    DVT Prophylaxis: Pneumatic Compression Devices  Upton Catheter: Not present  Lines: None     Cardiac Monitoring: None  Code Status:   Full    Clinically Significant Risk Factors Present on  "Admission                       # Overweight: Estimated body mass index is 28.9 kg/m  as calculated from the following:    Height as of this encounter: 1.575 m (5' 2\").    Weight as of this encounter: 71.7 kg (158 lb).           Disposition Plan      Expected Discharge Date: 02/07/2023                The patient's care was discussed with the Attending Physician, Dr. Judd Patrick.      Susan Pisano NP     Attestation: I saw and examined the patient with Susan Pisano NP, and the transplant team. I independently reviewed all pertinent laboratory and imaging results and made independent management decisions including immunosuppression management. I spent 30 minutes in care of this patient.  Judd Patrick MD    ______________________________________________________________________    Chief Complaint   Non functioning drain to RUQ    History is obtained from the patient    History of Present Illness   Zeinab Bermudez is a 67 year old female with PMH significant for Depression, acute cholecystitis s/p lap sri 12/11/22 at an OSH. She developed abdominal pain and presented to her local hospital 12/27 septic. She was found to have a CBD transection, bile leak of 2.5L, biloma. Underwent an ERCP on 12/29/22, stabilized and transferred to [facility] for further hepatobiliary evaluation and management of bile duct injury.  PTC attempted 1/6 but unsuccessful. She was hospitalized until 1/11. She was discharged with 2 surgical drains with plans to undergo a PTC later this week as an OP. She was seen in clinic 2/2 where her Left drain was removed due to no output. Her right drain was noted to have stopped draining ~1600 2/5 and they presented to the ED for evaluation.    She denies any current fever,chills, abdominal pain. She reports her appetite is fair. She is ambulatory within the home and believes to be getting stronger.        Physical Exam   Vital Signs: Temp: 98.4  F (36.9  C) Temp src: Oral BP: 120/76 Pulse: " 84   Resp: 16 SpO2: 97 % O2 Device: None (Room air)    Weight: 158 lbs 0 oz    Constitutional: awake, alert, cooperative, no apparent distress, and appears stated age  Respiratory: No increased work of breathing, good air exchange, clear to auscultation bilaterally, no crackles or wheezing  Cardiovascular: normal apical pulses  and regular rate and rhythm  GI: surgical scar with small eschar, healing well, normal bowel sounds, soft, non-distended, non-tender, no masses palpated. MELITA to right abdomen without output. Bile staining on bulb.  Skin: normal skin color, texture, turgor and no redness, warmth, or swelling  Musculoskeletal: no lower extremity pitting edema present  motor strength is 5 out of 5 all extremities bilaterally  Neurologic: Awake, alert, oriented to name, place and time.     Neuropsychiatric: General: normal, calm and normal eye contact    Medical Decision Making       55 MINUTES SPENT BY ME on the date of service doing chart review, history, exam, documentation & further activities per the note.      Data     I have personally reviewed the following data over the past 24 hrs:    4.0  \   11.9   / 212     137 106 10.8 /  133 (H)   4.2 18 (L) 0.94 \       ALT: 130 (H) AST: 201 (H) AP: 348 (H) TBILI: 1.0   ALB: 3.5 TOT PROTEIN: 6.6 LIPASE: 22       Imaging results reviewed over the past 24 hrs:   Recent Results (from the past 24 hour(s))   CT Abdomen Pelvis w Contrast    Narrative    EXAMINATION: CT ABDOMEN PELVIS W CONTRAST, 2/6/2023 11:58 AM    TECHNIQUE:  Helical CT images from the lung bases through the  symphysis pubis were obtained with IV contrast. Contrast dose:  iopamidol (ISOVUE-370) solution 97 mL    COMPARISON: CT 1/2/2023; drain placement attempt images 1/6/2023    HISTORY: Biliary drain not draining.    FINDINGS:  Drain/right upper quadrant: Percutaneous right upper quadrant surgical  drain, tip is at the anteromedial aspect of the fluid collection  within the gallbladder fossa. The  collection in the gallbladder fossa  measures 4.8 x 1.2 x 1.7 cm (AP, transverse, craniocaudal) and is  walled off with mildly enhancing walls. Mild stranding of the omentum  and mesentery in the right upper quadrant. Small splenic collection  measuring 2.2 x 2.7 x 2.1 cm (3/35). Additional small fluid  collections along the greater curvature of the stomach seen on images  39 and 50.    There is a thin subcapsular fluid collection at the inferior hepatic  lobe measuring 2.7 x 0.8 x 1.2 cm (transverse, craniocaudal, AP).    Liver: Stable well-circumscribed hypoattenuating lesion in hepatic  segment 4, likely a hemangioma or cyst. Mild heterogeneous enhancement  in the lower right hepatic lobe, suspect parenchymal inflammation.    Biliary tree: No intrahepatic or extrahepatic biliary ductal dilation.  Cholecystectomy clips.    Spleen: Unremarkable.    Pancreas: No acute or suspicious abnormality.    Adrenals: Normal.    Kidneys: No calculi. No solid or cystic renal lesions. Unremarkable  urinary bladder.    Pelvic organs: Hysterectomy. No pelvic masses.    Free fluid/free air: None.    Lymph nodes: No lymphadenopathy.    Vessels: Unremarkable.    Bones/soft tissues: No acute or suspicious abnormality. Degenerative  changes in the spine was passed at L5-S1.      Impression    IMPRESSION:   1. Right upper quadrant percutaneous drain tip is located in the  gallbladder fossa at the superior medial aspect of a new contained  fluid collection in the gallbladder fossa which measures 4.8 x 1.2 x  1.7 cm. Differential includes a biloma or abscess.  2. Decreased size of the thin fluid collection at the inferior aspect  of the right hepatic lobe. Additional small fluid perisplenic and  perigastric collections.  3. Improved appearance of the irregular enhancement in the right  hepatic lobe parenchyma, suspect improving infection/inflammation.    I have personally reviewed the examination and initial interpretation  and I agree  with the findings.    PARAM GASPAR MD         SYSTEM ID:  [id number]

## 2023-02-06 NOTE — CONSULTS
"INTERVENTIONAL RADIOLOGY CONSULT    In brief, patient is a 67-year-old female with past medical history of laparoscopic cholecystectomy on 12/11/2022 complicated by bile duct transection requiring ex lap, washout, and MELITA drain placement.  Patient is on IR schedule II 10/20/2023 for percutaneous transhepatic biliary drain with Dr. Haney and Dr. Babin.    Patient presented to the emergency department today for concerns of absent drainage from her existing drain.  Per transplant surgery, she otherwise feels well.  She does not appear septic.  IR consulted regarding percutaneous drainage of a small fluid collection in the gallbladder fossa identified on CT today adjacent to her existing drain.    On imaging, the fluid collection is very small   a, suggesting drain dysfunction.  There are no imaging findings to suggest that this a developing abscess and likely represents a small residual bile collection.  Furthermore, it is immediately adjacent to the existing drain.  The drain has not been flushed.    PLAN: At the moment,  IR would not offer aspiration or new drain placement.  Recommend conservative management and trial of drain flush. The patient will remain on the schedule for PTC placement on 2/10/2023.    Patient discussed with Dr. Haney.    Bernard Contreras MD  Interventional Radiology Fellow, PGY-6.      /76   Pulse 84   Temp 98.4  F (36.9  C) (Oral)   Resp 16   Ht 1.575 m (5' 2\")   Wt 71.7 kg (158 lb)   SpO2 100%   BMI 28.90 kg/m    Recent Labs   Lab Test 02/06/23  1014   WBC 4.0   HGB 11.9        Lab Results   Component Value Date    INR 1.15 01/01/2023     Bernard Contreras MD  Interventional Radiology Fellow  IR pass pager: [phone]    "

## 2023-02-06 NOTE — ED PROVIDER NOTES
History     Chief Complaint   Patient presents with     Flank Pain     Pt has external drain to drain her bile, has not drained anything since 1600 yesterday. Dr. Ruano is her provider and he told her to come to the er to get checked out     HPI  Zeinab Bermudez is a 67 year old female with a past medical history of GI bleed, laparoscopic cholecystectomy for cholecystectomy 12/11/22 c/b bile duct injury/bile leak/sepsis requiring exploratory laparotomy (bile duct transection was identified on ERCP done 12/29/22), washout, and MELITA placement, covid 19 infection, depression, HLD, HTN, SAH who presents to the emergency department with a chief complaint of bile drain problem. The pt states that it has not drained anything since 1600 yesterday. She contacted her provider who advised her to come here for evaluation. Biliary drain has been in place since 12/28/22. It was previously draining 300+ ml per day, on 2/5, it was a bit less than that, and since 4 PM yesterday, there has been almost no drainage. No bleeding or drainage from insertion site, no abdominal pain, nausea, or fevers. Pt states at one point she had 3 drains in place, other drains were removed due to no output.     The pt presents to the ER today accompanied by her .     I reviewed pt's records from her last hospitalization when she was admitted to transplant team here. She was discharged on 1/10/23. GI notes from ERCP here were also reviewed. It was noted that pt's transaminases were trending down by time of discharge. Pt is awaiting PTC tube placement 2/10.     I have reviewed the Medications, Allergies, Past Medical and Surgical History, and Social History in the [facility] system.    Past Medical History:   Diagnosis Date     Covid 2019 Positive 12/11/2022     Depressive disorder      HLD (hyperlipidemia)      Hypertension      SAH (subarachnoid hemorrhage) (H) 2017     Past Surgical History:   Procedure Laterality Date     ENDOSCOPIC RETROGRADE  CHOLANGIOPANCREATOGRAM N/A 1/3/2023    Procedure: ENDOSCOPIC RETROGRADE CHOLANGIOPANCREATOGRAPHY;  Surgeon: Michael Mann MD;  Location: [facility] OR     HYSTERECTOMY  2005     IR BILIARY DRAIN PLACEMENT  1/6/2023     LAPAROSCOPIC CHOLECYSTECTOMY  12/11/2022     LAPAROTOMY EXPLORATORY  12/28/2022     No current facility-administered medications for this encounter.     Current Outpatient Medications   Medication     acetaminophen (TYLENOL) 325 MG tablet     amoxicillin-clavulanate (AUGMENTIN) 875-125 MG tablet     calcium carbonate (OS-NASRIN) 500 MG tablet     LORazepam (ATIVAN) 0.5 MG tablet     melatonin 5 MG sublingual tablet     ondansetron (ZOFRAN ODT) 4 MG ODT tab     Pediatric Multiple Vitamins (MULTIVITAMIN CHILDRENS, W/ FA, PO)     pravastatin (PRAVACHOL) 40 MG tablet     ursodiol (ACTIGALL) 300 MG capsule     venlafaxine (EFFEXOR XR) 37.5 MG 24 hr capsule     No Known Allergies  Past medical history, past surgical history, medications, and allergies were reviewed with the patient. Additional pertinent items: None    Social History     Socioeconomic History     Marital status:      Spouse name: Not on file     Number of children: 2     Years of education: Not on file     Highest education level: Not on file   Occupational History     Not on file   Tobacco Use     Smoking status: Never     Smokeless tobacco: Never   Substance and Sexual Activity     Alcohol use: Not Currently     Drug use: Never     Sexual activity: Not on file   Other Topics Concern     Not on file   Social History Narrative     Not on file     Social Determinants of Health     Financial Resource Strain: Not on file   Food Insecurity: Not on file   Transportation Needs: Not on file   Physical Activity: Not on file   Stress: Not on file   Social Connections: Not on file   Intimate Partner Violence: Not on file   Housing Stability: Not on file     Social history was reviewed with the patient. Additional pertinent items: None    Review  "of Systems  A medically appropriate review of systems was performed with pertinent positives and negatives noted in the HPI, and all other systems negative.    Physical Exam   BP: 118/75  Pulse: 101  Temp: 97.5  F (36.4  C)  Resp: 16  Height: 157.5 cm (5' 2\")  Weight: 71.7 kg (158 lb)  SpO2: 97 %      General: Well nourished, well developed, NAD  HEENT: EOMI, anicteric. NCAT, MMM  Neck: no jugular venous distension, supple, nl ROM  Cardiac: Regular rate and rhythm. No murmurs, rubs, or gallops. Normal S1, S2.  Intact peripheral pulses  Pulm: CTAB, no stridor, wheezes, rales, rhonchi  Abd: Soft, nontender, nondistended.  No masses palpated. No drainage in MELITA drain   Skin: Warm and dry to the touch.  No rash  Extremities: No LE edema, no cyanosis, w/w/p  Neuro: A&Ox3, no gross focal deficits    ED Course        Procedures                           Labs Ordered and Resulted from Time of ED Arrival to Time of ED Departure   COMPREHENSIVE METABOLIC PANEL - Abnormal       Result Value    Sodium 137      Potassium 4.2      Chloride 106      Carbon Dioxide (CO2) 18 (*)     Anion Gap 13      Urea Nitrogen 10.8      Creatinine 0.94      Calcium 9.4      Glucose 133 (*)     Alkaline Phosphatase 348 (*)      (*)      (*)     Protein Total 6.6      Albumin 3.5      Bilirubin Total 1.0      GFR Estimate 67     LIPASE - Normal    Lipase 22     COVID-19 VIRUS (CORONAVIRUS) BY PCR - Normal    SARS CoV2 PCR Negative     LIPASE - Normal    Lipase 17     LACTIC ACID WHOLE BLOOD - Normal    Lactic Acid 1.5     CBC WITH PLATELETS AND DIFFERENTIAL    WBC Count 4.0      RBC Count 4.06      Hemoglobin 11.9      Hematocrit 36.8      MCV 91      MCH 29.3      MCHC 32.3      RDW 13.2      Platelet Count 212      % Neutrophils 71      % Lymphocytes 20      % Monocytes 9      % Eosinophils 0      % Basophils 0      % Immature Granulocytes 0      NRBCs per 100 WBC 0      Absolute Neutrophils 2.9      Absolute Lymphocytes 0.8      " Absolute Monocytes 0.3      Absolute Eosinophils 0.0      Absolute Basophils 0.0      Absolute Immature Granulocytes 0.0      Absolute NRBCs 0.0              Results for orders placed or performed during the hospital encounter of 02/06/23 (from the past 24 hour(s))   Lipase   Result Value Ref Range    Lipase 17 13 - 60 U/L   Asymptomatic COVID-19 Virus (Coronavirus) by PCR Nasopharyngeal    Specimen: Nasopharyngeal; Swab   Result Value Ref Range    SARS CoV2 PCR Negative Negative    Narrative    Testing was performed using the [employer] Xpress SARS-CoV-2 Assay on the Cepheid Gene-Xpert Instrument Systems. Additional information about this Emergency Use Authorization (EUA) assay can be found via the Lab Guide. This test should be ordered for the detection of SARS-CoV-2 in individuals who meet SARS-CoV-2 clinical and/or epidemiological criteria as well as from individuals without symptoms or other reasons to suspect COVID-19. Test performance for asymptomatic patients has only been established in anterior nasal swab specimens. This test is for in vitro diagnostic use under the FDA EUA for laboratories certified under CLIA to perform high complexity testing. This test has not been FDA cleared or approved. A negative result does not rule out the presence of PCR inhibitors in the specimen or target RNA concentration below the limit of detection for the assay. The possibility of a false negative should be considered if the patient's recent exposure or clinical presentation suggests COVID-19. This test was validated by [facility] [employer]. These Laboratories are certified under the Clinical Laboratory Improvement Amendments (CLIA) as qualified to perform high complexity testing.     EKG 12-lead, complete   Result Value Ref Range    Systolic Blood Pressure  mmHg    Diastolic Blood Pressure  mmHg    Ventricular Rate 118 BPM    Atrial Rate 118 BPM    RI Interval 118 ms    QRS Duration 62 ms     ms    QTc 437 ms     P Axis 46 degrees    R AXIS 30 degrees    T Axis 48 degrees    Interpretation ECG       Sinus tachycardia  Possible Left atrial enlargement  Borderline ECG  Unconfirmed report - interpretation of this ECG is computer generated - see medical record for final interpretation    Confirmed by - EMERGENCY ROOM, PHYSICIAN ([id number]),  URIEL SENIOR ([id number]) on 2/6/2023 6:09:20 PM     Lactic acid whole blood   Result Value Ref Range    Lactic Acid 1.5 0.7 - 2.0 mmol/L   CBC with platelets   Result Value Ref Range    WBC Count 6.3 4.0 - 11.0 10e3/uL    RBC Count 3.84 3.80 - 5.20 10e6/uL    Hemoglobin 11.4 (L) 11.7 - 15.7 g/dL    Hematocrit 35.0 35.0 - 47.0 %    MCV 91 78 - 100 fL    MCH 29.7 26.5 - 33.0 pg    MCHC 32.6 31.5 - 36.5 g/dL    RDW 13.8 10.0 - 15.0 %    Platelet Count 215 150 - 450 10e3/uL   Basic metabolic panel   Result Value Ref Range    Sodium 137 136 - 145 mmol/L    Potassium 4.0 3.4 - 5.3 mmol/L    Chloride 106 98 - 107 mmol/L    Carbon Dioxide (CO2) 21 (L) 22 - 29 mmol/L    Anion Gap 10 7 - 15 mmol/L    Urea Nitrogen 13.1 8.0 - 23.0 mg/dL    Creatinine 1.10 (H) 0.51 - 0.95 mg/dL    Calcium 8.9 8.8 - 10.2 mg/dL    Glucose 95 70 - 99 mg/dL    GFR Estimate 55 (L) >60 mL/min/1.73m2   Magnesium   Result Value Ref Range    Magnesium 2.1 1.7 - 2.3 mg/dL   Phosphorus   Result Value Ref Range    Phosphorus 4.2 2.5 - 4.5 mg/dL   INR   Result Value Ref Range    INR 1.27 (H) 0.85 - 1.15   Hepatic panel   Result Value Ref Range    Protein Total 5.6 (L) 6.4 - 8.3 g/dL    Albumin 3.0 (L) 3.5 - 5.2 g/dL    Bilirubin Total 0.9 <=1.2 mg/dL    Alkaline Phosphatase 266 (H) 35 - 104 U/L    AST 81 (H) 10 - 35 U/L     (H) 10 - 35 U/L    Bilirubin Direct 0.48 (H) 0.00 - 0.30 mg/dL       Labs, vital signs, and imaging studies were reviewed by me.    Medications   lidocaine 1 % 1 mL (has no administration in time range)   lidocaine (LMX4) cream (has no administration in time range)   sodium chloride (PF) 0.9% PF flush 3 mL  (has no administration in time range)   sodium chloride (PF) 0.9% PF flush 3 mL (3 mLs Intracatheter Given 2/7/23 0745)   calcium carbonate 500 mg (elemental) (OSCAL) tablet 500 mg (500 mg Oral Given 2/7/23 0744)   LORazepam (ATIVAN) tablet 0.5 mg (has no administration in time range)   ondansetron (ZOFRAN ODT) ODT tab 4 mg (4 mg Oral Given 2/6/23 1657)   childrens multivitamin with iron (FLINTSTONES COMPLETE) chewable tablet 1 tablet (1 tablet Oral Given 2/7/23 0750)   pravastatin (PRAVACHOL) tablet 40 mg (40 mg Oral Given 2/7/23 0744)   ursodiol (ACTIGALL) capsule 300 mg (300 mg Oral Given 2/7/23 0744)   venlafaxine (EFFEXOR XR) 24 hr capsule 112.5 mg (112.5 mg Oral Given 2/7/23 0744)   acetaminophen (TYLENOL) tablet 650 mg (650 mg Oral Given 2/6/23 2002)   acetaminophen (TYLENOL) tablet 975 mg (has no administration in time range)   diphenhydrAMINE (BENADRYL) capsule 25 mg (25 mg Oral Given 2/6/23 2307)     Or   diphenhydrAMINE (BENADRYL) injection 25 mg ( Intravenous See Alternative 2/6/23 2307)   ciprofloxacin (CIPRO) tablet 500 mg (has no administration in time range)   iopamidol (ISOVUE-370) solution 97 mL (97 mLs Intravenous Given 2/6/23 1143)   sodium chloride (PF) 0.9% PF flush 74 mL (74 mLs Intravenous Given 2/6/23 1143)   piperacillin-tazobactam (ZOSYN) 4.5 g vial to attach to  mL bag (0 g Intravenous Stopped 2/6/23 1645)   lactated ringers BOLUS 1,000 mL (1,000 mLs Intravenous New Bag 2/6/23 1858)       Assessments & Plan (with Medical Decision Making)   Zeinab Bermudez is a 67 year old female who presents with bile drain that is no longer functioning. Differential includes drain displacement, blockage. CT ordered to evaluate for biloma or abscess formation. Labs ordered to further evaluate the pt as well. Pt denies need for pain/nausea medication.     CT shows reaccummulation of fluid, tip of drain in gallbladder fossa    Labs remarkable for increased AST/ALT/alk phos, bili remains wnl    Medical  Decision Making  The patient presented with a problem that is a chronic illness mild to moderate exacerbation, progression, or side effect of treatment.    The patient's evaluation involved:  an assessment requiring an independent historian (pt's history was d/w her  as well (as he has been the one helping her with drain changes))  review of external note(s) from 1 sources (see HPI, transplant notes)  ordering and/or review of 3+ test(s) in this encounter (see separate area of note for details)  review of 3+ test result(s) ordered prior to this encounter (see separate area of note for details)  independent interpretation of testing performed by another health professional (CT)  discussion of management or test interpretation with another health professional (transplant surgery)    The patient's management involved prescription drug management (including medications given in the ED), a decision regarding minor procedure/surgery with identified risk factors and a decision regarding hospitalization.    I have reviewed the nursing notes.    I have reviewed the findings, diagnosis, plan and need for follow up with the patient.    Patient discussed with transplant surgery, to be admitted to their service for further management. They recommend zosyn for antibiotic coverage. They will consult IR for repeat drain placement. Plan was discussed with patient who understands and agrees with plan.    Current Discharge Medication List      START taking these medications    Details   ciprofloxacin (CIPRO) 500 MG tablet Take 1 tablet (500 mg) by mouth every 12 hours for 3 days  Qty: 6 tablet, Refills: 0    Associated Diagnoses: Bile leak, postoperative             Final diagnoses:   Admission for biliary drainage tube placement       Julieta Basilio MD  2/6/2023   [facility] EMERGENCY DEPARTMENT     Julieta Basilio MD  02/07/23 2699

## 2023-02-06 NOTE — ED TRIAGE NOTES
Pt ambulatory to triage with family. Pt has biliary drain present since 12/28/2022. Yesterday around 1600 the drain stopped working. They contacted her physician Dr. Desmond Ruano and he instructed her to come to the ER to be evaluated. VSS.      Triage Assessment     Row Name 02/06/23 0931       Triage Assessment (Adult)    Airway WDL WDL       Respiratory WDL    Respiratory WDL WDL       Skin Circulation/Temperature WDL    Skin Circulation/Temperature WDL WDL       Cardiac WDL    Cardiac WDL WDL       Peripheral/Neurovascular WDL    Peripheral Neurovascular WDL WDL       Cognitive/Neuro/Behavioral WDL    Cognitive/Neuro/Behavioral WDL WDL

## 2023-02-07 VITALS
HEIGHT: 62 IN | DIASTOLIC BLOOD PRESSURE: 55 MMHG | SYSTOLIC BLOOD PRESSURE: 93 MMHG | RESPIRATION RATE: 22 BRPM | HEART RATE: 85 BPM | WEIGHT: 161.8 LBS | OXYGEN SATURATION: 92 % | TEMPERATURE: 98 F | BODY MASS INDEX: 29.77 KG/M2

## 2023-02-07 PROBLEM — F41.8 DEPRESSION WITH ANXIETY: Status: ACTIVE | Noted: 2023-02-07

## 2023-02-07 LAB
ALBUMIN SERPL BCG-MCNC: 3 G/DL (ref 3.5–5.2)
ALP SERPL-CCNC: 266 U/L (ref 35–104)
ALT SERPL W P-5'-P-CCNC: 120 U/L (ref 10–35)
ANION GAP SERPL CALCULATED.3IONS-SCNC: 10 MMOL/L (ref 7–15)
AST SERPL W P-5'-P-CCNC: 81 U/L (ref 10–35)
BILIRUB DIRECT SERPL-MCNC: 0.48 MG/DL (ref 0–0.3)
BILIRUB SERPL-MCNC: 0.9 MG/DL
BUN SERPL-MCNC: 13.1 MG/DL (ref 8–23)
CALCIUM SERPL-MCNC: 8.9 MG/DL (ref 8.8–10.2)
CHLORIDE SERPL-SCNC: 106 MMOL/L (ref 98–107)
CREAT SERPL-MCNC: 1.1 MG/DL (ref 0.51–0.95)
DEPRECATED HCO3 PLAS-SCNC: 21 MMOL/L (ref 22–29)
ERYTHROCYTE [DISTWIDTH] IN BLOOD BY AUTOMATED COUNT: 13.8 % (ref 10–15)
GFR SERPL CREATININE-BSD FRML MDRD: 55 ML/MIN/1.73M2
GLUCOSE SERPL-MCNC: 95 MG/DL (ref 70–99)
HCT VFR BLD AUTO: 35 % (ref 35–47)
HGB BLD-MCNC: 11.4 G/DL (ref 11.7–15.7)
INR PPP: 1.27 (ref 0.85–1.15)
MAGNESIUM SERPL-MCNC: 2.1 MG/DL (ref 1.7–2.3)
MCH RBC QN AUTO: 29.7 PG (ref 26.5–33)
MCHC RBC AUTO-ENTMCNC: 32.6 G/DL (ref 31.5–36.5)
MCV RBC AUTO: 91 FL (ref 78–100)
PHOSPHATE SERPL-MCNC: 4.2 MG/DL (ref 2.5–4.5)
PLATELET # BLD AUTO: 215 10E3/UL (ref 150–450)
POTASSIUM SERPL-SCNC: 4 MMOL/L (ref 3.4–5.3)
PROT SERPL-MCNC: 5.6 G/DL (ref 6.4–8.3)
RBC # BLD AUTO: 3.84 10E6/UL (ref 3.8–5.2)
SODIUM SERPL-SCNC: 137 MMOL/L (ref 136–145)
WBC # BLD AUTO: 6.3 10E3/UL (ref 4–11)

## 2023-02-07 PROCEDURE — 85014 HEMATOCRIT: CPT | Performed by: NURSE PRACTITIONER

## 2023-02-07 PROCEDURE — 85610 PROTHROMBIN TIME: CPT | Performed by: NURSE PRACTITIONER

## 2023-02-07 PROCEDURE — 83735 ASSAY OF MAGNESIUM: CPT | Performed by: NURSE PRACTITIONER

## 2023-02-07 PROCEDURE — 250N000011 HC RX IP 250 OP 636: Performed by: NURSE PRACTITIONER

## 2023-02-07 PROCEDURE — 250N000013 HC RX MED GY IP 250 OP 250 PS 637: Performed by: NURSE PRACTITIONER

## 2023-02-07 PROCEDURE — 82248 BILIRUBIN DIRECT: CPT | Performed by: SURGERY

## 2023-02-07 PROCEDURE — 36415 COLL VENOUS BLD VENIPUNCTURE: CPT | Performed by: NURSE PRACTITIONER

## 2023-02-07 PROCEDURE — 250N000013 HC RX MED GY IP 250 OP 250 PS 637

## 2023-02-07 PROCEDURE — 84100 ASSAY OF PHOSPHORUS: CPT | Performed by: NURSE PRACTITIONER

## 2023-02-07 PROCEDURE — 82310 ASSAY OF CALCIUM: CPT | Performed by: NURSE PRACTITIONER

## 2023-02-07 PROCEDURE — 87077 CULTURE AEROBIC IDENTIFY: CPT | Performed by: NURSE PRACTITIONER

## 2023-02-07 PROCEDURE — 99239 HOSP IP/OBS DSCHRG MGMT >30: CPT | Performed by: SURGERY

## 2023-02-07 RX ORDER — CIPROFLOXACIN 250 MG/1
500 TABLET, FILM COATED ORAL EVERY 12 HOURS SCHEDULED
Status: DISCONTINUED | OUTPATIENT
Start: 2023-02-07 | End: 2023-02-07 | Stop reason: HOSPADM

## 2023-02-07 RX ORDER — CIPROFLOXACIN 500 MG/1
500 TABLET, FILM COATED ORAL EVERY 12 HOURS
Qty: 6 TABLET | Refills: 0 | Status: SHIPPED | OUTPATIENT
Start: 2023-02-07 | End: 2023-02-10

## 2023-02-07 RX ADMIN — VENLAFAXINE HYDROCHLORIDE 112.5 MG: 37.5 CAPSULE, EXTENDED RELEASE ORAL at 07:44

## 2023-02-07 RX ADMIN — PRAVASTATIN SODIUM 40 MG: 20 TABLET ORAL at 07:44

## 2023-02-07 RX ADMIN — DIPHENHYDRAMINE HYDROCHLORIDE 25 MG: 25 CAPSULE ORAL at 12:12

## 2023-02-07 RX ADMIN — URSODIOL 300 MG: 300 CAPSULE ORAL at 07:44

## 2023-02-07 RX ADMIN — Medication 500 MG: at 07:44

## 2023-02-07 RX ADMIN — CIPROFLOXACIN 500 MG: 250 TABLET, FILM COATED ORAL at 12:07

## 2023-02-07 RX ADMIN — PIPERACILLIN AND TAZOBACTAM 3.38 G: 3; .375 INJECTION, POWDER, LYOPHILIZED, FOR SOLUTION INTRAVENOUS at 10:11

## 2023-02-07 RX ADMIN — PIPERACILLIN AND TAZOBACTAM 3.38 G: 3; .375 INJECTION, POWDER, LYOPHILIZED, FOR SOLUTION INTRAVENOUS at 04:10

## 2023-02-07 RX ADMIN — Medication 1 TABLET: at 07:50

## 2023-02-07 ASSESSMENT — ACTIVITIES OF DAILY LIVING (ADL)
ADLS_ACUITY_SCORE: 35
DEPENDENT_IADLS:: INDEPENDENT
ADLS_ACUITY_SCORE: 35

## 2023-02-07 NOTE — PROGRESS NOTES
"[id number]    Pt is alert and oriented with non-functioning bile drain since yesterday. With mild intermittent abd pain 4/10, with Tylenol 650mg PRN given, no swelling or redness on the site, On Abx Zosyn with Benadryl. To keep NPO at MN, all nees attended.    VS: /63   Pulse 79   Temp 97.5  F (36.4  C) (Oral)   Resp 20   Ht 1.575 m (5' 2\")   Wt 71.7 kg (158 lb)   SpO2 98%   BMI 28.90 kg/m     "

## 2023-02-07 NOTE — DISCHARGE SUMMARY
[facility]    Discharge Summary  Transplant Surgery    Date of Admission:  2/6/2023  Date of Discharge:  2/7/2023  Discharging Provider: Susan Pisano NP / Judd Patrick MD    Attestation: I saw and examined the patient with Susan Pisano NP, and the transplant team. I independently reviewed all pertinent laboratory and imaging results and made independent management decisions including immunosuppression management. I spent 30 minutes in care of this patient.  Judd Patrick MD    Discharge Diagnoses   Active Problems:    Bile duct injury    Moderate malnutrition (H)    Bile leak, postoperative    Biliary drain displacement, initial encounter    Depression with anxiety      History of Present Illness   Zeinab Bermudez is a 67 year old female with a history of laparoscopic cholecystectomy on 12/11/22 at OSH complicated by bile duct injury s/p Ex lap, washout, and drain placement 12/28/22. Recent admission to [facility] 1/1-1/11/23 with peritonitis and sepsis d/t bile leak s/p drain placement x2 and unsuccessful PTC placement. She presented 2/7/23 with a non-functioning bile drain.     Hospital Course   S/p Lap sri complicated by bile duct injury, bile leak, intra-abdominal Infection: Underwent lap cholecystecomy 12/11/22 c/b sepsis and bile peritonitis with exploratory laparotomy, washout and drain placement on 12/28. ERCP on 12/29 showed possible bile duct transection. IR placed 3rd drain (left side) on 12/29/22 d/t biloma. She then had 2 surgical drains to suction (right), and one IR drain to suction (left). The Left drain was removed on 2/2/23 due to lack of drainage. The right drain had been draining bilious output until 2/5 ~1600. Planned to undergo a PTC on 2/10.  IR consulted on admission, no plans for intervention at this time as CT scan demonstrates likely small residual bile collection immediately adjacent to existing drain. Started on Zosyn.  Plan:transition to Ciprofloxacin BID until PTC  placement as scheduled 2/10/23. She will flush drain BID. Bile sent for culture with G. Stain +GPC, culture pending at discharge.      Transaminitis/Elevated Alk Phos: Transaminases had trended down and had normalized by 1/19.  They were normal at last OP appt on 2/2. Elevated on admission with /. (123 on 2/2) with normal bilirubin (1). LFTs remain elevated but were trending down. Continue Ursodiol BID.     Moderate malnutrition in the context of acute illness: Admits to keeping up with a low fat diet, attempting a high protein diet. Declined a nutrition consult. Continue current diet and multivitamin.       Depression with anxiety: Continue PTA venlafaxine 24hr. Ativan PO PRN      Discharge Disposition   Discharged to home   Condition at discharge: Stable    Pending Results   These results will be followed up by transplant fellow  Unresulted Labs Ordered in the Past 30 Days of this Admission     Date and Time Order Name Status Description    2/7/2023 11:08 AM Body fluid, unsp Aerobic Bacterial Culture Routine with Gram Stain Preliminary         Primary Care Physician   Jairo Ocampo    Physical Exam   Temp: 98  F (36.7  C) Temp src: Oral BP: 93/55 Pulse: 85   Resp: 22 SpO2: 92 % O2 Device: None (Room air) Oxygen Delivery: 2 LPM  Vitals:    02/06/23 0931 02/07/23 0109   Weight: 71.7 kg (158 lb) 73.4 kg (161 lb 12.8 oz)     Vital Signs with Ranges  Temp:  [97.5  F (36.4  C)-98  F (36.7  C)] 98  F (36.7  C)  Pulse:  [] 85  Resp:  [20-28] 22  BP: ()/(55-72) 93/55  SpO2:  [92 %-100 %] 92 %  I/O last 3 completed shifts:  In: 630 [P.O.:480; I.V.:150]  Out: 975 [Urine:500; Drains:475]       Constitutional: awake, alert, cooperative, no apparent distress, and appears stated age  Respiratory: No increased work of breathing, good air exchange, clear to auscultation bilaterally, no crackles or wheezing  Cardiovascular: normal apical pulses  and  regular rate and rhythm  GI: surgical scar with small eschar, healing well, normal bowel sounds, soft, non-distended, non-tender, no masses palpated. MELITA to right abdomen with bilious output, no obvious sediment.  Skin: normal skin color, texture, turgor and no redness, warmth, or swelling  Musculoskeletal: no lower extremity pitting edema present  motor strength is 5 out of 5 all extremities bilaterally  Neurologic: Awake, alert, oriented to name, place and time.     Neuropsychiatric: General: normal, calm and normal eye contact     Consultations This Hospital Stay   INTERVENTIONAL RADIOLOGY ADULT/PEDS IP CONSULT  INTERVENTIONAL RADIOLOGY ADULT/PEDS IP CONSULT  CARE MANAGEMENT / SOCIAL WORK IP CONSULT    Time Spent on this Encounter   I have spent less than 30 minutes on this discharge.    Discharge Orders   Discharge Medications   Current Discharge Medication List      START taking these medications    Details   ciprofloxacin (CIPRO) 500 MG tablet Take 1 tablet (500 mg) by mouth every 12 hours for 3 days  Qty: 6 tablet, Refills: 0    Associated Diagnoses: Bile leak, postoperative         CONTINUE these medications which have NOT CHANGED    Details   acetaminophen (TYLENOL) 325 MG tablet Take 325-650 mg by mouth every 6 hours as needed for mild pain      calcium carbonate (OS-NASRIN) 500 MG tablet Take 1 tablet by mouth daily      LORazepam (ATIVAN) 0.5 MG tablet Take 0.5 mg by mouth daily as needed for anxiety      melatonin 5 MG sublingual tablet Place 1 tablet (5 mg) under the tongue At Bedtime  Qty: 30 tablet, Refills: 0    Associated Diagnoses: Injury of bile duct, initial encounter      ondansetron (ZOFRAN ODT) 4 MG ODT tab Take 1 tablet (4 mg) by mouth every 6 hours as needed for nausea or vomiting  Qty: 15 tablet, Refills: 0    Associated Diagnoses: Nausea      Pediatric Multiple Vitamins (MULTIVITAMIN CHILDRENS, W/ FA, PO) Take 1 tablet by mouth daily      pravastatin (PRAVACHOL) 40 MG tablet Take 40 mg by  mouth daily      ursodiol (ACTIGALL) 300 MG capsule Take 1 capsule (300 mg) by mouth 2 times daily  Qty: 60 capsule, Refills: 1    Associated Diagnoses: Injury of bile duct, initial encounter      venlafaxine (EFFEXOR XR) 37.5 MG 24 hr capsule Take 112.5 mg by mouth daily                Resume Home Care Services    [facility] [phone], Fax [phone].   Home PT     Reason for your hospital stay    Nonfunctioning biliary drain     Activity    Encourage ambulation, Walk at least four times a day     When to contact your care team    WHEN TO CONTACT PROVIDER:     Notify your provider if you have abdominal pain, increased, change in color of drainage, or nonfunctioning biliary drain, fever greater than 101F, or yellowing of skin or eyes.     If you have concerns, please call the hospital switchboard at [phone] and ask to have the liver transplant fellow on-call paged. If you have a life-threatening emergency, go to the nearest emergency room.     Wound care and dressings    Instructions to care for your wound at home: keep wound clean and dry.     Adult [facility] Follow-up and recommended labs and tests    Follow up with Interventional Radiology as planned on 2/10/23 for PTC, arrive at 6AM  Will need to get a contrast CT after PTC to assess clearance of abd fluid collections    Appointments on [facility] and/or [facility] (with [facility] or [facility] provider or service). Call [phone] if you haven't heard regarding these appointments within 7 days of discharge.     Tubes and drains    You are going home with the following tubes or drains: MELITA drain.  Follow these instructions  to care for your tube:  Flush drain with 10cc of Normal saline twice daily. Empty and record MELITA drain daily and as needed. Record color and volumes of output.     Diet    Follow this diet upon discharge:        Low Fat Diet         Data   Results for orders placed or performed during the hospital encounter of 02/06/23   CT  Abdomen Pelvis w Contrast    Narrative    EXAMINATION: CT ABDOMEN PELVIS W CONTRAST, 2/6/2023 11:58 AM    TECHNIQUE:  Helical CT images from the lung bases through the  symphysis pubis were obtained with IV contrast. Contrast dose:  iopamidol (ISOVUE-370) solution 97 mL    COMPARISON: CT 1/2/2023; drain placement attempt images 1/6/2023    HISTORY: Biliary drain not draining.    FINDINGS:  Drain/right upper quadrant: Percutaneous right upper quadrant surgical  drain, tip is at the anteromedial aspect of the fluid collection  within the gallbladder fossa. The collection in the gallbladder fossa  measures 4.8 x 1.2 x 1.7 cm (AP, transverse, craniocaudal) and is  walled off with mildly enhancing walls. Mild stranding of the omentum  and mesentery in the right upper quadrant. Small splenic collection  measuring 2.2 x 2.7 x 2.1 cm (3/35). Additional small fluid  collections along the greater curvature of the stomach seen on images  39 and 50.    There is a thin subcapsular fluid collection at the inferior hepatic  lobe measuring 2.7 x 0.8 x 1.2 cm (transverse, craniocaudal, AP).    Liver: Stable well-circumscribed hypoattenuating lesion in hepatic  segment 4, likely a hemangioma or cyst. Mild heterogeneous enhancement  in the lower right hepatic lobe, suspect parenchymal inflammation.    Biliary tree: No intrahepatic or extrahepatic biliary ductal dilation.  Cholecystectomy clips.    Spleen: Unremarkable.    Pancreas: No acute or suspicious abnormality.    Adrenals: Normal.    Kidneys: No calculi. No solid or cystic renal lesions. Unremarkable  urinary bladder.    Pelvic organs: Hysterectomy. No pelvic masses.    Free fluid/free air: None.    Lymph nodes: No lymphadenopathy.    Vessels: Unremarkable.    Bones/soft tissues: No acute or suspicious abnormality. Degenerative  changes in the spine was passed at L5-S1.      Impression    IMPRESSION:   1. Right upper quadrant percutaneous drain tip is located in  the  gallbladder fossa at the superior medial aspect of a new contained  fluid collection in the gallbladder fossa which measures 4.8 x 1.2 x  1.7 cm. Differential includes a biloma or abscess.  2. Decreased size of the thin fluid collection at the inferior aspect  of the right hepatic lobe. Additional small fluid perisplenic and  perigastric collections.  3. Improved appearance of the irregular enhancement in the right  hepatic lobe parenchyma, suspect improving infection/inflammation.    I have personally reviewed the examination and initial interpretation  and I agree with the findings.    PARAM GASPAR MD         SYSTEM ID:  [id number]     Most Recent 3 CBC's:  Recent Labs   Lab Test 02/07/23  0637 02/06/23  1014 02/02/23  0856   WBC 6.3 4.0 9.2   HGB 11.4* 11.9 13.4   MCV 91 91 89    212 218     Most Recent 3 BMP's:  Recent Labs   Lab Test 02/07/23  0637 02/06/23  1014 01/11/23  0644    137 139   POTASSIUM 4.0 4.2 3.9   CHLORIDE 106 106 107   CO2 21* 18* 21*   BUN 13.1 10.8 12.5   CR 1.10* 0.94 0.67   ANIONGAP 10 13 11   NASRIN 8.9 9.4 8.7*   GLC 95 133* 92     Most Recent 2 LFT's:  Recent Labs   Lab Test 02/07/23  0637 02/06/23  1014   AST 81* 201*   * 130*   ALKPHOS 266* 348*   BILITOTAL 0.9 1.0     Most Recent 3 INR's:  Recent Labs   Lab Test 02/07/23  0637 01/01/23  2340   INR 1.27* 1.15

## 2023-02-07 NOTE — PROGRESS NOTES
Admitted/transferred from: ED  2 RN full   skin assessment completed by Tigre Russell, PRISCILA and Ailyn LUTHER  Skin assessment finding: issues found Old abdominal incision with scabs   Interventions/actions: other Will continue to monitor

## 2023-02-07 NOTE — CONSULTS
Care Management Initial Consult    General Information  Assessment completed with: Patient, Care Team Member, -chart review,    Type of CM/SW Visit: Initial Assessment    Primary Care Provider verified and updated as needed: Yes   Readmission within the last 30 days: other (see comments)   Return Category: Post-op/Post-procedure complication  Reason for Consult: discharge planning  Advance Care Planning:            Communication Assessment  Patient's communication style: spoken language (English or Bilingual)             Cognitive  Cognitive/Neuro/Behavioral: WDL                      Living Environment:   People in home: spouse     Current living Arrangements: house      Able to return to prior arrangements: yes       Family/Social Support:  Care provided by: self  Provides care for: no one  Marital Status:              Description of Support System:           Current Resources:   Patient receiving home care services: Yes  Skilled Home Care Services: Physicial Therapy  Community Resources: None  Equipment currently used at home: walker, rolling  Supplies currently used at home: Wound Care Supplies    Employment/Financial:  Employment Status: retired        Financial Concerns: No concerns identified           Lifestyle & Psychosocial Needs:  Social Determinants of Health     Tobacco Use: Low Risk      Smoking Tobacco Use: Never     Smokeless Tobacco Use: Never     Passive Exposure: Not on file   Alcohol Use: Not on file   Financial Resource Strain: Not on file   Food Insecurity: Not on file   Transportation Needs: Not on file   Physical Activity: Not on file   Stress: Not on file   Social Connections: Not on file   Intimate Partner Violence: Not on file   Depression: Not at risk     PHQ-2 Score: 0   Housing Stability: Not on file       Functional Status:  Prior to admission patient needed assistance:   Dependent ADLs:: Ambulation-walker  Dependent IADLs:: Independent (with some assistance/support from  spouse)       Mental Health Status:  Mental Health Status: No Current Concerns       Chemical Dependency Status:  Chemical Dependency Status: No Current Concerns             Additional Information:  Pt readmitted with non functioning MELITA drain.     Pt with complex medical history significant for s/p laparoscopic cholecystectomy on 12/11/22 with known bile duct injury and history of bile leak with a non functioning biliary drain and a new abdominal fluid collection and Elevated LFTs.   Per care team rounds anticipate possible discharge today vs pt remaining inpatient for scheduled PTC on 2/10. Pt known to writer from recent hospitalization.  Pt previjoulsy discharged home with [facility] providing  Home RN, PT services.    Met with pt and spouse.  Per pt she remains open to home care for home PT services but RN services have ended.  Pt notes she and her  have been managing drain cares.  Pt hopeful that she can discharge today and return for PTC on Friday 2/10.   Plan to resume home PT.    Resumption of home PT services placed.    Home Care:  [facility]   [phone],  Fax [phone].     Shabnam Ludwig RN BSN, PHN, ACM-RN  7A RN Care Coordinator  Phone: [phone]  Pager [phone]    To contact the weekend RNCC  [facility] (0800 - 1630) Saturday and Sunday    Units: 4A, 4C, 4E, 5A and 5B- Pager 1: [phone]    Units: 6A, 6B, 6C, 6D- Pager 2: [phone]    Units: 7A, 7B, 7C, 7D, and 5C-Pager 3: [phone]    [facility] ([phone]) Saturday and Sunday    Units: 5 Ortho, 8A, 10 ICU, & Pediatric Units-Pager 4: [phone]    2/7/2023 11:21 AM

## 2023-02-07 NOTE — UTILIZATION REVIEW
"  Admission Status; Secondary Review Determination         Under the authority of the Utilization Management Committee, the utilization review process indicated a secondary review on the above patient.  The review outcome is based on review of the medical records, discussions with staff, and applying clinical experience noted on the date of the review.        (xxx)      Inpatient Status Appropriate - This patient's medical care is consistent with medical management for inpatient care and reasonable inpatient medical practice.      () Observation Status Appropriate - This patient does not meet hospital inpatient criteria and is placed in observation status. If this patient's primary payer is Medicare and was admitted as an inpatient, Condition Code 44 should be used and patient status changed to \"observation\".   () Admission Status NOT Appropriate - This patient's medical care is not consistent with medical management for Inpatient or Observation Status.          RATIONALE FOR DETERMINATION     Zeinab Bermudez is a 67 year old female with a history of laparoscopic cholecystectomy on 12/11/22 complicated by bile duct injury s/p Ex lap, washout, and drain placement 12/28/22. Recent admission to [facility] 1/1-1/11/23 with peritonitis and sepsis d/t bile leak s/p drain placement x2 and unsuccessful PTC placement. She presented 2/7/23 with a non-functioning bile drain.  Imaging showed a new abdominal fluid collection and labs revealed LFTs acutely elevated.  She was admitted for close clinical monitoring, IVF, IV antibiotics, and IR consultation for drain placement.  IP status appropriate.  I have paged the care team to discuss.    The severity of illness, intensity of service provided, expected LOS and risk for adverse outcome make the care complex, high risk and appropriate for hospital admission.        The information on this document is developed by the utilization review team in order for the business office to ensure " compliance.  This only denotes the appropriateness of proper admission status and does not reflect the quality of care rendered.         The definitions of Inpatient Status and Observation Status used in making the determination above are those provided in the CMS Coverage Manual, Chapter 1 and Chapter 6, section 70.4.      Sincerely,     Angeline Holman MD  Physician Advisor   Utilization Review/ Case Management  [facility].

## 2023-02-07 NOTE — PROGRESS NOTES
"Goal outcome evaluation:  Time: 2130 - 0700  Plan of care reviewed with: Patient  Overall patient progress: No change  VS /55 (BP Location: Right arm)   Pulse 77   Temp 97.6  F (36.4  C) (Oral)   Resp 23   Ht 1.575 m (5' 2\")   Wt 73.4 kg (161 lb 12.8 oz)   SpO2 99%   BMI 29.59 kg/m      Activity: up ad gladis. Independently ambulates to the bathroom and back to bed.  Neuros: A&O x4. Calls appropriately. Able to make needs known. Clear and appropriate speech. Follows instructions.   Cardiac: WDL. Denies chest pain or SOB.   Respiratory: WDL on RA  GI/: Voiding spontaneously. No BM this shift  Diet: NPO  Skin/Incisions: Old abdominal incision with scab  Lines/Drains: LPIV infusing LR @ 100 mL/hr  Labs:  ,   Pain/Nausea: Denies pain/nausea  Plan: Per IR, no new drain will be placed  "

## 2023-02-07 NOTE — PLAN OF CARE
DISCHARGE:  Patient with orders to discharge to home.     Discharge instructions, medications & follow ups reviewed with patient. Copy of discharge summary given to patient. PIV removed. Belongings sent with patient.     Patient in stable condition. AVSS. Patient had no further questions regarding discharge instructions and medications. Patient transferred out by w/c & left with spouse.  Plan for follow-up with IR.

## 2023-02-08 ENCOUNTER — PATIENT OUTREACH (OUTPATIENT)
Dept: CARE COORDINATION | Facility: CLINIC | Age: 68
End: 2023-02-08
Payer: COMMERCIAL

## 2023-02-08 RX ORDER — DIPHENHYDRAMINE HCL 25 MG
25 TABLET ORAL EVERY 6 HOURS PRN
COMMUNITY

## 2023-02-08 RX ORDER — SENNOSIDES 8.6 MG
8.6 CAPSULE ORAL DAILY PRN
COMMUNITY

## 2023-02-08 NOTE — PROGRESS NOTES
[facility]    Background: Transitional Care Management program auto-identified and prompting a chart review by [facility] team.    Assessment: Upon chart review, [facility] Team member will Enroll this episode of Transitional Care Management program due to reason below:    Upon chart review, patient is followed by [facility] team who follow their patients closely. [facility] will not conduct outreach to avoid duplication of outreach to patient.     Plan: Transitional Care Management episode enrolled per reason above.

## 2023-02-10 ENCOUNTER — APPOINTMENT (OUTPATIENT)
Dept: INTERVENTIONAL RADIOLOGY/VASCULAR | Facility: CLINIC | Age: 68
End: 2023-02-10
Attending: NURSE PRACTITIONER
Payer: COMMERCIAL

## 2023-02-10 ENCOUNTER — ANESTHESIA (OUTPATIENT)
Dept: SURGERY | Facility: CLINIC | Age: 68
End: 2023-02-10
Payer: COMMERCIAL

## 2023-02-10 ENCOUNTER — HOSPITAL ENCOUNTER (OUTPATIENT)
Facility: CLINIC | Age: 68
Discharge: HOME | End: 2023-02-10
Attending: RADIOLOGY | Admitting: RADIOLOGY
Payer: COMMERCIAL

## 2023-02-10 ENCOUNTER — ANESTHESIA EVENT (OUTPATIENT)
Dept: SURGERY | Facility: CLINIC | Age: 68
End: 2023-02-10
Payer: COMMERCIAL

## 2023-02-10 VITALS
RESPIRATION RATE: 16 BRPM | SYSTOLIC BLOOD PRESSURE: 152 MMHG | TEMPERATURE: 97.7 F | DIASTOLIC BLOOD PRESSURE: 96 MMHG | HEART RATE: 80 BPM | BODY MASS INDEX: 29.94 KG/M2 | WEIGHT: 162.7 LBS | OXYGEN SATURATION: 99 % | HEIGHT: 62 IN

## 2023-02-10 DIAGNOSIS — S36.13XD INJURY OF BILE DUCT, SUBSEQUENT ENCOUNTER: ICD-10-CM

## 2023-02-10 LAB
HOLD SPECIMEN: NORMAL
HOLD SPECIMEN: NORMAL
INR PPP: 1.25 (ref 0.85–1.15)

## 2023-02-10 PROCEDURE — 250N000011 HC RX IP 250 OP 636: Performed by: NURSE ANESTHETIST, CERTIFIED REGISTERED

## 2023-02-10 PROCEDURE — 250N000025 HC SEVOFLURANE, PER MIN

## 2023-02-10 PROCEDURE — 710N000010 HC RECOVERY PHASE 1, LEVEL 2, PER MIN

## 2023-02-10 PROCEDURE — 47533 PLMT BILIARY DRAINAGE CATH: CPT | Mod: 53 | Performed by: RADIOLOGY

## 2023-02-10 PROCEDURE — 250N000011 HC RX IP 250 OP 636: Performed by: ANESTHESIOLOGY

## 2023-02-10 PROCEDURE — 370N000017 HC ANESTHESIA TECHNICAL FEE, PER MIN

## 2023-02-10 PROCEDURE — 255N000002 HC RX 255 OP 636: Performed by: RADIOLOGY

## 2023-02-10 PROCEDURE — C1769 GUIDE WIRE: HCPCS

## 2023-02-10 PROCEDURE — 272N000508 HC NEEDLE CR8

## 2023-02-10 PROCEDURE — 85610 PROTHROMBIN TIME: CPT | Performed by: NURSE PRACTITIONER

## 2023-02-10 PROCEDURE — 250N000009 HC RX 250: Performed by: ANESTHESIOLOGY

## 2023-02-10 PROCEDURE — 710N000012 HC RECOVERY PHASE 2, PER MINUTE

## 2023-02-10 PROCEDURE — 250N000013 HC RX MED GY IP 250 OP 250 PS 637: Performed by: STUDENT IN AN ORGANIZED HEALTH CARE EDUCATION/TRAINING PROGRAM

## 2023-02-10 PROCEDURE — 250N000009 HC RX 250: Performed by: NURSE ANESTHETIST, CERTIFIED REGISTERED

## 2023-02-10 PROCEDURE — 999N000141 HC STATISTIC PRE-PROCEDURE NURSING ASSESSMENT

## 2023-02-10 PROCEDURE — 250N000013 HC RX MED GY IP 250 OP 250 PS 637: Performed by: ANESTHESIOLOGY

## 2023-02-10 PROCEDURE — 47532 INJECTION FOR CHOLANGIOGRAM: CPT

## 2023-02-10 PROCEDURE — 250N000011 HC RX IP 250 OP 636: Performed by: NURSE PRACTITIONER

## 2023-02-10 PROCEDURE — 258N000003 HC RX IP 258 OP 636: Performed by: ANESTHESIOLOGY

## 2023-02-10 PROCEDURE — 36415 COLL VENOUS BLD VENIPUNCTURE: CPT | Performed by: NURSE PRACTITIONER

## 2023-02-10 PROCEDURE — 272N000504 HC NEEDLE CR4

## 2023-02-10 RX ORDER — SODIUM CHLORIDE, SODIUM LACTATE, POTASSIUM CHLORIDE, CALCIUM CHLORIDE 600; 310; 30; 20 MG/100ML; MG/100ML; MG/100ML; MG/100ML
INJECTION, SOLUTION INTRAVENOUS CONTINUOUS PRN
Status: DISCONTINUED | OUTPATIENT
Start: 2023-02-10 | End: 2023-02-10

## 2023-02-10 RX ORDER — AMPICILLIN AND SULBACTAM 2; 1 G/1; G/1
3 INJECTION, POWDER, FOR SOLUTION INTRAMUSCULAR; INTRAVENOUS
Status: COMPLETED | OUTPATIENT
Start: 2023-02-10 | End: 2023-02-10

## 2023-02-10 RX ORDER — PROPOFOL 10 MG/ML
INJECTION, EMULSION INTRAVENOUS PRN
Status: DISCONTINUED | OUTPATIENT
Start: 2023-02-10 | End: 2023-02-10

## 2023-02-10 RX ORDER — FENTANYL CITRATE 50 UG/ML
50 INJECTION, SOLUTION INTRAMUSCULAR; INTRAVENOUS EVERY 5 MIN PRN
Status: DISCONTINUED | OUTPATIENT
Start: 2023-02-10 | End: 2023-02-10 | Stop reason: HOSPADM

## 2023-02-10 RX ORDER — ONDANSETRON 2 MG/ML
4 INJECTION INTRAMUSCULAR; INTRAVENOUS EVERY 30 MIN PRN
Status: DISCONTINUED | OUTPATIENT
Start: 2023-02-10 | End: 2023-02-10 | Stop reason: HOSPADM

## 2023-02-10 RX ORDER — ACETAMINOPHEN 325 MG/1
975 TABLET ORAL ONCE
Status: COMPLETED | OUTPATIENT
Start: 2023-02-10 | End: 2023-02-10

## 2023-02-10 RX ORDER — ONDANSETRON 2 MG/ML
INJECTION INTRAMUSCULAR; INTRAVENOUS PRN
Status: DISCONTINUED | OUTPATIENT
Start: 2023-02-10 | End: 2023-02-10

## 2023-02-10 RX ORDER — HYDROMORPHONE HYDROCHLORIDE 1 MG/ML
0.2 INJECTION, SOLUTION INTRAMUSCULAR; INTRAVENOUS; SUBCUTANEOUS EVERY 5 MIN PRN
Status: DISCONTINUED | OUTPATIENT
Start: 2023-02-10 | End: 2023-02-10 | Stop reason: HOSPADM

## 2023-02-10 RX ORDER — PHENYLEPHRINE HCL IN 0.9% NACL 50MG/250ML
.5-1.25 PLASTIC BAG, INJECTION (ML) INTRAVENOUS CONTINUOUS
Status: DISCONTINUED | OUTPATIENT
Start: 2023-02-10 | End: 2023-02-10 | Stop reason: HOSPADM

## 2023-02-10 RX ORDER — SODIUM CHLORIDE, SODIUM LACTATE, POTASSIUM CHLORIDE, CALCIUM CHLORIDE 600; 310; 30; 20 MG/100ML; MG/100ML; MG/100ML; MG/100ML
INJECTION, SOLUTION INTRAVENOUS CONTINUOUS
Status: DISCONTINUED | OUTPATIENT
Start: 2023-02-10 | End: 2023-02-10 | Stop reason: HOSPADM

## 2023-02-10 RX ORDER — DEXAMETHASONE SODIUM PHOSPHATE 4 MG/ML
INJECTION, SOLUTION INTRA-ARTICULAR; INTRALESIONAL; INTRAMUSCULAR; INTRAVENOUS; SOFT TISSUE PRN
Status: DISCONTINUED | OUTPATIENT
Start: 2023-02-10 | End: 2023-02-10

## 2023-02-10 RX ORDER — OXYCODONE HYDROCHLORIDE 10 MG/1
10 TABLET ORAL EVERY 4 HOURS PRN
Status: DISCONTINUED | OUTPATIENT
Start: 2023-02-10 | End: 2023-02-10 | Stop reason: HOSPADM

## 2023-02-10 RX ORDER — FENTANYL CITRATE 50 UG/ML
INJECTION, SOLUTION INTRAMUSCULAR; INTRAVENOUS PRN
Status: DISCONTINUED | OUTPATIENT
Start: 2023-02-10 | End: 2023-02-10

## 2023-02-10 RX ORDER — LIDOCAINE HYDROCHLORIDE 20 MG/ML
INJECTION, SOLUTION INFILTRATION; PERINEURAL PRN
Status: DISCONTINUED | OUTPATIENT
Start: 2023-02-10 | End: 2023-02-10

## 2023-02-10 RX ORDER — FENTANYL CITRATE 50 UG/ML
25 INJECTION, SOLUTION INTRAMUSCULAR; INTRAVENOUS EVERY 5 MIN PRN
Status: DISCONTINUED | OUTPATIENT
Start: 2023-02-10 | End: 2023-02-10 | Stop reason: HOSPADM

## 2023-02-10 RX ORDER — ONDANSETRON 4 MG/1
4 TABLET, ORALLY DISINTEGRATING ORAL EVERY 30 MIN PRN
Status: DISCONTINUED | OUTPATIENT
Start: 2023-02-10 | End: 2023-02-10 | Stop reason: HOSPADM

## 2023-02-10 RX ORDER — HYDROMORPHONE HYDROCHLORIDE 1 MG/ML
0.4 INJECTION, SOLUTION INTRAMUSCULAR; INTRAVENOUS; SUBCUTANEOUS EVERY 5 MIN PRN
Status: DISCONTINUED | OUTPATIENT
Start: 2023-02-10 | End: 2023-02-10 | Stop reason: HOSPADM

## 2023-02-10 RX ORDER — OXYCODONE HYDROCHLORIDE 5 MG/1
5 TABLET ORAL EVERY 4 HOURS PRN
Status: DISCONTINUED | OUTPATIENT
Start: 2023-02-10 | End: 2023-02-10 | Stop reason: HOSPADM

## 2023-02-10 RX ORDER — IODIXANOL 320 MG/ML
100 INJECTION, SOLUTION INTRAVASCULAR ONCE
Status: COMPLETED | OUTPATIENT
Start: 2023-02-10 | End: 2023-02-10

## 2023-02-10 RX ORDER — VASOPRESSIN IN 0.9 % NACL 2 UNIT/2ML
SYRINGE (ML) INTRAVENOUS PRN
Status: DISCONTINUED | OUTPATIENT
Start: 2023-02-10 | End: 2023-02-10

## 2023-02-10 RX ADMIN — MIDAZOLAM 2 MG: 1 INJECTION INTRAMUSCULAR; INTRAVENOUS at 08:15

## 2023-02-10 RX ADMIN — Medication 20 MG: at 09:02

## 2023-02-10 RX ADMIN — NOREPINEPHRINE BITARTRATE 6.4 MCG: 1 INJECTION, SOLUTION, CONCENTRATE INTRAVENOUS at 08:49

## 2023-02-10 RX ADMIN — Medication 0.5 UNITS: at 09:20

## 2023-02-10 RX ADMIN — PROPOFOL 120 MG: 10 INJECTION, EMULSION INTRAVENOUS at 08:24

## 2023-02-10 RX ADMIN — PHENYLEPHRINE HYDROCHLORIDE 100 MCG: 10 INJECTION INTRAVENOUS at 08:28

## 2023-02-10 RX ADMIN — PHENYLEPHRINE HYDROCHLORIDE 100 MCG: 10 INJECTION INTRAVENOUS at 08:34

## 2023-02-10 RX ADMIN — NOREPINEPHRINE BITARTRATE 12.8 MCG: 1 INJECTION, SOLUTION, CONCENTRATE INTRAVENOUS at 08:53

## 2023-02-10 RX ADMIN — Medication 50 MG: at 08:25

## 2023-02-10 RX ADMIN — FENTANYL CITRATE 100 MCG: 50 INJECTION, SOLUTION INTRAMUSCULAR; INTRAVENOUS at 08:24

## 2023-02-10 RX ADMIN — SODIUM CHLORIDE, POTASSIUM CHLORIDE, SODIUM LACTATE AND CALCIUM CHLORIDE: 600; 310; 30; 20 INJECTION, SOLUTION INTRAVENOUS at 09:15

## 2023-02-10 RX ADMIN — NOREPINEPHRINE BITARTRATE 12.8 MCG: 1 INJECTION, SOLUTION, CONCENTRATE INTRAVENOUS at 09:05

## 2023-02-10 RX ADMIN — PHENYLEPHRINE HYDROCHLORIDE 0.5 MCG/KG/MIN: 10 INJECTION INTRAVENOUS at 09:14

## 2023-02-10 RX ADMIN — SUGAMMADEX 200 MG: 100 INJECTION, SOLUTION INTRAVENOUS at 11:04

## 2023-02-10 RX ADMIN — PHENYLEPHRINE HYDROCHLORIDE 200 MCG: 10 INJECTION INTRAVENOUS at 08:43

## 2023-02-10 RX ADMIN — LIDOCAINE HYDROCHLORIDE 100 MG: 20 INJECTION, SOLUTION INFILTRATION; PERINEURAL at 08:24

## 2023-02-10 RX ADMIN — PHENYLEPHRINE HYDROCHLORIDE 200 MCG: 10 INJECTION INTRAVENOUS at 08:41

## 2023-02-10 RX ADMIN — AMPICILLIN SODIUM AND SULBACTAM SODIUM 3 G: 2; 1 INJECTION, POWDER, FOR SOLUTION INTRAMUSCULAR; INTRAVENOUS at 08:30

## 2023-02-10 RX ADMIN — IODIXANOL 30 ML: 320 INJECTION, SOLUTION INTRAVASCULAR at 11:14

## 2023-02-10 RX ADMIN — NOREPINEPHRINE BITARTRATE 12.8 MCG: 1 INJECTION, SOLUTION, CONCENTRATE INTRAVENOUS at 09:00

## 2023-02-10 RX ADMIN — OXYCODONE HYDROCHLORIDE 5 MG: 5 TABLET ORAL at 15:43

## 2023-02-10 RX ADMIN — Medication 1 UNITS: at 09:10

## 2023-02-10 RX ADMIN — ONDANSETRON 4 MG: 2 INJECTION INTRAMUSCULAR; INTRAVENOUS at 10:35

## 2023-02-10 RX ADMIN — ACETAMINOPHEN 650 MG: 325 TABLET ORAL at 12:29

## 2023-02-10 RX ADMIN — ONDANSETRON HYDROCHLORIDE 4 MG: 2 INJECTION, SOLUTION INTRAMUSCULAR; INTRAVENOUS at 15:40

## 2023-02-10 RX ADMIN — SODIUM CHLORIDE, POTASSIUM CHLORIDE, SODIUM LACTATE AND CALCIUM CHLORIDE: 600; 310; 30; 20 INJECTION, SOLUTION INTRAVENOUS at 08:11

## 2023-02-10 RX ADMIN — DEXAMETHASONE SODIUM PHOSPHATE 4 MG: 4 INJECTION, SOLUTION INTRA-ARTICULAR; INTRALESIONAL; INTRAMUSCULAR; INTRAVENOUS; SOFT TISSUE at 08:30

## 2023-02-10 RX ADMIN — Medication 20 MG: at 10:03

## 2023-02-10 RX ADMIN — NOREPINEPHRINE BITARTRATE 6.4 MCG: 1 INJECTION, SOLUTION, CONCENTRATE INTRAVENOUS at 08:46

## 2023-02-10 ASSESSMENT — ACTIVITIES OF DAILY LIVING (ADL)
ADLS_ACUITY_SCORE: 35

## 2023-02-10 NOTE — ANESTHESIA PROCEDURE NOTES
Airway       Patient location during procedure: OR       Procedure Start/Stop Times: 2/10/2023 8:28 AM  Staff -        Other Anesthesia Staff: Mahamed Hernandez       Performed By: SRNA  Consent for Airway        Urgency: elective  Indications and Patient Condition       Indications for airway management: ubaldo-procedural       Induction type:intravenous       Mask difficulty assessment: 1 - vent by mask    Final Airway Details       Final airway type: endotracheal airway       Successful airway: ETT - single  Endotracheal Airway Details        ETT size (mm): 7.0       Cuffed: yes       Cuff volume (mL): 8       Successful intubation technique: direct laryngoscopy       DL Blade Type: Rodriguez 2       Grade View of Cords: 1       Adjucts: stylet       Position: Right       Measured from: lips       Secured at (cm): 22       Bite block used: None    Post intubation assessment        Placement verified by: capnometry, equal breath sounds and chest rise        Number of attempts at approach: 1       Number of other approaches attempted: 0       Secured with: pink tape       Ease of procedure: easy       Dentition: Intact and Unchanged    Medication(s) Administered   Medication Administration Time: 2/10/2023 8:28 AM

## 2023-02-10 NOTE — OR NURSING
Paged Dr Carr to give update on pt status. Stated OK to discharge pt to home when pt feels up to leaving

## 2023-02-10 NOTE — OR NURSING
Pt no longer on bedrest but informed she needs to stay additional 2 hrs to watch for fever or any other symptoms. Pt's current temp is 98.4 oral route. Pt wants to sleep for now.  at bedside. Call light within reach. Will continue to monitor

## 2023-02-10 NOTE — ANESTHESIA PREPROCEDURE EVALUATION
Anesthesia Pre-Procedure Evaluation    Patient: Zeinab Bermudez   MRN: [id number] : 1955        Procedure : Procedure(s):  ANESTHESIA OUT OF OR Percutaneous Biliary Drain Placement (first attempt 2023 FAILED) @0800          Past Medical History:   Diagnosis Date     Covid 2019 Positive 2022     Depressive disorder      HLD (hyperlipidemia)      Hypertension      SAH (subarachnoid hemorrhage) (H)       Past Surgical History:   Procedure Laterality Date     ENDOSCOPIC RETROGRADE CHOLANGIOPANCREATOGRAM N/A 1/3/2023    Procedure: ENDOSCOPIC RETROGRADE CHOLANGIOPANCREATOGRAPHY;  Surgeon: Michael Mnan MD;  Location: [facility]     HYSTERECTOMY       IR BILIARY DRAIN PLACEMENT  2023     LAPAROSCOPIC CHOLECYSTECTOMY  2022     LAPAROTOMY EXPLORATORY  2022      No Known Allergies   Social History     Tobacco Use     Smoking status: Never     Smokeless tobacco: Never   Substance Use Topics     Alcohol use: Not Currently      Wt Readings from Last 1 Encounters:   02/10/23 73.8 kg (162 lb 11.2 oz)        Anesthesia Evaluation   Pt has had prior anesthetic. Type: General.        ROS/MED HX  ENT/Pulmonary:       Neurologic: Comment: H/o of bleed with no procedural intervention    (+) CVA,     Cardiovascular:     (+) hypertension (no longer taking antihypertensive meds since initial biliary sepsis)-----    METS/Exercise Tolerance:     Hematologic:  - neg hematologic  ROS     Musculoskeletal:  - neg musculoskeletal ROS     GI/Hepatic: Comment: Questionable liver disease s/p biliary sepsis, current bile leak      Renal/Genitourinary:  - neg Renal ROS     Endo:  - neg endo ROS     Psychiatric/Substance Use:       Infectious Disease: Comment: On ABx for biliary leak      Malignancy:  - neg malignancy ROS     Other:            Physical Exam    Airway        Mallampati: II    Neck ROM: full     Respiratory Devices and Support         Dental       (+) Minor Abnormalities - some fillings, tiny  chips      Cardiovascular          Rhythm and rate: regular and normal     Pulmonary                   OUTSIDE LABS:  CBC:   Lab Results   Component Value Date    WBC 6.3 02/07/2023    WBC 4.0 02/06/2023    HGB 11.4 (L) 02/07/2023    HGB 11.9 02/06/2023    HCT 35.0 02/07/2023    HCT 36.8 02/06/2023     02/07/2023     02/06/2023     BMP:   Lab Results   Component Value Date     02/07/2023     02/06/2023    POTASSIUM 4.0 02/07/2023    POTASSIUM 4.2 02/06/2023    CHLORIDE 106 02/07/2023    CHLORIDE 106 02/06/2023    CO2 21 (L) 02/07/2023    CO2 18 (L) 02/06/2023    BUN 13.1 02/07/2023    BUN 10.8 02/06/2023    CR 1.10 (H) 02/07/2023    CR 0.94 02/06/2023    GLC 95 02/07/2023     (H) 02/06/2023     COAGS:   Lab Results   Component Value Date    PTT 31 01/01/2023    INR 1.25 (H) 02/10/2023     POC: No results found for: BGM, HCG, HCGS  HEPATIC:   Lab Results   Component Value Date    ALBUMIN 3.0 (L) 02/07/2023    PROTTOTAL 5.6 (L) 02/07/2023     (H) 02/07/2023    AST 81 (H) 02/07/2023    ALKPHOS 266 (H) 02/07/2023    BILITOTAL 0.9 02/07/2023     OTHER:   Lab Results   Component Value Date    LACT 1.5 02/06/2023    ANSRIN 8.9 02/07/2023    PHOS 4.2 02/07/2023    MAG 2.1 02/07/2023    LIPASE 17 02/06/2023       Anesthesia Plan    ASA Status:  3   NPO Status:  NPO Appropriate    Anesthesia Type: General.     - Airway: ETT              Consents    Anesthesia Plan(s) and associated risks, benefits, and realistic alternatives discussed. Questions answered and patient/representative(s) expressed understanding.     - Discussed: Risks, Benefits and Alternatives for BOTH SEDATION and the PROCEDURE were discussed     - Discussed with:  Patient, Spouse      - Extended Intubation/Ventilatory Support Discussed: Yes.    Use of blood products discussed: Yes.     - Discussed with: Patient.     - Consented: consented to blood products            Reason for refusal: other.     Postoperative Care    Pain  management: Multi-modal analgesia.   PONV prophylaxis: Ondansetron (or other 5HT-3)     Comments:                Rangel Kowalski MD

## 2023-02-10 NOTE — OR NURSING
Pt became nauseated as she got up to get dressed, then c/o of 5/10 abdominal pain on the right posterior abdomen. Called Dr Delcid. LR 500ml IV bolus ordered, along with an additional dose of Zofran 4mg IV. Pt to take an Oxycodone 5mg PO for pain

## 2023-02-10 NOTE — OR NURSING
Interventional Radiology team at bedside. Bedrest for 2 hours. Monitor for 2 additional hours. Monitor for fever, increased pain, and/or other issues.

## 2023-02-10 NOTE — DISCHARGE INSTRUCTIONS
[facility]   Adult Discharge Orders & Instructions     For 24 hours after surgery    Get plenty of rest.  A responsible adult must stay with you for at least 24 hours after you leave the hospital.   Do not drive or use heavy equipment.  If you have weakness or tingling, don't drive or use heavy equipment until this feeling goes away.  Do not drink alcohol.  Avoid strenuous or risky activities.  Ask for help when climbing stairs.   You may feel lightheaded.  IF so, sit for a few minutes before standing.  Have someone help you get up.   If you have nausea (feel sick to your stomach): Drink only clear liquids such as apple juice, ginger ale, broth or 7-Up.  Rest may also help.  Be sure to drink enough fluids.  Move to a regular diet as you feel able.  You may have a slight fever. Call the doctor if your fever is over 100 F (37.7 C) (taken under the tongue) or lasts longer than 24 hours.  You may have a dry mouth, a sore throat, muscle aches or trouble sleeping.  These should go away after 24 hours.  Do not make important or legal decisions.   Call your doctor for any of the followin.  Signs of infection (fever, growing tenderness at the surgery site, a large amount of drainage or bleeding, severe pain, foul-smelling drainage, redness, swelling).    2. It has been over 8 to 10 hours since surgery and you are still not able to urinate (pass water).    3.  Headache for over 24 hours.    4.  Numbness, tingling or weakness the day after surgery (if you had spinal anesthesia).

## 2023-02-10 NOTE — PROCEDURES
[facility]    Procedure: IR Post PTC     Date/Time: 2/10/2023 11:11 AM  Performed by: Darryl Carr MD  Authorized by: Darryl Carr MD       UNIVERSAL PROTOCOL   Site Marked: NA  Prior Images Obtained and Reviewed:  Yes  Required items: Required blood products, implants, devices and special equipment available    Patient identity confirmed:  Verbally with patient, arm band, provided demographic data and hospital-assigned identification number  Patient was reevaluated immediately before administering moderate or deep sedation or anesthesia  Confirmation Checklist:  Patient's identity using two indicators, relevant allergies, procedure was appropriate and matched the consent or emergent situation and correct equipment/implants were available  Time out: Immediately prior to the procedure a time out was called    Universal Protocol: the Joint Commission Universal Protocol was followed    Preparation: Patient was prepped and draped in usual sterile fashion       ANESTHESIA    Anesthesia: Local infiltration  Local Anesthetic:  Lidocaine 1% without epinephrine  Anesthetic Total (mL):  0      SEDATION  Patient Sedated: Yes    Sedation Type:  Deep  Vital signs: Vital signs monitored during sedation    See dictated procedure note for full details.  Findings: Percutaneous cholangiogram performed which showed dilated intra-hepatic biliary ducts however, multiple attempts were made to get adequate wire purchase but were unsuccessful.     Specimens: none    Complications: None    Condition: Stable      PROCEDURE    Patient Tolerance:  Patient tolerated the procedure well with no immediate complications  Length of time physician/provider present for 1:1 monitoring during sedation: 0

## 2023-02-10 NOTE — ANESTHESIA POSTPROCEDURE EVALUATION
Patient: Zeinab Bermudez    Procedure: Procedure(s):  ANESTHESIA OUT OF OR Percutaneous Biliary Drain Placement (first attempt 1/6/2023 FAILED) @0800       Anesthesia Type:  General    Note:  Disposition: Outpatient   Postop Pain Control: Uneventful            Sign Out: Well controlled pain   PONV: Yes            Symptoms: Nausea only            Sign Out: PONV/POV resolved with treatment (Extra dose of Zofran, 500cc LR bolus)   Neuro/Psych: Uneventful            Sign Out: Acceptable/Baseline neuro status   Airway/Respiratory: Uneventful            Sign Out: Acceptable/Baseline resp. status   CV/Hemodynamics: Uneventful            Sign Out: Acceptable CV status; No obvious hypovolemia; No obvious fluid overload   Other NRE: NONE   DID A NON-ROUTINE EVENT OCCUR? No           Last vitals:  Vitals Value Taken Time   /85 02/10/23 1200   Temp 36.6  C (97.8  F) 02/10/23 1122   Pulse 86 02/10/23 1212   Resp 16 02/10/23 1205   SpO2 95 % 02/10/23 1212   Vitals shown include unvalidated device data.    Electronically Signed By: Tee Schneider MD  February 10, 2023  12:14 PM   no

## 2023-02-10 NOTE — ANESTHESIA CARE TRANSFER NOTE
Patient: Zeinab Bermudez    Procedure: Procedure(s):  ANESTHESIA OUT OF OR Percutaneous Biliary Drain Placement (first attempt 1/6/2023 FAILED) @0800       Diagnosis: Injury of bile duct, subsequent encounter [S36.13XD]  Diagnosis Additional Information: No value filed.    Anesthesia Type:   General     Note:    Oropharynx: oropharynx clear of all foreign objects  Level of Consciousness: awake  Oxygen Supplementation: face mask  Level of Supplemental Oxygen (L/min / FiO2): 8  Independent Airway: airway patency satisfactory and stable  Dentition: dentition unchanged  Vital Signs Stable: post-procedure vital signs reviewed and stable  Report to RN Given: handoff report given  Patient transferred to: PACU    Handoff Report: Identifed the Patient, Identified the Reponsible Provider, Reviewed the pertinent medical history, Discussed the surgical course, Reviewed Intra-OP anesthesia mangement and issues during anesthesia, Set expectations for post-procedure period and Allowed opportunity for questions and acknowledgement of understanding      Vitals:  Vitals Value Taken Time   BP     Temp     Pulse     Resp     SpO2 100 % 02/10/23 1122   Vitals shown include unvalidated device data.    Electronically Signed By: TOMY Merchant CRNA  February 10, 2023  11:22 AM

## 2023-02-11 LAB
BACTERIA FLD CULT: ABNORMAL
GRAM STAIN RESULT: ABNORMAL
GRAM STAIN RESULT: ABNORMAL

## 2023-02-12 ENCOUNTER — TELEPHONE (OUTPATIENT)
Dept: RADIOLOGY | Facility: CLINIC | Age: 68
End: 2023-02-12
Payer: COMMERCIAL

## 2023-02-12 NOTE — TELEPHONE ENCOUNTER
Paged 2/12/2023 @ 12:34 PM regarding decreased biliary drain output and increased RUQ pain after attempted additional biliary drain placement on 2/10/2023.    Talked to  Karan @ 12:46 PM, Ms. Bermudez had episode of 3/10 pain in the RUQ, decreasing to 1/10 with tylenol. Drain which usually drains around 100-120 ml was draining less than 50 ml, although Karan informed me he is still able to flush the drain with 10 mL of saline as oriented. Patient had no fever and no additional symptoms.    Decreased drainage of biloma could be related to NPO status and decreased intake in the last day. As there are no signs of acute complications, I suggested continued home monitoring of symptoms and drain output. If patient has worsening pain or fever as well as additional symptoms, asked Karan to reach out promptly. If patient appears sicker, low threshold to present to an ED for evaluation. Please contact the IR department if that is the case. Otherwise we will follow up with patient during the week to evaluate symptoms and drain output.    Paolo Gaspar MD.  Diagnostic Radiology PGY-3.

## 2023-02-16 ENCOUNTER — OFFICE VISIT (OUTPATIENT)
Dept: TRANSPLANT | Facility: CLINIC | Age: 68
End: 2023-02-16
Attending: TRANSPLANT SURGERY
Payer: COMMERCIAL

## 2023-02-16 VITALS
DIASTOLIC BLOOD PRESSURE: 82 MMHG | BODY MASS INDEX: 29.23 KG/M2 | WEIGHT: 159.8 LBS | OXYGEN SATURATION: 99 % | SYSTOLIC BLOOD PRESSURE: 127 MMHG | HEART RATE: 91 BPM

## 2023-02-16 DIAGNOSIS — K91.89 BILE LEAK, POSTOPERATIVE: ICD-10-CM

## 2023-02-16 DIAGNOSIS — K83.8 BILE LEAK, POSTOPERATIVE: ICD-10-CM

## 2023-02-16 DIAGNOSIS — S36.13XD INJURY OF BILE DUCT, SUBSEQUENT ENCOUNTER: Primary | ICD-10-CM

## 2023-02-16 PROCEDURE — 99214 OFFICE O/P EST MOD 30 MIN: CPT | Performed by: TRANSPLANT SURGERY

## 2023-02-16 PROCEDURE — G0463 HOSPITAL OUTPT CLINIC VISIT: HCPCS | Performed by: TRANSPLANT SURGERY

## 2023-02-16 NOTE — PROGRESS NOTES
Visit after attempted PTC/intrahepatic biliary drainge.  Feels well, no major complications.  Surgically  Placed drain draining well.  No jaundice.  No fever.  About 300 ml/d of bile.    /82   Pulse 91   Wt 72.5 kg (159 lb 12.8 oz)   SpO2 99%   BMI 29.23 kg/m    Abd soft, non-tender.    I/P  At this juncture, she is 1.5 months after drainage of transected BD.  The residual fluid collection is 5x2x1.  While it would be beneficial for complete diversion of bile away from hilum, it appears that is not feasible at this juncture.  We discussed at length the healing process and the inflammatory resolution of the inflammation in the hilum.    To maximize her inflammation resolution and minimize risk of healing with subsequent surgical repair, I suggested that we postpone surgical repair (hepaticojejunostomy through creation a amy-en-Y) for another month: total about three months.  The anticipated operation was explained, hospitalization and potential risks, including failure to heal and continued leakage, infection, bleeding etc was discussed.  I will continue to see her on a regular basis until repair.  Will ask my assistant to set up for early May.  RTC 2-3 weeks.  Pre CBC/comp metabolic panel.  Abd CT scan mid-late March.    I spent 25 min discussing plan and process with Ms Bermudez and her  and 10 min documentation.

## 2023-02-16 NOTE — LETTER
2/16/2023         RE: Zeinab Bermudez  [address] [address] [facility] [address]        Dear Colleague,    Thank you for referring your patient, Zeinab Bermudez, to the [facility]. Please see a copy of my visit note below.    Visit after attempted PTC/intrahepatic biliary drainge.  Feels well, no major complications.  Surgically  Placed drain draining well.  No jaundice.  No fever.  About 300 ml/d of bile.    /82   Pulse 91   Wt 72.5 kg (159 lb 12.8 oz)   SpO2 99%   BMI 29.23 kg/m    Abd soft, non-tender.    I/P  At this juncture, she is 1.5 months after drainage of transected BD.  The residual fluid collection is 5x2x1.  While it would be beneficial for complete diversion of bile away from hilum, it appears that is not feasible at this juncture.  We discussed at length the healing process and the inflammatory resolution of the inflammation in the hilum.    To maximize her inflammation resolution and minimize risk of healing with subsequent surgical repair, I suggested that we postpone surgical repair (hepaticojejunostomy through creation a amy-en-Y) for another month: total about three months.  The anticipated operation was explained, hospitalization and potential risks, including failure to heal and continued leakage, infection, bleeding etc was discussed.  I will continue to see her on a regular basis until repair.  Will ask my assistant to set up for early May.  RTC 2-3 weeks.  Pre CBC/comp metabolic panel.  Abd CT scan mid-late March.    I spent 25 min discussing plan and process with Ms Bermudez and her  and 10 min documentation.      Again, thank you for allowing me to participate in the care of your patient.        Sincerely,        Desmond Ruano MD

## 2023-02-23 DIAGNOSIS — S36.13XS INJURY OF BILE DUCT, SEQUELA: Primary | ICD-10-CM

## 2023-03-02 ENCOUNTER — HOSPITAL ENCOUNTER (INPATIENT)
Facility: CLINIC | Age: 68
Setting detail: SURGERY ADMIT
End: 2023-03-02
Attending: TRANSPLANT SURGERY | Admitting: TRANSPLANT SURGERY
Payer: COMMERCIAL

## 2023-03-02 ENCOUNTER — PREP FOR PROCEDURE (OUTPATIENT)
Dept: TRANSPLANT | Facility: CLINIC | Age: 68
End: 2023-03-02
Payer: COMMERCIAL

## 2023-03-02 DIAGNOSIS — S36.13XA BILE DUCT INJURY: Primary | ICD-10-CM

## 2023-03-02 DIAGNOSIS — R60.9 EDEMA, UNSPECIFIED TYPE: ICD-10-CM

## 2023-03-02 DIAGNOSIS — S36.13XS INJURY OF BILE DUCT, SEQUELA: Primary | ICD-10-CM

## 2023-03-02 RX ORDER — POLYHEXAM BIGUAN/GAUZE BANDAGE 0.2%-4"X4"
1 SPONGE TOPICAL 2 TIMES DAILY
Qty: 60 EACH | Refills: 1 | Status: SHIPPED | OUTPATIENT
Start: 2023-03-02

## 2023-03-02 RX ORDER — IBUPROFEN 200 MG
1 CAPSULE ORAL 2 TIMES DAILY
Qty: 60 EACH | Refills: 1 | Status: SHIPPED | OUTPATIENT
Start: 2023-03-02

## 2023-03-09 ENCOUNTER — OFFICE VISIT (OUTPATIENT)
Dept: TRANSPLANT | Facility: CLINIC | Age: 68
End: 2023-03-09
Attending: TRANSPLANT SURGERY
Payer: COMMERCIAL

## 2023-03-09 ENCOUNTER — LAB (OUTPATIENT)
Dept: LAB | Facility: CLINIC | Age: 68
End: 2023-03-09
Payer: COMMERCIAL

## 2023-03-09 VITALS
BODY MASS INDEX: 29.85 KG/M2 | OXYGEN SATURATION: 99 % | DIASTOLIC BLOOD PRESSURE: 80 MMHG | SYSTOLIC BLOOD PRESSURE: 120 MMHG | WEIGHT: 163.2 LBS | HEART RATE: 68 BPM

## 2023-03-09 DIAGNOSIS — S36.13XS INJURY OF BILE DUCT, SEQUELA: Primary | ICD-10-CM

## 2023-03-09 DIAGNOSIS — S36.13XS INJURY OF BILE DUCT, SEQUELA: ICD-10-CM

## 2023-03-09 DIAGNOSIS — R60.9 EDEMA, UNSPECIFIED TYPE: ICD-10-CM

## 2023-03-09 LAB
ALBUMIN SERPL BCG-MCNC: 4 G/DL (ref 3.5–5.2)
ALP SERPL-CCNC: 404 U/L (ref 35–104)
ALT SERPL W P-5'-P-CCNC: 133 U/L (ref 10–35)
ANION GAP SERPL CALCULATED.3IONS-SCNC: 9 MMOL/L (ref 7–15)
AST SERPL W P-5'-P-CCNC: 106 U/L (ref 10–35)
BASOPHILS # BLD AUTO: 0 10E3/UL (ref 0–0.2)
BASOPHILS NFR BLD AUTO: 0 %
BILIRUB SERPL-MCNC: 1 MG/DL
BUN SERPL-MCNC: 11.9 MG/DL (ref 8–23)
CALCIUM SERPL-MCNC: 9.8 MG/DL (ref 8.8–10.2)
CHLORIDE SERPL-SCNC: 105 MMOL/L (ref 98–107)
CREAT SERPL-MCNC: 0.84 MG/DL (ref 0.51–0.95)
DEPRECATED HCO3 PLAS-SCNC: 26 MMOL/L (ref 22–29)
EOSINOPHIL # BLD AUTO: 0.1 10E3/UL (ref 0–0.7)
EOSINOPHIL NFR BLD AUTO: 1 %
ERYTHROCYTE [DISTWIDTH] IN BLOOD BY AUTOMATED COUNT: 13.5 % (ref 10–15)
GFR SERPL CREATININE-BSD FRML MDRD: 77 ML/MIN/1.73M2
GLUCOSE SERPL-MCNC: 107 MG/DL (ref 70–99)
HCT VFR BLD AUTO: 37.7 % (ref 35–47)
HGB BLD-MCNC: 12 G/DL (ref 11.7–15.7)
IMM GRANULOCYTES # BLD: 0 10E3/UL
IMM GRANULOCYTES NFR BLD: 0 %
LYMPHOCYTES # BLD AUTO: 1.4 10E3/UL (ref 0.8–5.3)
LYMPHOCYTES NFR BLD AUTO: 25 %
MCH RBC QN AUTO: 29.4 PG (ref 26.5–33)
MCHC RBC AUTO-ENTMCNC: 31.8 G/DL (ref 31.5–36.5)
MCV RBC AUTO: 92 FL (ref 78–100)
MONOCYTES # BLD AUTO: 0.4 10E3/UL (ref 0–1.3)
MONOCYTES NFR BLD AUTO: 7 %
NEUTROPHILS # BLD AUTO: 3.6 10E3/UL (ref 1.6–8.3)
NEUTROPHILS NFR BLD AUTO: 67 %
NRBC # BLD AUTO: 0 10E3/UL
NRBC BLD AUTO-RTO: 0 /100
PLATELET # BLD AUTO: 190 10E3/UL (ref 150–450)
POTASSIUM SERPL-SCNC: 3.6 MMOL/L (ref 3.4–5.3)
PROT SERPL-MCNC: 6.7 G/DL (ref 6.4–8.3)
RBC # BLD AUTO: 4.08 10E6/UL (ref 3.8–5.2)
SODIUM SERPL-SCNC: 140 MMOL/L (ref 136–145)
WBC # BLD AUTO: 5.4 10E3/UL (ref 4–11)

## 2023-03-09 PROCEDURE — 36415 COLL VENOUS BLD VENIPUNCTURE: CPT | Performed by: PATHOLOGY

## 2023-03-09 PROCEDURE — G0463 HOSPITAL OUTPT CLINIC VISIT: HCPCS | Performed by: TRANSPLANT SURGERY

## 2023-03-09 PROCEDURE — 99214 OFFICE O/P EST MOD 30 MIN: CPT | Performed by: TRANSPLANT SURGERY

## 2023-03-09 PROCEDURE — 85025 COMPLETE CBC W/AUTO DIFF WBC: CPT | Performed by: PATHOLOGY

## 2023-03-09 PROCEDURE — 80053 COMPREHEN METABOLIC PANEL: CPT | Performed by: PATHOLOGY

## 2023-03-09 NOTE — LETTER
3/9/2023         RE: Zeinab Bermudez  [address] [address] [address]        Dear Colleague,    Thank you for referring your patient, Zeinab Bermudez, to the [facility]. Please see a copy of my visit note below.    68 yo woman with transected BD and external drainage.  Feeling much better, no fever, no antibiotics, appetite,bowel function ok  Wound ok.  Planned hepaticojejunostomy/Amy on 4 April.  To have repeat abd CT in 1-2 weeks to assess further resolution of fluid.    Exam /80   Pulse 68   Wt 74 kg (163 lb 3.2 oz)   SpO2 99%   BMI 29.85 kg/m    Abd soft nontender  Bile in MELITA drain.     Latest Reference Range & Units 03/09/23 09:04   WBC 4.0 - 11.0 10e3/uL 5.4   Hemoglobin 11.7 - 15.7 g/dL 12.0   Hematocrit 35.0 - 47.0 % 37.7   Platelet Count 150 - 450 10e3/uL 190      Latest Reference Range & Units 03/09/23 09:04   Alkaline Phosphatase 35 - 104 U/L 404 (H)   ALT 10 - 35 U/L 133 (H)   AST 10 - 35 U/L 106 (H)   Bilirubin Total <=1.2 mg/dL 1.0   (H): Data is abnormally high    I/P  Failure to internally control biliary tree leads to need for correction.  At least three months since control was discussed with her.  Am concerned that external control will shortly become ineffectual.    She should get H&P from PCP for preop  Should have Blood drawn pre-op Type and cross for 2 units, CBC, chem comp, INR, CXR and  EKG.  Will have office arrange preop eval/labs.    We discussed at length the risk for bleeding, infection, risk of non-healing, prolonged hospitalization, probability that an external drainage tube (through amy or even an unlikely Rasta tube).    25 min review xrays, labs with patient/, f2f and 10 min documentation      Again, thank you for allowing me to participate in the care of your patient.        Sincerely,        Desmond Ruano MD

## 2023-03-09 NOTE — NURSING NOTE
/80   Pulse 68   Wt 74 kg (163 lb 3.2 oz)   SpO2 99%   BMI 29.85 kg/m      Chief Complaint   Patient presents with     Follow Up     Post- PTC Biliary Drain      Set up patient with supplies to care for billiary drain. Patient should have enough supplies until her surgery next month.         Bebeto Ta RN on 3/9/2023 at 11:06 AM

## 2023-03-09 NOTE — PROGRESS NOTES
68 yo woman with transected BD and external drainage.  Feeling much better, no fever, no antibiotics, appetite,bowel function ok  Wound ok.  Planned hepaticojejunostomy/Amy on 4 April.  To have repeat abd CT in 1-2 weeks to assess further resolution of fluid.    Exam /80   Pulse 68   Wt 74 kg (163 lb 3.2 oz)   SpO2 99%   BMI 29.85 kg/m    Abd soft nontender  Bile in MELITA drain.     Latest Reference Range & Units 03/09/23 09:04   WBC 4.0 - 11.0 10e3/uL 5.4   Hemoglobin 11.7 - 15.7 g/dL 12.0   Hematocrit 35.0 - 47.0 % 37.7   Platelet Count 150 - 450 10e3/uL 190      Latest Reference Range & Units 03/09/23 09:04   Alkaline Phosphatase 35 - 104 U/L 404 (H)   ALT 10 - 35 U/L 133 (H)   AST 10 - 35 U/L 106 (H)   Bilirubin Total <=1.2 mg/dL 1.0   (H): Data is abnormally high    I/P  Failure to internally control biliary tree leads to need for correction.  At least three months since control was discussed with her.  Am concerned that external control will shortly become ineffectual.    She should get H&P from PCP for preop  Should have Blood drawn pre-op Type and cross for 2 units, CBC, chem comp, INR, CXR and  EKG.  Will have office arrange preop eval/labs.    We discussed at length the risk for bleeding, infection, risk of non-healing, prolonged hospitalization, probability that an external drainage tube (through amy or even an unlikely Rasta tube).    25 min review xrays, labs with patient/, f2f and 10 min documentation

## 2023-03-14 DIAGNOSIS — S36.13XS INJURY OF BILE DUCT, SEQUELA: Primary | ICD-10-CM

## 2023-03-14 DIAGNOSIS — K83.9 BILE DUCT ABNORMALITY: ICD-10-CM

## 2023-03-14 DIAGNOSIS — R94.5 ABNORMAL RESULTS OF LIVER FUNCTION STUDIES: ICD-10-CM

## 2023-03-14 PROBLEM — I60.9 SUBARACHNOID HEMORRHAGE (H): Status: ACTIVE | Noted: 2018-08-06

## 2023-03-14 PROBLEM — A41.9 SEPSIS (H): Status: ACTIVE | Noted: 2022-12-10

## 2023-03-17 ENCOUNTER — MYC MEDICAL ADVICE (OUTPATIENT)
Dept: TRANSPLANT | Facility: CLINIC | Age: 68
End: 2023-03-17
Payer: COMMERCIAL

## 2023-03-17 ENCOUNTER — TELEPHONE (OUTPATIENT)
Dept: TRANSPLANT | Facility: CLINIC | Age: 68
End: 2023-03-17
Payer: COMMERCIAL

## 2023-03-17 NOTE — TELEPHONE ENCOUNTER
----- Message from Kenzie Haines, RN sent at 3/15/2023 10:06 PM CDT -----  Regarding: RE: please help  Thanks for sending Cody. I believe this is for a non-transplant surgical patient    Tati--would you be able to assist with the requests from Lacy DAVID below?    Thanks,  Kenzie      ----- Message -----  From: Cody Martinez  Sent: 3/14/2023   3:37 PM CDT  To: Kenzie Haines, RN  Subject: FW: please help                                  I got this today from Lacy, when I reached out to Karyn she let me know I could forward this to you.  I appreciate your help with this.   Thank you  Ramya Martinez  ----- Message -----  From: Lacy Lopez NP  Sent: 3/14/2023   2:45 PM CDT  To: Cody Martinez  Subject: please help                                      Hi Cody,    Can you please help get her into PACs.  She also needs labs 2-3 days before 4/4    Thank you!    Lacy

## 2023-03-19 ENCOUNTER — HOSPITAL ENCOUNTER (INPATIENT)
Facility: CLINIC | Age: 68
LOS: 3 days | Discharge: HOME | DRG: 919 | End: 2023-03-24
Attending: STUDENT IN AN ORGANIZED HEALTH CARE EDUCATION/TRAINING PROGRAM | Admitting: TRANSPLANT SURGERY
Payer: COMMERCIAL

## 2023-03-19 ENCOUNTER — TELEPHONE (OUTPATIENT)
Dept: TRANSPLANT | Facility: CLINIC | Age: 68
End: 2023-03-19
Payer: COMMERCIAL

## 2023-03-19 ENCOUNTER — APPOINTMENT (OUTPATIENT)
Dept: CT IMAGING | Facility: CLINIC | Age: 68
DRG: 919 | End: 2023-03-19
Attending: STUDENT IN AN ORGANIZED HEALTH CARE EDUCATION/TRAINING PROGRAM
Payer: COMMERCIAL

## 2023-03-19 ENCOUNTER — APPOINTMENT (OUTPATIENT)
Dept: ULTRASOUND IMAGING | Facility: CLINIC | Age: 68
DRG: 919 | End: 2023-03-19
Attending: STUDENT IN AN ORGANIZED HEALTH CARE EDUCATION/TRAINING PROGRAM
Payer: COMMERCIAL

## 2023-03-19 DIAGNOSIS — R74.01 TRANSAMINITIS: ICD-10-CM

## 2023-03-19 DIAGNOSIS — K91.89 BILE LEAK, POSTOPERATIVE: ICD-10-CM

## 2023-03-19 DIAGNOSIS — S36.13XA INJURY OF BILE DUCT, INITIAL ENCOUNTER: ICD-10-CM

## 2023-03-19 DIAGNOSIS — K83.8 BILE LEAK, POSTOPERATIVE: ICD-10-CM

## 2023-03-19 DIAGNOSIS — T85.590A OBSTRUCTION OF PERCUTANEOUS TRANSHEPATIC BILIARY DRAINAGE CATHETER: ICD-10-CM

## 2023-03-19 DIAGNOSIS — S36.13XD INJURY OF BILE DUCT, SUBSEQUENT ENCOUNTER: Primary | ICD-10-CM

## 2023-03-19 DIAGNOSIS — Z11.52 ENCOUNTER FOR SCREENING LABORATORY TESTING FOR SEVERE ACUTE RESPIRATORY SYNDROME CORONAVIRUS 2 (SARS-COV-2): ICD-10-CM

## 2023-03-19 LAB
ALBUMIN SERPL BCG-MCNC: 3.6 G/DL (ref 3.5–5.2)
ALP SERPL-CCNC: 635 U/L (ref 35–104)
ALT SERPL W P-5'-P-CCNC: 318 U/L (ref 10–35)
ANION GAP SERPL CALCULATED.3IONS-SCNC: 10 MMOL/L (ref 7–15)
AST SERPL W P-5'-P-CCNC: 279 U/L (ref 10–35)
AST SERPL W P-5'-P-CCNC: ABNORMAL U/L
BASOPHILS # BLD AUTO: 0 10E3/UL (ref 0–0.2)
BASOPHILS NFR BLD AUTO: 0 %
BILIRUB SERPL-MCNC: 1.9 MG/DL
BUN SERPL-MCNC: 10.2 MG/DL (ref 8–23)
CALCIUM SERPL-MCNC: 9.8 MG/DL (ref 8.8–10.2)
CHLORIDE SERPL-SCNC: 102 MMOL/L (ref 98–107)
CREAT SERPL-MCNC: 0.75 MG/DL (ref 0.51–0.95)
DEPRECATED HCO3 PLAS-SCNC: 24 MMOL/L (ref 22–29)
EOSINOPHIL # BLD AUTO: 0 10E3/UL (ref 0–0.7)
EOSINOPHIL NFR BLD AUTO: 0 %
ERYTHROCYTE [DISTWIDTH] IN BLOOD BY AUTOMATED COUNT: 13.7 % (ref 10–15)
GFR SERPL CREATININE-BSD FRML MDRD: 88 ML/MIN/1.73M2
GLUCOSE SERPL-MCNC: 108 MG/DL (ref 70–99)
HCT VFR BLD AUTO: 38.6 % (ref 35–47)
HGB BLD-MCNC: 12 G/DL (ref 11.7–15.7)
HOLD SPECIMEN: NORMAL
IMM GRANULOCYTES # BLD: 0 10E3/UL
IMM GRANULOCYTES NFR BLD: 0 %
INR PPP: 0.97 (ref 0.85–1.15)
LIPASE SERPL-CCNC: 14 U/L (ref 13–60)
LYMPHOCYTES # BLD AUTO: 0.9 10E3/UL (ref 0.8–5.3)
LYMPHOCYTES NFR BLD AUTO: 15 %
MCH RBC QN AUTO: 29 PG (ref 26.5–33)
MCHC RBC AUTO-ENTMCNC: 31.1 G/DL (ref 31.5–36.5)
MCV RBC AUTO: 93 FL (ref 78–100)
MONOCYTES # BLD AUTO: 0.6 10E3/UL (ref 0–1.3)
MONOCYTES NFR BLD AUTO: 9 %
NEUTROPHILS # BLD AUTO: 4.5 10E3/UL (ref 1.6–8.3)
NEUTROPHILS NFR BLD AUTO: 76 %
NRBC # BLD AUTO: 0 10E3/UL
NRBC BLD AUTO-RTO: 0 /100
PLATELET # BLD AUTO: 190 10E3/UL (ref 150–450)
POTASSIUM SERPL-SCNC: 3.9 MMOL/L (ref 3.4–5.3)
PROT SERPL-MCNC: 6.8 G/DL (ref 6.4–8.3)
RBC # BLD AUTO: 4.14 10E6/UL (ref 3.8–5.2)
SARS-COV-2 RNA RESP QL NAA+PROBE: NEGATIVE
SODIUM SERPL-SCNC: 136 MMOL/L (ref 136–145)
WBC # BLD AUTO: 6 10E3/UL (ref 4–11)

## 2023-03-19 PROCEDURE — 76700 US EXAM ABDOM COMPLETE: CPT | Mod: 26 | Performed by: RADIOLOGY

## 2023-03-19 PROCEDURE — 96375 TX/PRO/DX INJ NEW DRUG ADDON: CPT

## 2023-03-19 PROCEDURE — 74177 CT ABD & PELVIS W/CONTRAST: CPT | Mod: 26 | Performed by: RADIOLOGY

## 2023-03-19 PROCEDURE — 76700 US EXAM ABDOM COMPLETE: CPT

## 2023-03-19 PROCEDURE — 85610 PROTHROMBIN TIME: CPT | Performed by: STUDENT IN AN ORGANIZED HEALTH CARE EDUCATION/TRAINING PROGRAM

## 2023-03-19 PROCEDURE — 85025 COMPLETE CBC W/AUTO DIFF WBC: CPT | Performed by: STUDENT IN AN ORGANIZED HEALTH CARE EDUCATION/TRAINING PROGRAM

## 2023-03-19 PROCEDURE — 250N000013 HC RX MED GY IP 250 OP 250 PS 637: Performed by: STUDENT IN AN ORGANIZED HEALTH CARE EDUCATION/TRAINING PROGRAM

## 2023-03-19 PROCEDURE — U0003 INFECTIOUS AGENT DETECTION BY NUCLEIC ACID (DNA OR RNA); SEVERE ACUTE RESPIRATORY SYNDROME CORONAVIRUS 2 (SARS-COV-2) (CORONAVIRUS DISEASE [COVID-19]), AMPLIFIED PROBE TECHNIQUE, MAKING USE OF HIGH THROUGHPUT TECHNOLOGIES AS DESCRIBED BY CMS-2020-01-R: HCPCS | Performed by: STUDENT IN AN ORGANIZED HEALTH CARE EDUCATION/TRAINING PROGRAM

## 2023-03-19 PROCEDURE — 74177 CT ABD & PELVIS W/CONTRAST: CPT

## 2023-03-19 PROCEDURE — 84155 ASSAY OF PROTEIN SERUM: CPT | Performed by: STUDENT IN AN ORGANIZED HEALTH CARE EDUCATION/TRAINING PROGRAM

## 2023-03-19 PROCEDURE — 258N000003 HC RX IP 258 OP 636: Performed by: STUDENT IN AN ORGANIZED HEALTH CARE EDUCATION/TRAINING PROGRAM

## 2023-03-19 PROCEDURE — 99285 EMERGENCY DEPT VISIT HI MDM: CPT | Mod: 25

## 2023-03-19 PROCEDURE — 36415 COLL VENOUS BLD VENIPUNCTURE: CPT | Performed by: STUDENT IN AN ORGANIZED HEALTH CARE EDUCATION/TRAINING PROGRAM

## 2023-03-19 PROCEDURE — 83690 ASSAY OF LIPASE: CPT | Performed by: STUDENT IN AN ORGANIZED HEALTH CARE EDUCATION/TRAINING PROGRAM

## 2023-03-19 PROCEDURE — G0378 HOSPITAL OBSERVATION PER HR: HCPCS

## 2023-03-19 PROCEDURE — 250N000011 HC RX IP 250 OP 636: Performed by: TRANSPLANT SURGERY

## 2023-03-19 PROCEDURE — 99285 EMERGENCY DEPT VISIT HI MDM: CPT | Performed by: STUDENT IN AN ORGANIZED HEALTH CARE EDUCATION/TRAINING PROGRAM

## 2023-03-19 PROCEDURE — C9803 HOPD COVID-19 SPEC COLLECT: HCPCS

## 2023-03-19 PROCEDURE — 250N000011 HC RX IP 250 OP 636: Performed by: STUDENT IN AN ORGANIZED HEALTH CARE EDUCATION/TRAINING PROGRAM

## 2023-03-19 RX ORDER — LORAZEPAM 0.5 MG/1
0.5 TABLET ORAL DAILY PRN
Status: DISCONTINUED | OUTPATIENT
Start: 2023-03-19 | End: 2023-03-24 | Stop reason: HOSPADM

## 2023-03-19 RX ORDER — ONDANSETRON 4 MG/1
4 TABLET, ORALLY DISINTEGRATING ORAL EVERY 6 HOURS PRN
Status: DISCONTINUED | OUTPATIENT
Start: 2023-03-19 | End: 2023-03-24 | Stop reason: HOSPADM

## 2023-03-19 RX ORDER — SODIUM CHLORIDE 9 MG/ML
INJECTION, SOLUTION INTRAVENOUS CONTINUOUS
Status: DISCONTINUED | OUTPATIENT
Start: 2023-03-20 | End: 2023-03-22

## 2023-03-19 RX ORDER — PRAVASTATIN SODIUM 20 MG
40 TABLET ORAL EVERY EVENING
Status: DISCONTINUED | OUTPATIENT
Start: 2023-03-19 | End: 2023-03-24 | Stop reason: HOSPADM

## 2023-03-19 RX ORDER — IOPAMIDOL 755 MG/ML
100 INJECTION, SOLUTION INTRAVASCULAR ONCE
Status: DISCONTINUED | OUTPATIENT
Start: 2023-03-19 | End: 2023-03-20

## 2023-03-19 RX ORDER — ONDANSETRON 2 MG/ML
4 INJECTION INTRAMUSCULAR; INTRAVENOUS EVERY 6 HOURS PRN
Status: DISCONTINUED | OUTPATIENT
Start: 2023-03-19 | End: 2023-03-24 | Stop reason: HOSPADM

## 2023-03-19 RX ORDER — ACETAMINOPHEN 325 MG/1
975 TABLET ORAL EVERY 8 HOURS PRN
Status: DISCONTINUED | OUTPATIENT
Start: 2023-03-19 | End: 2023-03-20

## 2023-03-19 RX ADMIN — LORAZEPAM 0.5 MG: 0.5 TABLET ORAL at 20:34

## 2023-03-19 RX ADMIN — IOPAMIDOL 99 ML: 755 INJECTION, SOLUTION INTRAVENOUS at 20:58

## 2023-03-19 RX ADMIN — ACETAMINOPHEN 975 MG: 325 TABLET ORAL at 22:47

## 2023-03-19 RX ADMIN — ONDANSETRON 4 MG: 2 INJECTION INTRAMUSCULAR; INTRAVENOUS at 18:56

## 2023-03-19 RX ADMIN — SODIUM CHLORIDE: 9 INJECTION, SOLUTION INTRAVENOUS at 23:21

## 2023-03-19 ASSESSMENT — ACTIVITIES OF DAILY LIVING (ADL)
ADLS_ACUITY_SCORE: 35
ADLS_ACUITY_SCORE: 33

## 2023-03-19 NOTE — LETTER
Transition Communication Hand-off for Care Transitions to Next Level of Care Provider    Name: Zeinab Bermudez  : 1955  MRN #: [id number]  Primary Care Provider: Jairo Ocampo     Primary Clinic: [facility] [id number] [place] [id number]     Reason for Hospitalization:  Transaminitis [R74.01]  Obstruction of percutaneous transhepatic biliary drainage catheter [T85.590A]  Encounter for screening laboratory testing for severe acute respiratory syndrome coronavirus 2 (SARS-CoV-2) [Z20.822]  Admit Date/Time: 3/19/2023  3:06 PM  Discharge Date: 3/23/23  Payor Source: Payor: [employer] / Plan: BCBS MEDICARE ADVANTAGE / Product Type: Medicare /            Reason for Communication Hand-off Referral: Other continuity of care    Discharge Plan:See avs       Discharge Needs Assessment:  Needs    Flowsheet Row Most Recent Value   Equipment Currently Used at Home walker, rolling   # of Referrals Placed by CTS External Care Coordination          Follow-up plan:    Future Appointments   Date Time Provider Department Center   3/27/2023 11:20 AM [facility]   2023 10:00 AM Melisa Epps, APRN [facility]   2023 10:00 AM  LAB [facility]                 Key Recommendations:  See avs    Samia Paniagua RN    AVS/Discharge Summary is the source of truth; this is a helpful guide for improved communication of patient story

## 2023-03-19 NOTE — ED TRIAGE NOTES
Patient has  right abdominal pain, less drainage in Brian in the last 5 days. Has drain in place, right lower abdomen. S/p gall bladder removed December 2022. No fever     Triage Assessment     Row Name 03/19/23 1502       Triage Assessment (Adult)    Airway WDL WDL       Respiratory WDL    Respiratory WDL WDL       Skin Circulation/Temperature WDL    Skin Circulation/Temperature WDL WDL;X   wound, drain to right abdomen       Cardiac WDL    Cardiac WDL WDL       Peripheral/Neurovascular WDL    Peripheral Neurovascular WDL WDL    Capillary Refill, General less than/equal to 3 secs       Midnight Coma Scale    Best Eye Response 4-->(E4) spontaneous    Best Motor Response 6-->(M6) obeys commands    Best Verbal Response 5-->(V5) oriented    Edmund Coma Scale Score 15

## 2023-03-19 NOTE — TELEPHONE ENCOUNTER
"Zeinab called to report her drain is not working, this is day 5.  She is afebrile, but has some increase discomfort in her abdomen.  She was advised that she should be seen in the emergency room.  She states, \"I talked to a fellow yesterday, who told me I should be seen but they would get me to floor 7 right away.\"  Zeinab was advised that writer does not see notes, and is unaware of a direct admit.  She states that she will speak with her  and they will make arrangements to go to the ED today.  Lacy MUNOZ NP and Dr Tang updated.  " Location Indication Override (Is Already Calculated Based On Selected Body Location): Area H

## 2023-03-19 NOTE — ED PROVIDER NOTES
ED Provider Note  [facility]      History     Chief Complaint   Patient presents with     Abdominal Pain      insufficiant drainage     HPI  Zeinab Bermudez is a 67 year old female who with a past medical history of cholecystectomy at outside hospital with complications including Severy of the bile duct.  She follows with Dr. Ruano here who is planning reconstruction in April.  She presents to the emergency department today for evaluation of complications of a bile drain.  She reports it was placed by interventional radiology at an outside hospital.  Is averaging about 300 mL of drainage but over the last 5 days is only drained 45 mL.  Today may be 15 mL of output.  No change in color.  A slight increase in pain in the right upper quadrant, 2 out of 10.  She says that she spoke with a fellow on the phone yesterday, does not recall this physician's name, who stated they would try to get her in for clinic for some sort of ultrasound and PTC. Chart review suggests trying to get to 7th floor was mentioned by the patient.  She has no fevers chills or diaphoresis.  No nausea vomiting.  Slight decrease in bowel output.  Otherwise no acute complaints.    Past Medical History  Past Medical History:   Diagnosis Date     Covid 2019 Positive 12/11/2022     Depressive disorder      HLD (hyperlipidemia)      Hypertension      SAH (subarachnoid hemorrhage) (H) 2017     Past Surgical History:   Procedure Laterality Date     ENDOSCOPIC RETROGRADE CHOLANGIOPANCREATOGRAM N/A 1/3/2023    Procedure: ENDOSCOPIC RETROGRADE CHOLANGIOPANCREATOGRAPHY;  Surgeon: Michael Mann MD;  Location: [facility] OR     HYSTERECTOMY  2005     IR BILIARY DRAIN PLACEMENT  1/6/2023     IR BILIARY DRAIN PLACEMENT  2/10/2023     LAPAROSCOPIC CHOLECYSTECTOMY  12/11/2022     LAPAROTOMY EXPLORATORY  12/28/2022     calcium carbonate (OS-NASRIN) 500 MG tablet  diphenhydrAMINE (BENADRYL) 25 MG tablet  LORazepam (ATIVAN) 0.5 MG  "tablet  melatonin 5 MG sublingual tablet  ondansetron (ZOFRAN ODT) 4 MG ODT tab  Pediatric Multiple Vitamins (MULTIVITAMIN CHILDRENS, W/ FA, PO)  pravastatin (PRAVACHOL) 40 MG tablet  Sennosides (SENNA) 8.6 MG CAPS  ursodiol (ACTIGALL) 300 MG capsule  venlafaxine (EFFEXOR XR) 37.5 MG 24 hr capsule  acetaminophen (TYLENOL) 325 MG tablet  Gauze Pads & Dressings (DERMACEA DRAIN SPONGES) 4\"X4\" PADS  Gauze Pads & Dressings (EXCILON AMD DRAIN SPONGES) 4\"X4\" PADS  Gauze Pads & Dressings (GAUZE PADS 4\"X4\") 4\"X4\" PADS  sodium chloride, PF, 0.9% PF flush      No Known Allergies  Family History  Family History   Problem Relation Age of Onset     Chronic Obstructive Pulmonary Disease Mother      Stomach Cancer Mother      Heart Disease Father      Chronic Obstructive Pulmonary Disease Father      CABG Brother      Social History   Social History     Tobacco Use     Smoking status: Never     Smokeless tobacco: Never   Substance Use Topics     Alcohol use: Not Currently     Drug use: Never      Past medical history, past surgical history, medications, allergies, family history, and social history were reviewed with the patient. No additional pertinent items.      A medically appropriate review of systems was performed with pertinent positives and negatives noted in the HPI, and all other systems negative.    Physical Exam   BP: (!) 147/91  Pulse: 81  Temp: 98.6  F (37  C)  Resp: 19  Height: 157.5 cm (5' 2\")  Weight: 72.6 kg (160 lb)  SpO2: 100 %  Physical Exam  Vital Signs Reviewed  Gen: Well nourished, well developed, resting comfortably, no acute distress  HEENT: NC/AT, PERRL, EOMI, MMM  Neck: Supple, FROM  CV: Regular Rate, no murmur/rub/gallop  Lungs/Chest: Normal Effort, CTAB  Abd: Non-distended, soft, minimal right upper quadrant tenderness without rigidity rebound or guarding.  Right upper quadrant drain in place without erythema or induration around drainage site.  Sutures intact.  MSK/Back: FROM, no visible " deformity  Neuro: A&Ox3, GCS 15, CN II-XII unremarkable  Skin: Warm, Dry, Intact, no visible lesions    ED Course, Procedures, & Data     ED Course as of 03/19/23 1751   Sun Mar 19, 2023   1526 Patient seen and evaluated in hallway bed.  Surgery paged.  Labs ordered.   2686 Dr. Tang, liver surgery team. Recs: US abdomen, admit to Obs.     Procedures                 Results for orders placed or performed during the hospital encounter of 03/19/23   Comprehensive metabolic panel     Status: Abnormal   Result Value Ref Range    Sodium 136 136 - 145 mmol/L    Potassium 3.9 3.4 - 5.3 mmol/L    Chloride 102 98 - 107 mmol/L    Carbon Dioxide (CO2) 24 22 - 29 mmol/L    Anion Gap 10 7 - 15 mmol/L    Urea Nitrogen 10.2 8.0 - 23.0 mg/dL    Creatinine 0.75 0.51 - 0.95 mg/dL    Calcium 9.8 8.8 - 10.2 mg/dL    Glucose 108 (H) 70 - 99 mg/dL    Alkaline Phosphatase 635 (H) 35 - 104 U/L    AST       (H) 10 - 35 U/L    Protein Total 6.8 6.4 - 8.3 g/dL    Albumin 3.6 3.5 - 5.2 g/dL    Bilirubin Total 1.9 (H) <=1.2 mg/dL    GFR Estimate 88 >60 mL/min/1.73m2   INR     Status: Normal   Result Value Ref Range    INR 0.97 0.85 - 1.15   Lipase     Status: Normal   Result Value Ref Range    Lipase 14 13 - 60 U/L   Asymptomatic COVID-19 Virus (Coronavirus) by PCR Nasopharyngeal     Status: Normal    Specimen: Nasopharyngeal; Swab   Result Value Ref Range    SARS CoV2 PCR Negative Negative    Narrative    Testing was performed using the Xpert Xpress SARS-CoV-2 Assay on the Cepheid Gene-Xpert Instrument Systems. Additional information about this Emergency Use Authorization (EUA) assay can be found via the Lab Guide. This test should be ordered for the detection of SARS-CoV-2 in individuals who meet SARS-CoV-2 clinical and/or epidemiological criteria as well as from individuals without symptoms or other reasons to suspect COVID-19. Test performance for asymptomatic patients has only been established in anterior nasal swab specimens. This  test is for in vitro diagnostic use under the FDA EUA for laboratories certified under CLIA to perform high complexity testing. This test has not been FDA cleared or approved. A negative result does not rule out the presence of PCR inhibitors in the specimen or target RNA concentration below the limit of detection for the assay. The possibility of a false negative should be considered if the patient's recent exposure or clinical presentation suggests COVID-19. This test was validated by [facility]. These Laboratories are certified under the Clinical Laboratory Improvement Amendments (CLIA) as qualified to perform high complexity testing.     CBC with platelets and differential     Status: Abnormal   Result Value Ref Range    WBC Count 6.0 4.0 - 11.0 10e3/uL    RBC Count 4.14 3.80 - 5.20 10e6/uL    Hemoglobin 12.0 11.7 - 15.7 g/dL    Hematocrit 38.6 35.0 - 47.0 %    MCV 93 78 - 100 fL    MCH 29.0 26.5 - 33.0 pg    MCHC 31.1 (L) 31.5 - 36.5 g/dL    RDW 13.7 10.0 - 15.0 %    Platelet Count 190 150 - 450 10e3/uL    % Neutrophils 76 %    % Lymphocytes 15 %    % Monocytes 9 %    % Eosinophils 0 %    % Basophils 0 %    % Immature Granulocytes 0 %    NRBCs per 100 WBC 0 <1 /100    Absolute Neutrophils 4.5 1.6 - 8.3 10e3/uL    Absolute Lymphocytes 0.9 0.8 - 5.3 10e3/uL    Absolute Monocytes 0.6 0.0 - 1.3 10e3/uL    Absolute Eosinophils 0.0 0.0 - 0.7 10e3/uL    Absolute Basophils 0.0 0.0 - 0.2 10e3/uL    Absolute Immature Granulocytes 0.0 <=0.4 10e3/uL    Absolute NRBCs 0.0 10e3/uL   AST     Status: Abnormal   Result Value Ref Range     (H) 10 - 35 U/L   Extra Tube     Status: None (In process)    Narrative    The following orders were created for panel order Extra Tube.  Procedure                               Abnormality         Status                     ---------                               -----------         ------                     Extra Purple Top Tube[[id number]]                             In process                   Please view results for these tests on the individual orders.   CBC with platelets differential     Status: Abnormal    Narrative    The following orders were created for panel order CBC with platelets differential.  Procedure                               Abnormality         Status                     ---------                               -----------         ------                     CBC with platelets and d...[[id number]]  Abnormal            Final result                 Please view results for these tests on the individual orders.     Medications - No data to display  Labs Ordered and Resulted from Time of ED Arrival to Time of ED Departure   COMPREHENSIVE METABOLIC PANEL - Abnormal       Result Value    Sodium 136      Potassium 3.9      Chloride 102      Carbon Dioxide (CO2) 24      Anion Gap 10      Urea Nitrogen 10.2      Creatinine 0.75      Calcium 9.8      Glucose 108 (*)     Alkaline Phosphatase 635 (*)     AST         (*)     Protein Total 6.8      Albumin 3.6      Bilirubin Total 1.9 (*)     GFR Estimate 88     CBC WITH PLATELETS AND DIFFERENTIAL - Abnormal    WBC Count 6.0      RBC Count 4.14      Hemoglobin 12.0      Hematocrit 38.6      MCV 93      MCH 29.0      MCHC 31.1 (*)     RDW 13.7      Platelet Count 190      % Neutrophils 76      % Lymphocytes 15      % Monocytes 9      % Eosinophils 0      % Basophils 0      % Immature Granulocytes 0      NRBCs per 100 WBC 0      Absolute Neutrophils 4.5      Absolute Lymphocytes 0.9      Absolute Monocytes 0.6      Absolute Eosinophils 0.0      Absolute Basophils 0.0      Absolute Immature Granulocytes 0.0      Absolute NRBCs 0.0     AST - Abnormal     (*)    INR - Normal    INR 0.97     LIPASE - Normal    Lipase 14     COVID-19 VIRUS (CORONAVIRUS) BY PCR - Normal    SARS CoV2 PCR Negative       US Abdomen Complete    (Results Pending)          Critical care was not performed.     Medical Decision Making  The  patient's presentation was of high complexity (an acute health issue posing potential threat to life or bodily function).    The patient's evaluation involved:  ordering and/or review of 3+ test(s) in this encounter (see separate area of note for details)  discussion of management or test interpretation with another health professional (see separate area of note for details)    The patient's management necessitated high risk (a decision regarding hospitalization).      Assessment & Plan    Zeinab Bermudez is a 67 year old female who with a past medical history of cholecystectomy at outside hospital with complications including Severy of the bile duct.  She follows with Dr. Ruano here who is planning reconstruction in April.  She presents to the emergency department today for evaluation of complications of a bile drain.  She is slightly hypertensive otherwise vital signs within normal limits.  Resting comfortably no acute distress.  Nonperitoneal abdominal exam.  Will order CBC complete metabolic panel lipase and INR.  Page will be placed to the liver surgery team regarding desired imaging.    Patient remained comfortable and hemodynamically stable during my evaluation.  I reviewed her labs notice gradual uptrend in her transaminases and alkaline phosphatase.  Discussed with surgical fellow on-call who recommends ultrasound and observation admission, surgical resident who will process the admission contacted.  Patient and  aware proposed plan and in agreement.    6:12 PM Addendum: US read as abnormal fluid collection with questionable air. CT recommended by radiology and was ordered.            New Prescriptions    No medications on file       Final diagnoses:   Transaminitis   Obstruction of percutaneous transhepatic biliary drainage catheter       Akash Webber Jr., MD   [facility] EMERGENCY DEPARTMENT  3/19/2023     Akash Webber MD  03/19/23 1752       Akash Webber MD  03/19/23  1812

## 2023-03-19 NOTE — CONSULTS
I did not see this patient but agree with below notes  No billing  Transplant Surgery Consult Note  03/19/2023    Reason for consult: Biliary drain not functional  Consult requested by: ED      ASSESSMENT: 67-year-old female with a past medical history of hypertension hyperlipidemia status postcholecystectomy and outside hospital resulting in transection of the common bile duct has had biliary drain placed by radiology at an outside hospital however this drain has no longer been functional she is here for further management.  Patient is following with Dr. La who is planning reconstruction mid April.  Patient remains vitally stable, physical exam is benign, no tenderness to palpation, no evidence of jaundice.     RECOMMENDATIONS:    - Ultrasound to evaluate dilation of bile ducts  - We will consider IR consult in the morning for possible PTC placement  - Okay for regular diet, n.p.o. at midnight  - We will recheck labs in the morning  - Liver US      Discussed with fellow who will discuss with attending    Aakash Casillas PGY2  Surgery      =======================    History of Present Illness:    Zeinab Bermudez is a 67 year old female with past medical history of hypertension, hyperlipidemia, status post cholecystectomy at outside hospital complicated by transection of the common bile duct, has had a biliary drain placed by radiology in outside hospital which appears to no longer be functional.  Patient states that normally the drain puts out 300 mL a day, over the past 5 days it has put out a total of 50 mL.  Patient denies any abdominal pain, nausea, vomiting.  No change in appetite.  Regular bowel function.  Does not feel fatigued, no fevers or chills.  She is here for further management.     Past Medical History:  Past Medical History:   Diagnosis Date    Covid 2019 Positive 12/11/2022    Depressive disorder     HLD (hyperlipidemia)     Hypertension     SAH (subarachnoid hemorrhage) (H) 2017       Past  "Surgical History  Past Surgical History:   Procedure Laterality Date    ENDOSCOPIC RETROGRADE CHOLANGIOPANCREATOGRAM N/A 1/3/2023    Procedure: ENDOSCOPIC RETROGRADE CHOLANGIOPANCREATOGRAPHY;  Surgeon: Michael Mann MD;  Location: [facility] OR    HYSTERECTOMY  2005    IR BILIARY DRAIN PLACEMENT  1/6/2023    IR BILIARY DRAIN PLACEMENT  2/10/2023    LAPAROSCOPIC CHOLECYSTECTOMY  12/11/2022    LAPAROTOMY EXPLORATORY  12/28/2022       Family History:  Family History   Problem Relation Age of Onset    Chronic Obstructive Pulmonary Disease Mother     Stomach Cancer Mother     Heart Disease Father     Chronic Obstructive Pulmonary Disease Father     CABG Brother        Social History:  Social History     Social History Narrative    Not on file        Medications:  Current Outpatient Medications   Medication Sig Dispense Refill    calcium carbonate (OS-NASRIN) 500 MG tablet Take 1 tablet by mouth daily      diphenhydrAMINE (BENADRYL) 25 MG tablet Take 25 mg by mouth every 6 hours as needed      LORazepam (ATIVAN) 0.5 MG tablet Take 0.5 mg by mouth daily as needed for anxiety      melatonin 5 MG sublingual tablet Place 1 tablet (5 mg) under the tongue At Bedtime 30 tablet 0    ondansetron (ZOFRAN ODT) 4 MG ODT tab Take 1 tablet (4 mg) by mouth every 6 hours as needed for nausea or vomiting 15 tablet 0    Pediatric Multiple Vitamins (MULTIVITAMIN CHILDRENS, W/ FA, PO) Take 1 tablet by mouth daily      pravastatin (PRAVACHOL) 40 MG tablet Take 40 mg by mouth daily      Sennosides (SENNA) 8.6 MG CAPS       ursodiol (ACTIGALL) 300 MG capsule Take 1 capsule (300 mg) by mouth 2 times daily 60 capsule 1    venlafaxine (EFFEXOR XR) 37.5 MG 24 hr capsule Take 112.5 mg by mouth daily      acetaminophen (TYLENOL) 325 MG tablet Take 325-650 mg by mouth every 6 hours as needed for mild pain      Gauze Pads & Dressings (DERMACEA DRAIN SPONGES) 4\"X4\" PADS 1 pad. 2 times daily 60 each 1    Gauze Pads & Dressings (EXCILON AMD DRAIN SPONGES) " "4\"X4\" PADS 1 pad. 2 times daily 60 each 1    Gauze Pads & Dressings (GAUZE PADS 4\"X4\") 4\"X4\" PADS 1 pad. 2 times daily 60 each 1    sodium chloride, PF, 0.9% PF flush 3 mLs by Intracatheter route daily for 90 days 270 mL 0       Allergies:  No Known Allergies    Review of Symptoms:  A 10 point review of symptoms has been conducted and is negative except for that mentioned in the above HPI.    Physical Exam:    Temp:  [98.6  F (37  C)] 98.6  F (37  C)  Pulse:  [81] 81  Resp:  [19] 19  BP: (147)/(91) 147/91  SpO2:  [100 %] 100 %    Gen: NAD, resting comfortably  HEENT: NCAT, sclera anicteric  CV: RRR  Resp: nonlabored respirations, comfortable on room air  Abd: soft, nontender, nondistended, MELITA drain in the right upper quadrant with minimal bilious output.  No rebound guarding or rigidity.  No signs of peritonitis  Ext: WWP w/o edema  Neuro: no focal deficits  Skin: No rashes    Labs:  Na, K, Cl within normal limits      T bili 1.9  WBC 6      Imaging:  Liver US     IMPRESSION:   1.  Heterogeneous appearance of the liver which may possibly  represents areas of hepatic infarct.  2.  Indeterminate fluid collection with air that may possibly  represent the duodenum. Consider CT abdomen and pelvis with contrast  for further evaluation.        "

## 2023-03-20 LAB
ALBUMIN SERPL BCG-MCNC: 3.5 G/DL (ref 3.5–5.2)
ALP SERPL-CCNC: 591 U/L (ref 35–104)
ALT SERPL W P-5'-P-CCNC: 269 U/L (ref 10–35)
ANION GAP SERPL CALCULATED.3IONS-SCNC: 10 MMOL/L (ref 7–15)
AST SERPL W P-5'-P-CCNC: 180 U/L (ref 10–35)
BILIRUB DIRECT SERPL-MCNC: 1.1 MG/DL (ref 0–0.3)
BILIRUB SERPL-MCNC: 1.6 MG/DL
BUN SERPL-MCNC: 9.3 MG/DL (ref 8–23)
CALCIUM SERPL-MCNC: 9.5 MG/DL (ref 8.8–10.2)
CHLORIDE SERPL-SCNC: 103 MMOL/L (ref 98–107)
CREAT SERPL-MCNC: 0.82 MG/DL (ref 0.51–0.95)
DEPRECATED HCO3 PLAS-SCNC: 23 MMOL/L (ref 22–29)
ERYTHROCYTE [DISTWIDTH] IN BLOOD BY AUTOMATED COUNT: 13.9 % (ref 10–15)
GFR SERPL CREATININE-BSD FRML MDRD: 79 ML/MIN/1.73M2
GLUCOSE SERPL-MCNC: 110 MG/DL (ref 70–99)
HCT VFR BLD AUTO: 37 % (ref 35–47)
HGB BLD-MCNC: 11.6 G/DL (ref 11.7–15.7)
MAGNESIUM SERPL-MCNC: 2.5 MG/DL (ref 1.7–2.3)
MCH RBC QN AUTO: 29.3 PG (ref 26.5–33)
MCHC RBC AUTO-ENTMCNC: 31.4 G/DL (ref 31.5–36.5)
MCV RBC AUTO: 93 FL (ref 78–100)
PLATELET # BLD AUTO: 169 10E3/UL (ref 150–450)
POTASSIUM SERPL-SCNC: 3.8 MMOL/L (ref 3.4–5.3)
PROT SERPL-MCNC: 6.5 G/DL (ref 6.4–8.3)
RBC # BLD AUTO: 3.96 10E6/UL (ref 3.8–5.2)
SODIUM SERPL-SCNC: 136 MMOL/L (ref 136–145)
WBC # BLD AUTO: 8.6 10E3/UL (ref 4–11)

## 2023-03-20 PROCEDURE — G0378 HOSPITAL OBSERVATION PER HR: HCPCS

## 2023-03-20 PROCEDURE — 258N000003 HC RX IP 258 OP 636: Performed by: SURGERY

## 2023-03-20 PROCEDURE — 258N000003 HC RX IP 258 OP 636: Performed by: TRANSPLANT SURGERY

## 2023-03-20 PROCEDURE — 250N000013 HC RX MED GY IP 250 OP 250 PS 637: Performed by: PHYSICIAN ASSISTANT

## 2023-03-20 PROCEDURE — 258N000003 HC RX IP 258 OP 636: Performed by: PHYSICIAN ASSISTANT

## 2023-03-20 PROCEDURE — 250N000011 HC RX IP 250 OP 636: Performed by: PHYSICIAN ASSISTANT

## 2023-03-20 PROCEDURE — 250N000011 HC RX IP 250 OP 636: Performed by: STUDENT IN AN ORGANIZED HEALTH CARE EDUCATION/TRAINING PROGRAM

## 2023-03-20 PROCEDURE — 258N000003 HC RX IP 258 OP 636: Performed by: STUDENT IN AN ORGANIZED HEALTH CARE EDUCATION/TRAINING PROGRAM

## 2023-03-20 PROCEDURE — 258N000003 HC RX IP 258 OP 636

## 2023-03-20 PROCEDURE — 82248 BILIRUBIN DIRECT: CPT | Performed by: STUDENT IN AN ORGANIZED HEALTH CARE EDUCATION/TRAINING PROGRAM

## 2023-03-20 PROCEDURE — 85027 COMPLETE CBC AUTOMATED: CPT | Performed by: STUDENT IN AN ORGANIZED HEALTH CARE EDUCATION/TRAINING PROGRAM

## 2023-03-20 PROCEDURE — 82310 ASSAY OF CALCIUM: CPT | Performed by: STUDENT IN AN ORGANIZED HEALTH CARE EDUCATION/TRAINING PROGRAM

## 2023-03-20 PROCEDURE — 250N000013 HC RX MED GY IP 250 OP 250 PS 637: Performed by: STUDENT IN AN ORGANIZED HEALTH CARE EDUCATION/TRAINING PROGRAM

## 2023-03-20 PROCEDURE — 36415 COLL VENOUS BLD VENIPUNCTURE: CPT | Performed by: STUDENT IN AN ORGANIZED HEALTH CARE EDUCATION/TRAINING PROGRAM

## 2023-03-20 PROCEDURE — 96361 HYDRATE IV INFUSION ADD-ON: CPT

## 2023-03-20 PROCEDURE — 96367 TX/PROPH/DG ADDL SEQ IV INF: CPT

## 2023-03-20 PROCEDURE — 96365 THER/PROPH/DIAG IV INF INIT: CPT

## 2023-03-20 PROCEDURE — 250N000011 HC RX IP 250 OP 636: Performed by: TRANSPLANT SURGERY

## 2023-03-20 PROCEDURE — 96366 THER/PROPH/DIAG IV INF ADDON: CPT

## 2023-03-20 PROCEDURE — 83735 ASSAY OF MAGNESIUM: CPT | Performed by: PHYSICIAN ASSISTANT

## 2023-03-20 RX ORDER — PROCHLORPERAZINE 25 MG
12.5 SUPPOSITORY, RECTAL RECTAL EVERY 12 HOURS PRN
Status: DISCONTINUED | OUTPATIENT
Start: 2023-03-20 | End: 2023-03-24 | Stop reason: HOSPADM

## 2023-03-20 RX ORDER — PROCHLORPERAZINE MALEATE 5 MG
5 TABLET ORAL EVERY 6 HOURS PRN
Status: DISCONTINUED | OUTPATIENT
Start: 2023-03-20 | End: 2023-03-24 | Stop reason: HOSPADM

## 2023-03-20 RX ORDER — PIPERACILLIN SODIUM, TAZOBACTAM SODIUM 3; .375 G/15ML; G/15ML
3.38 INJECTION, POWDER, LYOPHILIZED, FOR SOLUTION INTRAVENOUS EVERY 6 HOURS
Status: DISCONTINUED | OUTPATIENT
Start: 2023-03-20 | End: 2023-03-22

## 2023-03-20 RX ORDER — ACETAMINOPHEN 325 MG/1
650 TABLET ORAL EVERY 6 HOURS PRN
Status: DISCONTINUED | OUTPATIENT
Start: 2023-03-20 | End: 2023-03-24 | Stop reason: HOSPADM

## 2023-03-20 RX ADMIN — ACETAMINOPHEN 650 MG: 325 TABLET ORAL at 21:14

## 2023-03-20 RX ADMIN — ACETAMINOPHEN 975 MG: 325 TABLET ORAL at 06:13

## 2023-03-20 RX ADMIN — VANCOMYCIN HYDROCHLORIDE 1500 MG: 10 INJECTION, POWDER, LYOPHILIZED, FOR SOLUTION INTRAVENOUS at 20:25

## 2023-03-20 RX ADMIN — PRAVASTATIN SODIUM 40 MG: 20 TABLET ORAL at 08:09

## 2023-03-20 RX ADMIN — VENLAFAXINE HYDROCHLORIDE 112.5 MG: 75 CAPSULE, EXTENDED RELEASE ORAL at 08:09

## 2023-03-20 RX ADMIN — SODIUM CHLORIDE: 9 INJECTION, SOLUTION INTRAVENOUS at 20:25

## 2023-03-20 RX ADMIN — ACETAMINOPHEN 650 MG: 325 TABLET ORAL at 15:10

## 2023-03-20 RX ADMIN — PIPERACILLIN AND TAZOBACTAM 3.38 G: 3; .375 INJECTION, POWDER, LYOPHILIZED, FOR SOLUTION INTRAVENOUS at 14:26

## 2023-03-20 RX ADMIN — SODIUM CHLORIDE, POTASSIUM CHLORIDE, SODIUM LACTATE AND CALCIUM CHLORIDE 1000 ML: 600; 310; 30; 20 INJECTION, SOLUTION INTRAVENOUS at 18:57

## 2023-03-20 RX ADMIN — SODIUM CHLORIDE, POTASSIUM CHLORIDE, SODIUM LACTATE AND CALCIUM CHLORIDE 1000 ML: 600; 310; 30; 20 INJECTION, SOLUTION INTRAVENOUS at 22:41

## 2023-03-20 RX ADMIN — Medication 1 MG: at 22:55

## 2023-03-20 RX ADMIN — SODIUM CHLORIDE: 9 INJECTION, SOLUTION INTRAVENOUS at 09:43

## 2023-03-20 RX ADMIN — SODIUM CHLORIDE, POTASSIUM CHLORIDE, SODIUM LACTATE AND CALCIUM CHLORIDE 1000 ML: 600; 310; 30; 20 INJECTION, SOLUTION INTRAVENOUS at 12:57

## 2023-03-20 RX ADMIN — PIPERACILLIN AND TAZOBACTAM 3.38 G: 3; .375 INJECTION, POWDER, LYOPHILIZED, FOR SOLUTION INTRAVENOUS at 08:13

## 2023-03-20 RX ADMIN — ONDANSETRON 4 MG: 4 TABLET, ORALLY DISINTEGRATING ORAL at 04:22

## 2023-03-20 ASSESSMENT — ACTIVITIES OF DAILY LIVING (ADL)
ADLS_ACUITY_SCORE: 20

## 2023-03-20 NOTE — PROGRESS NOTES
Transplant Surgery  Inpatient Daily Progress Note  03/20/2023    Assessment & Plan: 67 year old female with a history of laparoscopic cholecystectomy on 12/11/22 at [facility] complicated by bile duct injury s/p ex lap, washout, and drain placement 12/28/22. Admitted (1/1-1/11/23) to transplant liver surgery team for management of bile duct injury and intra abdominal fluid collection. Patient had drain placement x 2 and unsuccessful PTC placement. Patient now readmitted with decreased drain output over the past 5 days, elevated LFTs, and low grade fever.      GI/Nutrition:   S/p Lap sri complicated by bile duct injury, bile leak, intra-abdominal Infection: IR unable to place PTC at this time. IR consulted for drain placement intra abdominal fluid collection. No need to flush current drain prior to IR exchange tomorrow.  LFTs increased overall, but improved today from yesterday afternoon.    Hematology:   Anemia secondary to chronic infection/inflammation: b/l Hgb 10-11. Stable    Cardiorespiratory:   HLD: PTA pravastatin restarted  Hypovolemia: Hypotension and tachycardia. Fluid bolus.    Endocrine: No issues.     Fluid/Electrolytes: MIVF: continue  ml/hr. Give 1L LR bolus. Replete electrolytes PRN    : No issues    Infectious disease: T max 100.2. WBC WNL.   IR consult for drain placement. Start zosyn.    Prophylaxis: DVT, encourage ambulation, low risk    Disposition: Transfer to     JOANN/Fellow/Resident Provider: Lorna Cuadra, [id number]     Faculty: Desmond Ruano M.D.    __________________________________________________________________  History: Admitted 3/19/2023 for uncontrolled intra abdominal fluid collection.     Interval History: History is obtained from the patient and EMR  Overnight events: Feels generally OK, mild nausea and mildly elevated temp. Intra-abdominal drain with decreased output    ROS:   A 10-point review of systems was negative except as noted above.    Curent Meds:   lactated  "ringers  1,000 mL Intravenous Once    piperacillin-tazobactam  3.375 g Intravenous Q6H    pravastatin  40 mg Oral QPM    venlafaxine  112.5 mg Oral Daily       Physical Exam:     Admit Weight: 72.6 kg (160 lb)    Current Vitals:   BP 99/46 (BP Location: Left arm)   Pulse 105   Temp 100.2  F (37.9  C) (Oral)   Resp 17   Ht 1.575 m (5' 2.01\")   Wt 72.6 kg (160 lb)   SpO2 94%   BMI 29.26 kg/m           Vital sign ranges:    Temp:  [98.6  F (37  C)-100.5  F (38.1  C)] 100.2  F (37.9  C)  Pulse:  [] 105  Resp:  [16-19] 17  BP: ()/(46-91) 99/46  SpO2:  [94 %-100 %] 94 %  Patient Vitals for the past 24 hrs:   BP Temp Temp src Pulse Resp SpO2 Height Weight   03/20/23 1135 99/46 100.2  F (37.9  C) Oral 105 17 94 % -- --   03/20/23 0601 101/61 100.2  F (37.9  C) Oral 86 16 99 % -- --   03/20/23 0127 111/66 100.1  F (37.8  C) Oral 84 16 99 % -- --   03/19/23 2300 -- -- -- -- -- -- 1.575 m (5' 2.01\") 72.6 kg (160 lb)   03/19/23 2226 129/75 (!) 100.5  F (38.1  C) Oral 100 17 98 % -- --   03/19/23 1946 137/81 99.5  F (37.5  C) Oral 103 18 97 % -- --   03/19/23 1456 -- -- -- -- -- -- 1.575 m (5' 2\") 72.6 kg (160 lb)   03/19/23 1453 (!) 147/91 98.6  F (37  C) Oral 81 19 100 % -- --     General Appearance: in no apparent distress.   Skin: normal, warm, No rashes, induration, or jaundice  Heart: Perfused  Lungs: Nonlabored resps on RA  Abdomen: nontender, drain in place with residual bilious output  Neurologic: awake, alert and oriented. Tremor absent.    Frailty Scores    There is no flowsheet data to display.         Data:   CMP  Recent Labs   Lab 03/20/23  0640 03/19/23  1643 03/19/23  1542     --  136   POTASSIUM 3.8  --  3.9   CHLORIDE 103  --  102   CO2 23  --  24   *  --  108*   BUN 9.3  --  10.2   CR 0.82  --  0.75   GFRESTIMATED 79  --  88   NASRIN 9.5  --  9.8   LIPASE  --   --  14   ALBUMIN 3.5  --  3.6   BILITOTAL 1.6*  --  1.9*   ALKPHOS 591*  --  635*   * 279*  --    *  --  " 318*     CBC  Recent Labs   Lab 03/20/23  0640 03/19/23  1542   HGB 11.6* 12.0   WBC 8.6 6.0    190     COAGS  Recent Labs   Lab 03/19/23  1542   INR 0.97      UrinalysisNo lab results found.  Virology:  No results found for: CSPEC, CMVPCR, CMQNT, CMVQ, EBVDN, CMVM, CMIG, CMVG, CMIGG, CMLTX, EBVIGG, EBIGG, EBVAGN, HCVAB, HBSAB, BKRES    Attestation:  Patient has been seen with team and evaluated by me.   Vital signs, labs, medications and orders were reviewed.   When obtained, diagnostic images were reviewed by me and interpreted as above.    The care plan was discussed with the multidisciplinary team and I agree with the findings and plan in this note, with any differences recorded in blue.    .

## 2023-03-20 NOTE — PLAN OF CARE
"Outpatient/Observation goals to be met before discharge home:  Observation Goals:  - diagnostic tests and consults completed and resulted: Not met, awaiting IR Consult for Biliary Tube Resolution. - awaiting IR Consult for PTC, new drain placement for intra abdominal fluid. (Biliary Tube will remain).  -vital signs normal or at patient baseline: Not Met, Text paged Surgical Transplant Liver Provider re: Hypotensive, Tachycardia, elevated Temp. Patient currently receiving IVF NS @ 100/hr and rec'd IV Zosyn this am.   BP 99/46 (BP Location: Left arm)   Pulse 105   Temp 100.2  F (37.9  C) (Oral)   Resp 17   Ht 1.575 m (5' 2.01\")   Wt 72.6 kg (160 lb)   SpO2 94%   BMI 29.26 kg/m     -tolerating oral intake to maintain hydration: Not met, Pt NPO since 1100 yesterday with exception of soda crackers at 2130.   -adequate pain control on oral analgesics - Met, mild pain to biliary tube site, declined intervention.  -returns to baseline functional status: Not met, generalized weakness noted this am.  -safe disposition plan has been identified: Not met  -tolerating orals: Not met, currently NPO  "

## 2023-03-20 NOTE — PLAN OF CARE
"Outpatient/Observation goals to be met before discharge home:  Observation Goals:  - diagnostic tests and consults completed and resulted: Not met, awaiting IR Consult for PTC, new drain placement for intra abdominal fluid. (Biliary Tube will remain).  -vital signs normal or at patient baseline: Met  /61 (BP Location: Left arm)   Pulse 86   Temp 100.2  F (37.9  C) (Oral)   Resp 16   Ht 1.575 m (5' 2.01\")   Wt 72.6 kg (160 lb)   SpO2 99%   BMI 29.26 kg/m    -tolerating oral intake to maintain hydration: Not met, Pt NPO since 1100 yesterday with exception of soda crackers at 2130.   -adequate pain control on oral analgesics - Met, mild pain to biliary tube site, declined intervention.  -returns to baseline functional status: Not met, generalized weakness noted this am.  -safe disposition plan has been identified: Not met  -tolerating orals: Not met, currently NPO  "

## 2023-03-20 NOTE — PROVIDER NOTIFICATION
"Provider notified, Resident kidney/liver shunt surgery transplant: \"FYI: pt's BP is 77/44 and recheck was 81/48. After ambulating to bathroom BP 78/51. Pt states they have had minimal urine output as well. Just had 250 mL out, tea colored.\"  "

## 2023-03-20 NOTE — PLAN OF CARE
"Outpatient/Observation goals to be met before discharge home:   BP 99/46 (BP Location: Left arm)   Pulse 105   Temp 99.2  F (37.3  C)   Resp 17   Ht 1.575 m (5' 2.01\")   Wt 72.6 kg (160 lb)   SpO2 94%   BMI 29.26 kg/m      -diagnostic tests and consults completed and resulted: Not met  -vital signs normal or at patient baseline: Not met  "

## 2023-03-20 NOTE — CONSULTS
Interventional Radiology  [facility] Consult Service Note  03/20/23   8:00 AM  Consult Requested:  PTC placement and surgical drain management  Recommendations:    1.  PTC/biliary drain placement:  Case discussed with IR attending Dr. Addison Wyatt MD. Multiple unsuccessful IR attempts at biliary tube placement on 1/6/2023 and 2/10/2023.  Imaging reviewed and biliary dilatation is still not amenable to attempt.  Recommend surgical management.    2.  Regarding surgical drain in place:  Recommend stripping.  IR can place looped catheter into GB fossa collection.      Patient is on IR schedule 3/21/2023 for a exchange of surgical drain for IR looped catheter into GB fossa collection.   Labs WNL for procedure.   Orders entered for procedure, NPO, and antibiotics (already on IV zosyn no need for additional abx) have been entered.   Medications to be held include: None   Consent will be done prior to procedure.     Please contact the IR charge RN at [phone] for estimated time of procedure.     This is a 67-year-old female with a past medical history of hypertension hyperlipidemia status post cholecystectomy at outside hospital on 12/11/2022 resulting in transection of the common bile duct requiring ex lap/washout/MELITA placement, with surgical drain in place.      CT scan reviewed and looks like surgical drain is draining minimally, 8 ml out so far. D/w Sasha and recommend stripping. IR can exchange line for looped catheter into GB fossa collection.    Also requesting for PTC/biliary drain as it appears on imaging there is more biliary dilatation.  Patient is following with Dr. La who is planning reconstruction mid April.      Interval events:  2/10/2023:  IR PTC and unsuccessful biliary tube placement  1/6/2023:  IR attempted biliary tube placement-successful  12/29/2022:  IR aspiration and 12FR drain placement into perigastric fluid fluid collection (return of bilious fluid) on 12/29/2022 [facility].  2022:  ERCP with large leak noted at cystic duct.    Diet: NPO per Anesthesia Guidelines for Procedure/Surgery Except for: Meds on 1    Pertinent Imagin/10/2023:  IR PTC:  Impression:  1. Percutaneous transhepatic cholangiogram again demonstrates  nondilated intrahepatic biliary ducts with a short stump of the common  hepatic duct at the jac hepatis. Leakage from the common hepatic  duct stump into the peritoneum around the existing drain.     2. Extensive attempts at placing right-sided a biliary drain were  Unsuccessful.    2023:  IR PTC   IMPRESSION:   1. Unsuccessful drain placement due to inability to access a  peripheral anterior duct despite numerous attempts under ultrasound  fluoroscopic guidance.  2. Central right hepatic duct accessed and cholangiogram performed  demonstrating peripheral common hepatic duct transection just below  the right and left duct confluence. There is noted contrast  extravasation entering into the surgical drain without free leakage  into the peritoneum.     Plan:  - Anterior right duct could not be accessed. Left sided ducts are too  high to access.      Expected date of discharge: TBD    Vitals:   /61 (BP Location: Left arm)   Pulse 86   Temp 100.2  F (37.9  C) (Oral)   Resp 16   Ht 1.575 m (5' 2.01\")   Wt 72.6 kg (160 lb)   SpO2 99%   BMI 29.26 kg/m      Pertinent Labs:   Lab Results   Component Value Date    WBC 8.6 2023    WBC 6.0 2023    WBC 5.4 2023     Lab Results   Component Value Date    HGB 11.6 2023    HGB 12.0 2023    HGB 12.0 2023     Lab Results   Component Value Date     2023     2023     2023     Lab Results   Component Value Date    INR 0.97 2023    PTT 31 2023     Lab Results   Component Value Date    POTASSIUM 3.8 2023        No results found for: HCGQUANT    COVID-19 Antibody Results, Testing for Immunity    COVID-19 " Antibody Results, Testing for Immunity   No data to display.         COVID-19 PCR Results    COVID-19 PCR Results 12/10/22 1/2/23 2/6/23 3/19/23   COVID-19 Virus by PCR (External Result) Positive (A)      SARS CoV2 PCR  Positive (A) Negative Negative   Cycle threshold  42.8     (A) Abnormal value       Comments are available for some flowsheets but are not being displayed.             Rani Nicolas PALeoncioC  Interventional Radiology  Pager: [phone]

## 2023-03-20 NOTE — PROVIDER NOTIFICATION
"General surgery resident paged: \"BP is 77/44 recheck 81/48. After ambulation  BP 78/51. Pt states they've had minimal urine output as well. Just had 250 mL out, tea colored. Per transplant fellow advised to page you.\"  "

## 2023-03-20 NOTE — PROGRESS NOTES
" Observation Goals:    /66 (BP Location: Left arm)   Pulse 84   Temp 100.1  F (37.8  C) (Oral)   Resp 16   Ht 1.575 m (5' 2.01\")   Wt 72.6 kg (160 lb)   SpO2 99%   BMI 29.26 kg/m      - diagnostic tests and consults completed and resulted: Not met  -vital signs normal or at patient baseline: Met  -tolerating oral intake to maintain hydration: Not met  -adequate pain control on oral analgesics - Not met  -returns to baseline functional status: Not met  -safe disposition plan has been identified: Not met  -tolerating orals: Not met  "

## 2023-03-20 NOTE — PROVIDER NOTIFICATION
"Text paged Surg Transplant Provider with updated VS, Hypotensive, Tachycardic, Low-grade Temp. Do you want to increase IVF rate?  IV Zosyn infused this am.  Please advise.  Thanks, Roxy Huynh, PRISCILA *[phone]  BP 99/46 (BP Location: Left arm)   Pulse 105   Temp 100.2  F (37.9  C) (Oral)   Resp 17   Ht 1.575 m (5' 2.01\")   Wt 72.6 kg (160 lb)   SpO2 94%   BMI 29.26 kg/m    "

## 2023-03-20 NOTE — PROVIDER NOTIFICATION
Text page Surg Transplant Liver Provider #[phone] with request:  Pt nauseous, had small emesis, Zofran given @ 9556, can you order another antiemetic?  Provided Acupressure and Aromatherapy in the interim. Thanks, Roxy Huynh, RN [phone]    Also, pt not wanting Tylenol 975 mg, prior RN gave 650 mg - Can you please discontinue order for 975 mg and place new order for 650 mg?  Thank you!

## 2023-03-20 NOTE — PROGRESS NOTES
Care Management Follow Up    Length of Stay (days): 0    Expected Discharge Date: 03/20/2023     Concerns to be Addressed:     Saini document   Patient plan of care discussed at interdisciplinary rounds: Yes    Anticipated Discharge Disposition:  TBD     Anticipated Discharge Services:    Anticipated Discharge DME:      Patient/family educated on Medicare website which has current facility and service quality ratings:  N/A  Education Provided on the Discharge Plan:  N/A  Patient/Family in Agreement with the Plan:  N/A    Referrals Placed by CM/SW:  None  Private pay costs discussed: insurance costs out of pocket expenses, co-pays and deductible.     Additional Information:  Chart reviewed. Case discussed with bedside RN and medical provider.     Due to pt's Observation status, SW met with patient and her spouse at bedside, introduced self, explain SW role and review the Medicare Outpatient Observation Notice (SAINI). SW explained the document, and addressed questions and concerns. SW encouraged patient to follow up with their insurance plan directly to receive the most accurate information. Patient signed SAINI form. SW left a copy of document with pt. SW placed form in the patient's chart. Pt report no SW needs.     available and will continue to follow for discharge planning and supports as needed.     _______________________    YO Abraham, LSW  ED/OBS   [facility]  Phone: [phone]  Pager: [phone]  Fax: [phone]     On-call pager, [phone], 4:00 pm to midnight

## 2023-03-20 NOTE — PROVIDER NOTIFICATION
IR was called for estimated time - no plan for IR today, possibly tomorrow afternoon.    Text paged Surgical Transplant Provider #[phone] for Diet order. Pt has been NPO since yesterday @ 1100 with exception of soda crackers at 2130.  Thanks, Roxy Huynh, RN *[phone]

## 2023-03-20 NOTE — PROVIDER NOTIFICATION
"Provider notified, Fellow liver transplant surgery: \"Pt's BP is 77/44 and recheck was 81/48. After ambulating to bathroom BP 78/51. Pt states they have had minimal urine output as well. Just had 250 mL out, tea colored. Please advise.\"  "

## 2023-03-21 ENCOUNTER — APPOINTMENT (OUTPATIENT)
Dept: INTERVENTIONAL RADIOLOGY/VASCULAR | Facility: CLINIC | Age: 68
DRG: 919 | End: 2023-03-21
Attending: PHYSICIAN ASSISTANT
Payer: COMMERCIAL

## 2023-03-21 ENCOUNTER — PREP FOR PROCEDURE (OUTPATIENT)
Dept: TRANSPLANT | Facility: CLINIC | Age: 68
End: 2023-03-21
Payer: COMMERCIAL

## 2023-03-21 PROBLEM — Z11.52 ENCOUNTER FOR SCREENING LABORATORY TESTING FOR SEVERE ACUTE RESPIRATORY SYNDROME CORONAVIRUS 2 (SARS-COV-2): Status: ACTIVE | Noted: 2023-03-21

## 2023-03-21 PROBLEM — T85.590A: Status: ACTIVE | Noted: 2023-03-21

## 2023-03-21 PROBLEM — R74.01 TRANSAMINITIS: Status: ACTIVE | Noted: 2023-03-21

## 2023-03-21 LAB
ALBUMIN SERPL BCG-MCNC: 2.8 G/DL (ref 3.5–5.2)
ALP SERPL-CCNC: 309 U/L (ref 35–104)
ALT SERPL W P-5'-P-CCNC: 135 U/L (ref 10–35)
ANION GAP SERPL CALCULATED.3IONS-SCNC: 12 MMOL/L (ref 7–15)
AST SERPL W P-5'-P-CCNC: 54 U/L (ref 10–35)
BILIRUB SERPL-MCNC: 1.1 MG/DL
BUN SERPL-MCNC: 13.9 MG/DL (ref 8–23)
CALCIUM SERPL-MCNC: 8.5 MG/DL (ref 8.8–10.2)
CHLORIDE SERPL-SCNC: 102 MMOL/L (ref 98–107)
CREAT SERPL-MCNC: 1.25 MG/DL (ref 0.51–0.95)
DEPRECATED HCO3 PLAS-SCNC: 20 MMOL/L (ref 22–29)
ERYTHROCYTE [DISTWIDTH] IN BLOOD BY AUTOMATED COUNT: 14.3 % (ref 10–15)
GFR SERPL CREATININE-BSD FRML MDRD: 48 ML/MIN/1.73M2
GLUCOSE SERPL-MCNC: 106 MG/DL (ref 70–99)
GRAM STAIN RESULT: ABNORMAL
GRAM STAIN RESULT: ABNORMAL
HCT VFR BLD AUTO: 32.4 % (ref 35–47)
HGB BLD-MCNC: 10.4 G/DL (ref 11.7–15.7)
LACTATE SERPL-SCNC: 1 MMOL/L (ref 0.7–2)
MAGNESIUM SERPL-MCNC: 1.5 MG/DL (ref 1.7–2.3)
MCH RBC QN AUTO: 29.6 PG (ref 26.5–33)
MCHC RBC AUTO-ENTMCNC: 32.1 G/DL (ref 31.5–36.5)
MCV RBC AUTO: 92 FL (ref 78–100)
PLATELET # BLD AUTO: 183 10E3/UL (ref 150–450)
POTASSIUM SERPL-SCNC: 3.6 MMOL/L (ref 3.4–5.3)
PROT SERPL-MCNC: 5.1 G/DL (ref 6.4–8.3)
RBC # BLD AUTO: 3.51 10E6/UL (ref 3.8–5.2)
SODIUM SERPL-SCNC: 134 MMOL/L (ref 136–145)
WBC # BLD AUTO: 10.3 10E3/UL (ref 4–11)

## 2023-03-21 PROCEDURE — 76080 X-RAY EXAM OF FISTULA: CPT | Mod: 26 | Performed by: RADIOLOGY

## 2023-03-21 PROCEDURE — 250N000011 HC RX IP 250 OP 636: Performed by: PHYSICIAN ASSISTANT

## 2023-03-21 PROCEDURE — 83605 ASSAY OF LACTIC ACID: CPT | Performed by: STUDENT IN AN ORGANIZED HEALTH CARE EDUCATION/TRAINING PROGRAM

## 2023-03-21 PROCEDURE — 272N000500 HC NEEDLE CR2

## 2023-03-21 PROCEDURE — 250N000011 HC RX IP 250 OP 636: Performed by: TRANSPLANT SURGERY

## 2023-03-21 PROCEDURE — G0378 HOSPITAL OBSERVATION PER HR: HCPCS

## 2023-03-21 PROCEDURE — C1769 GUIDE WIRE: HCPCS

## 2023-03-21 PROCEDURE — 258N000003 HC RX IP 258 OP 636: Performed by: NURSE PRACTITIONER

## 2023-03-21 PROCEDURE — 250N000013 HC RX MED GY IP 250 OP 250 PS 637: Performed by: STUDENT IN AN ORGANIZED HEALTH CARE EDUCATION/TRAINING PROGRAM

## 2023-03-21 PROCEDURE — 83735 ASSAY OF MAGNESIUM: CPT | Performed by: PHYSICIAN ASSISTANT

## 2023-03-21 PROCEDURE — 258N000003 HC RX IP 258 OP 636: Performed by: TRANSPLANT SURGERY

## 2023-03-21 PROCEDURE — 96361 HYDRATE IV INFUSION ADD-ON: CPT

## 2023-03-21 PROCEDURE — 87075 CULTR BACTERIA EXCEPT BLOOD: CPT | Performed by: PHYSICIAN ASSISTANT

## 2023-03-21 PROCEDURE — 99152 MOD SED SAME PHYS/QHP 5/>YRS: CPT

## 2023-03-21 PROCEDURE — C1729 CATH, DRAINAGE: HCPCS

## 2023-03-21 PROCEDURE — 0W9G30Z DRAINAGE OF PERITONEAL CAVITY WITH DRAINAGE DEVICE, PERCUTANEOUS APPROACH: ICD-10-PCS | Performed by: RADIOLOGY

## 2023-03-21 PROCEDURE — 36415 COLL VENOUS BLD VENIPUNCTURE: CPT | Performed by: PHYSICIAN ASSISTANT

## 2023-03-21 PROCEDURE — 82310 ASSAY OF CALCIUM: CPT | Performed by: PHYSICIAN ASSISTANT

## 2023-03-21 PROCEDURE — 87077 CULTURE AEROBIC IDENTIFY: CPT | Performed by: PHYSICIAN ASSISTANT

## 2023-03-21 PROCEDURE — 99152 MOD SED SAME PHYS/QHP 5/>YRS: CPT | Mod: GC | Performed by: RADIOLOGY

## 2023-03-21 PROCEDURE — 49405 IMAGE CATH FLUID COLXN VISC: CPT

## 2023-03-21 PROCEDURE — 49424 ASSESS CYST CONTRAST INJECT: CPT

## 2023-03-21 PROCEDURE — 85027 COMPLETE CBC AUTOMATED: CPT | Performed by: PHYSICIAN ASSISTANT

## 2023-03-21 PROCEDURE — 87205 SMEAR GRAM STAIN: CPT | Performed by: PHYSICIAN ASSISTANT

## 2023-03-21 PROCEDURE — 96376 TX/PRO/DX INJ SAME DRUG ADON: CPT

## 2023-03-21 PROCEDURE — 49424 ASSESS CYST CONTRAST INJECT: CPT | Mod: XS | Performed by: RADIOLOGY

## 2023-03-21 PROCEDURE — 258N000003 HC RX IP 258 OP 636: Performed by: STUDENT IN AN ORGANIZED HEALTH CARE EDUCATION/TRAINING PROGRAM

## 2023-03-21 PROCEDURE — 87102 FUNGUS ISOLATION CULTURE: CPT | Performed by: PHYSICIAN ASSISTANT

## 2023-03-21 PROCEDURE — 250N000009 HC RX 250: Performed by: PHYSICIAN ASSISTANT

## 2023-03-21 PROCEDURE — 36415 COLL VENOUS BLD VENIPUNCTURE: CPT | Performed by: STUDENT IN AN ORGANIZED HEALTH CARE EDUCATION/TRAINING PROGRAM

## 2023-03-21 PROCEDURE — 255N000002 HC RX 255 OP 636: Performed by: RADIOLOGY

## 2023-03-21 PROCEDURE — 120N000011 HC R&B TRANSPLANT UMMC

## 2023-03-21 PROCEDURE — 87070 CULTURE OTHR SPECIMN AEROBIC: CPT | Performed by: PHYSICIAN ASSISTANT

## 2023-03-21 PROCEDURE — 49405 IMAGE CATH FLUID COLXN VISC: CPT | Mod: GC | Performed by: RADIOLOGY

## 2023-03-21 PROCEDURE — 96366 THER/PROPH/DIAG IV INF ADDON: CPT

## 2023-03-21 RX ORDER — NALOXONE HYDROCHLORIDE 0.4 MG/ML
0.2 INJECTION, SOLUTION INTRAMUSCULAR; INTRAVENOUS; SUBCUTANEOUS
Status: DISCONTINUED | OUTPATIENT
Start: 2023-03-21 | End: 2023-03-21 | Stop reason: HOSPADM

## 2023-03-21 RX ORDER — IODIXANOL 320 MG/ML
100 INJECTION, SOLUTION INTRAVASCULAR ONCE
Status: COMPLETED | OUTPATIENT
Start: 2023-03-21 | End: 2023-03-21

## 2023-03-21 RX ORDER — LIDOCAINE 40 MG/G
CREAM TOPICAL
Status: DISCONTINUED | OUTPATIENT
Start: 2023-03-21 | End: 2023-03-21 | Stop reason: HOSPADM

## 2023-03-21 RX ORDER — FENTANYL CITRATE 50 UG/ML
25-50 INJECTION, SOLUTION INTRAMUSCULAR; INTRAVENOUS EVERY 5 MIN PRN
Status: DISCONTINUED | OUTPATIENT
Start: 2023-03-21 | End: 2023-03-21 | Stop reason: HOSPADM

## 2023-03-21 RX ORDER — DIPHENHYDRAMINE HYDROCHLORIDE 50 MG/ML
25-50 INJECTION INTRAMUSCULAR; INTRAVENOUS
Status: COMPLETED | OUTPATIENT
Start: 2023-03-21 | End: 2023-03-21

## 2023-03-21 RX ORDER — NALOXONE HYDROCHLORIDE 0.4 MG/ML
0.4 INJECTION, SOLUTION INTRAMUSCULAR; INTRAVENOUS; SUBCUTANEOUS
Status: DISCONTINUED | OUTPATIENT
Start: 2023-03-21 | End: 2023-03-21 | Stop reason: HOSPADM

## 2023-03-21 RX ORDER — FLUMAZENIL 0.1 MG/ML
0.2 INJECTION, SOLUTION INTRAVENOUS
Status: DISCONTINUED | OUTPATIENT
Start: 2023-03-21 | End: 2023-03-21 | Stop reason: HOSPADM

## 2023-03-21 RX ORDER — DIPHENHYDRAMINE HCL 25 MG
25 CAPSULE ORAL EVERY 6 HOURS PRN
Status: DISCONTINUED | OUTPATIENT
Start: 2023-03-21 | End: 2023-03-24 | Stop reason: HOSPADM

## 2023-03-21 RX ADMIN — DIPHENHYDRAMINE HYDROCHLORIDE 25 MG: 25 CAPSULE ORAL at 01:18

## 2023-03-21 RX ADMIN — PRAVASTATIN SODIUM 40 MG: 20 TABLET ORAL at 08:40

## 2023-03-21 RX ADMIN — FENTANYL CITRATE 50 MCG: 50 INJECTION, SOLUTION INTRAMUSCULAR; INTRAVENOUS at 18:07

## 2023-03-21 RX ADMIN — SODIUM CHLORIDE: 9 INJECTION, SOLUTION INTRAVENOUS at 21:54

## 2023-03-21 RX ADMIN — PIPERACILLIN AND TAZOBACTAM 3.38 G: 3; .375 INJECTION, POWDER, LYOPHILIZED, FOR SOLUTION INTRAVENOUS at 06:37

## 2023-03-21 RX ADMIN — DIPHENHYDRAMINE HYDROCHLORIDE 50 MG: 50 INJECTION, SOLUTION INTRAMUSCULAR; INTRAVENOUS at 17:28

## 2023-03-21 RX ADMIN — MIDAZOLAM 1 MG: 1 INJECTION INTRAMUSCULAR; INTRAVENOUS at 17:54

## 2023-03-21 RX ADMIN — PIPERACILLIN AND TAZOBACTAM 3.38 G: 3; .375 INJECTION, POWDER, LYOPHILIZED, FOR SOLUTION INTRAVENOUS at 12:38

## 2023-03-21 RX ADMIN — MIDAZOLAM 1 MG: 1 INJECTION INTRAMUSCULAR; INTRAVENOUS at 17:32

## 2023-03-21 RX ADMIN — VENLAFAXINE HYDROCHLORIDE 112.5 MG: 75 CAPSULE, EXTENDED RELEASE ORAL at 08:40

## 2023-03-21 RX ADMIN — DIPHENHYDRAMINE HYDROCHLORIDE 25 MG: 25 CAPSULE ORAL at 11:02

## 2023-03-21 RX ADMIN — Medication 1 MG: at 21:54

## 2023-03-21 RX ADMIN — PIPERACILLIN AND TAZOBACTAM 3.38 G: 3; .375 INJECTION, POWDER, LYOPHILIZED, FOR SOLUTION INTRAVENOUS at 18:59

## 2023-03-21 RX ADMIN — PIPERACILLIN AND TAZOBACTAM 3.38 G: 3; .375 INJECTION, POWDER, LYOPHILIZED, FOR SOLUTION INTRAVENOUS at 00:01

## 2023-03-21 RX ADMIN — FENTANYL CITRATE 25 MCG: 50 INJECTION, SOLUTION INTRAMUSCULAR; INTRAVENOUS at 17:56

## 2023-03-21 RX ADMIN — FENTANYL CITRATE 25 MCG: 50 INJECTION, SOLUTION INTRAMUSCULAR; INTRAVENOUS at 17:50

## 2023-03-21 RX ADMIN — SODIUM CHLORIDE: 9 INJECTION, SOLUTION INTRAVENOUS at 10:46

## 2023-03-21 RX ADMIN — SODIUM CHLORIDE, POTASSIUM CHLORIDE, SODIUM LACTATE AND CALCIUM CHLORIDE 500 ML: 600; 310; 30; 20 INJECTION, SOLUTION INTRAVENOUS at 09:31

## 2023-03-21 RX ADMIN — LIDOCAINE HYDROCHLORIDE 5 ML: 10 INJECTION, SOLUTION EPIDURAL; INFILTRATION; INTRACAUDAL; PERINEURAL at 18:13

## 2023-03-21 RX ADMIN — VANCOMYCIN HYDROCHLORIDE 1250 MG: 10 INJECTION, POWDER, LYOPHILIZED, FOR SOLUTION INTRAVENOUS at 20:07

## 2023-03-21 RX ADMIN — IODIXANOL 1 ML: 320 INJECTION, SOLUTION INTRAVASCULAR at 18:19

## 2023-03-21 ASSESSMENT — ACTIVITIES OF DAILY LIVING (ADL)
ADLS_ACUITY_SCORE: 22
ADLS_ACUITY_SCORE: 20
ADLS_ACUITY_SCORE: 22
ADLS_ACUITY_SCORE: 20

## 2023-03-21 NOTE — PLAN OF CARE
"Goal Outcome Evaluation:  BP 94/55   Pulse 84   Temp 98.2  F (36.8  C) (Oral)   Resp 14   Ht 1.575 m (5' 2.01\")   Wt 72.6 kg (160 lb)   SpO2 96%   BMI 29.26 kg/m      Care from: 6197-1323      VS & Pain: Hypotensive within parameters but otherwise vitally stable on RA. MD notified about low bps. Denies pain and nausea    Neuro: A&Ox4, able to make needs known    Respiratory: No complaints of SOB, LS clear and equal    Cardiac: WDL    GI/: LBM today. Voiding in small amounts. Bladder scanned for 378ml, straight cath for 280ml.     Nutrition: NPO for procedure    Lines: R PIV infusing sodium chloride 0.9% at 100ml/hr    Activity: Up with SBA, steady on feet    Plan: IR abscess tube changed. Continue with plan of care                      "

## 2023-03-21 NOTE — PROVIDER NOTIFICATION
"Text paged [facility] Call General Surgery Resident, \"Pt hasn't voided in about 8 hours and has received 2L of LR. Had her sit on the toilet to see she could void and could only produce about 25mL. Bladder scan shows 385mL.\"  "

## 2023-03-21 NOTE — PHARMACY-VANCOMYCIN DOSING SERVICE
"Pharmacy Vancomycin Initial Note  Date of Service 2023  Patient's  1955  67 year old, female    Indication: IAI -Transection of common bile duct and S. hominis in body fluid culture from a bilary IR drain.    Current estimated CrCl = Estimated Creatinine Clearance: 63.8 mL/min (based on SCr of 0.82 mg/dL).    Creatinine for last 3 days  3/19/2023:  3:42 PM Creatinine 0.75 mg/dL  3/20/2023:  6:40 AM Creatinine 0.82 mg/dL    Recent Vancomycin Level(s) for last 3 days  No results found for requested labs within last 72 hours.      Vancomycin IV Administrations (past 72 hours)      No vancomycin orders with administrations in past 72 hours.                Nephrotoxins and other renal medications (From now, onward)    Start     Dose/Rate Route Frequency Ordered Stop    23 1930  vancomycin (VANCOCIN) 1,500 mg in 0.9% NaCl 250 mL intermittent infusion         1,500 mg  over 90 Minutes Intravenous EVERY 24 HOURS 23 1906      23 0800  piperacillin-tazobactam (ZOSYN) 3.375 g vial to attach to  mL bag        Note to Pharmacy: For [facility], [facility] and [facility]: For Zosyn-naive patients, use the \"Zosyn initial dose + extended infusion\" order panel.    3.375 g  over 30 Minutes Intravenous EVERY 6 HOURS 23 0744            Contrast Orders - past 72 hours (72h ago, onward)    Start     Dose/Rate Route Frequency Stop    23  iopamidol (ISOVUE-370) solution 100 mL  Status:  Discontinued         100 mL Intravenous ONCE 23 1055          Insight[employer] Prediction of Planned Initial Vancomycin Regimen  Regimen: 1500 mg IV every 24 hours.  Exposure target: AUC24 (range)400-600 mg/L.hr   AUC24,ss: 464 mg/L.hr  Probability of AUC24 > 400: 66 %  Ctrough,ss: 12.4 mg/L  Probability of Ctrough,ss > 20: 16 %  Probability of nephrotoxicity (Lodise UMER ): 8 %        Plan:  1. Start vancomycin  1500 mg IV q24h.   2. Vancomycin monitoring method: AUC  3. Vancomycin therapeutic monitoring goal: 400-600 " mg*h/L  4. Pharmacy will check vancomycin levels as appropriate in 1-3 Days.      Codie Stein H

## 2023-03-21 NOTE — PHARMACY-VANCOMYCIN DOSING SERVICE
Pharmacy Empiric Dose Change Per Policy  Original Dose Ordered: vancomycin 1500 mg every 24 hours  Dose Changed To: 1250 mg every 24 hours  This dose change was based on the pharmacist's assessment of this patient's age, weight, concurrent drug therapy, treatment goals, whether patient's creatinine clearance adequately indicates renal function (factoring in age, muscle mass, fluid and clinical status), and, if applicable, prior pharmacokinetic data.    Creatinine Clearance=     Estimated Creatinine Clearance: 41.9 mL/min (A) (based on SCr of 1.25 mg/dL (H)).     Regimen: 1250 mg IV every 24 hours.  Start time: 08:38 on 03/21/2023  Exposure target: AUC24 (range)400-600 mg/L.hr   AUC24,ss: 536 mg/L.hr  Probability of AUC24 > 400: 80 %  Ctrough,ss: 16.3 mg/L  Probability of Ctrough,ss > 20: 32 %  Probability of nephrotoxicity (Lodise UMER 2009): 12 %    Will continue to follow and modify dosage according to levels, organ function and clinical condition.    Venessa Ruvalcaba, PharmD

## 2023-03-21 NOTE — PROCEDURES
[facility]    Procedure: RUQ drain placement, CT sinogram    Date/Time: 3/21/2023 6:21 PM  Performed by: Desmond Smith DO  Authorized by: Desmond Simth DO       UNIVERSAL PROTOCOL   Site Marked: NA  Prior Images Obtained and Reviewed:  Yes  Required items: Required blood products, implants, devices and special equipment available    Patient identity confirmed:  Verbally with patient, arm band, provided demographic data and hospital-assigned identification number  Patient was reevaluated immediately before administering moderate or deep sedation or anesthesia  Confirmation Checklist:  Patient's identity using two indicators, relevant allergies, procedure was appropriate and matched the consent or emergent situation and correct equipment/implants were available  Time out: Immediately prior to the procedure a time out was called    Universal Protocol: the Joint Commission Universal Protocol was followed    Preparation: Patient was prepped and draped in usual sterile fashion       ANESTHESIA    Anesthesia: Local infiltration  Local Anesthetic:  Lidocaine 1% without epinephrine      SEDATION  Patient Sedated: Yes    Vital signs: Vital signs monitored during sedation    See dictated procedure note for full details.  Findings: Small collection within the gallbladder fossa. 10 Fr Pigtail drain placed within the collection, small volume of thin blood tinged bilious fluid was aspirated and sent for analysis.    Existing surgical drain was interrogate with dilute contrast and CT sinogram confirmed continuity of the existing drain within the GB fossa collection, now controlled with the new pigtail drain.    Specimens: fluid and/or tissue for laboratory analysis and culture    Complications: None    Condition: Stable    Plan: Routine aftercare. New 10 Fr RUQ pigtail drain to bulb drainage, flushes ordered. Monitor outputs.  Existing surgical drain is patent and  has partial communication with the GB fossa collection. The small collection is now optimally managed with the new pigtail drain. Surgical drain is can be removed at the discretion of the primary surgical team.      PROCEDURE    Patient Tolerance:  Patient tolerated the procedure well with no immediate complications  Length of time physician/provider present for 1:1 monitoring during sedation: 20

## 2023-03-21 NOTE — PLAN OF CARE
"Observation Goals:  - Diagnostic tests and consults completed and resulted - Not met  - Vital signs normal or at patient baseline - Not met  BP 90/54 (BP Location: Left arm)   Pulse 84   Temp 98.7  F (37.1  C) (Oral)   Resp 14   Ht 1.575 m (5' 2.01\")   Wt 72.6 kg (160 lb)   SpO2 100%   BMI 29.26 kg/m    "

## 2023-03-21 NOTE — PRE-PROCEDURE
GENERAL PRE-PROCEDURE:     Written consent obtained?: Yes    Risks and benefits: Risks, benefits and alternatives were discussed    Consent given by:  Patient  Patient states understanding of procedure being performed: Yes    Patient's understanding of procedure matches consent: Yes    Procedure consent matches procedure scheduled: Yes    Expected level of sedation:  Moderate  Appropriately NPO:  Yes  ASA Class:  3  Mallampati  :  Grade 1- soft palate, uvula, tonsillar pillars, and posterior pharyngeal wall visible  Lungs:  Lungs clear with good breath sounds bilaterally  Heart:  Normal heart sounds and rate  History & Physical reviewed:  History and physical reviewed and no updates needed  Statement of review:  I have reviewed the lab findings, diagnostic data, medications, and the plan for sedation

## 2023-03-21 NOTE — PROGRESS NOTES
Transplant Surgery  Inpatient Daily Progress Note  03/21/2023    Assessment & Plan: 67 year old female with a history of laparoscopic cholecystectomy on 12/11/22 at [facility]SH complicated by bile duct injury s/p ex lap, washout, and drain placement 12/28/22. Admitted (1/1-1/11/23) to transplant liver surgery team for management of bile duct injury and intra abdominal fluid collection. Patient had drain placement x 2 and unsuccessful PTC placement. Patient now readmitted with decreased drain output over the past 5 days, elevated LFTs, and low grade fever.      GI/Nutrition:   S/p Lap sri complicated by bile duct injury, bile leak, intra-abdominal Infection: IR unable to place PTC at this time. IR consulted for drain placement intra abdominal fluid collection- planning for this afternoon yet. No need to flush current drain prior to IR exchange tomorrow.  LFTs increased overall, but improving since admission.    Hematology:   Anemia secondary to chronic infection/inflammation: b/l Hgb 10-11. Stable    Cardiorespiratory:   HLD: PTA pravastatin restarted  Hypovolemia: Hypotension and tachycardia. Fluid bolus.    Endocrine: No issues.     Fluid/Electrolytes: MIVF: continue  ml/hr. Receive 3L boluses overnight due to hypotension. Replete electrolytes PRN    : No issues    Infectious disease: T max 100.8. WBC WNL. IR consult for drain placement. On zosyn, added vanco overnight due to hypotension.    Prophylaxis: DVT, encourage ambulation, low risk    Disposition: 7A    JOANN/Fellow/Resident Provider: Lorna Cuadra PA-C [id number]     Faculty: Desmond Ruano M.D.    __________________________________________________________________  History: Admitted 3/19/2023 for uncontrolled intra abdominal fluid collection.     Interval History: History is obtained from the patient and EMR  Overnight events: Feels generally OK, mild nausea and mildly elevated temp. Intra-abdominal drain with decreased output. Awaiting IR  "procedure    ROS:   A 10-point review of systems was negative except as noted above.    Curent Meds:   piperacillin-tazobactam  3.375 g Intravenous Q6H    pravastatin  40 mg Oral QPM    vancomycin  1,250 mg Intravenous Q24H    venlafaxine  112.5 mg Oral Daily       Physical Exam:     Admit Weight: 72.6 kg (160 lb)    Current Vitals:   BP 90/54 (BP Location: Left arm, Patient Position: Supine, Cuff Size: Adult Regular)   Pulse 77   Temp 98.2  F (36.8  C) (Oral)   Resp 17   Ht 1.575 m (5' 2.01\")   Wt 72.6 kg (160 lb)   SpO2 96%   BMI 29.26 kg/m           Vital sign ranges:    Temp:  [97.8  F (36.6  C)-100.8  F (38.2  C)] 98.2  F (36.8  C)  Pulse:  [] 77  Resp:  [14-17] 17  BP: (77-97)/(44-66) 90/54  SpO2:  [94 %-100 %] 96 %  Patient Vitals for the past 24 hrs:   BP Temp Temp src Pulse Resp SpO2   03/21/23 1500 90/54 98.2  F (36.8  C) Oral 77 17 96 %   03/21/23 1100 95/53 98.4  F (36.9  C) Oral 74 16 98 %   03/21/23 1049 -- 97.9  F (36.6  C) Oral -- -- --   03/21/23 1000 97/52 -- -- 84 -- 98 %   03/21/23 0845 94/51 -- -- -- -- --   03/21/23 0745 (!) 87/50 100.2  F (37.9  C) Oral 89 16 94 %   03/21/23 0559 94/52 99  F (37.2  C) Oral 104 16 96 %   03/21/23 0214 90/54 98.7  F (37.1  C) Oral 84 14 100 %   03/21/23 0100 (!) 86/48 97.8  F (36.6  C) Oral 84 16 96 %   03/20/23 2355 (!) 77/48 -- -- -- -- --   03/20/23 2300 (!) 86/45 -- -- -- -- --   03/20/23 2254 (!) 82/47 100.2  F (37.9  C) Oral 104 15 96 %   03/20/23 2159 (!) 83/44 (!) 100.8  F (38.2  C) Oral 111 14 97 %   03/20/23 2057 (!) 84/47 100.3  F (37.9  C) -- 98 -- 94 %   03/20/23 2003 93/66 99.2  F (37.3  C) -- 92 16 99 %   03/20/23 1754 (!) 78/51 -- -- 90 -- --   03/20/23 1732 (!) 81/48 98.7  F (37.1  C) -- 86 16 98 %   03/20/23 1730 (!) 77/44 -- -- -- -- --     General Appearance: in no apparent distress.   Skin: normal, warm, No rashes, induration, or jaundice  Heart: Perfused  Lungs: Nonlabored resps on RA  Abdomen: nontender, drain in place with " residual bilious output  Neurologic: awake, alert and oriented. Tremor absent.    Frailty Scores    There is no flowsheet data to display.         Data:   CMP  Recent Labs   Lab 03/21/23  0645 03/20/23  0640 03/19/23  1643 03/19/23  1542   * 136  --  136   POTASSIUM 3.6 3.8  --  3.9   CHLORIDE 102 103  --  102   CO2 20* 23  --  24   * 110*  --  108*   BUN 13.9 9.3  --  10.2   CR 1.25* 0.82  --  0.75   GFRESTIMATED 48* 79  --  88   NASRIN 8.5* 9.5  --  9.8   MAG 1.5* 2.5*  --   --    LIPASE  --   --   --  14   ALBUMIN 2.8* 3.5  --  3.6   BILITOTAL 1.1 1.6*  --  1.9*   ALKPHOS 309* 591*  --  635*   AST 54* 180*   < >  --    * 269*  --  318*    < > = values in this interval not displayed.     CBC  Recent Labs   Lab 03/21/23  0645 03/20/23  0640   HGB 10.4* 11.6*   WBC 10.3 8.6    169     COAGS  Recent Labs   Lab 03/19/23  1542   INR 0.97      UrinalysisNo lab results found.  Virology:  No results found for: CSPEC, CMVPCR, CMQNT, CMVQ, EBVDN, CMVM, CMIG, CMVG, CMIGG, CMLTX, EBVIGG, EBIGG, EBVAGN, HCVAB, HBSAB, BKRES    Attestation:  Patient has been seen with team and evaluated by me.   Perc drain today  Vital signs, labs, medications and orders were reviewed.   When obtained, diagnostic images were reviewed by me and interpreted as above.    The care plan was discussed with the multidisciplinary team and I agree with the findings and plan in this note, with any differences recorded in blue.    .

## 2023-03-21 NOTE — PLAN OF CARE
"Observation Goals:  - Diagnostic tests and consults completed and resulted - Not met  - Vital signs normal or at patient baseline - Not met  BP (!) 77/48 (BP Location: Left arm)   Pulse 104   Temp 100.2  F (37.9  C) (Oral)   Resp 15   Ht 1.575 m (5' 2.01\")   Wt 72.6 kg (160 lb)   SpO2 96%   BMI 29.26 kg/m    "

## 2023-03-21 NOTE — PLAN OF CARE
Goal Outcome Evaluation:  -diagnostic tests and consults completed and resulted. No  -vital signs normal or at patient baseline. No. Hypotensive.

## 2023-03-21 NOTE — PROGRESS NOTES
Transplant Surgery Cross-Cover Long Call Shift  03/20/2023    Paged earlier this evening regarding Zeinab Bermudez's hypotensive blood pressures (BP 80s/40s) and low urine output (only 250 mL uziel urine all day). Body fluid culture from IR drain grew S. Hominis. Discussed the above with Transplant Fellow, Dr. Jose Tang M.D., and initiated a 1L LR bolus and started IV vancomycin given concern for intra-abdominal infection.    Patient was seen at 20:40h. At this time, she had completed a 1L LR bolus and had just started IV vancomycin. During the bolus her BP improved to 93/66. However, at the time she was seen, BP 84/47, T 100.3, HR 98, and SpO2 94% on room air.    Patient endorsed chills, fatigue, mild headache, and pruritus of the anterior chest. She denied nausea (although had emesis once early this morning), SOB, chest pain, or dizziness. Denied abdominal pain, rating it 1/10. Passing flatus.    BP 84/47, T 100.3, HR 98, and SpO2 94% on room air.    Gen: Adult female supine in bed, alert and interactive, in NAD.  HEENT: Face symmetric, no jaundice or scleral icterus.  Pulm: CTAB, breathing non-labored on room air.  CV: RRR.  Abdomen: soft, non-tender, non-distended. No guarding or rebound tenderness. Well-healed midline incision. Right abdominal IR drain with gomez-green bilious output in bulb drain.  Extremities: warm and well perfused.    A/P: Zeinab Bermudez is a 67 year old female with history of common bile duct transection during cholecystectomy, with IR biliary drain, who is currently under observation while awaiting IR PTC placement in the morning.    Hypotension and temperature elevations on Zosyn, with S. hominis growth in body fluid culture, support a clinical picture of intra-abdominal infection. IV vancomycin started and has not yet had time to take effect. Abdominal exam appears benign without signs of either localized or diffuse peritonitis.    - Ordered another 1L bolus for hypotension  -  Continue IV vanc and Zosyn  - Continue to monitor vital signs and strict I&O  - If patient develops sepsis, would plan to transfer from observation status to a higher level of care.    Discussed with Transplant Fellow, Dr. Jose Tang M.D.    Tanya Oliva M.D.  General Surgery Resident PGY1  [facility]  Surgery Cross-Cover Long Call Shift

## 2023-03-21 NOTE — UTILIZATION REVIEW
Admission Status; Secondary Review Determination    Under the authority of the Utilization Management Committee, the utilization review process indicated a secondary review on the above patient. The review outcome is based on review of the medical records, discussions with staff, and applying clinical experience noted on the date of the review.    (x) Inpatient Status Appropriate - This patient's medical care is consistent with medical management for inpatient care and reasonable inpatient medical practice.    RATIONALE FOR DETERMINATION: 67-year-old female with history of cholecystectomy complicated by bile duct leak with plans for surgical reconstruction next month now presents to the hospital with significant acute reduction in bile drain with resultant increased abdominal pain and increasing fluid intra-abdominally noted on imaging.  Patient with recurrent hypotension, fever and need for broad-spectrum IV antibiotics awaiting successful intra-abdominal drainage appropriate for inpatient care.    At the time of admission with the information available to the attending physician more than 2 nights Hospital complex care was anticipated, based on patient risk of adverse outcome if treated as outpatient and complex care required. Inpatient admission is appropriate based on the Medicare guidelines.    This document was produced using voice recognition software    The information on this document is developed by the utilization review team in order for the business office to ensure compliance. This only denotes the appropriateness of proper admission status and does not reflect the quality of care rendered.    The definitions of Inpatient Status and Observation Status used in making the determination above are those provided in the CMS Coverage Manual, Chapter 1 and Chapter 6, section 70.4.    Sincerely,    Kenton Edwards MD  Utilization Review  Physician Advisor  [facility].

## 2023-03-21 NOTE — PLAN OF CARE
"Observation Goals:  - Diagnostic tests and consults completed and resulted - Not met  - Vital signs normal or at patient baseline - Not met  BP (!) 84/47 (BP Location: Right arm)   Pulse 98   Temp 100.3  F (37.9  C)   Resp 16   Ht 1.575 m (5' 2.01\")   Wt 72.6 kg (160 lb)   SpO2 94%   BMI 29.26 kg/m    "

## 2023-03-21 NOTE — PROVIDER NOTIFICATION
"Text paged First Call General Surgery Resident, \"Pt reports itching all over. No rashes/hives noted. Can she have Benadryl?\"  "

## 2023-03-21 NOTE — IR NOTE
Patient Name: Zeinab Bermudez  Medical Record Number: [id number]  Today's Date: 3/21/2023    Procedure: Abdominal drain placement  Proceduralist: Dr. Wyatt, Dr. Smith  Pathology present: NA    Procedure Start: 1757  Procedure end: 1816  Sedation medications administered: 100 mcg fentanyl and 2 mg versed and 50 mg benadryl    Report given to: Mary FRANCOIS   : BENEDICT    Other Notes: Pt arrived to IR room 1 from . Consent reviewed. Pt denies any questions or concerns regarding procedure. Pt positioned supine and monitored per protocol. Pt tolerated procedure without any noted complications. Pt transferred back to .    Labs sent.

## 2023-03-22 LAB
ALBUMIN SERPL BCG-MCNC: 2.9 G/DL (ref 3.5–5.2)
ALP SERPL-CCNC: 249 U/L (ref 35–104)
ALT SERPL W P-5'-P-CCNC: 96 U/L (ref 10–35)
ANION GAP SERPL CALCULATED.3IONS-SCNC: 11 MMOL/L (ref 7–15)
AST SERPL W P-5'-P-CCNC: 27 U/L (ref 10–35)
BILIRUB SERPL-MCNC: 0.9 MG/DL
BUN SERPL-MCNC: 15.5 MG/DL (ref 8–23)
CALCIUM SERPL-MCNC: 8.7 MG/DL (ref 8.8–10.2)
CHLORIDE SERPL-SCNC: 109 MMOL/L (ref 98–107)
CREAT SERPL-MCNC: 1.07 MG/DL (ref 0.51–0.95)
DEPRECATED HCO3 PLAS-SCNC: 18 MMOL/L (ref 22–29)
ERYTHROCYTE [DISTWIDTH] IN BLOOD BY AUTOMATED COUNT: 14.4 % (ref 10–15)
GFR SERPL CREATININE-BSD FRML MDRD: 57 ML/MIN/1.73M2
GLUCOSE SERPL-MCNC: 78 MG/DL (ref 70–99)
HCT VFR BLD AUTO: 28.5 % (ref 35–47)
HGB BLD-MCNC: 8.7 G/DL (ref 11.7–15.7)
MAGNESIUM SERPL-MCNC: 2 MG/DL (ref 1.7–2.3)
MCH RBC QN AUTO: 29 PG (ref 26.5–33)
MCHC RBC AUTO-ENTMCNC: 30.5 G/DL (ref 31.5–36.5)
MCV RBC AUTO: 95 FL (ref 78–100)
PLATELET # BLD AUTO: 125 10E3/UL (ref 150–450)
POTASSIUM SERPL-SCNC: 3.6 MMOL/L (ref 3.4–5.3)
PROT SERPL-MCNC: 5.3 G/DL (ref 6.4–8.3)
RBC # BLD AUTO: 3 10E6/UL (ref 3.8–5.2)
SODIUM SERPL-SCNC: 138 MMOL/L (ref 136–145)
WBC # BLD AUTO: 5.3 10E3/UL (ref 4–11)

## 2023-03-22 PROCEDURE — 80053 COMPREHEN METABOLIC PANEL: CPT | Performed by: PHYSICIAN ASSISTANT

## 2023-03-22 PROCEDURE — 99232 SBSQ HOSP IP/OBS MODERATE 35: CPT | Mod: FS | Performed by: TRANSPLANT SURGERY

## 2023-03-22 PROCEDURE — 250N000013 HC RX MED GY IP 250 OP 250 PS 637: Performed by: STUDENT IN AN ORGANIZED HEALTH CARE EDUCATION/TRAINING PROGRAM

## 2023-03-22 PROCEDURE — 258N000003 HC RX IP 258 OP 636: Performed by: STUDENT IN AN ORGANIZED HEALTH CARE EDUCATION/TRAINING PROGRAM

## 2023-03-22 PROCEDURE — 250N000011 HC RX IP 250 OP 636: Performed by: STUDENT IN AN ORGANIZED HEALTH CARE EDUCATION/TRAINING PROGRAM

## 2023-03-22 PROCEDURE — 36415 COLL VENOUS BLD VENIPUNCTURE: CPT | Performed by: PHYSICIAN ASSISTANT

## 2023-03-22 PROCEDURE — 120N000011 HC R&B TRANSPLANT UMMC

## 2023-03-22 PROCEDURE — 999N000128 HC STATISTIC PERIPHERAL IV START W/O US GUIDANCE

## 2023-03-22 PROCEDURE — 250N000011 HC RX IP 250 OP 636: Performed by: PHYSICIAN ASSISTANT

## 2023-03-22 PROCEDURE — 85027 COMPLETE CBC AUTOMATED: CPT | Performed by: PHYSICIAN ASSISTANT

## 2023-03-22 PROCEDURE — 83735 ASSAY OF MAGNESIUM: CPT | Performed by: PHYSICIAN ASSISTANT

## 2023-03-22 RX ORDER — CHOLECALCIFEROL (VITAMIN D3) 50 MCG
1 TABLET ORAL DAILY
COMMUNITY

## 2023-03-22 RX ORDER — AMPICILLIN AND SULBACTAM 2; 1 G/1; G/1
3 INJECTION, POWDER, FOR SOLUTION INTRAMUSCULAR; INTRAVENOUS EVERY 6 HOURS
Status: DISCONTINUED | OUTPATIENT
Start: 2023-03-22 | End: 2023-03-24

## 2023-03-22 RX ADMIN — PIPERACILLIN AND TAZOBACTAM 3.38 G: 3; .375 INJECTION, POWDER, LYOPHILIZED, FOR SOLUTION INTRAVENOUS at 06:22

## 2023-03-22 RX ADMIN — PRAVASTATIN SODIUM 40 MG: 20 TABLET ORAL at 08:04

## 2023-03-22 RX ADMIN — VENLAFAXINE HYDROCHLORIDE 112.5 MG: 75 CAPSULE, EXTENDED RELEASE ORAL at 08:04

## 2023-03-22 RX ADMIN — AMPICILLIN SODIUM AND SULBACTAM SODIUM 3 G: 2; 1 INJECTION, POWDER, FOR SOLUTION INTRAMUSCULAR; INTRAVENOUS at 17:41

## 2023-03-22 RX ADMIN — PIPERACILLIN AND TAZOBACTAM 3.38 G: 3; .375 INJECTION, POWDER, LYOPHILIZED, FOR SOLUTION INTRAVENOUS at 00:59

## 2023-03-22 RX ADMIN — PIPERACILLIN AND TAZOBACTAM 3.38 G: 3; .375 INJECTION, POWDER, LYOPHILIZED, FOR SOLUTION INTRAVENOUS at 11:50

## 2023-03-22 RX ADMIN — AMPICILLIN SODIUM AND SULBACTAM SODIUM 3 G: 2; 1 INJECTION, POWDER, FOR SOLUTION INTRAMUSCULAR; INTRAVENOUS at 23:12

## 2023-03-22 RX ADMIN — Medication 1 MG: at 21:25

## 2023-03-22 RX ADMIN — ONDANSETRON 4 MG: 4 TABLET, ORALLY DISINTEGRATING ORAL at 18:25

## 2023-03-22 RX ADMIN — SODIUM CHLORIDE: 9 INJECTION, SOLUTION INTRAVENOUS at 08:09

## 2023-03-22 ASSESSMENT — ACTIVITIES OF DAILY LIVING (ADL)
ADLS_ACUITY_SCORE: 22
ADLS_ACUITY_SCORE: 22
DEPENDENT_IADLS:: INDEPENDENT
ADLS_ACUITY_SCORE: 22

## 2023-03-22 NOTE — PROGRESS NOTES
Observation goals :  -diagnostic tests and consults completed and resulted: not met   -vital signs normal or at patient baseline: not met

## 2023-03-22 NOTE — PHARMACY-ADMISSION MEDICATION HISTORY
Admission Medication History Completed by Pharmacy    See [facility] Admission Navigator for allergy information, preferred outpatient pharmacy, prior to admission medications and immunization status.     Medication History Sources:     Patient and spouse (Karan)     Dispense history     Changes made to PTA medication list (reason):    Added: Vitamin D     Deleted: None    Changed: Multivitamin (updated product), senna (added directions), calcium carbonate (pt reports taking 2 tabs daily), diphenhydramine (added prn reason), flushes (patient and  report a frequency of every 8 hours)     Prior to Admission medications    Medication Sig Last Dose Taking? Auth Provider Long Term End Date   acetaminophen (TYLENOL) 325 MG tablet Take 325-650 mg by mouth every 6 hours as needed for mild pain  at PRN Yes Unknown, Entered By History     calcium carbonate (OS-NASRIN) 500 MG tablet Take 2 tablets by mouth daily 3/19/2023 Yes Unknown, Entered By History     diphenhydrAMINE (BENADRYL) 25 MG tablet Take 25 mg by mouth every 6 hours as needed for itching  at PRN Yes Reported, Patient     LORazepam (ATIVAN) 0.5 MG tablet Take 0.5 mg by mouth daily as needed for anxiety Past Week at PRN - takes 2-3x per month Yes Reported, Patient     melatonin 5 MG sublingual tablet Place 1 tablet (5 mg) under the tongue At Bedtime 3/18/2023 at HS Yes Susan Pisano NP     Multiple Vitamins-Minerals (WOMENS MULTI PO) Take 2 tablets by mouth daily  Yes Unknown, Entered By History     ondansetron (ZOFRAN ODT) 4 MG ODT tab Take 1 tablet (4 mg) by mouth every 6 hours as needed for nausea or vomiting Past Week at PRN Yes Susan Pisano NP No    pravastatin (PRAVACHOL) 40 MG tablet Take 40 mg by mouth daily 3/19/2023 at AM Yes Reported, Patient Yes    Sennosides (SENNA) 8.6 MG CAPS Take 8.6 mg by mouth daily as needed 3/18/2023 at PRN Yes Reported, Patient     sodium chloride, PF, 0.9% PF flush 3 mLs by Intracatheter route daily for 90  days  Patient taking differently: 3 mLs by Intracatheter route every 8 hours  Yes Lacy Lopez NP  5/31/23   ursodiol (ACTIGALL) 300 MG capsule Take 1 capsule (300 mg) by mouth 2 times daily Unknown Yes Susan Pisano NP     venlafaxine (EFFEXOR XR) 37.5 MG 24 hr capsule Take 112.5 mg by mouth daily 3/19/2023 at AM Yes Reported, Patient     vitamin D3 (CHOLECALCIFEROL) 50 mcg (2000 units) tablet Take 1 tablet by mouth daily  Yes Unknown, Entered By History         Date completed: 03/22/23    Medication history completed by: FRED ARCE RPh

## 2023-03-22 NOTE — PROGRESS NOTES
Antimicrobial Stewardship Team Note    Antimicrobial Stewardship Program - A joint venture between [facility] and  Physicians to optimize antibiotic management.  NOT a formal consult - Restricted Antimicrobial Review     Patient: Zeinab Bermudez  MRN: [id number]  Allergies: Patient has no known allergies.    Brief Summary: Zeinab Bermudez is a 67 year old female with history of HTN, hyperlipidemia, and cholecystectomy at [facility] on 12/11/2022 c/b transection of the common bile duct, bile leak requiring ex lap/washout/MELITA placement with surgical drain in place, plan for reconstruction in April 2023 who was admitted on 3/19/2023 for evaluation of complications of a bile drain.    History of Present Illness: Patient reported average drainage of about 300 mL but over the last 5 days is only drained 45 mL and only drained 15 mL on the day of presentation with no changes in color. Endorsed slight increase in RUQ pain (2/10) and slight decrease in bowel output. No fevers, chills, diaphoresis, nausea, or vomiting. On presentation, patient had a tmax of 100.5 with all other VS wnl. Labs were notable for elevated LFTs (ALT//180, T bili 1.6, andalk phos 591). WBC wnl. CT abdomen/pelvis showed stable positioning of RUQ percutaneous drain in the gallbladder fossa with a more defined rim enhancing fluid collection containing scattered foci of gas anterior to the liver, suggestive of abscess. The surgical drain appears to terminate just outside of this collection. There is also increased mild intrahepatic biliary dilatation and unchanged perihepatic and perisplenic ascites. Patient was initiated on empiric Zosyn for intraabdominal abscess.  Later that day, patient was noted to have low BP (77/48) with temp 100.8 and IV vancomycin was added given prior Staphylococcus hominis, oxacillin susceptible from gallbladder fluid on 2/7/2023. Patient endorsed chills, fatigue, mild headache, and pruritus of the anterior  chest. She denied nausea (although had emesis once early this morning), SOB, chest pain, dizziness or abdominal pain. Non-tender and non-distended abdomen without guarding or rebound tenderness on exam. Right abdominal IR drain noted to output gomez-green bilious fluid. Patient underwent placement of CT guided gallbladder fossa collection on 3/21. Abdominal fluid culture is growing 3+ Enterococcus faecalis, pending susceptibility.          Active Anti-infective Medications   (From admission, onward)                 Start     Stop    03/21/23 2000  vancomycin  1,250 mg,   Intravenous,   EVERY 24 HOURS        Intra-Abdominal Infection        --    03/20/23 0800  piperacillin-tazobactam  3.375 g,   Intravenous,   EVERY 6 HOURS        Intra-Abdominal Infection        --                  Assessment: Intraabdominal Abscess  Patient is currently on day 3 of Zosyn and vancomycin for intraabdominal abscess from bile leak s/p drain placement. Fever curve is improving and patient remains hemodynamically stable. No leukocytosis has been noted throughout admission. Abdominal fluid culture is growing Enterococcus faecalis which is 100% susceptible to penicillin and ampicillin based on the [facility] antibiogram. We recommend stopping vancomycin given no evidence of MRSA or resistant gram-positive organisms on culture. Staphylococcus hominis isolated from abdominal fluid culture on 2/7/2023 is also susceptible to oxacillin, thus covered by beta-lactams. Furthermore, there is no evidence of Pseudomonas in culture and empiric Zosyn may be deescalated to Unasyn for targeted E. faecalis coverage as well as other intraabdominal enteric organisms. Development of abscess in the presence of surgical drain is concerning for ongoing bile leak, though patient is planned for reconstruction in April. At this time, it is difficult to determine duration of antibiotic therapy given drain in place with ongoing output. We recommend obtaining ID consult  for further evaluation and antibiotic management, including determination of duration of therapy.     Recommendations:  Stop IV vancomycin   Deescalate Zosyn to Unasyn 3g every 6 hours  ID consult     Pharmacy took the following actions: Called/paged provider, Electronic note created.    Discussed with ID Staff: Patricia Venegas MD, MS  Madina Pinzon, PharmD, BCIDP  Pager: [phone]    Vital Signs/Clinical Features:  Vitals         03/20 0700 03/21 0659 03/21 0700 03/22 0659 03/22 0700  03/22 1508   Most Recent      Temp ( F) 97.8 -  100.8    97.6 -  100.2    98.1 -  98.2     98.2 (36.8) 03/22 1435    Pulse 84 -  111    70 -  89    74 -  75     75 03/22 1435    Resp 14 -  17    12 -  28      16     16 03/22 1435    BP 77/44 -  99/46    87/50 -  109/63    114/79 -  129/78     129/78 03/22 1435    SpO2 (%) 94 -  100    91 -  99    94 -  95     95 03/22 1435            Labs  Estimated Creatinine Clearance: 48.9 mL/min (A) (based on SCr of 1.07 mg/dL (H)).  Recent Labs   Lab Test 02/07/23  0637 03/09/23  0904 03/19/23  1542 03/20/23  0640 03/21/23  0645 03/22/23  0458   CR 1.10* 0.84 0.75 0.82 1.25* 1.07*       Recent Labs   Lab Test 02/07/23  0637 03/09/23  0904 03/19/23  1542 03/20/23  0640 03/21/23  0645 03/22/23  0458   WBC 6.3 5.4 6.0 8.6 10.3 5.3   HGB 11.4* 12.0 12.0 11.6* 10.4* 8.7*   HCT 35.0 37.7 38.6 37.0 32.4* 28.5*   MCV 91 92 93 93 92 95    190 190 169 183 125*       Recent Labs   Lab Test 02/07/23  0637 03/09/23  0904 03/19/23  1542 03/19/23  1643 03/20/23  0640 03/21/23  0645 03/22/23  0458   BILITOTAL 0.9 1.0 1.9*  --  1.6* 1.1 0.9   ALKPHOS 266* 404* 635*  --  591* 309* 249*   ALBUMIN 3.0* 4.0 3.6  --  3.5 2.8* 2.9*   AST 81* 106*  --  279* 180* 54* 27   * 133* 318*  --  269* 135* 96*       Recent Labs   Lab Test 02/06/23  1702 03/21/23  0023   LACT 1.5 1.0             Culture Results:  7-Day Micro Results       Procedure Component Value Units Date/Time    Anaerobic bacterial culture  [[id number]] Collected: 03/21/23 1809    Order Status: Sent Lab Status: In process Updated: 03/21/23 1923    Specimen: Abscess from Abdomen     Fungus Culture, non-blood [[id number]] Collected: 03/21/23 1809    Order Status: Sent Lab Status: In process Updated: 03/21/23 1923    Specimen: Abscess from Abdomen     Body fluid, unsp Aerobic Bacterial Culture Routine [[id number]]  (Abnormal) Collected: 03/21/23 1808    Order Status: Completed Lab Status: Preliminary result Updated: 03/22/23 1435    Specimen: Body fluid, unsp from Abdomen      Culture Culture in progress      3+ Enterococcus faecalis    Gram stain [[id number]]  (Abnormal) Collected: 03/21/23 1808    Order Status: Completed Lab Status: Final result Updated: 03/21/23 2326    Specimen: Ascites Fluid from Abdomen      Gram Stain Result 2+ Gram positive cocci      2+ WBC seen                                    Imaging: US Abdomen Complete    Result Date: 3/19/2023  EXAMINATION: US ABDOMEN LIMITED,  3/19/2023 5:46 PM COMPARISON: CT AP 2/6/23, ultrasound 1/10/2023 History: History bile duct transection, biliary drain-nonfunctional. Evaluate for hepatobiliary pathology/dilation. TECHNIQUE: The abdomen was scanned in standard fashion with specialized ultrasound transducer(s) using both gray-scale and limited color Doppler techniques. FINDINGS: Liver: Heterogeneous echogenicity of the liver. Liver measures 15.5 cm. Within the midline abdomen, there is an indeterminant fluid collection with air that may or may not represent the duodenum. Gallbladder: Surgically absent. At the gallbladder fossa, there is a drain in place. Bile Ducts: Intrahepatic biliary system is of normal caliber. The common bile duct is not definitely visualized. Kidneys: Normal right kidney echotexture. Right kidney measures 9.4 cm.      IMPRESSION: 1.  Heterogeneous appearance of the liver which may possibly represents areas of hepatic infarct versus inflammation. 2.  Indeterminate fluid  collection with air that may possibly represent the duodenum. Consider CT abdomen and pelvis with contrast for further evaluation. I have personally reviewed the examination and initial interpretation and I agree with the findings. CARLIE SANTOS MD   SYSTEM ID:  [id number]    CT Liver Abscess Drainage    Result Date: 3/21/2023  Procedures 3/21/2023: 1. New CT-guided abscess drain placement 2. CT sinogram through existing drain History: surgical drain in place, not draining well.  place looped catheter into GB fossa collection. Comparison: CT from 3/19/2023 Staff: Addison Wyatt MD Fellow/Resident: NICK Smith D.O. Monitoring: Patient was placed on continuous monitoring with intravenous conscious sedation administered by the trained IR nursing staff and supervised by the IR attending. Patient remained stable throughout the procedure. Medications: 1. Versed IV: 2 mg 2. Fentanyl IV: 100 mcg 3. 50 mg Benadryl IV 4. 10  cc 1% lidocaine Sedation time, face-to-face: 20 minutes DLP: 1343 mGy*cm Findings/procedure: Prior to the procedure, both verbal and written informed consent obtained from the patient. Patient placed supine on the CT table. Preprocedure scan obtained revealing a small gallbladder fossa collection. Surgical drain appears vertically adjacent to it, not well positioned in relation to this collection. Adequate percutaneous approach for drain placement. The upper abdomen was prepped and draped. Timeout performed. 1% Lidocaine used for local analgesia. Under intermittent CT fluoroscopic guidance, a 5 Ecuadorean Comvivaeh centesis needle catheter was advanced into the gallbladder fossa fluid collection. CT image documenting needle catheter position within the abscess was saved in the patient's record. Needle removed. Over a guidewire, catheter was removed and tract dilated to 10 Ecuadorean. 10 Ecuadorean locking pigtail catheter advanced over the wire into the collection. Guidewire removed. Pigtail formed and locked.  About 10 mL red-tinged, thin bilious fluid was aspirated and sent for labs. CT Fluoroscopy demonstrated adequate placement of the drainage catheter with the fluid collection. Representative CT images were saved in the patient's record. Catheter secured to the skin with a 2-0 Ethilon retention suture. Dressing applied and catheter connected to bag drainage. Dilute contrast was then injected through the existing surgical drain. Diagnostic CT is performed demonstrating partial continuity with this drain relation to the gallbladder fossa collection, with contrast within the collection and a small volume around the mature tract. Estimate blood loss: Minimal Specimens: Small sample of blood tinged, bilious fluid sent for analysis. Impression/plan: 1. Uncomplicated CT fluoroscopic guided gallbladder fossa collection drain placement with a 10 Jordanian locking pigtail drainage catheter. 2. Existing surgical drain was injected, demonstrating partial continuity with the gallbladder fossa collection. Considering the new drain is well-positioned and will provide optimal control of this gallbladder fossa collection, the existing surgical drain tube can be removed at the primary surgical team's discretion. 3. New right upper quadrant drain to bulb drainage. Flush catheter 10 cc normal saline every shift to maintain patency. Chart daily outputs. In general, contact IR when net output less than 10 cc in 24 hrs.     CT Abdomen Pelvis w Contrast    Result Date: 3/19/2023  EXAMINATION: CT ABDOMEN PELVIS W CONTRAST  3/19/2023 9:06 PM  HISTORY: Hx biliary drain transection, drain non-functional. Abnormal fluid collection noted on RUQ US COMPARISON: CT abdomen and pelvis 2/6/2023 PROCEDURE COMMENTS: CT of the abdomen and pelvis was performed with Isovue 370 intravenous contrast. Coronal and sagittal reformatted images were obtained. FINDINGS: Lower thorax: The lung bases are clear. Abdomen and pelvis: There is increased mild intrahepatic  biliary dilatation. No focal hepatic mass. There is some heterogeneous vague hypoattenuating area in the anterior right hepatic lobe, series 4 image 50 which likely correlates with heterogeneity on ultrasound. Stable position of percutaneous drain in the right upper quadrant. There is a more well-defined partially rim-enhancing fluid collection with gas along the anterior margin of the liver measuring approximately 3.6 x 2.7 cm (series 4 image 66) which correlates with the finding on ultrasound. This is adjacent to but separate from the duodenal bulb on CT. Small amount of perihepatic and perisplenic ascites. Cholecystectomy. The pancreas is unremarkable. The spleen and adrenal glands are normal in appearance. Symmetric renal cortical enhancement. No hydronephrosis or nephrolithiasis. The urinary bladder is well-distended and appears unremarkable. Trace free fluid within the pelvis. No free intraperitoneal air. No abnormally dilated loops of large or small bowel. Moderate to large colonic stool burden. Postoperative changes of hysterectomy. No abdominal or pelvic lymphadenopathy by size criteria. The major intra-abdominal vasculature is widely patent. The infrarenal aorta is normal in caliber. Bones: Multilevel degenerative changes of the spine. No suspicious or aggressive appearing bone lesions.     IMPRESSION: 1. Stable positioning of right upper quadrant percutaneous drain in the gallbladder fossa with a more defined rim enhancing fluid collection containing scattered foci of gas anterior to the liver, suggestive of abscess. The surgical drain appears to terminate just outside of this collection. 2. Increased mild intrahepatic biliary dilatation. 3. Unchanged perihepatic and perisplenic ascites. 4. There is some heterogeneity in the anterior inferior right hepatic lobe which may represent some edema/inflammation correlating with heterogeneity on ultrasound. I have personally reviewed the examination and initial  interpretation and I agree with the findings. CARLIE SANTOS MD   SYSTEM ID:  [id number]

## 2023-03-22 NOTE — TELEPHONE ENCOUNTER
FUTURE VISIT INFORMATION      SURGERY INFORMATION:    Date: 2023    Location: [facility] OR    Surgeon:  Desmond Ruano MD    Anesthesia Type:  General    Procedure: HEPATICOJEJUNOSTOMY through creation amy-en-Y    Consult: 23    RECORDS REQUESTED FROM:       Primary Care Provider: Jairo Ocampo MD - [facility]      Most recent EKG+ Tracin23 - Epic

## 2023-03-22 NOTE — PLAN OF CARE
"Vital signs:  Temp: 98.8  F (37.1  C) Temp src: Oral BP: 130/77 Pulse: 82   Resp: 16 SpO2: 95 % O2 Device: None (Room air) Oxygen Delivery: 1 LPM Height: 157.5 cm (5' 2.01\") Weight: 72.6 kg (160 lb)    7a-7p: Pt tolerating diet, +voids, +bm. ABX changed to unasyn. Around 530pm pt c/o sob, 02 95%, encouraged IS, notified MD. Around 6pm hung unasyn,  pt had several bouts of emesis. MD notified. No new orders received. Pt placed on 1L 02. +green output from drains x2. Drain teaching complete.    Problem: Plan of Care - These are the overarching goals to be used throughout the patient stay.    Goal: Plan of Care Review  Description: The Plan of Care Review/Shift note should be completed every shift.  The Outcome Evaluation is a brief statement about your assessment that the patient is improving, declining, or no change.  This information will be displayed automatically on your shift note.  Outcome: Progressing  Flowsheets (Taken 3/22/2023 4112)  Plan of Care Reviewed With: patient  Overall Patient Progress: improving   Goal Outcome Evaluation:      Plan of Care Reviewed With: patient    Overall Patient Progress: improvingOverall Patient Progress: improving           "

## 2023-03-22 NOTE — PROGRESS NOTES
Transplant Surgery  Inpatient Daily Progress Note  03/22/2023    Assessment & Plan: 67 year old female with a history of laparoscopic cholecystectomy on 12/11/22 at OSH complicated by bile duct injury s/p ex lap, washout, and drain placement 12/28/22. Admitted (1/1-1/11/23) to transplant liver surgery team for management of bile duct injury and intra abdominal fluid collection. Patient had drain placement x 2 and unsuccessful PTC placement. Patient now readmitted with decreased drain output over the past 5 days, elevated LFTs, and low grade fever.      GI/Nutrition:   S/p Lap sri complicated by bile duct injury, bile leak, intra-abdominal Infection: IR unable to place PTC at this time. IR consulted for drain placement intra abdominal fluid collection- placed 3/21 PM. Flush new drain NS 10 ml every 8 hours.   LFTs increased overall, but improving since admission.    Hematology:   Anemia secondary to chronic infection/inflammation: b/l Hgb 10-11. Decrease to ~9 likely dilutional in the setting of fluids.    Cardiorespiratory:   HLD: PTA pravastatin restarted  Hypovolemia: Hypotension and tachycardia. Fluid bolus. Now improving.    Endocrine: No issues. FBS 78    Fluid/Electrolytes: MIVF: Discontinue IVF now that post procedure and diet ordered. Previously received 3L boluses overnight due to hypotension. Replete electrolytes PRN    : No issues    Infectious disease: T max 100.2. WBC WNL. IR consult for drain placement. On zosyn and vanco, gram stain with gram + cocci, will await cultures prior to discharge.    Prophylaxis: DVT, encourage ambulation, low risk    Disposition: 7A. Will wait for cultures prior to discharge, likely tomorrow, possibly later tonight.    JOANN/Fellow/Resident Provider: Lorna Cuadra PA-C [id number]     Faculty: Desmond Ruano M.D.    __________________________________________________________________  History: Admitted 3/19/2023 for uncontrolled intra abdominal fluid collection.  "    Interval History: History is obtained from the patient and EMR  Overnight events: Feels well, would like to go home if possible.    ROS:   A 10-point review of systems was negative except as noted above.    Curent Meds:    piperacillin-tazobactam  3.375 g Intravenous Q6H     pravastatin  40 mg Oral QPM     sodium chloride (PF)  10 mL Irrigation Q8H     vancomycin  1,250 mg Intravenous Q24H     venlafaxine  112.5 mg Oral Daily       Physical Exam:     Admit Weight: 72.6 kg (160 lb)    Current Vitals:   /79 (BP Location: Left arm)   Pulse 74   Temp 98.1  F (36.7  C) (Oral)   Resp 16   Ht 1.575 m (5' 2.01\")   Wt 72.6 kg (160 lb)   SpO2 94%   BMI 29.26 kg/m           Vital sign ranges:    Temp:  [97.6  F (36.4  C)-98.2  F (36.8  C)] 98.1  F (36.7  C)  Pulse:  [70-88] 74  Resp:  [12-28] 16  BP: ()/(50-79) 114/79  SpO2:  [91 %-99 %] 94 %  Patient Vitals for the past 24 hrs:   BP Temp Temp src Pulse Resp SpO2   03/22/23 1020 114/79 98.1  F (36.7  C) Oral 74 16 94 %   03/22/23 0528 100/63 97.6  F (36.4  C) Oral 70 16 91 %   03/22/23 0200 109/63 97.9  F (36.6  C) Oral 71 16 97 %   03/21/23 2137 93/58 98  F (36.7  C) Oral 73 16 94 %   03/21/23 1820 94/55 -- -- 84 14 96 %   03/21/23 1815 90/50 -- -- 80 19 96 %   03/21/23 1810 93/55 -- -- 81 14 97 %   03/21/23 1805 103/62 -- -- 84 12 97 %   03/21/23 1800 97/53 -- -- 79 13 99 %   03/21/23 1755 101/60 -- -- 88 21 94 %   03/21/23 1750 109/59 -- -- 85 14 96 %   03/21/23 1745 107/63 -- -- 84 28 95 %   03/21/23 1740 105/61 -- -- 81 27 98 %   03/21/23 1735 101/59 -- -- 76 26 97 %   03/21/23 1733 -- -- -- -- 25 --   03/21/23 1730 102/58 -- -- 80 21 97 %   03/21/23 1500 90/54 98.2  F (36.8  C) Oral 77 17 96 %     General Appearance: in no apparent distress.   Skin: normal, warm, No rashes, induration, or jaundice  Heart: Perfused  Lungs: Nonlabored resps on RA  Abdomen: nontender, drain x 2 in place. New drain with bilious output  Neurologic: awake, alert and " oriented. Tremor absent.    Frailty Scores    There is no flowsheet data to display.         Data:   CMP  Recent Labs   Lab 03/22/23  0458 03/21/23  0645 03/19/23  1643 03/19/23  1542    134*   < > 136   POTASSIUM 3.6 3.6   < > 3.9   CHLORIDE 109* 102   < > 102   CO2 18* 20*   < > 24   GLC 78 106*   < > 108*   BUN 15.5 13.9   < > 10.2   CR 1.07* 1.25*   < > 0.75   GFRESTIMATED 57* 48*   < > 88   NASRIN 8.7* 8.5*   < > 9.8   MAG 2.0 1.5*   < >  --    LIPASE  --   --   --  14   ALBUMIN 2.9* 2.8*   < > 3.6   BILITOTAL 0.9 1.1   < > 1.9*   ALKPHOS 249* 309*   < > 635*   AST 27 54*   < >  --    ALT 96* 135*   < > 318*    < > = values in this interval not displayed.     CBC  Recent Labs   Lab 03/22/23  0458 03/21/23  0645   HGB 8.7* 10.4*   WBC 5.3 10.3   * 183     COAGS  Recent Labs   Lab 03/19/23  1542   INR 0.97      UrinalysisNo lab results found.  Virology:  No results found for: CSPEC, CMVPCR, CMQNT, CMVQ, EBVDN, CMVM, CMIG, CMVG, CMIGG, CMLTX, EBVIGG, EBIGG, EBVAGN, HCVAB, HBSAB, BKRES

## 2023-03-22 NOTE — CONSULTS
Care Management Initial Consult    General Information  Assessment completed with: Patient, Care Team Member, Spouse or significant other,    Type of CM/SW Visit: Initial Assessment    Primary Care Provider verified and updated as needed: Yes   Readmission within the last 30 days:        Reason for Consult: discharge planning  Advance Care Planning:            Communication Assessment  Patient's communication style: spoken language (English or Bilingual)    Hearing Difficulty or Deaf: no   Wear Glasses or Blind: yes    Cognitive  Cognitive/Neuro/Behavioral: WDL                      Living Environment:   People in home: spouse     Current living Arrangements: house      Able to return to prior arrangements: yes       Family/Social Support:  Care provided by: self  Provides care for: no one  Marital Status:              Description of Support System:           Current Resources:   Patient receiving home care services: No     Community Resources:  (Drain care supplies)  Equipment currently used at home: walker, rolling  Supplies currently used at home: Wound Care Supplies    Employment/Financial:  Employment Status: retired        Financial Concerns: No concerns identified           Lifestyle & Psychosocial Needs:  Social Determinants of Health     Tobacco Use: Low Risk      Smoking Tobacco Use: Never     Smokeless Tobacco Use: Never     Passive Exposure: Not on file   Alcohol Use: Not on file   Financial Resource Strain: Not on file   Food Insecurity: Not on file   Transportation Needs: Not on file   Physical Activity: Not on file   Stress: Not on file   Social Connections: Not on file   Intimate Partner Violence: Not on file   Depression: Not at risk     PHQ-2 Score: 0   Housing Stability: Not on file       Functional Status:  Prior to admission patient needed assistance:   Dependent ADLs:: Ambulation-walker  Dependent IADLs:: Independent       Mental Health Status: No concerns          Chemical Dependency  Status: No Concerns    Additional Information:  Patient status reviewed/discussed during care team rounds.  Pt admitted history of laparoscopic cholecystectomy on 12/11/22 at OSH complicated by bile duct injury s/p ex lap, washout, and drain placement 12/28/22. Admitted (1/1-1/11/23) to transplant liver surgery team for management of bile duct injury and intra abdominal fluid collection. Patient had drain placement x 2 and unsuccessful PTC placement. Patient now readmitted with decreased drain output over the past 5 days, elevated LFTs, and low grade fever.   Team anticipates pt will discharge on oral antibiotics.  Pt known to writer from previous hospitalizations.  Previously open to home care services.    Met with pt and spouse.  Per discussion with pt and spouse pt no longer receiving home care RN services.  Pt ordering drain care site care supplies via [employer].  Pt notes they may need help with securing normal saline flushes when cleared for discharge.  Pt spouse will check to see how many flushes they have at home.    Writer confirmed with [facility] that discharge pharmacy carries 5 ml normal saline syringes.  Cost for a box of 30 syringes is $25.90.  Reviewed with pt and her spouse.  Agreed to f/u with pt and spouse when closer to discharge.    Shabnam Ludwig RN BSN, PHN, ACM-RN  7A RN Care Coordinator  Phone: [phone]  Pager [phone]    To contact the weekend [facility]  [place] (0800 - 1630) Saturday and Sunday    Units: 4A, 4C, 4E, 5A and 5B- Pager 1: [phone]    Units: 6A, 6B, 6C, 6D- Pager 2: [phone]    Units: 7A, 7B, 7C, 7D, and 5C-Pager 3: [phone]    [facility] [place] ([phone]) Saturday and Sunday    Units: 5 Ortho, 8A, 10 ICU, & Pediatric Units-Pager 4: [phone]    3/22/2023 2:36 PM

## 2023-03-22 NOTE — PLAN OF CARE
"Goal Outcome Evaluation:    /63 (BP Location: Left arm)   Pulse 71   Temp 97.9  F (36.6  C) (Oral)   Resp 16   Ht 1.575 m (5' 2.01\")   Wt 72.6 kg (160 lb)   SpO2 97%   BMI 29.26 kg/m      Shift: 0396-3440  VS: Vitals stable, afebrile, room air   Neuro: Alert and oriented x4   BG: No sugars   Labs: Awaiting AM labs   Pain/Nausea: Denies pain or nausea   Diet: Clear liquid diet, advance as tolerated   IV Access: PIV x1 infusing NS at 100  Lines/Drains: Abdominal drain x2 w/ dressings CDI, irrigate 10 mL Q8  GI/: Voiding adequately, last BM 3/21  Mobility: Independent in room   Plan: Continue with POC and notify team with any changes   "

## 2023-03-23 ENCOUNTER — APPOINTMENT (OUTPATIENT)
Dept: GENERAL RADIOLOGY | Facility: CLINIC | Age: 68
DRG: 919 | End: 2023-03-23
Attending: PHYSICIAN ASSISTANT
Payer: COMMERCIAL

## 2023-03-23 LAB
ALBUMIN SERPL BCG-MCNC: 3 G/DL (ref 3.5–5.2)
ALP SERPL-CCNC: 246 U/L (ref 35–104)
ALT SERPL W P-5'-P-CCNC: 75 U/L (ref 10–35)
ANION GAP SERPL CALCULATED.3IONS-SCNC: 11 MMOL/L (ref 7–15)
AST SERPL W P-5'-P-CCNC: 18 U/L (ref 10–35)
BACTERIA FLD CULT: ABNORMAL
BILIRUB SERPL-MCNC: 0.8 MG/DL
BUN SERPL-MCNC: 11.7 MG/DL (ref 8–23)
CALCIUM SERPL-MCNC: 8.7 MG/DL (ref 8.8–10.2)
CHLORIDE SERPL-SCNC: 107 MMOL/L (ref 98–107)
CREAT SERPL-MCNC: 0.81 MG/DL (ref 0.51–0.95)
DEPRECATED HCO3 PLAS-SCNC: 21 MMOL/L (ref 22–29)
ERYTHROCYTE [DISTWIDTH] IN BLOOD BY AUTOMATED COUNT: 13.8 % (ref 10–15)
GFR SERPL CREATININE-BSD FRML MDRD: 80 ML/MIN/1.73M2
GLUCOSE SERPL-MCNC: 107 MG/DL (ref 70–99)
HCT VFR BLD AUTO: 29.4 % (ref 35–47)
HGB BLD-MCNC: 9.2 G/DL (ref 11.7–15.7)
MAGNESIUM SERPL-MCNC: 1.7 MG/DL (ref 1.7–2.3)
MCH RBC QN AUTO: 28.9 PG (ref 26.5–33)
MCHC RBC AUTO-ENTMCNC: 31.3 G/DL (ref 31.5–36.5)
MCV RBC AUTO: 93 FL (ref 78–100)
PLATELET # BLD AUTO: 160 10E3/UL (ref 150–450)
POTASSIUM SERPL-SCNC: 3.2 MMOL/L (ref 3.4–5.3)
PROT SERPL-MCNC: 5.6 G/DL (ref 6.4–8.3)
RBC # BLD AUTO: 3.18 10E6/UL (ref 3.8–5.2)
SODIUM SERPL-SCNC: 139 MMOL/L (ref 136–145)
WBC # BLD AUTO: 6.8 10E3/UL (ref 4–11)

## 2023-03-23 PROCEDURE — 99222 1ST HOSP IP/OBS MODERATE 55: CPT | Performed by: INTERNAL MEDICINE

## 2023-03-23 PROCEDURE — 250N000013 HC RX MED GY IP 250 OP 250 PS 637: Performed by: STUDENT IN AN ORGANIZED HEALTH CARE EDUCATION/TRAINING PROGRAM

## 2023-03-23 PROCEDURE — 250N000011 HC RX IP 250 OP 636: Performed by: STUDENT IN AN ORGANIZED HEALTH CARE EDUCATION/TRAINING PROGRAM

## 2023-03-23 PROCEDURE — 83735 ASSAY OF MAGNESIUM: CPT | Performed by: PHYSICIAN ASSISTANT

## 2023-03-23 PROCEDURE — 85027 COMPLETE CBC AUTOMATED: CPT | Performed by: PHYSICIAN ASSISTANT

## 2023-03-23 PROCEDURE — 71045 X-RAY EXAM CHEST 1 VIEW: CPT | Mod: 26 | Performed by: RADIOLOGY

## 2023-03-23 PROCEDURE — 80053 COMPREHEN METABOLIC PANEL: CPT | Performed by: PHYSICIAN ASSISTANT

## 2023-03-23 PROCEDURE — 99232 SBSQ HOSP IP/OBS MODERATE 35: CPT | Mod: FS | Performed by: TRANSPLANT SURGERY

## 2023-03-23 PROCEDURE — 250N000013 HC RX MED GY IP 250 OP 250 PS 637: Performed by: TRANSPLANT SURGERY

## 2023-03-23 PROCEDURE — 250N000011 HC RX IP 250 OP 636: Performed by: PHYSICIAN ASSISTANT

## 2023-03-23 PROCEDURE — 71045 X-RAY EXAM CHEST 1 VIEW: CPT

## 2023-03-23 PROCEDURE — 120N000011 HC R&B TRANSPLANT UMMC

## 2023-03-23 PROCEDURE — 250N000013 HC RX MED GY IP 250 OP 250 PS 637: Performed by: PHYSICIAN ASSISTANT

## 2023-03-23 PROCEDURE — 36415 COLL VENOUS BLD VENIPUNCTURE: CPT | Performed by: PHYSICIAN ASSISTANT

## 2023-03-23 RX ORDER — HYDRALAZINE HYDROCHLORIDE 20 MG/ML
10 INJECTION INTRAMUSCULAR; INTRAVENOUS EVERY 4 HOURS PRN
Status: DISCONTINUED | OUTPATIENT
Start: 2023-03-23 | End: 2023-03-24 | Stop reason: HOSPADM

## 2023-03-23 RX ORDER — FUROSEMIDE 20 MG
20 TABLET ORAL DAILY
Status: DISCONTINUED | OUTPATIENT
Start: 2023-03-23 | End: 2023-03-24 | Stop reason: HOSPADM

## 2023-03-23 RX ORDER — ANTIARTHRITIC COMBINATION NO.2 900 MG
TABLET ORAL DAILY
Status: DISCONTINUED | OUTPATIENT
Start: 2023-03-23 | End: 2023-03-23

## 2023-03-23 RX ORDER — MULTIVITAMIN,THERAPEUTIC
1 TABLET ORAL DAILY
Status: DISCONTINUED | OUTPATIENT
Start: 2023-03-23 | End: 2023-03-24 | Stop reason: HOSPADM

## 2023-03-23 RX ORDER — POTASSIUM CHLORIDE 750 MG/1
20 TABLET, EXTENDED RELEASE ORAL ONCE
Status: COMPLETED | OUTPATIENT
Start: 2023-03-23 | End: 2023-03-23

## 2023-03-23 RX ORDER — VITAMIN B COMPLEX
50 TABLET ORAL DAILY
Status: DISCONTINUED | OUTPATIENT
Start: 2023-03-23 | End: 2023-03-24 | Stop reason: HOSPADM

## 2023-03-23 RX ADMIN — AMPICILLIN SODIUM AND SULBACTAM SODIUM 3 G: 2; 1 INJECTION, POWDER, FOR SOLUTION INTRAMUSCULAR; INTRAVENOUS at 11:33

## 2023-03-23 RX ADMIN — AMPICILLIN SODIUM AND SULBACTAM SODIUM 3 G: 2; 1 INJECTION, POWDER, FOR SOLUTION INTRAMUSCULAR; INTRAVENOUS at 05:18

## 2023-03-23 RX ADMIN — AMPICILLIN SODIUM AND SULBACTAM SODIUM 3 G: 2; 1 INJECTION, POWDER, FOR SOLUTION INTRAMUSCULAR; INTRAVENOUS at 23:02

## 2023-03-23 RX ADMIN — PRAVASTATIN SODIUM 40 MG: 20 TABLET ORAL at 07:19

## 2023-03-23 RX ADMIN — LORAZEPAM 0.5 MG: 0.5 TABLET ORAL at 21:16

## 2023-03-23 RX ADMIN — POTASSIUM CHLORIDE 20 MEQ: 750 TABLET, EXTENDED RELEASE ORAL at 17:37

## 2023-03-23 RX ADMIN — HYDRALAZINE HYDROCHLORIDE 10 MG: 20 INJECTION INTRAMUSCULAR; INTRAVENOUS at 21:52

## 2023-03-23 RX ADMIN — Medication 50 MCG: at 09:08

## 2023-03-23 RX ADMIN — VENLAFAXINE HYDROCHLORIDE 112.5 MG: 75 CAPSULE, EXTENDED RELEASE ORAL at 07:19

## 2023-03-23 RX ADMIN — POTASSIUM CHLORIDE 20 MEQ: 750 TABLET, EXTENDED RELEASE ORAL at 09:08

## 2023-03-23 RX ADMIN — AMPICILLIN SODIUM AND SULBACTAM SODIUM 3 G: 2; 1 INJECTION, POWDER, FOR SOLUTION INTRAMUSCULAR; INTRAVENOUS at 17:37

## 2023-03-23 RX ADMIN — THERA TABS 1 TABLET: TAB at 09:08

## 2023-03-23 RX ADMIN — FUROSEMIDE 20 MG: 20 TABLET ORAL at 17:37

## 2023-03-23 ASSESSMENT — ACTIVITIES OF DAILY LIVING (ADL)
ADLS_ACUITY_SCORE: 22

## 2023-03-23 NOTE — DISCHARGE SUMMARY
[facility]    Discharge Summary  Solid Organ Transplant    Date of Admission:  3/19/2023  Date of Discharge:  3/24/2023  Discharging Provider: Nilda Ruiz NP / Simon Carrillo MD PhD    Discharge Diagnoses   Principal Problem:    Encounter for screening laboratory testing for severe acute respiratory syndrome coronavirus 2 (SARS-CoV-2)  Active Problems:    Transaminitis    Obstruction of percutaneous transhepatic biliary drainage catheter    Pulmonary edema    Follow-ups Needed After Discharge   -Repeat CT rescheduled for the week of 4/1-4/7/23.  -Pre-op visit with Transplant NP in clinic prior to 4/11/23.   -OR for bile duct repair as scheduled 4/11/23.    Unresulted Labs Ordered in the Past 30 Days of this Admission     Date and Time Order Name Status Description    3/21/2023  9:16 AM Fungus Culture, non-blood Preliminary     3/21/2023  9:16 AM Anaerobic bacterial culture Preliminary       These results will be followed up by transplant team    Discharge Disposition   Discharged to home  Condition at discharge: Stable    Hospital Course   Zeinab Bermudez is a 67 year old female with PMH significant for laparoscopic cholecystectomy on 12/11/22 at OSH complicated by bile duct injury s/p ex lap, washout, and drain placement 12/28/22. History of drain placement x 2 and unsuccessful PTC placement. Patient was admitted on 3/19/2023 with decreased drain output x5 days prior to admission, elevated LFTs, and low grade fever. The following problems were addressed during her hospitalization:    s/p Lap sri c/b bile duct injury, bile leak, intra-abdominal infection: IR unable to place PTC at this time, but was able to successfully place drain into intra-abdominal fluid collection on 3/21. Initiated on broad spectrum Zosyn and Vanco. Fluid cultures + Enterococcus Faecalis pansensitive to antibiotics, and so was narrowed to Augmentin. Drain output averaging 300-500 cc/day.  LFTs  increased overall, but improving since admission. WBC WNL, afebrile at discharge. Plan:   -Continue to Flush new drain with NS 10 mL every 8 hours.  -Augmentin x 7 days treatment (EOT 3/28/23).  -CT A/P with contrast the week of 4/1/23 in preparation for surgery.  -OR for Hepaticojejunostomy through creation Ger-en-Y scheduled for 4/11/23 with Dr. Runao.     Anemia secondary to chronic infection/inflammation: Hgb at baseline 10-11.      HLD: PTA pravastatin restarted.    Hypoxia/Pulmonary edema: Received fluid on admission. C/o SOB and requiring oxygen overnight 3/23; CXR with pulmonary edema. Diuresed with Lasix and respiratory status improving; on RA. Resolved.    Hypokalemia: Likely r/t Lasix. Replaced PO yesterday, recommended high potassium diet x 2 days.        Consultations This Hospital Stay   INTERVENTIONAL RADIOLOGY ADULT/PEDS IP CONSULT  PHARMACY TO DOSE [facility] IP CONSULT  CARE MANAGEMENT / SOCIAL WORK IP CONSULT  INFECTIOUS DISEASE GENERAL ADULT IP CONSULT  NURSING TO CONSULT FOR VASCULAR ACCESS CARE IP CONSULT    Code Status   Full Code    Time Spent on this Encounter   I, Nilda Ruiz NP, personally saw the patient today and spent greater than 30 minutes discharging this patient.     Nilda Ruiz NP  [facility]  ______________________________________________________________________    Physical Exam   Temp: 98.6  F (37  C) Temp src: Oral BP: (!) 153/89 Pulse: 94   Resp: 24 SpO2: 92 % O2 Device: None (Room air) Oxygen Delivery: 1 LPM  Vitals:    03/19/23 1456 03/19/23 2300   Weight: 72.6 kg (160 lb) 72.6 kg (160 lb)     Vital Signs with Ranges  Temp:  [98  F (36.7  C)-99.3  F (37.4  C)] 98.6  F (37  C)  Pulse:  [86-96] 94  Resp:  [18-24] 24  BP: (145-174)/(78-97) 153/89  SpO2:  [88 %-93 %] 92 %  I/O last 3 completed shifts:  In: 100 [I.V.:100]  Out: 5375 [Urine:5000; Drains:375]    Constitutional: resting comfortably in  bed  Eyes: EOMI; corrective lenses in place  Respiratory: unlabored on RA  Cardiovascular: perfused  GI: abdomen soft, non-distended, non-tender to palpation. Surgical drain and IR drain with bile output.  Genitourinary: no [place] present  Skin: warm, dry  Musculoskeletal: trace generalized edema noted  Neurologic: A&Ox4  Neuropsychiatric: behavior appropriate to situation    Primary Care Physician   Jairo Ocampo    Discharge Orders      Reason for your hospital stay    You were admitted for elevated liver function tests, low grade fever, and drain malfunction. A new drain was placed by IR. You are discharging home in stable condition with close follow up.     Activity    Your activity upon discharge: Activity encouraged.     Monitor and record    Drain output     When to contact your care team    WHEN TO CONTACT YOUR COORDINATOR:     Transplant Coordinator Carol Guajardo [phone]     Notify your coordinator if you have pain over your liver, drain malfunctioning/changes in output, fever greater than 100F, or yellowing of skin or eyes.     If you have URGENT concerns after office hours, please call the hospital switchboard at [phone] and ask to have the organ transplant nurse on-call paged. If you have a life-threatening emergency, go to the nearest emergency room.     Tubes and drains    You are going home with the following tubes or drains: MELITA and IR drains. Tube cares per hospital or home care instructions. Continue to flush drains with normal saline every 8 hours.     Adult [facility] Follow-up and recommended labs and tests    Follow ups:  -Repeat CT rescheduled for the week of 4/1-4/7/23.  -Pre-op visit with Transplant NP in clinic prior to 4/11/23.   -OR for bile duct repair as scheduled 4/11/23.     Diet    Follow this diet upon discharge: Regular diet       Significant Results and Procedures   Results for orders placed or performed during the hospital encounter of 03/19/23   US Abdomen Complete     Narrative    EXAMINATION: US ABDOMEN LIMITED,  3/19/2023 5:46 PM     COMPARISON: CT AP 2/6/23, ultrasound 1/10/2023    History: History bile duct transection, biliary drain-nonfunctional.   Evaluate for hepatobiliary pathology/dilation.     TECHNIQUE: The abdomen was scanned in standard fashion with  specialized ultrasound transducer(s) using both gray-scale and limited  color Doppler techniques.    FINDINGS:  Liver: Heterogeneous echogenicity of the liver. Liver measures 15.5  cm. Within the midline abdomen, there is an indeterminant fluid  collection with air that may or may not represent the duodenum.     Gallbladder: Surgically absent. At the gallbladder fossa, there is a  drain in place.    Bile Ducts: Intrahepatic biliary system is of normal caliber. The  common bile duct is not definitely visualized.    Kidneys: Normal right kidney echotexture. Right kidney measures 9.4  cm.        Impression    IMPRESSION:   1.  Heterogeneous appearance of the liver which may possibly  represents areas of hepatic infarct versus inflammation.  2.  Indeterminate fluid collection with air that may possibly  represent the duodenum. Consider CT abdomen and pelvis with contrast  for further evaluation.    I have personally reviewed the examination and initial interpretation  and I agree with the findings.    CARLIE SANTOS MD         SYSTEM ID:  [id number]   CT Abdomen Pelvis w Contrast    Narrative    EXAMINATION: CT ABDOMEN PELVIS W CONTRAST  3/19/2023 9:06 PM      HISTORY: Hx biliary drain transection, drain non-functional. Abnormal  fluid collection noted on RUQ US    COMPARISON: CT abdomen and pelvis 2/6/2023    PROCEDURE COMMENTS: CT of the abdomen and pelvis was performed with  Isovue 370 intravenous contrast. Coronal and sagittal reformatted  images were obtained.    FINDINGS:  Lower thorax:   The lung bases are clear.    Abdomen and pelvis:  There is increased mild intrahepatic biliary dilatation. No focal  hepatic  mass. There is some heterogeneous vague hypoattenuating area  in the anterior right hepatic lobe, series 4 image 50 which likely  correlates with heterogeneity on ultrasound. Stable position of  percutaneous drain in the right upper quadrant. There is a more  well-defined partially rim-enhancing fluid collection with gas along  the anterior margin of the liver measuring approximately 3.6 x 2.7 cm  (series 4 image 66) which correlates with the finding on ultrasound.  This is adjacent to but separate from the duodenal bulb on CT. Small  amount of perihepatic and perisplenic ascites. Cholecystectomy. The  pancreas is unremarkable. The spleen and adrenal glands are normal in  appearance. Symmetric renal cortical enhancement. No hydronephrosis or  nephrolithiasis. The urinary bladder is well-distended and appears  unremarkable. Trace free fluid within the pelvis. No free  intraperitoneal air. No abnormally dilated loops of large or small  bowel. Moderate to large colonic stool burden. Postoperative changes  of hysterectomy. No abdominal or pelvic lymphadenopathy by size  criteria. The major intra-abdominal vasculature is widely patent. The  infrarenal aorta is normal in caliber.    Bones:  Multilevel degenerative changes of the spine. No suspicious or  aggressive appearing bone lesions.      Impression    IMPRESSION:  1. Stable positioning of right upper quadrant percutaneous drain in  the gallbladder fossa with a more defined rim enhancing fluid  collection containing scattered foci of gas anterior to the liver,  suggestive of abscess. The surgical drain appears to terminate just  outside of this collection.  2. Increased mild intrahepatic biliary dilatation.  3. Unchanged perihepatic and perisplenic ascites.  4. There is some heterogeneity in the anterior inferior right hepatic  lobe which may represent some edema/inflammation correlating with  heterogeneity on ultrasound.    I have personally reviewed the examination  and initial interpretation  and I agree with the findings.    CARLIE SANTOS MD         SYSTEM ID:  [id number]   CT Liver Abscess Drainage    Narrative    Procedures 3/21/2023:  1. New CT-guided abscess drain placement  2. CT sinogram through existing drain    History: surgical drain in place, not draining well.  place looped  catheter into GB fossa collection.    Comparison: CT from 3/19/2023    Staff: Addison Wyatt MD    Fellow/Resident: NICK Smith D.O.    Monitoring: Patient was placed on continuous monitoring with  intravenous conscious sedation administered by the trained IR nursing  staff and supervised by the IR attending. Patient remained stable  throughout the procedure.     Medications:  1. Versed IV: 2 mg  2. Fentanyl IV: 100 mcg  3. 50 mg Benadryl IV  4. 10  cc 1% lidocaine    Sedation time, face-to-face: 20 minutes    DLP: 1343 mGy*cm    Findings/procedure:    Prior to the procedure, both verbal and written informed consent  obtained from the patient. Patient placed supine on the CT table.  Preprocedure scan obtained revealing a small gallbladder fossa  collection. Surgical drain appears vertically adjacent to it, not well  positioned in relation to this collection. Adequate percutaneous  approach for drain placement.     The upper abdomen was prepped and draped. Timeout performed. 1%  Lidocaine used for local analgesia. Under intermittent CT fluoroscopic  guidance, a 5 [place] centesis needle catheter was  advanced into the gallbladder fossa fluid collection. CT image  documenting needle catheter position within the abscess was saved in  the patient's record. Needle removed.     Over a guidewire, catheter was removed and tract dilated to 10 German.  10 German locking pigtail catheter advanced over the wire into the  collection. Guidewire removed. Pigtail formed and locked. About 10 mL  red-tinged, thin bilious fluid was aspirated and sent for labs.    CT Fluoroscopy demonstrated  adequate placement of the drainage  catheter with the fluid collection. Representative CT images were  saved in the patient's record.    Catheter secured to the skin with a 2-0 Ethilon retention suture.  Dressing applied and catheter connected to bag drainage.    Dilute contrast was then injected through the existing surgical drain.  Diagnostic CT is performed demonstrating partial continuity with this  drain relation to the gallbladder fossa collection, with contrast  within the collection and a small volume around the mature tract.    Estimate blood loss: Minimal    Specimens: Small sample of blood tinged, bilious fluid sent for  analysis.    Impression/plan:  1. Uncomplicated CT fluoroscopic guided gallbladder fossa collection  drain placement with a 10 [place] locking pigtail drainage catheter.  2. Existing surgical drain was injected, demonstrating partial  continuity with the gallbladder fossa collection. Considering the new  drain is well-positioned and will provide optimal control of this  gallbladder fossa collection, the existing surgical drain tube can be  removed at the primary surgical team's discretion.  3. New right upper quadrant drain to bulb drainage. Flush catheter 10  cc normal saline every shift to maintain patency. Chart daily outputs.  In general, contact IR when net output less than 10 cc in 24 hrs.    XR Chest Port 1 View    Narrative    Exam: XR CHEST PORT 1 VIEW, 3/23/2023 9:11 AM    Comparison: Radiograph 1/2/2023    History: Hypoxia    Findings:  Portable AP view of the chest. Interval removal of the right upper  extremity PICC line and right upper quadrant drain. Trachea is  midline. Cardiomediastinal silhouette is within normal limits.  Decreased small bilateral pleural effusions. Similar patchy perihilar  and bibasilar opacity. Increased interstitial prominence. No  pneumothorax. Visualized upper abdomen is unremarkable.      Impression    Impression:   1. Similar patchy perihilar and  bibasilar opacities with increased  interstitial prominence, findings likely represent edema and/or  atelectasis.  2. Decreased small bilateral pleural effusions.    I have personally reviewed the examination and initial interpretation  and I agree with the findings.    JOAN MICHAEL MD         SYSTEM ID:  [id number]       Discharge Medications   Current Discharge Medication List      START taking these medications    Details   amoxicillin-clavulanate (AUGMENTIN) 875-125 MG tablet Take 1 tablet by mouth every 12 hours for 4 days  Qty: 8 tablet, Refills: 0    Associated Diagnoses: Injury of bile duct, subsequent encounter; Bile leak, postoperative         CONTINUE these medications which have CHANGED    Details   sodium chloride, PF, 0.9% PF flush 10 mLs by Intracatheter route every 8 hours  Qty: 420 mL, Refills: 1    Comments: Please give patient boxes of 10 mL syringes (however it is easiest to dispense). I am unable to change the quantity of this to box.  Associated Diagnoses: Injury of bile duct, initial encounter      ursodiol (ACTIGALL) 300 MG capsule Take 1 capsule (300 mg) by mouth 2 times daily  Qty: 60 capsule, Refills: 1    Associated Diagnoses: Injury of bile duct, initial encounter         CONTINUE these medications which have NOT CHANGED    Details   acetaminophen (TYLENOL) 325 MG tablet Take 325-650 mg by mouth every 6 hours as needed for mild pain      calcium carbonate (OS-NASRIN) 500 MG tablet Take 1 tablet by mouth daily      diphenhydrAMINE (BENADRYL) 25 MG tablet Take 25 mg by mouth every 6 hours as needed for itching      LORazepam (ATIVAN) 0.5 MG tablet Take 0.5 mg by mouth daily as needed for anxiety      melatonin 5 MG sublingual tablet Place 1 tablet (5 mg) under the tongue At Bedtime  Qty: 30 tablet, Refills: 0    Associated Diagnoses: Injury of bile duct, initial encounter      Multiple Vitamins-Minerals (WOMENS MULTI PO) Take 2 tablets by mouth daily      ondansetron (ZOFRAN ODT) 4 MG  "ODT tab Take 1 tablet (4 mg) by mouth every 6 hours as needed for nausea or vomiting  Qty: 15 tablet, Refills: 0    Associated Diagnoses: Nausea      pravastatin (PRAVACHOL) 40 MG tablet Take 40 mg by mouth daily      Sennosides (SENNA) 8.6 MG CAPS Take 8.6 mg by mouth daily as needed      venlafaxine (EFFEXOR XR) 37.5 MG 24 hr capsule Take 112.5 mg by mouth daily      vitamin D3 (CHOLECALCIFEROL) 50 mcg (2000 units) tablet Take 1 tablet by mouth daily      !! Gauze Pads & Dressings (DERMACEA DRAIN SPONGES) 4\"X4\" PADS 1 pad. 2 times daily  Qty: 60 each, Refills: 1    Associated Diagnoses: Injury of bile duct, sequela      !! Gauze Pads & Dressings (EXCILON AMD DRAIN SPONGES) 4\"X4\" PADS 1 pad. 2 times daily  Qty: 60 each, Refills: 1    Associated Diagnoses: Injury of bile duct, sequela      !! Gauze Pads & Dressings (GAUZE PADS 4\"X4\") 4\"X4\" PADS 1 pad. 2 times daily  Qty: 60 each, Refills: 1    Associated Diagnoses: Injury of bile duct, sequela       !! - Potential duplicate medications found. Please discuss with provider.        Allergies   No Known Allergies  "

## 2023-03-23 NOTE — PROGRESS NOTES
[facility] Cover Note    Paged earlier in the evening about patient having new SOB and emesis.    Saw patient bedside. At that time patient seemed anxious and increased WOB after emesis. Was satting 89% on RA. Up to 95% with 1L. Patient stated she had N/V around the time of getting unasyn. She also stated she just started eating real meals today.     She states she has not been using the IS the past few days. She has no pain in either of her calves and both legs look the same size. While she is not on DVT prophylaxis, there is a very low suspicion for a DVT or PE. Discussed with patient the importance of using the IS consistently and that she likely needs to take is slow with eating. The unasyn is likely unrelated to the N/V.    She was slightly calmer when I left. I checked in with her a few hours later and she had improved. Off oxygen on RA, still no pain in lower legs, SOB slightly improved saying the IS is difficult to get past the 500-750 range.     Temp: 98.6  F (37  C) Temp src: Oral BP: (!) 144/81 Pulse: 91   Resp: 20 SpO2: 93 % O2 Device: Nasal cannula Oxygen Delivery: 1 LPM      Will CTM vitals and clinical status.    Ryan Murrell, PGY-1  General Surgery Resident   Pager: [phone]

## 2023-03-23 NOTE — PLAN OF CARE
"VS: /78 (BP Location: Left arm, Patient Position: Semi-Chaudhry's, Cuff Size: Adult Regular)   Pulse 87   Temp 98.3  F (36.8  C) (Oral)   Resp 22   Ht 1.575 m (5' 2.01\")   Wt 72.6 kg (160 lb)   SpO2 91%   BMI 29.26 kg/m      Cares: 1900 - 0730     Neuro: Aox4   Cardio: WDL   Respiratory: pt reports SOB, dyspnea on exertion. O2 sats 92-95% on 1-1.5L O2  GI/: voiding adequately w/ out issues, LBM 3-22   Skin: intact   Diet: Regular   Labs: waiting on morning lab results   BG: none   LDA: Left PIV SL,x2 Right bile drains - 100mL out of each drain  Mobility: Ind.   Pain/Nausea: denies pain or nausea    PRN medications: none given  Plan of Care: continue with current POC and update MD with any changes   "

## 2023-03-23 NOTE — CONSULTS
General ID Green Service: Consult Note     Patient:  Zeinab Bermudez, Date of birth 1955, Medical record number [id number]  Date of Visit:  March 23, 2023  Reason for consult: positive biliary drain cultures         Assessment and Recommendations:     ID Problem list:  1. Intraabdominal RUQ fluid collection secondary to bile leak    S/p washout 12/28/22 at OSH ([facility]) with RUQ drain placement    S/p OSH left abdominal IR drainage 12/29/23    Decreased RUQ drain output c/b RUQ fluid collection accumulation on 3/19/3 CT abdomen, s/p IR drainage 3/21/23 (cultures prelim growing E.faecalis - susceptibility pending)  2. History of cholecystectomy c/b biliary injury (at OSH 12/11/22; [facility])    Planned for surgical repair 4/11/23  3. Transaminitis - improving  4. MITZY - improving      Discussion:   Patient with complex history of iatrogenic biliary duct damage and resultant ongoing biliary leakage for which she has had a RUQ in place for past ~3 months and draining ~300 ml/day of bilious fluid without issues; it seems as though her issues arose acutely last week when the biliary drain was seemingly misplaced outside of the RUQ fluid collection leading to decreased drain output and increased biliary accumulation in gallbladder fossa and around the liver (but not overtly involving any intrahepatic fluid collections per radiologist report); she has now had a 3/21 IR placement of a new RUQ biliary drain in the gallbladder fossa which is well positioned to drain her bile externally once again, and the old RUQ drain also was left in place after being flushed with contrast to ensure position and patency;  both drains are now putting out bilious fluid again with 350 ml output from the new IR drain on 3/22, and 175 ml output from the old MELITA drain on 3/22. This is reassuring that there is tentatively adequate source control of her bilious RUQ fluid collection once again.     No other new fluid collections were  noted on the 3/19 CT abd/pelvis outside of the RUQ fluid collection that is now apparently being drained by 2 drains; additionally, there is no overt intrahepatic abscesses noted on CT (but did have right hepatic lobe heterogeneity of unclear significance, possibly edema/inflammation per radiologist, but no overt phlegmon/abscess); thus, would be reasonable to continue on 7-day course of antibiotics from the time of last drainage for source control (tentatively 3/21-); would also suggest re-imaging as outpatient to ensure adequate drainage (will defer to IR and transplant surgery who are managing the drains regarding timing and imaging modality); but if found to have new or worsening fluid collections requiring further drainage, then would extend above antibiotics to at least 7-days beyond the latest IR drainage/source control. Ans could call back ID if intrahepatic abscess is identified on subsequent imaging as that would change antibiotic management considerations. For now would continue on IV Unasyn while inpatient based on preliminary culture data (currently on E.faecalis, susceptibility pending but per our antibiogram ampicillin has excellent activity), and would suggest stepping-down to PO Augmentin on discharge for completion of remainder of course.      Recs:  1. Continue IV Unasyn while inpatient  2. On discharge could convert to PO Augmentin 875 bid  5. Follow up final 3/21 fluid cultures  3. Would suggest 7-day antibiotic course from the time of last drainage for source control (tentatively 3/21-3/28)  4. Suggest re-imaging as outpatient to ensure fluid collections adequately being drained without any new fluid collection accumulation; will defer timing and modality to IR and transplant surgery; but if found to have new or worsening fluid collections requiring further drainage, then would extend above antibiotics to at least 7-days beyond the latest drainage/source control  5. Drain management as per IR  and surgical team  6. Follow up with transplant surgery for definitive bilairy repair as planned 4/11/23          Thank you for allowing us to participate in the care of this patient. ID will sign off at this time. Can call with questions.     Sean Sidhu MD    Infectious Diseases   03/23/2023           History of Present Illness:     Zeinab is a 67F with PMH including HTN, HLD, and recent cholecystitis s/p laparoscopic cholecystectomy at OSH (12/11/22 at [facility]) c/b transection of the CBD with bilairy leak requiring ex-lap, washout, and drain placement (12/28/22 at [facility]), who was admitted at [facility] on 3/19 with decreased RUQ drain output.     Per patient, she originally underwent the 12/11/22 cholecystectomy without reported surgical complications and was discharged; then over the following days she developed worsening distension and abdominal pain; thus she was readmitted at [facility] 12/27 and was found to have sepsis and peritonitis secondary to biliary leak; she was taken back to the OR there on 12/28/22 for washout followed by ERCP 12/29 and was found to have biliary fluid leakage due to the bile duct injury; initially she had 2 drains post-op, and on 12/29 there underwent additional left abdominal IR drainage x1 there; she was then transferred to [facility] on 1/1/23 for further management; GI performed ERCP 1/3 and IR attempted PTC drain placement on 1/6 but was unsuccessful; she was on empiric IV zosyn while inpatient, which was transitioned to PO Augmentin on 1/6 to continue for 1-week course until 1/18; she was discharged on 1/1 with one MELITA drain in place and patient says she completed the Augmentin without issues. She was briefly in ED on 2/6-2/7 with non-functioning bile drain; a drain fluid culture was collected 2/7 that grew Staph hominis (oxacillin-susceptible) and she was given 3-day course of cipro; IR re-attempted PTC biliary drain placement as outpatient on 2/10 without success. She was planned  for surgical repair on 4/11.     Patient says she was overall doing well since then with ~300 ml/day of biliary output from the MELITA drain and would flush ~2x/day until around 3/15/23 when the drain suddenly had decreased output. She contacted her outpatient provider who told her to increase to 3x/day flushes still without success; she and  said there was only ~10 ml output per day. Patient also started to notice increased abdominal pain and distension so she presented to hospital and was admitted 3/19 for further management. Patient underwent imaging with ultrasound followed by CT which noted new RUQ fluid collection ~3.6 cm in largest dimension that did not appear to be reached by the current RUQ drain; she was also noted to have elevated LFTs and MITZY; IR was consulted who successfully placed a new RUQ biliary drain on 3/21 with blood-tinged biliary output, and they left the old RUQ in place and injected it with contrast to verify it was in place. She was continued on IV zosyn ubaldo-procedure. On 3/22 the Antimicrobial Stewardship team recommended narrowing to IV Unasyn based on preliminary 3/21 drainage cultures showing only E.faecalis (susceptibility pending). ID was consulted for further antibiotic recommendations.     Per patient, she did not have fevers but did have chills last week; no night sweats, +nausea, no vomiting, no diarrhea, and abdominal pain is predominantly discomfort around the new drain now. Both drains are now putting out bilious fluid again with 350 ml output from the new IR drain on 3/22, and 175 ml output from the old MELITA drain on 3/22; both with brown bilious fluid output. She says that she is still planned for surgical repair on 4/11. She says that her abdominal pain/swelling has improved since 3/21 IR drainage; no further fevers/chills, and nausea is also improving.            Review of Systems:   10 point ROS obtained, pertinent positives and negatives as above.       Past Medical  History:     Past Medical History:   Diagnosis Date     Covid 2019 Positive 12/11/2022     Depressive disorder      HLD (hyperlipidemia)      Hypertension      SAH (subarachnoid hemorrhage) (H) 2017     Past Surgical History:   Procedure Laterality Date     ENDOSCOPIC RETROGRADE CHOLANGIOPANCREATOGRAM N/A 1/3/2023    Procedure: ENDOSCOPIC RETROGRADE CHOLANGIOPANCREATOGRAPHY;  Surgeon: Michael Mann MD;  Location: [facility] OR     HYSTERECTOMY  2005     IR BILIARY DRAIN PLACEMENT  1/6/2023     IR BILIARY DRAIN PLACEMENT  2/10/2023     LAPAROSCOPIC CHOLECYSTECTOMY  12/11/2022     LAPAROTOMY EXPLORATORY  12/28/2022     Otherwise as per HPI      Allergies:    No Known Allergies         Current Antimicrobials:   IV Unasyn       Family History:   Reviewed and noncontributory       Social History:     Social History     Tobacco Use     Smoking status: Never     Smokeless tobacco: Never   Substance Use Topics     Alcohol use: Not Currently     Drug use: Never     No alcohol  No smoking  [place]  No pets  No recent travel    Otherwise as per HPI         Physical Exam:   Ranges forvital signs:  Temp:  [98.1  F (36.7  C)-98.9  F (37.2  C)] 98.1  F (36.7  C)  Pulse:  [75-91] 76  Resp:  [16-22] 20  BP: (128-144)/(72-81) 128/78  SpO2:  [86 %-95 %] 92 %  GENERAL: adult female, sitting up in chair in no acute distress.   ENT:  Head is normocephalic, atraumatic. Oropharynx is moist.  EYES:  Eyes have anicteric sclerae. No conjunctival injection.  NECK:  Supple.  LUNGS:  Unlabored breathing on low flow oxygen.  CARDIOVASCULAR:  Regular rate.  ABDOMEN:  Non-distended, soft, nontender. +2 right abdominal drains in place with brown bilious fluid draining.   MSK: Normal bulk and tone.  EXT: Extremities warm and well perfused.  SKIN:  No acute rashes visible on exposed skin.   NEUROLOGIC:  Awake, alert, interactive.         Laboratory Data:     Inflammatory Markers  No lab results found.    Hematology Studies     Recent Labs   Lab Test 03/23/23  0442 03/22/23  0458 03/21/23  0645 03/20/23  0640 03/19/23  1542 03/09/23  0904   WBC 6.8 5.3 10.3 8.6 6.0 5.4   HGB 9.2* 8.7* 10.4* 11.6* 12.0 12.0   MCV 93 95 92 93 93 92    125* 183 169 190 190       Metabolic Studies     Recent Labs   Lab Test 03/23/23  0442 03/22/23  0458 03/21/23  0645 03/20/23  0640 03/19/23  1542    138 134* 136 136   POTASSIUM 3.2* 3.6 3.6 3.8 3.9   CHLORIDE 107 109* 102 103 102   CO2 21* 18* 20* 23 24   BUN 11.7 15.5 13.9 9.3 10.2   CR 0.81 1.07* 1.25* 0.82 0.75   GFRESTIMATED 80 57* 48* 79 88       Hepatic Studies    Recent Labs   Lab Test 03/23/23  0442 03/22/23  0458 03/21/23  0645 03/20/23  0640 03/19/23  1643 03/19/23  1542 03/09/23  0904   BILITOTAL 0.8 0.9 1.1 1.6*  --  1.9* 1.0   ALKPHOS 246* 249* 309* 591*  --  635* 404*   ALBUMIN 3.0* 2.9* 2.8* 3.5  --  3.6 4.0   AST 18 27 54* 180* 279*  --  106*   ALT 75* 96* 135* 269*  --  318* 133*       Microbiology:  Culture   Date Value Ref Range Status   03/21/2023 No anaerobic organisms isolated after 1 day  Preliminary   03/21/2023 No growth after 1 day  Preliminary   03/21/2023 3+ Enterococcus faecalis (A)  Preliminary   02/07/2023 1+ Staphylococcus hominis (A)  Final            Imaging:     3/19/23 abd ultrasound report:  IMPRESSION:   1.  Heterogeneous appearance of the liver which may possibly  represents areas of hepatic infarct versus inflammation.  2.  Indeterminate fluid collection with air that may possibly  represent the duodenum. Consider CT abdomen and pelvis with contrast  for further evaluation.    3/19/23 CT abd/pelvis report:  IMPRESSION:  1. Stable positioning of right upper quadrant percutaneous drain in  the gallbladder fossa with a more defined rim enhancing fluid  collection containing scattered foci of gas anterior to the liver,  suggestive of abscess. The surgical drain appears to terminate just  outside of this collection.  2. Increased mild intrahepatic biliary  dilatation.  3. Unchanged perihepatic and perisplenic ascites.  4. There is some heterogeneity in the anterior inferior right hepatic  lobe which may represent some edema/inflammation correlating with  heterogeneity on ultrasound.

## 2023-03-23 NOTE — PROGRESS NOTES
Care Management Discharge Note    Discharge Date: 03/23/2023       Discharge Disposition: Home    Discharge Services: None    Discharge DME: None    Discharge Transportation: family or friend will provide    Patient/family educated on Medicare website which has current facility and service quality ratings: yes    Education Provided on the Discharge Plan:  yes  Persons Notified of Discharge Plans: patient and spouse  Patient/Family in Agreement with the Plan: yes    Handoff Referral Completed: Yes    Additional Information:  Team anticipates discharge home today.  Met with pt and spouse.  Pt spouse confirmed that they have fifty Normal Saline 10 ml syringes at home.  Pt and spouse will need at least one box of 5 ml normal saline syringes filled at discharge pharmacy prior to discharge.  Agreed to update provider.  Pt and spouse note no other concerns or needs. Reviewed with PRISCILA Neri, RN BSN, PHN, ACM-RN  7A RN Care Coordinator  Phone: [phone]  Pager [phone]    To contact the weekend [facility] (0800 [phone] 1630) Saturday and Sunday    Units: 4A, 4C, 4E, 5A and 5B- Pager 1: [phone]    Units: 6A, 6B, 6C, 6D- Pager 2: [phone]    Units: 7A, 7B, 7C, 7D, and 5C-Pager 3: [phone]    [facility] ([phone]) Saturday and Sunday    Units: 5 Ortho, 8A, 10 ICU, & Pediatric Units-Pager 4: [phone]    3/23/2023 11:02 AM

## 2023-03-24 VITALS
OXYGEN SATURATION: 92 % | SYSTOLIC BLOOD PRESSURE: 153 MMHG | BODY MASS INDEX: 29.44 KG/M2 | WEIGHT: 160 LBS | HEIGHT: 62 IN | HEART RATE: 94 BPM | RESPIRATION RATE: 24 BRPM | DIASTOLIC BLOOD PRESSURE: 89 MMHG | TEMPERATURE: 98.6 F

## 2023-03-24 PROBLEM — J81.1 PULMONARY EDEMA: Status: ACTIVE | Noted: 2023-03-24

## 2023-03-24 LAB
ALBUMIN SERPL BCG-MCNC: 3.3 G/DL (ref 3.5–5.2)
ALP SERPL-CCNC: 250 U/L (ref 35–104)
ALT SERPL W P-5'-P-CCNC: 63 U/L (ref 10–35)
ANION GAP SERPL CALCULATED.3IONS-SCNC: 14 MMOL/L (ref 7–15)
AST SERPL W P-5'-P-CCNC: 26 U/L (ref 10–35)
BILIRUB SERPL-MCNC: 1.1 MG/DL
BUN SERPL-MCNC: 5.2 MG/DL (ref 8–23)
CALCIUM SERPL-MCNC: 9.1 MG/DL (ref 8.8–10.2)
CHLORIDE SERPL-SCNC: 100 MMOL/L (ref 98–107)
CREAT SERPL-MCNC: 0.69 MG/DL (ref 0.51–0.95)
DEPRECATED HCO3 PLAS-SCNC: 24 MMOL/L (ref 22–29)
ERYTHROCYTE [DISTWIDTH] IN BLOOD BY AUTOMATED COUNT: 13.4 % (ref 10–15)
GFR SERPL CREATININE-BSD FRML MDRD: >90 ML/MIN/1.73M2
GLUCOSE SERPL-MCNC: 108 MG/DL (ref 70–99)
HCT VFR BLD AUTO: 32.7 % (ref 35–47)
HGB BLD-MCNC: 10.4 G/DL (ref 11.7–15.7)
MAGNESIUM SERPL-MCNC: 1.8 MG/DL (ref 1.7–2.3)
MCH RBC QN AUTO: 28.7 PG (ref 26.5–33)
MCHC RBC AUTO-ENTMCNC: 31.8 G/DL (ref 31.5–36.5)
MCV RBC AUTO: 90 FL (ref 78–100)
PLATELET # BLD AUTO: 221 10E3/UL (ref 150–450)
POTASSIUM SERPL-SCNC: 3 MMOL/L (ref 3.4–5.3)
PROT SERPL-MCNC: 6.4 G/DL (ref 6.4–8.3)
RBC # BLD AUTO: 3.62 10E6/UL (ref 3.8–5.2)
SODIUM SERPL-SCNC: 138 MMOL/L (ref 136–145)
WBC # BLD AUTO: 8.7 10E3/UL (ref 4–11)

## 2023-03-24 PROCEDURE — 250N000013 HC RX MED GY IP 250 OP 250 PS 637: Performed by: PHYSICIAN ASSISTANT

## 2023-03-24 PROCEDURE — 85027 COMPLETE CBC AUTOMATED: CPT | Performed by: PHYSICIAN ASSISTANT

## 2023-03-24 PROCEDURE — 80053 COMPREHEN METABOLIC PANEL: CPT | Performed by: PHYSICIAN ASSISTANT

## 2023-03-24 PROCEDURE — 250N000013 HC RX MED GY IP 250 OP 250 PS 637: Performed by: STUDENT IN AN ORGANIZED HEALTH CARE EDUCATION/TRAINING PROGRAM

## 2023-03-24 PROCEDURE — 250N000013 HC RX MED GY IP 250 OP 250 PS 637: Performed by: NURSE PRACTITIONER

## 2023-03-24 PROCEDURE — 36415 COLL VENOUS BLD VENIPUNCTURE: CPT | Performed by: PHYSICIAN ASSISTANT

## 2023-03-24 PROCEDURE — 83735 ASSAY OF MAGNESIUM: CPT | Performed by: PHYSICIAN ASSISTANT

## 2023-03-24 PROCEDURE — 250N000013 HC RX MED GY IP 250 OP 250 PS 637: Performed by: TRANSPLANT SURGERY

## 2023-03-24 PROCEDURE — 99239 HOSP IP/OBS DSCHRG MGMT >30: CPT | Mod: FS | Performed by: TRANSPLANT SURGERY

## 2023-03-24 PROCEDURE — 250N000011 HC RX IP 250 OP 636: Performed by: PHYSICIAN ASSISTANT

## 2023-03-24 RX ORDER — URSODIOL 300 MG/1
300 CAPSULE ORAL 2 TIMES DAILY
Qty: 60 CAPSULE | Refills: 1 | Status: SHIPPED | OUTPATIENT
Start: 2023-03-24

## 2023-03-24 RX ORDER — POTASSIUM CHLORIDE 750 MG/1
40 TABLET, EXTENDED RELEASE ORAL ONCE
Status: DISCONTINUED | OUTPATIENT
Start: 2023-03-24 | End: 2023-03-24 | Stop reason: HOSPADM

## 2023-03-24 RX ADMIN — AMOXICILLIN AND CLAVULANATE POTASSIUM 1 TABLET: 875; 125 TABLET, FILM COATED ORAL at 11:53

## 2023-03-24 RX ADMIN — PRAVASTATIN SODIUM 40 MG: 20 TABLET ORAL at 07:41

## 2023-03-24 RX ADMIN — Medication 50 MCG: at 07:41

## 2023-03-24 RX ADMIN — VENLAFAXINE HYDROCHLORIDE 112.5 MG: 75 CAPSULE, EXTENDED RELEASE ORAL at 07:41

## 2023-03-24 RX ADMIN — AMPICILLIN SODIUM AND SULBACTAM SODIUM 3 G: 2; 1 INJECTION, POWDER, FOR SOLUTION INTRAMUSCULAR; INTRAVENOUS at 06:03

## 2023-03-24 RX ADMIN — FUROSEMIDE 20 MG: 20 TABLET ORAL at 07:41

## 2023-03-24 RX ADMIN — THERA TABS 1 TABLET: TAB at 07:41

## 2023-03-24 ASSESSMENT — ACTIVITIES OF DAILY LIVING (ADL)
ADLS_ACUITY_SCORE: 22

## 2023-03-24 NOTE — PLAN OF CARE
Patient discharged home accompanied by her . She has 2 drains in place and will resume drain cares. All belongings with the patient. Prescriptions per [facility]. No IV's in place. Discharge orders reviewed.

## 2023-03-24 NOTE — PLAN OF CARE
"VS: BP (!) 153/89 (BP Location: Left arm, Patient Position: Semi-Chaudhry's, Cuff Size: Adult Regular)   Pulse 94   Temp 98.6  F (37  C) (Oral)   Resp 24   Ht 1.575 m (5' 2.01\")   Wt 72.6 kg (160 lb)   SpO2 92%   BMI 29.26 kg/m      Cares: 1900 - 0730     Neuro: Aox4   Cardio: hypertensive 170/90s last night - PRN hydralazine given and pt Bps have improved 150/80s.   Respiratory: pt continuous to be SOB, requiring 1L O2 overnight satting 91-95%  GI/: pt received 20mg IV lasix yesterday and has been voiding good amounts overnight (2.5L out), LBM 3-23  Skin: intact  Diet: Regular   Labs: waiting on morning lab results   BG: none   LDA: Left PIV, x2 right bile drains (irrigating one of them Q8H) - both have brown output (115mL and 150mL out overnight)   Mobility: Ind.   Pain/Nausea: denies pain or nausea    PRN medications: Ativan x1, IV hydralazine 10mg   Plan of Care: plan to possibly discharge today. Continue with current POC and update MD with any changes   "

## 2023-03-24 NOTE — PROVIDER NOTIFICATION
7A 7585 ROSALINDA Bermudez - pt /94, recheck was 170/97.   Dimple FRANCOIS [phone]    MD ordered 10mg IV hydralazine

## 2023-03-24 NOTE — PROGRESS NOTES
Transplant Surgery  Inpatient Daily Progress Note  03/23/2023    Assessment & Plan: 67 year old female with a history of laparoscopic cholecystectomy on 12/11/22 at OSH complicated by bile duct injury s/p ex lap, washout, and drain placement 12/28/22. Admitted (1/1-1/11/23) to transplant liver surgery team for management of bile duct injury and intra abdominal fluid collection. Patient had drain placement x 2 and unsuccessful PTC placement. Patient now readmitted with decreased drain output over the past 5 days, elevated LFTs, and low grade fever.      GI/Nutrition:   S/p Lap sri complicated by bile duct injury, bile leak, intra-abdominal Infection: Intra-abdominal infection secondary to bile duct injury, bile leak, and inadequate drainage. IR unable to place PTC at this time. IR consulted for drain placement intra abdominal fluid collection- placed 3/21 PM. Flush new drain NS 10 ml every 8 hours.   LFTs improving since admission.    Hematology:   Anemia secondary to chronic infection/inflammation: b/l Hgb 10-11. Decrease to ~9 likely dilutional in the setting of fluids.    Cardiorespiratory:   HLD: PTA pravastatin restarted  Hypovolemia: Hypotension and tachycardia. Fluid bolus. Now resolved  Hypervolemia: will give lasix x 1  Acute respiratory failure with hypoxia: Hypoxia/Pulm edema: S/p fluid boluses. Will give lasix x 1    Endocrine: No issues. FBS 78    Fluid/Electrolytes: MIVF:Previously received 3L boluses overnight due to hypotension.   Hypokalemia: Oral replacement ordered  Acute kidney injury, resolved: With treatment of infection    : No issues    Infectious disease: T max 98.9. WBC WNL. IR consult for drain placement. Gram stain with gram + cocci, now growing enterococcus faecium. Transitioned from IV vanc/zosyn to IV unasyn, per ID can switch to oral augmentin for discharge     Prophylaxis: DVT, encourage ambulation, low risk    Disposition: 7A. Planning for discharge  "tomorrow    JOANN/Fellow/Resident Provider: Lorna Cuadra PA-C [id number]     Faculty: Simon Carrillo M.D., PhD    __________________________________________________________________  History: Admitted 3/19/2023 for uncontrolled intra abdominal fluid collection.     Interval History: History is obtained from the patient and EMR  Overnight events: Shortness of breath overnight.     ROS:   A 10-point review of systems was negative except as noted above.    Curent Meds:    ampicillin-sulbactam  3 g Intravenous Q6H     furosemide  20 mg Oral Daily     multivitamin, therapeutic  1 tablet Oral Daily     pravastatin  40 mg Oral QPM     sodium chloride (PF)  10 mL Irrigation Q8H     venlafaxine  112.5 mg Oral Daily     vitamin D3  50 mcg Oral Daily       Physical Exam:     Admit Weight: 72.6 kg (160 lb)    Current Vitals:   BP (!) 156/95 (BP Location: Left arm)   Pulse 87   Temp 98.1  F (36.7  C) (Oral)   Resp 18   Ht 1.575 m (5' 2.01\")   Wt 72.6 kg (160 lb)   SpO2 91%   BMI 29.26 kg/m           Vital sign ranges:    Temp:  [98  F (36.7  C)-98.9  F (37.2  C)] 98.1  F (36.7  C)  Pulse:  [76-91] 87  Resp:  [18-22] 18  BP: (128-156)/(72-95) 156/95  SpO2:  [86 %-94 %] 91 %  Patient Vitals for the past 24 hrs:   BP Temp Temp src Pulse Resp SpO2   03/23/23 1747 (!) 156/95 98.1  F (36.7  C) Oral 87 18 --   03/23/23 1400 (!) 145/78 98  F (36.7  C) Oral 86 18 91 %   03/23/23 0956 128/78 98.1  F (36.7  C) Oral 76 20 92 %   03/23/23 0642 -- -- -- -- -- 91 %   03/23/23 0516 133/78 98.3  F (36.8  C) Oral 87 22 94 %   03/23/23 0156 -- -- -- -- -- 93 %   03/23/23 0155 -- -- -- -- -- (!) 86 %   03/23/23 0144 (!) 144/81 98.6  F (37  C) Oral 91 20 91 %   03/22/23 2147 132/72 98.9  F (37.2  C) Oral 90 18 94 %     General Appearance: in no apparent distress.   Skin: normal, warm, No rashes, induration, or jaundice  Heart: Perfused  Lungs: Nonlabored resps on 1L  Abdomen: nontender, drain x 2 in place. New drain with bilious output  Neurologic: " awake, alert and oriented. Tremor absent.    Frailty Scores    There is no flowsheet data to display.         Data:   CMP  Recent Labs   Lab 03/23/23  0442 03/22/23  0458 03/19/23  1643 03/19/23  1542    138   < > 136   POTASSIUM 3.2* 3.6   < > 3.9   CHLORIDE 107 109*   < > 102   CO2 21* 18*   < > 24   * 78   < > 108*   BUN 11.7 15.5   < > 10.2   CR 0.81 1.07*   < > 0.75   GFRESTIMATED 80 57*   < > 88   NASRIN 8.7* 8.7*   < > 9.8   MAG 1.7 2.0   < >  --    LIPASE  --   --   --  14   ALBUMIN 3.0* 2.9*   < > 3.6   BILITOTAL 0.8 0.9   < > 1.9*   ALKPHOS 246* 249*   < > 635*   AST 18 27   < >  --    ALT 75* 96*   < > 318*    < > = values in this interval not displayed.     CBC  Recent Labs   Lab 03/23/23 0442 03/22/23  0458   HGB 9.2* 8.7*   WBC 6.8 5.3    125*     COAGS  Recent Labs   Lab 03/19/23  1542   INR 0.97      UrinalysisNo lab results found.  Virology:  No results found for: CSPEC, CMVPCR, CMQNT, CMVQ, EBVDN, CMVM, CMIG, CMVG, CMIGG, CMLTX, EBVIGG, EBIGG, EBVAGN, HCVAB, HBSAB, BKRES

## 2023-03-28 LAB — BACTERIA ABSC ANAEROBE+AEROBE CULT: NORMAL

## 2023-04-02 LAB
ABO/RH(D): NORMAL
ANTIBODY SCREEN: NEGATIVE
SPECIMEN EXPIRATION DATE: NORMAL

## 2023-04-03 ENCOUNTER — TELEPHONE (OUTPATIENT)
Dept: TRANSPLANT | Facility: CLINIC | Age: 68
End: 2023-04-03
Payer: COMMERCIAL

## 2023-04-03 ENCOUNTER — LAB (OUTPATIENT)
Dept: LAB | Facility: CLINIC | Age: 68
End: 2023-04-03
Payer: COMMERCIAL

## 2023-04-03 ENCOUNTER — ANESTHESIA EVENT (OUTPATIENT)
Dept: SURGERY | Facility: CLINIC | Age: 68
DRG: 907 | End: 2023-04-03
Payer: COMMERCIAL

## 2023-04-03 DIAGNOSIS — K83.9 BILE DUCT ABNORMALITY: ICD-10-CM

## 2023-04-03 DIAGNOSIS — S36.13XS INJURY OF BILE DUCT, SEQUELA: ICD-10-CM

## 2023-04-03 DIAGNOSIS — R94.5 ABNORMAL RESULTS OF LIVER FUNCTION STUDIES: ICD-10-CM

## 2023-04-03 DIAGNOSIS — Z01.818 PRE-OP EXAM: ICD-10-CM

## 2023-04-03 DIAGNOSIS — Z01.818 PRE-OP EXAM: Primary | ICD-10-CM

## 2023-04-03 DIAGNOSIS — R60.9 EDEMA, UNSPECIFIED TYPE: ICD-10-CM

## 2023-04-03 LAB
ALBUMIN SERPL BCG-MCNC: 4 G/DL (ref 3.5–5.2)
ALP SERPL-CCNC: 299 U/L (ref 35–104)
ALT SERPL W P-5'-P-CCNC: 65 U/L (ref 10–35)
ANION GAP SERPL CALCULATED.3IONS-SCNC: 10 MMOL/L (ref 7–15)
AST SERPL W P-5'-P-CCNC: 56 U/L (ref 10–35)
BASOPHILS # BLD AUTO: 0 10E3/UL (ref 0–0.2)
BASOPHILS NFR BLD AUTO: 0 %
BILIRUB SERPL-MCNC: 0.7 MG/DL
BUN SERPL-MCNC: 10 MG/DL (ref 8–23)
CALCIUM SERPL-MCNC: 10 MG/DL (ref 8.8–10.2)
CHLORIDE SERPL-SCNC: 104 MMOL/L (ref 98–107)
CREAT SERPL-MCNC: 0.79 MG/DL (ref 0.51–0.95)
DEPRECATED HCO3 PLAS-SCNC: 25 MMOL/L (ref 22–29)
EOSINOPHIL # BLD AUTO: 0.1 10E3/UL (ref 0–0.7)
EOSINOPHIL NFR BLD AUTO: 1 %
ERYTHROCYTE [DISTWIDTH] IN BLOOD BY AUTOMATED COUNT: 13.6 % (ref 10–15)
GFR SERPL CREATININE-BSD FRML MDRD: 83 ML/MIN/1.73M2
GLUCOSE SERPL-MCNC: 123 MG/DL (ref 70–99)
HCT VFR BLD AUTO: 37.1 % (ref 35–47)
HGB BLD-MCNC: 12.1 G/DL (ref 11.7–15.7)
IMM GRANULOCYTES # BLD: 0 10E3/UL
IMM GRANULOCYTES NFR BLD: 0 %
INR PPP: 1.07 (ref 0.85–1.15)
LYMPHOCYTES # BLD AUTO: 1.7 10E3/UL (ref 0.8–5.3)
LYMPHOCYTES NFR BLD AUTO: 26 %
MCH RBC QN AUTO: 28.5 PG (ref 26.5–33)
MCHC RBC AUTO-ENTMCNC: 32.6 G/DL (ref 31.5–36.5)
MCV RBC AUTO: 88 FL (ref 78–100)
MONOCYTES # BLD AUTO: 0.3 10E3/UL (ref 0–1.3)
MONOCYTES NFR BLD AUTO: 5 %
NEUTROPHILS # BLD AUTO: 4.4 10E3/UL (ref 1.6–8.3)
NEUTROPHILS NFR BLD AUTO: 68 %
NRBC # BLD AUTO: 0 10E3/UL
NRBC BLD AUTO-RTO: 0 /100
PLATELET # BLD AUTO: 228 10E3/UL (ref 150–450)
POTASSIUM SERPL-SCNC: 3.3 MMOL/L (ref 3.4–5.3)
PROT SERPL-MCNC: 7 G/DL (ref 6.4–8.3)
RBC # BLD AUTO: 4.24 10E6/UL (ref 3.8–5.2)
SODIUM SERPL-SCNC: 139 MMOL/L (ref 136–145)
WBC # BLD AUTO: 6.5 10E3/UL (ref 4–11)

## 2023-04-03 PROCEDURE — 36415 COLL VENOUS BLD VENIPUNCTURE: CPT | Performed by: PATHOLOGY

## 2023-04-03 PROCEDURE — 86900 BLOOD TYPING SEROLOGIC ABO: CPT | Performed by: PATHOLOGY

## 2023-04-03 PROCEDURE — 85610 PROTHROMBIN TIME: CPT | Performed by: PATHOLOGY

## 2023-04-03 PROCEDURE — 85025 COMPLETE CBC W/AUTO DIFF WBC: CPT | Performed by: PATHOLOGY

## 2023-04-03 PROCEDURE — 80053 COMPREHEN METABOLIC PANEL: CPT | Performed by: PATHOLOGY

## 2023-04-03 PROCEDURE — 86850 RBC ANTIBODY SCREEN: CPT | Performed by: PATHOLOGY

## 2023-04-03 PROCEDURE — 86901 BLOOD TYPING SEROLOGIC RH(D): CPT | Performed by: PATHOLOGY

## 2023-04-03 NOTE — ANESTHESIA PREPROCEDURE EVALUATION
Anesthesia Pre-Procedure Evaluation    Patient: Zeinab Bermudez   MRN: [id number] : 1955        Procedure : Procedure(s):  HEPATICOJEJUNOSTOMY through creation amy-en-Y          Past Medical History:   Diagnosis Date     Covid 2019 Positive 2022     Depressive disorder      HLD (hyperlipidemia)      Hypertension      SAH (subarachnoid hemorrhage) (H) 2017      Past Surgical History:   Procedure Laterality Date     ENDOSCOPIC RETROGRADE CHOLANGIOPANCREATOGRAM N/A 1/3/2023    Procedure: ENDOSCOPIC RETROGRADE CHOLANGIOPANCREATOGRAPHY;  Surgeon: Michael Mann MD;  Location: [facility] OR     HYSTERECTOMY       IR BILIARY DRAIN PLACEMENT  2023     IR BILIARY DRAIN PLACEMENT  2/10/2023     LAPAROSCOPIC CHOLECYSTECTOMY  2022     LAPAROTOMY EXPLORATORY  2022      No Known Allergies   Social History     Tobacco Use     Smoking status: Never     Smokeless tobacco: Never   Vaping Use     Vaping status: Not on file   Substance Use Topics     Alcohol use: Not Currently      Wt Readings from Last 1 Encounters:   23 72.6 kg (160 lb)        Anesthesia Evaluation   Pt has had prior anesthetic. Type: General and MAC.    No history of anesthetic complications       ROS/MED HX  ENT/Pulmonary:  - neg pulmonary ROS     Neurologic: Comment: H/o of bleed with no procedural intervention    (+) CVA,     Cardiovascular:     (+) Dyslipidemia hypertension (no longer taking antihypertensive meds since initial biliary sepsis)-----Previous cardiac testing   Echo: Date: Results:    Stress Test: Date: Results:    ECG Reviewed: Date: 23 Results:  Sinus tachycardia 118bpm  Cath: Date: Results:      METS/Exercise Tolerance: >4 METS    Hematologic:  - neg hematologic  ROS     Musculoskeletal:  - neg musculoskeletal ROS     GI/Hepatic: Comment: Current bile leak s/p cholecystectomy and multiple drain placement attempts.   (-) GERD   Renal/Genitourinary:  - neg Renal ROS     Endo:  - neg endo ROS      Psychiatric/Substance Use:     (+) psychiatric history anxiety and depression     Infectious Disease: Comment: Previously on abx for biliary leak and abdominal infection.      Malignancy:  - neg malignancy ROS     Other:            Physical Exam    Airway        Mallampati: II   TM distance: > 3 FB   Neck ROM: full   Mouth opening: > 3 cm    Respiratory Devices and Support         Dental       (+) Modest Abnormalities - crowns, retainers, 1 or 2 missing teeth      Cardiovascular             Pulmonary                   OUTSIDE LABS:  CBC:   Lab Results   Component Value Date    WBC 6.5 04/03/2023    WBC 8.7 03/24/2023    HGB 12.1 04/03/2023    HGB 10.4 (L) 03/24/2023    HCT 37.1 04/03/2023    HCT 32.7 (L) 03/24/2023     04/03/2023     03/24/2023     BMP:   Lab Results   Component Value Date     03/24/2023     03/23/2023    POTASSIUM 3.0 (L) 03/24/2023    POTASSIUM 3.2 (L) 03/23/2023    CHLORIDE 100 03/24/2023    CHLORIDE 107 03/23/2023    CO2 24 03/24/2023    CO2 21 (L) 03/23/2023    BUN 5.2 (L) 03/24/2023    BUN 11.7 03/23/2023    CR 0.69 03/24/2023    CR 0.81 03/23/2023     (H) 03/24/2023     (H) 03/23/2023     COAGS:   Lab Results   Component Value Date    PTT 31 01/01/2023    INR 0.97 03/19/2023     POC: No results found for: BGM, HCG, HCGS  HEPATIC:   Lab Results   Component Value Date    ALBUMIN 3.3 (L) 03/24/2023    PROTTOTAL 6.4 03/24/2023    ALT 63 (H) 03/24/2023    AST 26 03/24/2023    ALKPHOS 250 (H) 03/24/2023    BILITOTAL 1.1 03/24/2023     OTHER:   Lab Results   Component Value Date    LACT 1.0 03/21/2023    NASRIN 9.1 03/24/2023    PHOS 4.2 02/07/2023    MAG 1.8 03/24/2023    LIPASE 14 03/19/2023       Anesthesia Plan    ASA Status:  3   NPO Status:  NPO Appropriate    Anesthesia Type: General.     - Airway: ETT   Induction: Intravenous.   Maintenance: Balanced.   Techniques and Equipment:     - Lines/Monitors: 2nd IV, BIS     - Blood: T&S     - Drips/Meds:  Phenylephrine     Consents    Anesthesia Plan(s) and associated risks, benefits, and realistic alternatives discussed. Questions answered and patient/representative(s) expressed understanding.    - Discussed:     - Discussed with:  Patient      - Extended Intubation/Ventilatory Support Discussed: No.      - Patient is DNR/DNI Status: No    Use of blood products discussed: Yes.     - Discussed with: Patient.     - Consented: consented to blood products            Reason for refusal: other.     Postoperative Care    Pain management: IV analgesics, Oral pain medications, Multi-modal analgesia.   PONV prophylaxis: Ondansetron (or other 5HT-3), Dexamethasone or Solumedrol, Aprepitant     Comments:                Ryan Guillen MD

## 2023-04-03 NOTE — TELEPHONE ENCOUNTER
Contacted patient stating surgery is tomorrow morning    Labs ordered and will be scheduled    Patient feels fine.  Drain working well.  235ml yesterday day prior 495ml      No complaints.

## 2023-04-04 ENCOUNTER — ANESTHESIA (OUTPATIENT)
Dept: SURGERY | Facility: CLINIC | Age: 68
DRG: 907 | End: 2023-04-04
Payer: COMMERCIAL

## 2023-04-04 ENCOUNTER — HOSPITAL ENCOUNTER (INPATIENT)
Facility: CLINIC | Age: 68
LOS: 5 days | Discharge: HOME | DRG: 907 | End: 2023-04-09
Attending: TRANSPLANT SURGERY | Admitting: TRANSPLANT SURGERY
Payer: COMMERCIAL

## 2023-04-04 ENCOUNTER — APPOINTMENT (OUTPATIENT)
Dept: GENERAL RADIOLOGY | Facility: CLINIC | Age: 68
DRG: 907 | End: 2023-04-04
Attending: PHYSICIAN ASSISTANT
Payer: COMMERCIAL

## 2023-04-04 DIAGNOSIS — G89.18 ACUTE POST-OPERATIVE PAIN: ICD-10-CM

## 2023-04-04 DIAGNOSIS — T85.520A BILIARY DRAIN DISPLACEMENT, INITIAL ENCOUNTER: Primary | ICD-10-CM

## 2023-04-04 DIAGNOSIS — K65.1 INTRA-ABDOMINAL ABSCESS (H): ICD-10-CM

## 2023-04-04 DIAGNOSIS — S36.13XS INJURY OF BILE DUCT, SEQUELA: ICD-10-CM

## 2023-04-04 LAB
ABO/RH(D): NORMAL
ALBUMIN SERPL BCG-MCNC: 2.6 G/DL (ref 3.5–5.2)
ALP SERPL-CCNC: 167 U/L (ref 35–104)
ALT SERPL W P-5'-P-CCNC: 163 U/L (ref 10–35)
ANION GAP SERPL CALCULATED.3IONS-SCNC: 12 MMOL/L (ref 7–15)
ANTIBODY SCREEN: NEGATIVE
APTT PPP: 29 SECONDS (ref 22–38)
AST SERPL W P-5'-P-CCNC: 287 U/L (ref 10–35)
BASOPHILS # BLD AUTO: 0 10E3/UL (ref 0–0.2)
BASOPHILS NFR BLD AUTO: 0 %
BILIRUB DIRECT SERPL-MCNC: 0.89 MG/DL (ref 0–0.3)
BILIRUB SERPL-MCNC: 1.4 MG/DL
BLD PROD TYP BPU: NORMAL
BLD PROD TYP BPU: NORMAL
BLOOD COMPONENT TYPE: NORMAL
BLOOD COMPONENT TYPE: NORMAL
BUN SERPL-MCNC: 8.3 MG/DL (ref 8–23)
CALCIUM SERPL-MCNC: 7.6 MG/DL (ref 8.8–10.2)
CHLORIDE SERPL-SCNC: 109 MMOL/L (ref 98–107)
CODING SYSTEM: NORMAL
CODING SYSTEM: NORMAL
CREAT SERPL-MCNC: 0.62 MG/DL (ref 0.51–0.95)
CROSSMATCH: NORMAL
CROSSMATCH: NORMAL
DEPRECATED HCO3 PLAS-SCNC: 19 MMOL/L (ref 22–29)
EOSINOPHIL # BLD AUTO: 0.1 10E3/UL (ref 0–0.7)
EOSINOPHIL NFR BLD AUTO: 0 %
ERYTHROCYTE [DISTWIDTH] IN BLOOD BY AUTOMATED COUNT: 13.8 % (ref 10–15)
GFR SERPL CREATININE-BSD FRML MDRD: >90 ML/MIN/1.73M2
GLUCOSE BLDC GLUCOMTR-MCNC: 95 MG/DL (ref 70–99)
GLUCOSE SERPL-MCNC: 172 MG/DL (ref 70–99)
GRAM STAIN RESULT: ABNORMAL
HCT VFR BLD AUTO: 34 % (ref 35–47)
HGB BLD-MCNC: 10.7 G/DL (ref 11.7–15.7)
HGB BLD-MCNC: 11.1 G/DL (ref 11.7–15.7)
HGB BLD-MCNC: 11.3 G/DL (ref 11.7–15.7)
IMM GRANULOCYTES # BLD: 0.2 10E3/UL
IMM GRANULOCYTES NFR BLD: 1 %
INR PPP: 1.33 (ref 0.85–1.15)
ISSUE DATE AND TIME: NORMAL
ISSUE DATE AND TIME: NORMAL
LYMPHOCYTES # BLD AUTO: 1.5 10E3/UL (ref 0.8–5.3)
LYMPHOCYTES NFR BLD AUTO: 6 %
MAGNESIUM SERPL-MCNC: 1.8 MG/DL (ref 1.7–2.3)
MCH RBC QN AUTO: 30.1 PG (ref 26.5–33)
MCHC RBC AUTO-ENTMCNC: 33.2 G/DL (ref 31.5–36.5)
MCV RBC AUTO: 90 FL (ref 78–100)
MONOCYTES # BLD AUTO: 0.5 10E3/UL (ref 0–1.3)
MONOCYTES NFR BLD AUTO: 2 %
NEUTROPHILS # BLD AUTO: 20.9 10E3/UL (ref 1.6–8.3)
NEUTROPHILS NFR BLD AUTO: 91 %
NRBC # BLD AUTO: 0 10E3/UL
NRBC BLD AUTO-RTO: 0 /100
PHOSPHATE SERPL-MCNC: 3.5 MG/DL (ref 2.5–4.5)
PLATELET # BLD AUTO: 318 10E3/UL (ref 150–450)
POTASSIUM SERPL-SCNC: 3.6 MMOL/L (ref 3.4–5.3)
PROT SERPL-MCNC: 4.4 G/DL (ref 6.4–8.3)
RBC # BLD AUTO: 3.76 10E6/UL (ref 3.8–5.2)
SARS-COV-2 RNA RESP QL NAA+PROBE: NEGATIVE
SODIUM SERPL-SCNC: 140 MMOL/L (ref 136–145)
SPECIMEN EXPIRATION DATE: NORMAL
UNIT ABO/RH: NORMAL
UNIT ABO/RH: NORMAL
UNIT NUMBER: NORMAL
UNIT NUMBER: NORMAL
UNIT STATUS: NORMAL
UNIT STATUS: NORMAL
UNIT TYPE ISBT: 9500
UNIT TYPE ISBT: 9500
WBC # BLD AUTO: 23.2 10E3/UL (ref 4–11)

## 2023-04-04 PROCEDURE — 82248 BILIRUBIN DIRECT: CPT | Performed by: TRANSPLANT SURGERY

## 2023-04-04 PROCEDURE — 999N000141 HC STATISTIC PRE-PROCEDURE NURSING ASSESSMENT: Performed by: TRANSPLANT SURGERY

## 2023-04-04 PROCEDURE — 87075 CULTR BACTERIA EXCEPT BLOOD: CPT | Performed by: TRANSPLANT SURGERY

## 2023-04-04 PROCEDURE — 258N000003 HC RX IP 258 OP 636: Performed by: TRANSPLANT SURGERY

## 2023-04-04 PROCEDURE — 82310 ASSAY OF CALCIUM: CPT | Performed by: SURGERY

## 2023-04-04 PROCEDURE — 86923 COMPATIBILITY TEST ELECTRIC: CPT | Performed by: PHYSICIAN ASSISTANT

## 2023-04-04 PROCEDURE — 84100 ASSAY OF PHOSPHORUS: CPT | Performed by: SURGERY

## 2023-04-04 PROCEDURE — 250N000011 HC RX IP 250 OP 636

## 2023-04-04 PROCEDURE — 85730 THROMBOPLASTIN TIME PARTIAL: CPT | Performed by: SURGERY

## 2023-04-04 PROCEDURE — 250N000011 HC RX IP 250 OP 636: Performed by: SURGERY

## 2023-04-04 PROCEDURE — 250N000011 HC RX IP 250 OP 636: Performed by: STUDENT IN AN ORGANIZED HEALTH CARE EDUCATION/TRAINING PROGRAM

## 2023-04-04 PROCEDURE — 250N000025 HC SEVOFLURANE, PER MIN: Performed by: TRANSPLANT SURGERY

## 2023-04-04 PROCEDURE — 87205 SMEAR GRAM STAIN: CPT | Performed by: TRANSPLANT SURGERY

## 2023-04-04 PROCEDURE — 87070 CULTURE OTHR SPECIMN AEROBIC: CPT | Performed by: TRANSPLANT SURGERY

## 2023-04-04 PROCEDURE — 86901 BLOOD TYPING SEROLOGIC RH(D): CPT | Performed by: STUDENT IN AN ORGANIZED HEALTH CARE EDUCATION/TRAINING PROGRAM

## 2023-04-04 PROCEDURE — P9016 RBC LEUKOCYTES REDUCED: HCPCS | Performed by: NURSE PRACTITIONER

## 2023-04-04 PROCEDURE — 87102 FUNGUS ISOLATION CULTURE: CPT | Performed by: TRANSPLANT SURGERY

## 2023-04-04 PROCEDURE — 258N000003 HC RX IP 258 OP 636: Performed by: STUDENT IN AN ORGANIZED HEALTH CARE EDUCATION/TRAINING PROGRAM

## 2023-04-04 PROCEDURE — 85025 COMPLETE CBC W/AUTO DIFF WBC: CPT | Performed by: SURGERY

## 2023-04-04 PROCEDURE — 250N000013 HC RX MED GY IP 250 OP 250 PS 637: Performed by: STUDENT IN AN ORGANIZED HEALTH CARE EDUCATION/TRAINING PROGRAM

## 2023-04-04 PROCEDURE — 250N000011 HC RX IP 250 OP 636: Performed by: PHYSICIAN ASSISTANT

## 2023-04-04 PROCEDURE — 370N000017 HC ANESTHESIA TECHNICAL FEE, PER MIN: Performed by: TRANSPLANT SURGERY

## 2023-04-04 PROCEDURE — 85018 HEMOGLOBIN: CPT | Performed by: PHYSICIAN ASSISTANT

## 2023-04-04 PROCEDURE — 87077 CULTURE AEROBIC IDENTIFY: CPT | Performed by: TRANSPLANT SURGERY

## 2023-04-04 PROCEDURE — 250N000009 HC RX 250: Performed by: STUDENT IN AN ORGANIZED HEALTH CARE EDUCATION/TRAINING PROGRAM

## 2023-04-04 PROCEDURE — 85610 PROTHROMBIN TIME: CPT | Performed by: SURGERY

## 2023-04-04 PROCEDURE — 710N000010 HC RECOVERY PHASE 1, LEVEL 2, PER MIN: Performed by: TRANSPLANT SURGERY

## 2023-04-04 PROCEDURE — 360N000084 HC SURGERY LEVEL 4 W/ FLUORO, PER MIN: Performed by: TRANSPLANT SURGERY

## 2023-04-04 PROCEDURE — 250N000013 HC RX MED GY IP 250 OP 250 PS 637: Performed by: SURGERY

## 2023-04-04 PROCEDURE — C9290 INJ, BUPIVACAINE LIPOSOME: HCPCS

## 2023-04-04 PROCEDURE — 74018 RADEX ABDOMEN 1 VIEW: CPT | Mod: 26 | Performed by: STUDENT IN AN ORGANIZED HEALTH CARE EDUCATION/TRAINING PROGRAM

## 2023-04-04 PROCEDURE — 250N000011 HC RX IP 250 OP 636: Performed by: NURSE PRACTITIONER

## 2023-04-04 PROCEDURE — 272N000001 HC OR GENERAL SUPPLY STERILE: Performed by: TRANSPLANT SURGERY

## 2023-04-04 PROCEDURE — 250N000012 HC RX MED GY IP 250 OP 636 PS 637: Performed by: STUDENT IN AN ORGANIZED HEALTH CARE EDUCATION/TRAINING PROGRAM

## 2023-04-04 PROCEDURE — 250N000011 HC RX IP 250 OP 636: Performed by: TRANSPLANT SURGERY

## 2023-04-04 PROCEDURE — 83735 ASSAY OF MAGNESIUM: CPT | Performed by: SURGERY

## 2023-04-04 PROCEDURE — 36415 COLL VENOUS BLD VENIPUNCTURE: CPT | Performed by: PHYSICIAN ASSISTANT

## 2023-04-04 PROCEDURE — 0F190ZB BYPASS COMMON BILE DUCT TO SMALL INTESTINE, OPEN APPROACH: ICD-10-PCS | Performed by: TRANSPLANT SURGERY

## 2023-04-04 PROCEDURE — 999N000065 XR ABDOMEN PORT 1 VIEW

## 2023-04-04 PROCEDURE — 258N000001 HC RX 258: Performed by: TRANSPLANT SURGERY

## 2023-04-04 PROCEDURE — 36415 COLL VENOUS BLD VENIPUNCTURE: CPT | Performed by: STUDENT IN AN ORGANIZED HEALTH CARE EDUCATION/TRAINING PROGRAM

## 2023-04-04 PROCEDURE — 80053 COMPREHEN METABOLIC PANEL: CPT | Performed by: TRANSPLANT SURGERY

## 2023-04-04 PROCEDURE — 120N000005 HC R&B MS OVERFLOW UMMC

## 2023-04-04 PROCEDURE — U0005 INFEC AGEN DETEC AMPLI PROBE: HCPCS | Performed by: TRANSPLANT SURGERY

## 2023-04-04 PROCEDURE — 47785 FUSE BILE DUCTS AND BOWEL: CPT | Performed by: TRANSPLANT SURGERY

## 2023-04-04 PROCEDURE — 86923 COMPATIBILITY TEST ELECTRIC: CPT | Performed by: NURSE PRACTITIONER

## 2023-04-04 DEVICE — IMPLANTABLE DEVICE: Type: IMPLANTABLE DEVICE | Site: BILE DUCT | Status: FUNCTIONAL

## 2023-04-04 RX ORDER — HYDROMORPHONE HCL IN WATER/PF 6 MG/30 ML
0.2 PATIENT CONTROLLED ANALGESIA SYRINGE INTRAVENOUS
Status: DISCONTINUED | OUTPATIENT
Start: 2023-04-04 | End: 2023-04-05

## 2023-04-04 RX ORDER — BISACODYL 10 MG
10 SUPPOSITORY, RECTAL RECTAL DAILY PRN
Status: DISCONTINUED | OUTPATIENT
Start: 2023-04-04 | End: 2023-04-09 | Stop reason: HOSPADM

## 2023-04-04 RX ORDER — ACETAMINOPHEN 325 MG/1
975 TABLET ORAL EVERY 8 HOURS
Status: COMPLETED | OUTPATIENT
Start: 2023-04-04 | End: 2023-04-07

## 2023-04-04 RX ORDER — LABETALOL HYDROCHLORIDE 5 MG/ML
10 INJECTION, SOLUTION INTRAVENOUS
Status: DISCONTINUED | OUTPATIENT
Start: 2023-04-04 | End: 2023-04-04 | Stop reason: HOSPADM

## 2023-04-04 RX ORDER — FENTANYL CITRATE 50 UG/ML
25 INJECTION, SOLUTION INTRAMUSCULAR; INTRAVENOUS EVERY 5 MIN PRN
Status: DISCONTINUED | OUTPATIENT
Start: 2023-04-04 | End: 2023-04-04 | Stop reason: HOSPADM

## 2023-04-04 RX ORDER — NALOXONE HYDROCHLORIDE 0.4 MG/ML
0.4 INJECTION, SOLUTION INTRAMUSCULAR; INTRAVENOUS; SUBCUTANEOUS
Status: DISCONTINUED | OUTPATIENT
Start: 2023-04-04 | End: 2023-04-04 | Stop reason: HOSPADM

## 2023-04-04 RX ORDER — HYDROMORPHONE HCL IN WATER/PF 6 MG/30 ML
0.4 PATIENT CONTROLLED ANALGESIA SYRINGE INTRAVENOUS
Status: DISCONTINUED | OUTPATIENT
Start: 2023-04-04 | End: 2023-04-05

## 2023-04-04 RX ORDER — DEXAMETHASONE SODIUM PHOSPHATE 4 MG/ML
INJECTION, SOLUTION INTRA-ARTICULAR; INTRALESIONAL; INTRAMUSCULAR; INTRAVENOUS; SOFT TISSUE PRN
Status: DISCONTINUED | OUTPATIENT
Start: 2023-04-04 | End: 2023-04-04

## 2023-04-04 RX ORDER — ONDANSETRON 2 MG/ML
4 INJECTION INTRAMUSCULAR; INTRAVENOUS EVERY 6 HOURS PRN
Status: DISCONTINUED | OUTPATIENT
Start: 2023-04-04 | End: 2023-04-09 | Stop reason: HOSPADM

## 2023-04-04 RX ORDER — OXYCODONE HYDROCHLORIDE 5 MG/1
5 TABLET ORAL EVERY 4 HOURS PRN
Status: DISCONTINUED | OUTPATIENT
Start: 2023-04-04 | End: 2023-04-09 | Stop reason: HOSPADM

## 2023-04-04 RX ORDER — HYDROXYZINE HYDROCHLORIDE 10 MG/1
10 TABLET, FILM COATED ORAL EVERY 6 HOURS PRN
Status: DISCONTINUED | OUTPATIENT
Start: 2023-04-04 | End: 2023-04-04 | Stop reason: HOSPADM

## 2023-04-04 RX ORDER — PROPOFOL 10 MG/ML
INJECTION, EMULSION INTRAVENOUS PRN
Status: DISCONTINUED | OUTPATIENT
Start: 2023-04-04 | End: 2023-04-04

## 2023-04-04 RX ORDER — LIDOCAINE 40 MG/G
CREAM TOPICAL
Status: DISCONTINUED | OUTPATIENT
Start: 2023-04-04 | End: 2023-04-04 | Stop reason: HOSPADM

## 2023-04-04 RX ORDER — ONDANSETRON 2 MG/ML
INJECTION INTRAMUSCULAR; INTRAVENOUS PRN
Status: DISCONTINUED | OUTPATIENT
Start: 2023-04-04 | End: 2023-04-04

## 2023-04-04 RX ORDER — APREPITANT 40 MG/1
40 CAPSULE ORAL DAILY
Status: DISCONTINUED | OUTPATIENT
Start: 2023-04-04 | End: 2023-04-04 | Stop reason: HOSPADM

## 2023-04-04 RX ORDER — PHENYLEPHRINE HCL IN 0.9% NACL 50MG/250ML
.5-1.25 PLASTIC BAG, INJECTION (ML) INTRAVENOUS CONTINUOUS
Status: DISCONTINUED | OUTPATIENT
Start: 2023-04-04 | End: 2023-04-04

## 2023-04-04 RX ORDER — ACETAMINOPHEN 325 MG/1
650 TABLET ORAL EVERY 4 HOURS PRN
Status: DISCONTINUED | OUTPATIENT
Start: 2023-04-07 | End: 2023-04-09 | Stop reason: HOSPADM

## 2023-04-04 RX ORDER — NALOXONE HYDROCHLORIDE 0.4 MG/ML
0.2 INJECTION, SOLUTION INTRAMUSCULAR; INTRAVENOUS; SUBCUTANEOUS
Status: DISCONTINUED | OUTPATIENT
Start: 2023-04-04 | End: 2023-04-04 | Stop reason: HOSPADM

## 2023-04-04 RX ORDER — VASOPRESSIN IN 0.9 % NACL 2 UNIT/2ML
SYRINGE (ML) INTRAVENOUS PRN
Status: DISCONTINUED | OUTPATIENT
Start: 2023-04-04 | End: 2023-04-04

## 2023-04-04 RX ORDER — ONDANSETRON 4 MG/1
4 TABLET, ORALLY DISINTEGRATING ORAL EVERY 6 HOURS PRN
Status: DISCONTINUED | OUTPATIENT
Start: 2023-04-04 | End: 2023-04-09 | Stop reason: HOSPADM

## 2023-04-04 RX ORDER — ACETAMINOPHEN 325 MG/1
975 TABLET ORAL ONCE
Status: DISCONTINUED | OUTPATIENT
Start: 2023-04-04 | End: 2023-04-04 | Stop reason: HOSPADM

## 2023-04-04 RX ORDER — PIPERACILLIN SODIUM, TAZOBACTAM SODIUM 3; .375 G/15ML; G/15ML
3.38 INJECTION, POWDER, LYOPHILIZED, FOR SOLUTION INTRAVENOUS ONCE
Status: COMPLETED | OUTPATIENT
Start: 2023-04-04 | End: 2023-04-04

## 2023-04-04 RX ORDER — NICARDIPINE HCL-0.9% SOD CHLOR 1 MG/10 ML
SYRINGE (ML) INTRAVENOUS PRN
Status: DISCONTINUED | OUTPATIENT
Start: 2023-04-04 | End: 2023-04-04

## 2023-04-04 RX ORDER — HYDROMORPHONE HCL IN WATER/PF 6 MG/30 ML
0.2 PATIENT CONTROLLED ANALGESIA SYRINGE INTRAVENOUS EVERY 5 MIN PRN
Status: DISCONTINUED | OUTPATIENT
Start: 2023-04-04 | End: 2023-04-04 | Stop reason: HOSPADM

## 2023-04-04 RX ORDER — PIPERACILLIN SODIUM, TAZOBACTAM SODIUM 3; .375 G/15ML; G/15ML
3.38 INJECTION, POWDER, LYOPHILIZED, FOR SOLUTION INTRAVENOUS EVERY 6 HOURS
Status: DISCONTINUED | OUTPATIENT
Start: 2023-04-04 | End: 2023-04-04 | Stop reason: HOSPADM

## 2023-04-04 RX ORDER — HALOPERIDOL 5 MG/ML
1 INJECTION INTRAMUSCULAR
Status: DISCONTINUED | OUTPATIENT
Start: 2023-04-04 | End: 2023-04-04 | Stop reason: HOSPADM

## 2023-04-04 RX ORDER — FENTANYL CITRATE 50 UG/ML
50 INJECTION, SOLUTION INTRAMUSCULAR; INTRAVENOUS EVERY 5 MIN PRN
Status: DISCONTINUED | OUTPATIENT
Start: 2023-04-04 | End: 2023-04-04 | Stop reason: HOSPADM

## 2023-04-04 RX ORDER — SODIUM CHLORIDE, SODIUM LACTATE, POTASSIUM CHLORIDE, CALCIUM CHLORIDE 600; 310; 30; 20 MG/100ML; MG/100ML; MG/100ML; MG/100ML
INJECTION, SOLUTION INTRAVENOUS CONTINUOUS PRN
Status: DISCONTINUED | OUTPATIENT
Start: 2023-04-04 | End: 2023-04-04

## 2023-04-04 RX ORDER — NALOXONE HYDROCHLORIDE 0.4 MG/ML
0.4 INJECTION, SOLUTION INTRAMUSCULAR; INTRAVENOUS; SUBCUTANEOUS
Status: DISCONTINUED | OUTPATIENT
Start: 2023-04-04 | End: 2023-04-09 | Stop reason: HOSPADM

## 2023-04-04 RX ORDER — DEXTROSE, SODIUM CHLORIDE, SODIUM LACTATE, POTASSIUM CHLORIDE, AND CALCIUM CHLORIDE 5; .6; .31; .03; .02 G/100ML; G/100ML; G/100ML; G/100ML; G/100ML
INJECTION, SOLUTION INTRAVENOUS CONTINUOUS
Status: DISCONTINUED | OUTPATIENT
Start: 2023-04-04 | End: 2023-04-07

## 2023-04-04 RX ORDER — FLUMAZENIL 0.1 MG/ML
0.2 INJECTION, SOLUTION INTRAVENOUS
Status: DISCONTINUED | OUTPATIENT
Start: 2023-04-04 | End: 2023-04-04 | Stop reason: HOSPADM

## 2023-04-04 RX ORDER — NALOXONE HYDROCHLORIDE 0.4 MG/ML
0.2 INJECTION, SOLUTION INTRAMUSCULAR; INTRAVENOUS; SUBCUTANEOUS
Status: DISCONTINUED | OUTPATIENT
Start: 2023-04-04 | End: 2023-04-09 | Stop reason: HOSPADM

## 2023-04-04 RX ORDER — ONDANSETRON 2 MG/ML
4 INJECTION INTRAMUSCULAR; INTRAVENOUS EVERY 30 MIN PRN
Status: DISCONTINUED | OUTPATIENT
Start: 2023-04-04 | End: 2023-04-04 | Stop reason: HOSPADM

## 2023-04-04 RX ORDER — BUPIVACAINE HYDROCHLORIDE 2.5 MG/ML
INJECTION, SOLUTION EPIDURAL; INFILTRATION; INTRACAUDAL
Status: COMPLETED | OUTPATIENT
Start: 2023-04-04 | End: 2023-04-04

## 2023-04-04 RX ORDER — PROCHLORPERAZINE MALEATE 5 MG
5 TABLET ORAL EVERY 6 HOURS PRN
Status: DISCONTINUED | OUTPATIENT
Start: 2023-04-04 | End: 2023-04-09 | Stop reason: HOSPADM

## 2023-04-04 RX ORDER — SODIUM CHLORIDE, SODIUM GLUCONATE, SODIUM ACETATE, POTASSIUM CHLORIDE AND MAGNESIUM CHLORIDE 526; 502; 368; 37; 30 MG/100ML; MG/100ML; MG/100ML; MG/100ML; MG/100ML
INJECTION, SOLUTION INTRAVENOUS CONTINUOUS PRN
Status: DISCONTINUED | OUTPATIENT
Start: 2023-04-04 | End: 2023-04-04

## 2023-04-04 RX ORDER — AMOXICILLIN 250 MG
1 CAPSULE ORAL 2 TIMES DAILY
Status: DISCONTINUED | OUTPATIENT
Start: 2023-04-04 | End: 2023-04-09 | Stop reason: HOSPADM

## 2023-04-04 RX ORDER — ONDANSETRON 4 MG/1
4 TABLET, ORALLY DISINTEGRATING ORAL EVERY 30 MIN PRN
Status: DISCONTINUED | OUTPATIENT
Start: 2023-04-04 | End: 2023-04-04 | Stop reason: HOSPADM

## 2023-04-04 RX ORDER — POLYETHYLENE GLYCOL 3350 17 G/17G
17 POWDER, FOR SOLUTION ORAL DAILY
Status: DISCONTINUED | OUTPATIENT
Start: 2023-04-05 | End: 2023-04-09 | Stop reason: HOSPADM

## 2023-04-04 RX ORDER — ACETAMINOPHEN 325 MG/1
975 TABLET ORAL ONCE
Status: COMPLETED | OUTPATIENT
Start: 2023-04-04 | End: 2023-04-04

## 2023-04-04 RX ORDER — LIDOCAINE HYDROCHLORIDE 20 MG/ML
INJECTION, SOLUTION INFILTRATION; PERINEURAL PRN
Status: DISCONTINUED | OUTPATIENT
Start: 2023-04-04 | End: 2023-04-04

## 2023-04-04 RX ORDER — OXYCODONE HYDROCHLORIDE 10 MG/1
10 TABLET ORAL EVERY 4 HOURS PRN
Status: DISCONTINUED | OUTPATIENT
Start: 2023-04-04 | End: 2023-04-09 | Stop reason: HOSPADM

## 2023-04-04 RX ORDER — FENTANYL CITRATE 50 UG/ML
25-50 INJECTION, SOLUTION INTRAMUSCULAR; INTRAVENOUS
Status: DISCONTINUED | OUTPATIENT
Start: 2023-04-04 | End: 2023-04-04 | Stop reason: HOSPADM

## 2023-04-04 RX ORDER — SODIUM CHLORIDE, SODIUM LACTATE, POTASSIUM CHLORIDE, CALCIUM CHLORIDE 600; 310; 30; 20 MG/100ML; MG/100ML; MG/100ML; MG/100ML
INJECTION, SOLUTION INTRAVENOUS CONTINUOUS
Status: DISCONTINUED | OUTPATIENT
Start: 2023-04-04 | End: 2023-04-04 | Stop reason: HOSPADM

## 2023-04-04 RX ORDER — HYDROMORPHONE HCL IN WATER/PF 6 MG/30 ML
0.4 PATIENT CONTROLLED ANALGESIA SYRINGE INTRAVENOUS EVERY 5 MIN PRN
Status: DISCONTINUED | OUTPATIENT
Start: 2023-04-04 | End: 2023-04-04 | Stop reason: HOSPADM

## 2023-04-04 RX ADMIN — HYDROMORPHONE HYDROCHLORIDE 0.5 MG: 1 INJECTION, SOLUTION INTRAMUSCULAR; INTRAVENOUS; SUBCUTANEOUS at 12:04

## 2023-04-04 RX ADMIN — NOREPINEPHRINE BITARTRATE 6.4 MCG: 1 INJECTION, SOLUTION, CONCENTRATE INTRAVENOUS at 11:01

## 2023-04-04 RX ADMIN — PIPERACILLIN AND TAZOBACTAM 3.38 G: 3; .375 INJECTION, POWDER, LYOPHILIZED, FOR SOLUTION INTRAVENOUS at 17:14

## 2023-04-04 RX ADMIN — ONDANSETRON 4 MG: 2 INJECTION INTRAMUSCULAR; INTRAVENOUS at 12:07

## 2023-04-04 RX ADMIN — PHENYLEPHRINE HYDROCHLORIDE 200 MCG: 10 INJECTION INTRAVENOUS at 12:26

## 2023-04-04 RX ADMIN — BUPIVACAINE 20 ML: 13.3 INJECTION, SUSPENSION, LIPOSOMAL INFILTRATION at 07:00

## 2023-04-04 RX ADMIN — HYDROMORPHONE HYDROCHLORIDE 0.2 MG: 1 INJECTION, SOLUTION INTRAMUSCULAR; INTRAVENOUS; SUBCUTANEOUS at 12:46

## 2023-04-04 RX ADMIN — Medication 20 MG: at 11:16

## 2023-04-04 RX ADMIN — FENTANYL CITRATE 25 MCG: 50 INJECTION, SOLUTION INTRAMUSCULAR; INTRAVENOUS at 14:40

## 2023-04-04 RX ADMIN — PHENYLEPHRINE HYDROCHLORIDE 50 MCG: 10 INJECTION INTRAVENOUS at 09:56

## 2023-04-04 RX ADMIN — NOREPINEPHRINE BITARTRATE 6.4 MCG: 1 INJECTION, SOLUTION, CONCENTRATE INTRAVENOUS at 10:43

## 2023-04-04 RX ADMIN — PHENYLEPHRINE HYDROCHLORIDE 200 MCG: 10 INJECTION INTRAVENOUS at 12:42

## 2023-04-04 RX ADMIN — BUPIVACAINE HYDROCHLORIDE 20 ML: 2.5 INJECTION, SOLUTION EPIDURAL; INFILTRATION; INTRACAUDAL; PERINEURAL at 07:00

## 2023-04-04 RX ADMIN — PHENYLEPHRINE HYDROCHLORIDE 100 MCG: 10 INJECTION INTRAVENOUS at 12:14

## 2023-04-04 RX ADMIN — HYDROMORPHONE HYDROCHLORIDE 0.2 MG: 0.2 INJECTION, SOLUTION INTRAMUSCULAR; INTRAVENOUS; SUBCUTANEOUS at 22:10

## 2023-04-04 RX ADMIN — FENTANYL CITRATE 50 MCG: 50 INJECTION, SOLUTION INTRAMUSCULAR; INTRAVENOUS at 13:42

## 2023-04-04 RX ADMIN — ACETAMINOPHEN 975 MG: 325 TABLET, FILM COATED ORAL at 16:37

## 2023-04-04 RX ADMIN — NOREPINEPHRINE BITARTRATE 6.4 MCG: 1 INJECTION, SOLUTION, CONCENTRATE INTRAVENOUS at 10:36

## 2023-04-04 RX ADMIN — FENTANYL CITRATE 50 MCG: 50 INJECTION, SOLUTION INTRAMUSCULAR; INTRAVENOUS at 08:46

## 2023-04-04 RX ADMIN — Medication 0.5 MCG/KG/MIN: at 13:23

## 2023-04-04 RX ADMIN — FENTANYL CITRATE 50 MCG: 50 INJECTION, SOLUTION INTRAMUSCULAR; INTRAVENOUS at 07:01

## 2023-04-04 RX ADMIN — Medication 10 MG: at 10:22

## 2023-04-04 RX ADMIN — VANCOMYCIN HYDROCHLORIDE 1250 MG: 10 INJECTION, POWDER, LYOPHILIZED, FOR SOLUTION INTRAVENOUS at 18:52

## 2023-04-04 RX ADMIN — PHENYLEPHRINE HYDROCHLORIDE 100 MCG: 10 INJECTION INTRAVENOUS at 12:17

## 2023-04-04 RX ADMIN — MIDAZOLAM 2 MG: 1 INJECTION INTRAMUSCULAR; INTRAVENOUS at 07:24

## 2023-04-04 RX ADMIN — OXYCODONE HYDROCHLORIDE 5 MG: 5 TABLET ORAL at 16:37

## 2023-04-04 RX ADMIN — SODIUM CHLORIDE, POTASSIUM CHLORIDE, SODIUM LACTATE AND CALCIUM CHLORIDE: 600; 310; 30; 20 INJECTION, SOLUTION INTRAVENOUS at 08:32

## 2023-04-04 RX ADMIN — NOREPINEPHRINE BITARTRATE 6.4 MCG: 1 INJECTION, SOLUTION, CONCENTRATE INTRAVENOUS at 13:09

## 2023-04-04 RX ADMIN — ACETAMINOPHEN 975 MG: 325 TABLET ORAL at 06:13

## 2023-04-04 RX ADMIN — APREPITANT 40 MG: 40 CAPSULE ORAL at 06:51

## 2023-04-04 RX ADMIN — FENTANYL CITRATE 50 MCG: 50 INJECTION, SOLUTION INTRAMUSCULAR; INTRAVENOUS at 10:10

## 2023-04-04 RX ADMIN — SENNOSIDES AND DOCUSATE SODIUM 1 TABLET: 8.6; 5 TABLET ORAL at 19:46

## 2023-04-04 RX ADMIN — Medication 1 UNITS: at 10:39

## 2023-04-04 RX ADMIN — DEXAMETHASONE SODIUM PHOSPHATE 4 MG: 4 INJECTION, SOLUTION INTRA-ARTICULAR; INTRALESIONAL; INTRAMUSCULAR; INTRAVENOUS; SOFT TISSUE at 08:46

## 2023-04-04 RX ADMIN — SUGAMMADEX 200 MG: 100 INJECTION, SOLUTION INTRAVENOUS at 12:40

## 2023-04-04 RX ADMIN — HYDROMORPHONE HYDROCHLORIDE 0.2 MG: 0.2 INJECTION, SOLUTION INTRAMUSCULAR; INTRAVENOUS; SUBCUTANEOUS at 18:35

## 2023-04-04 RX ADMIN — PIPERACILLIN AND TAZOBACTAM 3.38 G: 3; .375 INJECTION, POWDER, FOR SOLUTION INTRAVENOUS at 09:00

## 2023-04-04 RX ADMIN — PROPOFOL 100 MG: 10 INJECTION, EMULSION INTRAVENOUS at 08:46

## 2023-04-04 RX ADMIN — PHENYLEPHRINE HYDROCHLORIDE 100 MCG: 10 INJECTION INTRAVENOUS at 12:11

## 2023-04-04 RX ADMIN — SODIUM CHLORIDE, SODIUM LACTATE, POTASSIUM CHLORIDE, CALCIUM CHLORIDE: 600; 310; 30; 20 INJECTION, SOLUTION INTRAVENOUS at 08:55

## 2023-04-04 RX ADMIN — Medication 1 UNITS: at 10:47

## 2023-04-04 RX ADMIN — SODIUM CHLORIDE, SODIUM GLUCONATE, SODIUM ACETATE, POTASSIUM CHLORIDE AND MAGNESIUM CHLORIDE: 526; 502; 368; 37; 30 INJECTION, SOLUTION INTRAVENOUS at 10:37

## 2023-04-04 RX ADMIN — MIDAZOLAM 1 MG: 1 INJECTION INTRAMUSCULAR; INTRAVENOUS at 07:01

## 2023-04-04 RX ADMIN — LIDOCAINE HYDROCHLORIDE 100 MG: 20 INJECTION, SOLUTION INFILTRATION; PERINEURAL at 08:46

## 2023-04-04 RX ADMIN — FENTANYL CITRATE 50 MCG: 50 INJECTION, SOLUTION INTRAMUSCULAR; INTRAVENOUS at 09:31

## 2023-04-04 RX ADMIN — NOREPINEPHRINE BITARTRATE 12.8 MCG: 1 INJECTION, SOLUTION, CONCENTRATE INTRAVENOUS at 10:38

## 2023-04-04 RX ADMIN — ONDANSETRON 4 MG: 2 INJECTION INTRAMUSCULAR; INTRAVENOUS at 16:33

## 2023-04-04 RX ADMIN — Medication 0.2 MG: at 10:34

## 2023-04-04 RX ADMIN — SODIUM CHLORIDE, SODIUM LACTATE, POTASSIUM CHLORIDE, CALCIUM CHLORIDE AND DEXTROSE MONOHYDRATE: 5; 600; 310; 30; 20 INJECTION, SOLUTION INTRAVENOUS at 13:54

## 2023-04-04 RX ADMIN — FENTANYL CITRATE 50 MCG: 50 INJECTION, SOLUTION INTRAMUSCULAR; INTRAVENOUS at 13:30

## 2023-04-04 RX ADMIN — Medication 20 MG: at 09:25

## 2023-04-04 RX ADMIN — HYDROMORPHONE HYDROCHLORIDE 0.3 MG: 1 INJECTION, SOLUTION INTRAMUSCULAR; INTRAVENOUS; SUBCUTANEOUS at 12:39

## 2023-04-04 RX ADMIN — FENTANYL CITRATE 50 MCG: 50 INJECTION, SOLUTION INTRAMUSCULAR; INTRAVENOUS at 17:41

## 2023-04-04 RX ADMIN — Medication 50 MG: at 08:46

## 2023-04-04 RX ADMIN — PHENYLEPHRINE HYDROCHLORIDE 150 MCG: 10 INJECTION INTRAVENOUS at 12:07

## 2023-04-04 RX ADMIN — FENTANYL CITRATE 50 MCG: 50 INJECTION, SOLUTION INTRAMUSCULAR; INTRAVENOUS at 10:22

## 2023-04-04 ASSESSMENT — ACTIVITIES OF DAILY LIVING (ADL)
ADLS_ACUITY_SCORE: 20

## 2023-04-04 NOTE — BRIEF OP NOTE
[facility]    Brief Operative Note    Pre-operative diagnosis: Bile duct injury [S36.13XA]  Post-operative diagnosis Same as pre-operative diagnosis    Procedure: Procedure(s):  HEPATICOJEJUNOSTOMY through creation amy-en-Y  Surgeon: Surgeon(s) and Role:     * Desmond Ruano MD - Primary     * Jose Tang MD - Fellow - Assisting  Anesthesia: General with Block   Estimated Blood Loss: 200 ml    Drains: Shawn-Roger  Specimens:   ID Type Source Tests Collected by Time Destination   A : Abscess Abscess Abdomen ANAEROBIC BACTERIAL CULTURE ROUTINE, GRAM STAIN, FUNGAL OR YEAST CULTURE ROUTINE, AEROBIC BACTERIAL CULTURE ROUTINE Desmond Ruano MD 4/4/2023 10:20 AM      Findings:   biliary abscess and transected duct, adhesions.  Complications: None.  Implants:   Implant Name Type Inv. Item Serial No.  Lot No. LRB No. Used Action   NEOMED FEEDING TUBE with ENFit Connector 8.0 Fr    [facility] [id number] Bilateral 2 Implanted

## 2023-04-04 NOTE — PHARMACY-VANCOMYCIN DOSING SERVICE
Pharmacy Vancomycin Initial Note  Date of Service 2023  Patient's  1955  67 year old, female  Actual Body Weight: 68 kg    Indication: Intra-abdominal infection    Current estimated CrCl = Estimated Creatinine Clearance: 97.1 mL/min (based on SCr of 0.62 mg/dL).    Creatinine for last 3 days  4/3/2023:  3:18 PM Creatinine 0.79 mg/dL  2023:  1:23 PM Creatinine 0.62 mg/dL    Recent Vancomycin Level(s) for last 3 days  No results found for requested labs within last 3 days.      Vancomycin IV Administrations (past 72 hours)      No vancomycin orders with administrations in past 72 hours.                Nephrotoxins and other renal medications (From now, onward)    Start     Dose/Rate Route Frequency Ordered Stop    23 1530  piperacillin-tazobactam (ZOSYN) 3.375 g vial to attach to  mL bag            3.375 g  over 30 Minutes Intravenous EVERY 6 HOURS 23 1508            Contrast Orders - past 72 hours (72h ago, onward)    None          [employer] Prediction of Planned Initial Vancomycin Regimen    Regimen: 1250 mg IV every 18 hours.  Start time: 17:30 on 2023  Exposure target: AUC24 (range) 400-600 mg/L.hr   AUC24,ss: 419 mg/L.hr  Probability of AUC24 > 400: 55 %  Ctrough,ss: 11.4 mg/L  Probability of Ctrough,ss > 20: 10 %  Probability of nephrotoxicity (Lodise UMER ): 7 %        Plan:  1. Start vancomycin  1250 mg IV q18h.   2. Vancomycin monitoring method: AUC  3. Vancomycin therapeutic monitoring goal: 400-600 mg*h/L  4. Pharmacy will check vancomycin levels as appropriate in 1-3 Days.    5. Serum creatinine levels will be ordered daily for the first week of therapy and at least twice weekly for subsequent weeks.      Barbara Hernandez [facility]

## 2023-04-04 NOTE — ANESTHESIA PROCEDURE NOTES
TAP Procedure Note    Pre-Procedure   Staff -        Anesthesiologist:  Pollo Faye DO       Resident/Fellow: Ryan Guillen MD       Performed By: resident       Location: pre-op       Procedure Start/Stop Times: 4/4/2023 7:00 AM and 4/4/2023 7:10 AM       Pre-Anesthestic Checklist: patient identified, IV checked, site marked, risks and benefits discussed, informed consent, monitors and equipment checked, pre-op evaluation, at physician/surgeon's request and post-op pain management  Timeout:       Correct Patient: Yes        Correct Procedure: Yes        Correct Site: Yes        Correct Position: Yes        Correct Laterality: Yes        Site Marked: Yes  Procedure Documentation  Procedure: TAP       Laterality: bilateral       Patient Position: supine       Patient Prep/Sterile Barriers: sterile gloves, mask       Skin prep: Chloraprep       Needle Type: short bevel       Needle Gauge: 21.        Needle Length (millimeters): 110        Ultrasound guided       1. Ultrasound was used to identify targeted nerve, plexus, vascular marker, or fascial plane and place a needle adjacent to it in real-time.       2. Ultrasound was used to visualize the spread of anesthetic in close proximity to the above referenced structure.       3. A permanent image is entered into the patient's record.    Assessment/Narrative         The placement was negative for: blood aspirated, painful injection and site bleeding       Paresthesias: No.       Bolus given via needle. no blood aspirated via catheter.        Secured via.        Insertion/Infusion Method: Single Shot       Complications: none       Injection made incrementally with aspirations every 5 mL.    Medication(s) Administered   Bupivacaine 0.25% PF (Infiltration) - Infiltration   20 mL - 4/4/2023 7:00:00 AM  Bupivacaine liposome (Exparel) 1.3% LA inj susp (Infiltration) - Infiltration   20 mL - 4/4/2023 7:00:00 AM  Medication Administration Time: 4/4/2023 7:00 AM      FOR  "[facility] ONLY:   Pain Team Contact information: please page the Pain Team Via [employer]. Search \"Pain\". During daytime hours, please page the attending first. At night please page the resident first.      "

## 2023-04-04 NOTE — OR NURSING
TAPS block performed without complications.  VSS.  Pt tolerated well.  Will continue to monitor.  Disha FRANCOIS

## 2023-04-04 NOTE — OP NOTE
Transplant Surgery  Operative Note    Preop Dx:  Transected common bile duct following lap cholecystectomy in need of repair  Postop Dx: same  Procedure: Hepaticojejunostomy with antecolic Ger en Y reconstruction  Surgeon: Desmond Ruano M.D.  ASSISTANT:  Jose Tang fellow.  There was no qualified general surgery resident available to assist during this procedure.   Anesthesia: General  EBL: 200 ml  Drains: Shawn-Roger drain  Specimen: cultures from abscess cavity.  Findings:  Dense portal adhesions, and transected common bile duct  Complications: None.    Indication: In Dec 2022, the patient has prior complication from cholecystectomy in need of reconstruction for biliary continuity. She presented to [facility] in Jan 2023 with a surgically placed drain at the orifice of the bile duct, that was placed at a different hospital. Because of the time after bile duct transection, it was elected to await resolution of immediate perioperative inflammation.  It is now about 3 months since last surgical procedure and it was elected to move towards definitive repair.  After discussing the risks and benefits of surgery and potential complications, the patient provided informed consent.     DETAILS OF PROCEDURE:  The patient was brought to the operating room, placed in a supine position.  Perioperative prophylactic IV antibiotics were given.  Anesthesia was adminisitered. The abdomen was prepped and draped in the usual sterile fashion.  Time out was performed.    Prior midline incision was re-opened with inferior extension below the umbilicus and the peritoneum entered. Superior omental adhesions to the abdominal wall were taken down with electrocautery and sharp dissection. The right lobe of the liver was mobilized from its diagphramatic attachments and elevated. The liver was densely adherent and there were multiple areas where the capsule was removed.  After the right lobe was mobilized, laps were placed behind the liver  to move the jac closer to the operative site.  The two drains draining the bile ducts were cut as they entered the abdomen and traced into the biloma, about 3x4x2 cm.The jac had dense adhesions related to biloma and prior surgery. Cultures were sent. A short hepatic vein had been incorporated into the portal inflammation that required suture ligation in order to provide exposure for reconstruction. After the biloma was opened, surgical clips were removed and the bile duct lumen was identified.  The duct was probed and the bifurcation of the right and left HD was 2-3 cm from the lumen, 3-0 silk sutures were placed on lateral aspects and duct for traction.  The anterior CHD was was opened with electrocautery until the septum at the bifurcation of the right and left ducts could be visually identified. At this point, we turned our attention to Amy en Y reconstruction. The bowel was traced 30-40 cm from the ligament of Treitz and transected with blue load DEION stapler. The mesentery was divided to permit an non-tension position to the bile duct.  The amy limb was 30-40 cm where the afferent bowel was anastamosed side to side creating the Amy en Y.  This was performed with stapled anastomosis and closure of the common channel with 4-0 PDS and 4-0 prolene in two layers. This limb was routed antecolic to lie next to the bile duct and the hepaticojejunostomy was constructed with running 5-0 PDS suture.  After the posterior anastamosis was completed, 8F biliary drains were placed in the right and left hepatic ducts and routed into the Amy.  The anterior anastamosis was then completed.  The mesentery defect was then closed with 3-0 silk suture. The biliary limb was then marked with clips and tacked to the anterior abdominal wall with 3-0 silk suture roughly 10 cm from the enteroenterostomy, in the event the biliary anastamoses requires future interrogation. The field was irrigated and hemostasis achieved. A 19 Fr zo  drain was then routed posterior to the HJ reconstruction.This was fixed in placed with 3-0 nylon at the skin. The abdomen was closed with a running 0 looped PDS suture. The subcutaneous tissue and skin were then closed with 3-0 vicryl and staples. An overlying dressing was then placed over the incision and drain site. Both prior intra-abdominal drains were removed during the operation.     All needle, sponge, and instrument counts were accurate.  The patient tolerated the procedure well without apparent complications and was trasfered to the PACU in good condition.  Faculty was present for critical portions of the procedure.    I, Desmond Ruano, performed or directly supervised Dr Tang in the procedure as described.  There were no qualified residents available to assist with this complex case.

## 2023-04-04 NOTE — ANESTHESIA CARE TRANSFER NOTE
Patient: Zeinab Bermudez    Procedure: Procedure(s):  HEPATICOJEJUNOSTOMY through creation amy-en-Y       Diagnosis: Bile duct injury [S36.13XA]  Diagnosis Additional Information: No value filed.    Anesthesia Type:   General     Note:    Oropharynx: oropharynx clear of all foreign objects and spontaneously breathing  Level of Consciousness: drowsy  Oxygen Supplementation: face mask  Level of Supplemental Oxygen (L/min / FiO2): 10  Independent Airway: airway patency satisfactory and stable  Dentition: dentition unchanged S/P dental procedure    Report to RN Given: handoff report given  Patient transferred to: PACU  Comments: Patient hypotensive on PACU sign out, placed in trendelenburg, given 6.4 norepinephrine with improvement in MAPs. Ordered peripheral phenylephrine gtt.   Handoff Report: Identifed the Patient, Identified the Reponsible Provider, Reviewed the pertinent medical history, Discussed the surgical course, Reviewed Intra-OP anesthesia mangement and issues during anesthesia, Set expectations for post-procedure period and Allowed opportunity for questions and acknowledgement of understanding      Vitals:  Vitals Value Taken Time   BP 80/45 04/04/23 1300   Temp     Pulse 76 04/04/23 1308   Resp 3 04/04/23 1308   SpO2 99 % 04/04/23 1308   Vitals shown include unvalidated device data.    Electronically Signed By: Ryan Guillen MD  April 4, 2023  1:09 PM

## 2023-04-04 NOTE — ANESTHESIA PROCEDURE NOTES
Airway       Patient location during procedure: OR       Procedure Start/Stop Times: 4/4/2023 8:51 AM  Staff -        Anesthesiologist:  Carol Villanueva MD       Resident/Fellow: Ryan Guillen MD       Performed By: resident  Consent for Airway        Urgency: elective  Indications and Patient Condition       Indications for airway management: ubaldo-procedural       Induction type:intravenous       Mask difficulty assessment: 1 - vent by mask    Final Airway Details       Final airway type: endotracheal airway       Successful airway: ETT - single  Endotracheal Airway Details        ETT size (mm): 7.0       Cuffed: yes       Successful intubation technique: direct laryngoscopy       DL Blade Type: MAC 4       Grade View of Cords: 2       Adjucts: stylet       Position: Right       Measured from: gums/teeth       Secured at (cm): 22       Bite block used: None    Post intubation assessment        Placement verified by: capnometry and equal breath sounds        Number of attempts at approach: 1       Number of other approaches attempted: 0       Secured with: pink tape       Ease of procedure: easy       Dentition: Intact and Unchanged    Medication(s) Administered   Medication Administration Time: 4/4/2023 8:51 AM

## 2023-04-04 NOTE — ANESTHESIA PROCEDURE NOTES
Arterial Line Procedure Note    Pre-Procedure   Staff -        Performed By: anesthesiologist       Location: OR  Line Placement:   This line was placed Post Induction  Procedure   Procedure: arterial line       Laterality: left       Insertion Site: radial.  Sterile Prep        Standard elements of sterile barrier followed       Skin prep: Chloraprep  Insertion/Injection        Technique: ultrasound guided and Seldinger Technique        1. Ultrasound was used to evaluate the access site.       2. Artery evaluated via ultrasound for patency/adequacy.       3. Using real-time ultrasound the needle/catheter was observed entering the artery/vein.       Catheter Type/Size: 20 G, 12 cm  Narrative        Tegaderm dressing used.       Complications: None apparent,        Arterial waveform: Yes        IBP within 10% of NIBP: Yes

## 2023-04-04 NOTE — ANESTHESIA POSTPROCEDURE EVALUATION
Patient: Zeinab Bermudez    Procedure: Procedure(s):  HEPATICOJEJUNOSTOMY through creation amy-en-Y       Anesthesia Type:  General    Note:  Disposition: Inpatient   Postop Pain Control: Uneventful            Sign Out: Well controlled pain   PONV: No   Neuro/Psych: Uneventful            Sign Out: Acceptable/Baseline neuro status   Airway/Respiratory: Uneventful            Sign Out: Acceptable/Baseline resp. status   CV/Hemodynamics: Uneventful            Sign Out: Acceptable CV status; No obvious hypovolemia; No obvious fluid overload   Other NRE: NONE   DID A NON-ROUTINE EVENT OCCUR? No           Last vitals:  Vitals Value Taken Time   BP 97/77 04/04/23 1445   Temp 36.3  C (97.4  F) 04/04/23 1430   Pulse 87 04/04/23 1455   Resp 12 04/04/23 1455   SpO2 97 % 04/04/23 1455   Vitals shown include unvalidated device data.    Electronically Signed By: CARLIE ABRAHAM MD  April 4, 2023  2:56 PM

## 2023-04-04 NOTE — OR NURSING
General Surgery MD, Tristan, paged to clarify whether chest xray needed to verify NG tube placed intra-op.

## 2023-04-05 ENCOUNTER — APPOINTMENT (OUTPATIENT)
Dept: ULTRASOUND IMAGING | Facility: CLINIC | Age: 68
DRG: 907 | End: 2023-04-05
Attending: PHYSICIAN ASSISTANT
Payer: COMMERCIAL

## 2023-04-05 ENCOUNTER — APPOINTMENT (OUTPATIENT)
Dept: GENERAL RADIOLOGY | Facility: CLINIC | Age: 68
DRG: 907 | End: 2023-04-05
Attending: TRANSPLANT SURGERY
Payer: COMMERCIAL

## 2023-04-05 LAB
ALBUMIN SERPL BCG-MCNC: 2.6 G/DL (ref 3.5–5.2)
ALP SERPL-CCNC: 131 U/L (ref 35–104)
ALT SERPL W P-5'-P-CCNC: 142 U/L (ref 10–35)
ANION GAP SERPL CALCULATED.3IONS-SCNC: 12 MMOL/L (ref 7–15)
AST SERPL W P-5'-P-CCNC: 128 U/L (ref 10–35)
BILIRUB DIRECT SERPL-MCNC: 0.32 MG/DL (ref 0–0.3)
BILIRUB SERPL-MCNC: 0.7 MG/DL
BUN SERPL-MCNC: 13.4 MG/DL (ref 8–23)
CALCIUM SERPL-MCNC: 8.7 MG/DL (ref 8.8–10.2)
CHLORIDE SERPL-SCNC: 108 MMOL/L (ref 98–107)
CREAT SERPL-MCNC: 1.05 MG/DL (ref 0.51–0.95)
DEPRECATED HCO3 PLAS-SCNC: 20 MMOL/L (ref 22–29)
ERYTHROCYTE [DISTWIDTH] IN BLOOD BY AUTOMATED COUNT: 14.3 % (ref 10–15)
GFR SERPL CREATININE-BSD FRML MDRD: 59 ML/MIN/1.73M2
GLUCOSE SERPL-MCNC: 165 MG/DL (ref 70–99)
HCT VFR BLD AUTO: 32.4 % (ref 35–47)
HGB BLD-MCNC: 10.4 G/DL (ref 11.7–15.7)
MAGNESIUM SERPL-MCNC: 1.7 MG/DL (ref 1.7–2.3)
MCH RBC QN AUTO: 29.5 PG (ref 26.5–33)
MCHC RBC AUTO-ENTMCNC: 32.1 G/DL (ref 31.5–36.5)
MCV RBC AUTO: 92 FL (ref 78–100)
PHOSPHATE SERPL-MCNC: 4.4 MG/DL (ref 2.5–4.5)
PLATELET # BLD AUTO: 264 10E3/UL (ref 150–450)
POTASSIUM SERPL-SCNC: 4 MMOL/L (ref 3.4–5.3)
PROT SERPL-MCNC: 4.8 G/DL (ref 6.4–8.3)
RBC # BLD AUTO: 3.53 10E6/UL (ref 3.8–5.2)
SODIUM SERPL-SCNC: 140 MMOL/L (ref 136–145)
WBC # BLD AUTO: 21.8 10E3/UL (ref 4–11)

## 2023-04-05 PROCEDURE — 250N000013 HC RX MED GY IP 250 OP 250 PS 637: Performed by: STUDENT IN AN ORGANIZED HEALTH CARE EDUCATION/TRAINING PROGRAM

## 2023-04-05 PROCEDURE — 258N000003 HC RX IP 258 OP 636: Performed by: PHYSICIAN ASSISTANT

## 2023-04-05 PROCEDURE — 82248 BILIRUBIN DIRECT: CPT | Performed by: SURGERY

## 2023-04-05 PROCEDURE — 999N000065 XR ABDOMEN PORT 1 VIEW

## 2023-04-05 PROCEDURE — 93975 VASCULAR STUDY: CPT | Mod: 26 | Performed by: STUDENT IN AN ORGANIZED HEALTH CARE EDUCATION/TRAINING PROGRAM

## 2023-04-05 PROCEDURE — 80053 COMPREHEN METABOLIC PANEL: CPT | Performed by: SURGERY

## 2023-04-05 PROCEDURE — 258N000003 HC RX IP 258 OP 636: Performed by: TRANSPLANT SURGERY

## 2023-04-05 PROCEDURE — 250N000011 HC RX IP 250 OP 636: Performed by: PHYSICIAN ASSISTANT

## 2023-04-05 PROCEDURE — 74018 RADEX ABDOMEN 1 VIEW: CPT | Mod: 26 | Performed by: RADIOLOGY

## 2023-04-05 PROCEDURE — 250N000011 HC RX IP 250 OP 636: Performed by: SURGERY

## 2023-04-05 PROCEDURE — 93975 VASCULAR STUDY: CPT

## 2023-04-05 PROCEDURE — 36415 COLL VENOUS BLD VENIPUNCTURE: CPT | Performed by: SURGERY

## 2023-04-05 PROCEDURE — 85027 COMPLETE CBC AUTOMATED: CPT | Performed by: SURGERY

## 2023-04-05 PROCEDURE — 120N000011 HC R&B TRANSPLANT UMMC

## 2023-04-05 PROCEDURE — 999N000248 HC STATISTIC IV INSERT WITH US BY RN

## 2023-04-05 PROCEDURE — 84100 ASSAY OF PHOSPHORUS: CPT | Performed by: SURGERY

## 2023-04-05 PROCEDURE — 250N000013 HC RX MED GY IP 250 OP 250 PS 637: Performed by: SURGERY

## 2023-04-05 PROCEDURE — 83735 ASSAY OF MAGNESIUM: CPT | Performed by: SURGERY

## 2023-04-05 PROCEDURE — 250N000011 HC RX IP 250 OP 636: Performed by: TRANSPLANT SURGERY

## 2023-04-05 RX ORDER — LORAZEPAM 0.5 MG/1
0.5 TABLET ORAL DAILY PRN
Status: DISCONTINUED | OUTPATIENT
Start: 2023-04-05 | End: 2023-04-09 | Stop reason: HOSPADM

## 2023-04-05 RX ORDER — SODIUM CHLORIDE, SODIUM LACTATE, POTASSIUM CHLORIDE, CALCIUM CHLORIDE 600; 310; 30; 20 MG/100ML; MG/100ML; MG/100ML; MG/100ML
INJECTION, SOLUTION INTRAVENOUS
Status: DISCONTINUED
Start: 2023-04-05 | End: 2023-04-05 | Stop reason: HOSPADM

## 2023-04-05 RX ORDER — PIPERACILLIN SODIUM, TAZOBACTAM SODIUM 3; .375 G/15ML; G/15ML
3.38 INJECTION, POWDER, LYOPHILIZED, FOR SOLUTION INTRAVENOUS EVERY 6 HOURS
Status: DISCONTINUED | OUTPATIENT
Start: 2023-04-05 | End: 2023-04-06

## 2023-04-05 RX ORDER — METHOCARBAMOL 100 MG/ML
500 INJECTION, SOLUTION INTRAMUSCULAR; INTRAVENOUS EVERY 8 HOURS
Status: DISCONTINUED | OUTPATIENT
Start: 2023-04-05 | End: 2023-04-06

## 2023-04-05 RX ADMIN — METHOCARBAMOL 500 MG: 100 INJECTION, SOLUTION INTRAMUSCULAR; INTRAVENOUS at 13:49

## 2023-04-05 RX ADMIN — ONDANSETRON 4 MG: 2 INJECTION INTRAMUSCULAR; INTRAVENOUS at 08:01

## 2023-04-05 RX ADMIN — ONDANSETRON 4 MG: 2 INJECTION INTRAMUSCULAR; INTRAVENOUS at 00:14

## 2023-04-05 RX ADMIN — Medication: at 11:18

## 2023-04-05 RX ADMIN — PIPERACILLIN AND TAZOBACTAM 3.38 G: 3; .375 INJECTION, POWDER, LYOPHILIZED, FOR SOLUTION INTRAVENOUS at 12:34

## 2023-04-05 RX ADMIN — ACETAMINOPHEN 975 MG: 325 TABLET, FILM COATED ORAL at 18:30

## 2023-04-05 RX ADMIN — HYDROMORPHONE HYDROCHLORIDE 0.2 MG: 0.2 INJECTION, SOLUTION INTRAMUSCULAR; INTRAVENOUS; SUBCUTANEOUS at 08:00

## 2023-04-05 RX ADMIN — LORAZEPAM 0.5 MG: 0.5 TABLET ORAL at 04:51

## 2023-04-05 RX ADMIN — PIPERACILLIN AND TAZOBACTAM 3.38 G: 3; .375 INJECTION, POWDER, LYOPHILIZED, FOR SOLUTION INTRAVENOUS at 18:32

## 2023-04-05 RX ADMIN — SODIUM CHLORIDE, SODIUM LACTATE, POTASSIUM CHLORIDE, CALCIUM CHLORIDE AND DEXTROSE MONOHYDRATE: 5; 600; 310; 30; 20 INJECTION, SOLUTION INTRAVENOUS at 12:33

## 2023-04-05 RX ADMIN — HYDROMORPHONE HYDROCHLORIDE 0.4 MG: 0.2 INJECTION, SOLUTION INTRAMUSCULAR; INTRAVENOUS; SUBCUTANEOUS at 09:58

## 2023-04-05 RX ADMIN — SENNOSIDES AND DOCUSATE SODIUM 1 TABLET: 8.6; 5 TABLET ORAL at 20:04

## 2023-04-05 RX ADMIN — ACETAMINOPHEN 975 MG: 325 TABLET, FILM COATED ORAL at 10:16

## 2023-04-05 RX ADMIN — SODIUM CHLORIDE, POTASSIUM CHLORIDE, SODIUM LACTATE AND CALCIUM CHLORIDE 500 ML: 600; 310; 30; 20 INJECTION, SOLUTION INTRAVENOUS at 18:32

## 2023-04-05 RX ADMIN — HYDROMORPHONE HYDROCHLORIDE 0.2 MG: 0.2 INJECTION, SOLUTION INTRAMUSCULAR; INTRAVENOUS; SUBCUTANEOUS at 04:34

## 2023-04-05 RX ADMIN — SODIUM CHLORIDE, POTASSIUM CHLORIDE, SODIUM LACTATE AND CALCIUM CHLORIDE 500 ML: 600; 310; 30; 20 INJECTION, SOLUTION INTRAVENOUS at 11:30

## 2023-04-05 RX ADMIN — VANCOMYCIN HYDROCHLORIDE 1250 MG: 10 INJECTION, POWDER, LYOPHILIZED, FOR SOLUTION INTRAVENOUS at 09:57

## 2023-04-05 RX ADMIN — POLYETHYLENE GLYCOL 3350 17 G: 17 POWDER, FOR SOLUTION ORAL at 10:22

## 2023-04-05 RX ADMIN — METHOCARBAMOL 500 MG: 100 INJECTION, SOLUTION INTRAMUSCULAR; INTRAVENOUS at 20:09

## 2023-04-05 RX ADMIN — SENNOSIDES AND DOCUSATE SODIUM 1 TABLET: 8.6; 5 TABLET ORAL at 10:16

## 2023-04-05 RX ADMIN — Medication: at 16:21

## 2023-04-05 RX ADMIN — SODIUM CHLORIDE, SODIUM LACTATE, POTASSIUM CHLORIDE, CALCIUM CHLORIDE AND DEXTROSE MONOHYDRATE 1000 ML: 5; 600; 310; 30; 20 INJECTION, SOLUTION INTRAVENOUS at 00:48

## 2023-04-05 RX ADMIN — ACETAMINOPHEN 975 MG: 325 TABLET, FILM COATED ORAL at 00:56

## 2023-04-05 ASSESSMENT — ACTIVITIES OF DAILY LIVING (ADL)
ADLS_ACUITY_SCORE: 20
ADLS_ACUITY_SCORE: 26
ADLS_ACUITY_SCORE: 26
ADLS_ACUITY_SCORE: 28
ADLS_ACUITY_SCORE: 28
ADLS_ACUITY_SCORE: 26
ADLS_ACUITY_SCORE: 26
ADLS_ACUITY_SCORE: 28
ADLS_ACUITY_SCORE: 26
ADLS_ACUITY_SCORE: 26
ADLS_ACUITY_SCORE: 28
ADLS_ACUITY_SCORE: 28

## 2023-04-05 NOTE — PHARMACY-ADMISSION MEDICATION HISTORY
Pharmacy Intern Admission Medication History    Admission medication history is complete. The information provided in this note is only as accurate as the sources available at the time of the update.    Medication reconciliation/reorder completed by provider prior to medication history? Yes    Information Source(s): Family member via phone, Surescripts    Pertinent Information:     Family member is knowledgeable about patient's medications, and reports patient has no known drug allergies.     Changes made to PTA medication list:    Added: None    Deleted: None    Changed: None    Medication Affordability: N/A       Allergies reviewed with patient and updates made in EHR: yes    Medication History Completed By: Allegra Almodovar 4/5/2023 5:48 PM    PTA Med List   Medication Sig Last Dose     acetaminophen (TYLENOL) 325 MG tablet Take 325-650 mg by mouth every 6 hours as needed for mild pain Past Week     calcium carbonate (OS-NASRIN) 500 MG tablet Take 1 tablet by mouth daily 4/3/2023     diphenhydrAMINE (BENADRYL) 25 MG tablet Take 25 mg by mouth every 6 hours as needed for itching More than a month     LORazepam (ATIVAN) 0.5 MG tablet Take 0.5 mg by mouth daily as needed for anxiety Past Month     melatonin 5 MG sublingual tablet Place 1 tablet (5 mg) under the tongue At Bedtime 4/3/2023 at 2130     Multiple Vitamins-Minerals (WOMENS MULTI PO) Take 2 tablets by mouth daily 4/3/2023 at 0900     ondansetron (ZOFRAN ODT) 4 MG ODT tab Take 1 tablet (4 mg) by mouth every 6 hours as needed for nausea or vomiting      pravastatin (PRAVACHOL) 40 MG tablet Take 40 mg by mouth daily Past Month     Sennosides (SENNA) 8.6 MG CAPS Take 8.6 mg by mouth daily as needed Past Month     ursodiol (ACTIGALL) 300 MG capsule Take 1 capsule (300 mg) by mouth 2 times daily 4/4/2023 at 0430     venlafaxine (EFFEXOR XR) 37.5 MG 24 hr capsule Take 112.5 mg by mouth daily 4/3/2023 at 0900     vitamin D3 (CHOLECALCIFEROL) 50 mcg (2000 units)  tablet Take 1 tablet by mouth daily 4/3/2023 at 0900

## 2023-04-05 NOTE — PLAN OF CARE
"/73 (BP Location: Left arm)   Pulse 95   Temp 98.4  F (36.9  C) (Axillary)   Resp 19   Ht 1.88 m (6' 2\")   Wt 68 kg (149 lb 14.6 oz)   SpO2 97%   BMI 19.25 kg/m      At the start of shift pt was hypothermic, warm blankets given. Temp rechecked-97.8. Provider aware of pt's temperature. On O2 @ 1lpm via nc, saturating well. On capno. Pain exacerbated with movement, PRN Dilaudid given x 2. Pt complained of intermittent nausea, PRN Zofran given x 1. Pt verbalized that she is having anxiety,hospitalist notified. PRN Ativan given x 1.  Abdominal binder in place. Dressing is marked, has minimal strike through. MELITA drain in place with dark red drainage. Upton cath in place. Hospitalist notified re:urine output, no new orders made just continue to monitor. Xray for tube placement needs to be advanced per Xray result, tube advanced (60 cm). 2nd Xray done, per hospitalist okay to connect to suction as ordered. NG connected to low continuous suction, drainage is red-tinged with small clots  Maintained on NPO exept for meds. Continue with poc.     Problem: Plan of Care - These are the overarching goals to be used throughout the patient stay.    Goal: Optimal Comfort and Wellbeing  Outcome: Progressing     Problem: Pain Acute  Goal: Optimal Pain Control and Function  Outcome: Progressing  Intervention: Prevent or Manage Pain  Recent Flowsheet Documentation  Taken 4/5/2023 0103 by Yani Veliz, RN  Medication Review/Management: medications reviewed     Problem: Surgery Nonspecified  Goal: Absence of Bleeding  Outcome: Progressing     Problem: Surgery Nonspecified  Goal: Effective Urinary Elimination  Outcome: Progressing     Problem: Surgery Nonspecified  Goal: Effective Oxygenation and Ventilation  Outcome: Progressing  Intervention: Optimize Oxygenation and Ventilation  Recent Flowsheet Documentation  Taken 4/5/2023 0103 by Yani Veliz, RN  Head of Bed (HOB) Positioning: HOB at 30 degrees   Goal " Outcome Evaluation:      Plan of Care Reviewed With: patient

## 2023-04-05 NOTE — PROGRESS NOTES
Patient admitted to: 5403  Admitted from: PACU  Arrived by: Cart  Reason for admission: POD#0 from hepaticojejunostomy   Patient accompanied by: , Karan  Belongings: Glasses, cell phone, clothes remain with patient  Teaching: Oriented to room and unit  Skin double check completed by: Lance CODY RN

## 2023-04-05 NOTE — PLAN OF CARE
Goal Outcome Evaluation:  A&Ox4.Anxious.T max 99.5 ax  Tachycardic in the 110's; liver transplant team is notified.  Boarderline UOP, 125 ml/4 hrs:  ml IV fluid bolus is ordered; bolus is infusing over 4 hrs  MELITA with dark red  minimal OP.  Abdominal pain; not controled with PRN dialudid and tylenol po. PCA dilaudid and robaxin ordered; pt is on scheduled tylenol 975 mg tab po  Abdominal incision dressing with dried drainage; abdominal binder is on abdomen.  Pt is on vanco and zosyn.  Second PIV placed; pt is a hard stick.  Plan to get pt out of bed today and start using IS.   is in room; supportive of pt.    Pt transferred to  via her bed with her belongings escorted by transport personnel and ; report given to  RNMoni.

## 2023-04-05 NOTE — PHARMACY-VANCOMYCIN DOSING SERVICE
Pharmacy Empiric Dose Change Per Policy  Original Dose Ordered: vancomycin 1250mg iv q18h (estimated  mg/L hr, above goal of 400-600)    Dose Changed To: vancomycin 1250mg iv q24h (estimated  mg/L hr)    This dose change was based on the pharmacist's assessment of this patient's age, weight, concurrent drug therapy, treatment goals, whether patient's creatinine clearance adequately indicates renal function (factoring in age, muscle mass, fluid and clinical status), and, if applicable, prior pharmacokinetic data.    Creatinine Clearance= Estimated Creatinine Clearance: 48.3 mL/min (A) (based on SCr of 1.05 mg/dL (H)).     Will continue to follow and modify dosage according to levels, organ function and clinical condition    Cami Wang, Pharm.D., BCPS, BCTXP

## 2023-04-05 NOTE — PROVIDER NOTIFICATION
"Oscar Rivas MD paged \"Temp is 79.7. Warm blankets given. Recheck 97.8 oral, 97.1 axillay.\"      "

## 2023-04-05 NOTE — PROGRESS NOTES
Transplant Surgery  Inpatient Daily Progress Note  04/05/2023    Assessment & Plan: 67 year old female with a history of laparoscopic cholecystectomy on 12/11/22 at OSH complicated by bile duct injury s/p ex lap, washout, and drain placement 12/28/22. Admitted (1/1-1/11/23) to transplant liver surgery team for management of bile duct injury and intra abdominal fluid collection. Patient had drain placement x 2 and unsuccessful PTC placement. Also admitted 3/19-3/24 for obstructed drain, elevated LFTs, and low grade fever. Now admitted after Hepaticojejunostomy with antecolic Ger en Y reconstruction on 4/4 with Dr. Ruano.     GI/Nutrition:   S/p Lap sri complicated by bile duct injury, bile leak, intra-abdominal Infection now s/p hepaticojejunostomy with antecolic Ger en Y reconstruction on 4/4: LFTs improving since admission. Surgical drain appears non-bilious.  NPO, NG tube in place at this time.    Acute post op pain: Received TAP block. Will switch to dilaudid PCA.     Hematology:   Anemia secondary to chronic infection/inflammation, acute blood loss anemia: Received 1 unit pRBCs intra-op. b/l Hgb 10-11. Stable post OR    Cardiorespiratory:   HLD: PTA pravastatin, will restart once tolerated oral diet     Endocrine: No issues.      Fluid/Electrolytes: MIVF:   Acute kidney injury: Cr 1.05 (0.6) will give fluid bolus this AM     : No issues     Infectious disease: T max 98.6. WBC elevated, 21.8. Gram stain with gram + cocci and gram + bacilli. Now growing enterococcus faecalis. IV vanc/zosyn     Prophylaxis: DVT, encourage ambulation, low risk    Disposition: Transferred to     JOANN/Fellow/Resident Provider: Lorna Cuadra PA-C [id number]    Faculty: Desmond Ruano M.D.    __________________________________________________________________  Transplant History: Admitted 4/4/2023 for Hepaticojejunostomy with antecolic Ger en Y reconstruction  N/A, Postoperative day: 1      Interval History:  "History is obtained from the patient, spouse, and EMR  Overnight events: Pain control suboptimal. Feels dry. Dry heaving with NG tube clamped.    ROS:   A 10-point review of systems was negative except as noted above.    Meds:   acetaminophen  975 mg Oral Q8H    lactated ringers  500 mL Intravenous Once    methocarbamol  500 mg Intravenous Q8H    piperacillin-tazobactam  3.375 g Intravenous Q6H    polyethylene glycol  17 g Oral Daily    senna-docusate  1 tablet Oral BID    sodium chloride (PF)  3 mL Intravenous Q8H    [START ON 4/6/2023] vancomycin  1,250 mg Intravenous Q24H    [START ON 4/6/2023] venlafaxine  112.5 mg Oral Daily       Physical Exam:     Admit Weight: 70.9 kg (156 lb 4.9 oz)    Current vitals:   /52   Pulse 101   Temp 98.2  F (36.8  C) (Oral)   Resp 16   Ht 1.575 m (5' 2\")   Wt 68 kg (149 lb 14.6 oz)   SpO2 94%   BMI 27.42 kg/m           Vital sign ranges:    Temp:  [79.7  F (26.5  C)-99.5  F (37.5  C)] 98.2  F (36.8  C)  Pulse:  [] 101  Resp:  [0-21] 16  BP: ()/(52-95) 103/52  SpO2:  [93 %-99 %] 94 %  Patient Vitals for the past 24 hrs:   BP Temp Temp src Pulse Resp SpO2 Height   04/05/23 1351 -- 98.2  F (36.8  C) Oral 101 16 94 % --   04/05/23 1200 103/52 -- -- 107 -- 93 % --   04/05/23 1100 113/64 99.5  F (37.5  C) Axillary 105 16 97 % --   04/05/23 1000 119/74 -- -- 108 16 -- --   04/05/23 0958 119/74 -- -- 116 -- 96 % --   04/05/23 0800 129/76 99  F (37.2  C) Axillary 116 18 96 % 1.575 m (5' 2\")   04/05/23 0700 114/83 98.3  F (36.8  C) Axillary 105 21 96 % --   04/05/23 0600 122/79 98.5  F (36.9  C) Axillary 97 21 96 % --   04/05/23 0500 114/73 98.4  F (36.9  C) Axillary 95 19 97 % --   04/05/23 0400 131/83 98.3  F (36.8  C) Axillary 109 18 97 % --   04/05/23 0300 (!) 138/95 98.6  F (37  C) Axillary 105 18 96 % --   04/05/23 0200 129/81 98.4  F (36.9  C) Axillary 96 20 96 % --   04/05/23 0100 110/72 98.1  F (36.7  C) Axillary 109 16 97 % --   04/04/23 2342 127/78 97.8 "  F (36.6  C) Axillary 99 20 96 % --   04/04/23 2243 126/78 97.8  F (36.6  C) Axillary 92 20 97 % --   04/04/23 2159 112/75 96.8  F (36  C) Axillary 95 17 97 % --   04/04/23 2101 -- 97.1  F (36.2  C) Axillary -- -- -- --   04/04/23 2100 -- 97.8  F (36.6  C) Oral -- -- -- --   04/04/23 2042 138/71 (!) 79.7  F (26.5  C) Axillary 87 12 98 % --   04/04/23 1948 115/75 (!) 96.5  F (35.8  C) Axillary 82 15 98 % --   04/04/23 1823 103/71 96.8  F (36  C) Axillary 85 19 99 % --   04/04/23 1756 -- -- -- 80 (!) 0 96 % --   04/04/23 1741 100/66 -- -- 87 14 97 % --   04/04/23 1730 105/63 -- -- 89 14 97 % --   04/04/23 1700 101/73 -- -- 86 16 97 % --   04/04/23 1630 107/72 -- -- 92 14 94 % --   04/04/23 1600 100/68 -- -- 89 14 96 % --   04/04/23 1530 99/72 -- -- 90 12 97 % --     General Appearance: in no apparent distress.   Skin: normal, warm  Heart: perfused  Lungs: Nonlabored resps, now on RA  Abdomen: The abdomen is soft, nontender to light palpation  : livingston is present. Urine has no gross hematuria.   Extremities: edema: absent.   Neurologic: awake, alert and oriented. Tremor absent..     Data:   CMP  Recent Labs   Lab 04/05/23  0355 04/04/23  1323    140   POTASSIUM 4.0 3.6   CHLORIDE 108* 109*   CO2 20* 19*   * 172*   BUN 13.4 8.3   CR 1.05* 0.62   GFRESTIMATED 59* >90   NASRIN 8.7* 7.6*   MAG 1.7 1.8   PHOS 4.4 3.5   ALBUMIN 2.6* 2.6*   BILITOTAL 0.7 1.4*   ALKPHOS 131* 167*   * 287*   * 163*     CBC  Recent Labs   Lab 04/05/23  0355 04/04/23  2142 04/04/23  1918 04/04/23  1323   HGB 10.4* 10.7*   < > 11.3*   WBC 21.8*  --   --  23.2*     --   --  318    < > = values in this interval not displayed.     COAMARCELLE  Recent Labs   Lab 04/04/23  1323 04/03/23  1518   INR 1.33* 1.07   PTT 29  --       Urinalysis  No lab results found.  Cultures:     Attestation:  Patient has been seen with team and evaluated by me. Abd benign, Hgb fell overnight after being stable.  Transfuse, check US for  perihepatic hematoma. Follow with serial hematocrit.  Suspect it is bleed from Ger staple line.  Should stop.  Vital signs, labs, medications and orders were reviewed.   When obtained, diagnostic images were reviewed by me and interpreted as above.    The care plan was discussed with the multidisciplinary team and I agree with the findings and plan in this note, with any differences recorded in blue.    .

## 2023-04-05 NOTE — PROGRESS NOTES
"Post Op Check    04/04/2023    Zeinab Bermudez is a 67 year old female with h/o bile duct injury now POD#0 s/p HEPATICOJEJUNOSTOMY through creation amy-en-Y.    Pt reports that she is doing well and her pain is well controlled. Denies SOB, chest pain, or dizziness. Has not gotten out of bed. Denies nausea/vomiting. Adequate UOP.    /75   Pulse 82   Temp (!) 96.5  F (35.8  C) (Axillary)   Resp 15   Ht 1.88 m (6' 2\")   Wt 68 kg (149 lb 14.6 oz)   SpO2 98%   BMI 19.25 kg/m      Gen: A&O x3, NAD  Chest: breathing non-labored  Abdomen: soft, non-tender, non-distended, dressing in place minimal strike through. MELITA in place with serosang output.   Extremities: warm and well perfused    A/P: No acute post-op issues. Continue plan of care per primary team. Please call with any questions.    Oscar Rivas MD  PGY-1 Plastic Surgery      "

## 2023-04-06 ENCOUNTER — APPOINTMENT (OUTPATIENT)
Dept: ULTRASOUND IMAGING | Facility: CLINIC | Age: 68
DRG: 907 | End: 2023-04-06
Attending: PHYSICIAN ASSISTANT
Payer: COMMERCIAL

## 2023-04-06 ENCOUNTER — PRE VISIT (OUTPATIENT)
Dept: SURGERY | Facility: CLINIC | Age: 68
End: 2023-04-06

## 2023-04-06 ENCOUNTER — APPOINTMENT (OUTPATIENT)
Dept: OCCUPATIONAL THERAPY | Facility: CLINIC | Age: 68
DRG: 907 | End: 2023-04-06
Attending: TRANSPLANT SURGERY
Payer: COMMERCIAL

## 2023-04-06 LAB
ALBUMIN SERPL BCG-MCNC: 2.1 G/DL (ref 3.5–5.2)
ALP SERPL-CCNC: 92 U/L (ref 35–104)
ALT SERPL W P-5'-P-CCNC: 73 U/L (ref 10–35)
ANION GAP SERPL CALCULATED.3IONS-SCNC: 6 MMOL/L (ref 7–15)
AST SERPL W P-5'-P-CCNC: 46 U/L (ref 10–35)
BILIRUB DIRECT SERPL-MCNC: 0.34 MG/DL (ref 0–0.3)
BILIRUB SERPL-MCNC: 0.6 MG/DL
BLD PROD TYP BPU: NORMAL
BLOOD COMPONENT TYPE: NORMAL
BUN SERPL-MCNC: 18.9 MG/DL (ref 8–23)
BURR CELLS BLD QL SMEAR: ABNORMAL
CALCIUM SERPL-MCNC: 8.3 MG/DL (ref 8.8–10.2)
CHLORIDE SERPL-SCNC: 112 MMOL/L (ref 98–107)
CODING SYSTEM: NORMAL
CREAT SERPL-MCNC: 1.36 MG/DL (ref 0.51–0.95)
CROSSMATCH: NORMAL
DEPRECATED HCO3 PLAS-SCNC: 26 MMOL/L (ref 22–29)
ELLIPTOCYTES BLD QL SMEAR: SLIGHT
ERYTHROCYTE [DISTWIDTH] IN BLOOD BY AUTOMATED COUNT: 14.6 % (ref 10–15)
GFR SERPL CREATININE-BSD FRML MDRD: 43 ML/MIN/1.73M2
GLUCOSE SERPL-MCNC: 118 MG/DL (ref 70–99)
HCT VFR BLD AUTO: 21.8 % (ref 35–47)
HGB BLD-MCNC: 6.8 G/DL (ref 11.7–15.7)
HGB BLD-MCNC: 6.9 G/DL (ref 11.7–15.7)
HGB BLD-MCNC: 8.3 G/DL (ref 11.7–15.7)
HGB BLD-MCNC: 8.7 G/DL (ref 11.7–15.7)
ISSUE DATE AND TIME: NORMAL
MAGNESIUM SERPL-MCNC: 1.7 MG/DL (ref 1.7–2.3)
MCH RBC QN AUTO: 29.7 PG (ref 26.5–33)
MCHC RBC AUTO-ENTMCNC: 31.2 G/DL (ref 31.5–36.5)
MCV RBC AUTO: 95 FL (ref 78–100)
PHOSPHATE SERPL-MCNC: 2.3 MG/DL (ref 2.5–4.5)
PLAT MORPH BLD: ABNORMAL
PLATELET # BLD AUTO: 133 10E3/UL (ref 150–450)
POTASSIUM SERPL-SCNC: 3.7 MMOL/L (ref 3.4–5.3)
PROT SERPL-MCNC: 4.1 G/DL (ref 6.4–8.3)
RBC # BLD AUTO: 2.29 10E6/UL (ref 3.8–5.2)
RBC MORPH BLD: ABNORMAL
SODIUM SERPL-SCNC: 144 MMOL/L (ref 136–145)
UNIT ABO/RH: NORMAL
UNIT NUMBER: NORMAL
UNIT STATUS: NORMAL
UNIT TYPE ISBT: 9500
WBC # BLD AUTO: 12 10E3/UL (ref 4–11)

## 2023-04-06 PROCEDURE — 84100 ASSAY OF PHOSPHORUS: CPT | Performed by: SURGERY

## 2023-04-06 PROCEDURE — P9016 RBC LEUKOCYTES REDUCED: HCPCS | Performed by: PHYSICIAN ASSISTANT

## 2023-04-06 PROCEDURE — 36415 COLL VENOUS BLD VENIPUNCTURE: CPT | Performed by: PHYSICIAN ASSISTANT

## 2023-04-06 PROCEDURE — 250N000013 HC RX MED GY IP 250 OP 250 PS 637: Performed by: PHYSICIAN ASSISTANT

## 2023-04-06 PROCEDURE — 97110 THERAPEUTIC EXERCISES: CPT | Mod: GO | Performed by: OCCUPATIONAL THERAPIST

## 2023-04-06 PROCEDURE — 76705 ECHO EXAM OF ABDOMEN: CPT

## 2023-04-06 PROCEDURE — 120N000011 HC R&B TRANSPLANT UMMC

## 2023-04-06 PROCEDURE — 97535 SELF CARE MNGMENT TRAINING: CPT | Mod: GO | Performed by: OCCUPATIONAL THERAPIST

## 2023-04-06 PROCEDURE — 258N000003 HC RX IP 258 OP 636: Performed by: TRANSPLANT SURGERY

## 2023-04-06 PROCEDURE — 36415 COLL VENOUS BLD VENIPUNCTURE: CPT | Performed by: SURGERY

## 2023-04-06 PROCEDURE — 250N000011 HC RX IP 250 OP 636: Performed by: TRANSPLANT SURGERY

## 2023-04-06 PROCEDURE — 76705 ECHO EXAM OF ABDOMEN: CPT | Mod: 26 | Performed by: RADIOLOGY

## 2023-04-06 PROCEDURE — 97165 OT EVAL LOW COMPLEX 30 MIN: CPT | Mod: GO | Performed by: OCCUPATIONAL THERAPIST

## 2023-04-06 PROCEDURE — 85027 COMPLETE CBC AUTOMATED: CPT | Performed by: SURGERY

## 2023-04-06 PROCEDURE — 85018 HEMOGLOBIN: CPT | Performed by: PHYSICIAN ASSISTANT

## 2023-04-06 PROCEDURE — 83735 ASSAY OF MAGNESIUM: CPT | Performed by: SURGERY

## 2023-04-06 PROCEDURE — 97530 THERAPEUTIC ACTIVITIES: CPT | Mod: GO | Performed by: OCCUPATIONAL THERAPIST

## 2023-04-06 PROCEDURE — 250N000013 HC RX MED GY IP 250 OP 250 PS 637: Performed by: SURGERY

## 2023-04-06 PROCEDURE — 80053 COMPREHEN METABOLIC PANEL: CPT | Performed by: SURGERY

## 2023-04-06 PROCEDURE — 250N000011 HC RX IP 250 OP 636: Performed by: PHYSICIAN ASSISTANT

## 2023-04-06 PROCEDURE — 82248 BILIRUBIN DIRECT: CPT | Performed by: SURGERY

## 2023-04-06 PROCEDURE — 82310 ASSAY OF CALCIUM: CPT | Performed by: SURGERY

## 2023-04-06 PROCEDURE — 250N000011 HC RX IP 250 OP 636: Performed by: SURGERY

## 2023-04-06 RX ORDER — LIDOCAINE 4 G/G
1 PATCH TOPICAL
Status: DISCONTINUED | OUTPATIENT
Start: 2023-04-06 | End: 2023-04-09 | Stop reason: HOSPADM

## 2023-04-06 RX ORDER — METHOCARBAMOL 500 MG/1
500 TABLET, FILM COATED ORAL 4 TIMES DAILY
Status: DISCONTINUED | OUTPATIENT
Start: 2023-04-06 | End: 2023-04-09 | Stop reason: HOSPADM

## 2023-04-06 RX ORDER — AMPICILLIN AND SULBACTAM 2; 1 G/1; G/1
3 INJECTION, POWDER, FOR SOLUTION INTRAMUSCULAR; INTRAVENOUS EVERY 6 HOURS
Status: DISCONTINUED | OUTPATIENT
Start: 2023-04-06 | End: 2023-04-09 | Stop reason: HOSPADM

## 2023-04-06 RX ORDER — PIPERACILLIN SODIUM, TAZOBACTAM SODIUM 2; .25 G/10ML; G/10ML
2.25 INJECTION, POWDER, LYOPHILIZED, FOR SOLUTION INTRAVENOUS EVERY 6 HOURS
Status: DISCONTINUED | OUTPATIENT
Start: 2023-04-06 | End: 2023-04-06

## 2023-04-06 RX ADMIN — ACETAMINOPHEN 975 MG: 325 TABLET, FILM COATED ORAL at 18:48

## 2023-04-06 RX ADMIN — AMPICILLIN SODIUM AND SULBACTAM SODIUM 3 G: 2; 1 INJECTION, POWDER, FOR SOLUTION INTRAMUSCULAR; INTRAVENOUS at 16:08

## 2023-04-06 RX ADMIN — PIPERACILLIN AND TAZOBACTAM 3.38 G: 3; .375 INJECTION, POWDER, LYOPHILIZED, FOR SOLUTION INTRAVENOUS at 05:59

## 2023-04-06 RX ADMIN — SODIUM CHLORIDE, SODIUM LACTATE, POTASSIUM CHLORIDE, CALCIUM CHLORIDE AND DEXTROSE MONOHYDRATE: 5; 600; 310; 30; 20 INJECTION, SOLUTION INTRAVENOUS at 00:01

## 2023-04-06 RX ADMIN — SENNOSIDES AND DOCUSATE SODIUM 1 TABLET: 8.6; 5 TABLET ORAL at 20:25

## 2023-04-06 RX ADMIN — PIPERACILLIN AND TAZOBACTAM 3.38 G: 3; .375 INJECTION, POWDER, LYOPHILIZED, FOR SOLUTION INTRAVENOUS at 00:04

## 2023-04-06 RX ADMIN — METHOCARBAMOL 500 MG: 100 INJECTION, SOLUTION INTRAMUSCULAR; INTRAVENOUS at 11:45

## 2023-04-06 RX ADMIN — ACETAMINOPHEN 975 MG: 325 TABLET, FILM COATED ORAL at 11:45

## 2023-04-06 RX ADMIN — METHOCARBAMOL 500 MG: 500 TABLET ORAL at 16:07

## 2023-04-06 RX ADMIN — AMPICILLIN SODIUM AND SULBACTAM SODIUM 3 G: 2; 1 INJECTION, POWDER, FOR SOLUTION INTRAMUSCULAR; INTRAVENOUS at 22:34

## 2023-04-06 RX ADMIN — VENLAFAXINE HYDROCHLORIDE 112.5 MG: 75 CAPSULE, EXTENDED RELEASE ORAL at 09:31

## 2023-04-06 RX ADMIN — VANCOMYCIN HYDROCHLORIDE 1250 MG: 10 INJECTION, POWDER, LYOPHILIZED, FOR SOLUTION INTRAVENOUS at 13:05

## 2023-04-06 RX ADMIN — METHOCARBAMOL 500 MG: 500 TABLET ORAL at 20:25

## 2023-04-06 RX ADMIN — METHOCARBAMOL 500 MG: 100 INJECTION, SOLUTION INTRAMUSCULAR; INTRAVENOUS at 03:08

## 2023-04-06 RX ADMIN — POLYETHYLENE GLYCOL 3350 17 G: 17 POWDER, FOR SOLUTION ORAL at 09:31

## 2023-04-06 RX ADMIN — SENNOSIDES AND DOCUSATE SODIUM 1 TABLET: 8.6; 5 TABLET ORAL at 09:31

## 2023-04-06 RX ADMIN — LIDOCAINE PATCH 4% 1 PATCH: 40 PATCH TOPICAL at 10:36

## 2023-04-06 RX ADMIN — ONDANSETRON 4 MG: 4 TABLET, ORALLY DISINTEGRATING ORAL at 22:08

## 2023-04-06 RX ADMIN — ACETAMINOPHEN 975 MG: 325 TABLET, FILM COATED ORAL at 03:21

## 2023-04-06 RX ADMIN — SODIUM CHLORIDE, SODIUM LACTATE, POTASSIUM CHLORIDE, CALCIUM CHLORIDE AND DEXTROSE MONOHYDRATE: 5; 600; 310; 30; 20 INJECTION, SOLUTION INTRAVENOUS at 23:44

## 2023-04-06 RX ADMIN — ONDANSETRON 4 MG: 4 TABLET, ORALLY DISINTEGRATING ORAL at 09:53

## 2023-04-06 RX ADMIN — ONDANSETRON 4 MG: 4 TABLET, ORALLY DISINTEGRATING ORAL at 16:35

## 2023-04-06 RX ADMIN — SODIUM CHLORIDE, SODIUM LACTATE, POTASSIUM CHLORIDE, CALCIUM CHLORIDE AND DEXTROSE MONOHYDRATE: 5; 600; 310; 30; 20 INJECTION, SOLUTION INTRAVENOUS at 13:06

## 2023-04-06 ASSESSMENT — ACTIVITIES OF DAILY LIVING (ADL)
PREVIOUS_RESPONSIBILITIES: MEAL PREP;HOUSEKEEPING;LAUNDRY;SHOPPING;YARDWORK;MEDICATION MANAGEMENT;FINANCES;DRIVING
ADLS_ACUITY_SCORE: 26
ADLS_ACUITY_SCORE: 29
ADLS_ACUITY_SCORE: 28
ADLS_ACUITY_SCORE: 28
DEPENDENT_IADLS:: INDEPENDENT
ADLS_ACUITY_SCORE: 28
ADLS_ACUITY_SCORE: 26
ADLS_ACUITY_SCORE: 29
ADLS_ACUITY_SCORE: 28
ADLS_ACUITY_SCORE: 26

## 2023-04-06 NOTE — PROGRESS NOTES
04/06/23 1600   Appointment Info   Signing Clinician's Name / Credentials (OT) arabella ford ot/l   Living Environment   People in Home spouse   Current Living Arrangements house   Home Accessibility stairs to enter home;stairs within home   Number of Stairs, Main Entrance 5   Number of Stairs, Within Home, Primary greater than 10 stairs   Stair Railings, Within Home, Primary railings on both sides of stairs   Transportation Anticipated family or friend will provide   Self-Care   Usual Activity Tolerance good   Current Activity Tolerance fair   Equipment Currently Used at Home none   Activity/Exercise/Self-Care Comment I PLOF   Instrumental Activities of Daily Living (IADL)   Previous Responsibilities meal prep;housekeeping;laundry;shopping;yardwork;medication management;finances;driving   IADL Comments active    General Information   Referring Physician Jose Tang   Patient/Family Therapy Goal Statement (OT) return to PLOF   Additional Occupational Profile Info/Pertinent History of Current Problem Zeinab Bermudez is a 67 year-old female with a PMH of laparoscopic cholecystectomy on 12/11/22 (OSH) complicated by bile duct injury s/p ex lap, washout, and drain placement 12/28/22, 1/1-23-1/11/23 admission for management of bile duct injury and intra-abdominal, fluid collection with two drains placed, 3/19/23-3/24/23 readmission for obstructed drain, elevated LFTs, and low grade fever who presented to [facility] on 4/4/23 for hepaticojejunostomy with antecolic  Ger-en-Y reconstruction.   Limitations/Impairments   (abdominal)   General Observations and Info pt limited mainly by pain, motivated with SO at bedside   Cognitive Status Examination   Orientation Status orientation to person, place and time   Cognitive Status Comments no concerns.   Visual Perception   Visual Impairment/Limitations WNL   Sensory   Sensory Quick Adds sensation intact   Range of Motion Comprehensive   General Range of Motion no range of motion  deficits identified   Strength Comprehensive (MMT)   General Manual Muscle Testing (MMT) Assessment other (see comments)   Comment, General Manual Muscle Testing (MMT) Assessment overall deconditioning   Coordination   Upper Extremity Coordination No deficits were identified   Bed Mobility   Bed Mobility supine-sit   Supine-Sit [place] (Bed Mobility) maximum assist (25% patient effort)   Comment (Bed Mobility) educatin required.   Transfers   Transfers bed-chair transfer;sit-stand transfer   Transfer Skill: Bed to Chair/Chair to Bed   Bed-Chair [place] (Transfers) moderate assist (50% patient effort)   Sit-Stand Transfer   Sit-Stand [place] (Transfers) minimum assist (75% patient effort)   Balance   Balance Assessment standing balance: dynamic   Balance Comments fair   Activities of Daily Living   BADL Assessment/Intervention lower body dressing   Lower Body Dressing Assessment/Training   [place] Level (Lower Body Dressing) maximum assist (25% patient effort)   Clinical Impression   Criteria for Skilled Therapeutic Interventions Met (OT) Yes, treatment indicated   OT Diagnosis decreased ADL I   Assessment of Occupational Performance 5 or more Performance Deficits   Identified Performance Deficits dressing, bathing, G/H, homemaking, leisure   Planned Therapy Interventions (OT) ADL retraining;IADL retraining;bed mobility training;strengthening;transfer training;home program guidelines;progressive activity/exercise;risk factor education   Clinical Decision Making Complexity (OT) low complexity   Risk & Benefits of therapy have been explained evaluation/treatment results reviewed;care plan/treatment goals reviewed;risks/benefits reviewed;current/potential barriers reviewed;participants voiced agreement with care plan;participants included;patient;spouse/significant other   Clinical Impression Comments Pt presents to OT with psot surgical precautions, pain and overall deconditioning leading to  decreased ADL I.   OT Goals   Therapy Frequency (OT) 6 times/wk   OT Predicted Duration/Target Date for Goal Attainment 04/19/23   OT Goals Hygiene/Grooming;Lower Body Dressing;Bed Mobility;Toilet Transfer/Toileting;OT Goal 1   OT: Hygiene/Grooming independent;while standing;within precautions   OT: Lower Body Dressing Independent;within precautions;including set-up/clothing retrieval   OT: Bed Mobility Independent;supine to/from sitting;within precautions   OT: Toilet Transfer/Toileting Independent;toilet transfer;cleaning and garment management;within precautions   OT: Goal 1 pt will ascend/descend 10 stairs mod I   OT Discharge Planning   OT Plan ambulation, precautions.   OT Discharge Recommendation (DC Rec) home with assist;Transitional Care Facility  (pending progress, anticipate home)   OT Rationale for DC Rec pt below baseline in ADL I   OT Brief overview of current status max assist bed mobility, min assist pivot to chair.

## 2023-04-06 NOTE — PROGRESS NOTES
Transplant Surgery  Inpatient Daily Progress Note  04/06/2023    Assessment & Plan: 67 year old female with a history of laparoscopic cholecystectomy on 12/11/22 at OSH complicated by bile duct injury s/p ex lap, washout, and drain placement 12/28/22. Admitted (1/1-1/11/23) to transplant liver surgery team for management of bile duct injury and intra abdominal fluid collection. Patient had drain placement x 2 and unsuccessful PTC placement. Also admitted 3/19-3/24 for obstructed drain, elevated LFTs, and low grade fever. Now admitted after Hepaticojejunostomy with antecolic Amy en Y reconstruction on 4/4 with Dr. Ruano.     GI/Nutrition:   S/p Lap sri complicated by bile duct injury, bile leak, intra-abdominal Infection now s/p hepaticojejunostomy with antecolic Amy en Y reconstruction on 4/4: LFTs improving since admission. Surgical drain appears non-bilious. NG tube removed, started clear liquid diet.    Acute post op pain: Received TAP block. Switched to dilaudid PCA.      Hematology:   Anemia secondary to chronic infection/inflammation, acute blood loss anemia: Received 1 unit pRBCs intra-op. Hgb drop again this AM (though other labs also appeared dilutional). Hgb 6.8/6.9 on recheck. Will transfuse 1 unit pRBC. Concern for possible bleeding at amy limb, will continue to monitor clinically. b/l Hgb 10-11.     Cardiorespiratory:   HLD: PTA pravastatin, will restart once tolerated oral diet     Endocrine: No issues.      Fluid/Electrolytes: MIVF:   Acute kidney injury: Cr 1.36 (1.05) will give fluid bolus this AM     : No issues     Infectious disease: T max 99.5. WBC improved, 12.0 (21.8). Gram stain with gram + cocci and gram + bacilli. Now growing enterococcus faecalis. Will narrow IV vanc/zosyn to unasyn.     Prophylaxis: DVT, encourage ambulation, low risk    Disposition: 7A    JOANN/Fellow/Resident Provider: Lorna Cuadra PA-C [id number]    Faculty: Desmond Ruano,  "M.D.    __________________________________________________________________  Transplant History: Admitted 4/4/2023 for Hepaticojejunostomy with antecolic Ger en Y reconstruction  N/A, Postoperative day: 1      Interval History: History is obtained from the patient, spouse, and EMR  Overnight events: Pain control suboptimal but slightly improved today. Feels dry.    ROS:   A 10-point review of systems was negative except as noted above.    Meds:   acetaminophen  975 mg Oral Q8H    ampicillin-sulbactam  3 g Intravenous Q6H    lidocaine  1 patch Transdermal Q24H    lidocaine   Transdermal Q8H PRINCE    methocarbamol  500 mg Intravenous Q8H    polyethylene glycol  17 g Oral Daily    senna-docusate  1 tablet Oral BID    sodium chloride (PF)  3 mL Intravenous Q8H    venlafaxine  112.5 mg Oral Daily       Physical Exam:     Admit Weight: 70.9 kg (156 lb 4.9 oz)    Current vitals:   BP 96/60 (BP Location: Left arm)   Pulse 100   Temp 98.3  F (36.8  C) (Oral)   Resp 16   Ht 1.575 m (5' 2\")   Wt 68 kg (149 lb 14.6 oz)   SpO2 97%   BMI 27.42 kg/m           Vital sign ranges:    Temp:  [97.3  F (36.3  C)-98.9  F (37.2  C)] 98.3  F (36.8  C)  Pulse:  [] 100  Resp:  [16-18] 16  BP: ()/(50-60) 96/60  SpO2:  [83 %-100 %] 97 %  Patient Vitals for the past 24 hrs:   BP Temp Temp src Pulse Resp SpO2   04/06/23 1325 96/60 98.3  F (36.8  C) Oral 100 16 97 %   04/06/23 1300 100/60 -- -- 91 -- 98 %   04/06/23 0906 93/53 97.3  F (36.3  C) Oral 94 -- 99 %   04/06/23 0834 90/51 97.9  F (36.6  C) Oral 93 16 100 %   04/06/23 0600 101/55 98.4  F (36.9  C) Oral 90 16 99 %   04/06/23 0156 110/51 98.6  F (37  C) Oral 103 16 99 %   04/06/23 0000 101/53 -- -- 96 16 99 %   04/05/23 2135 -- -- -- -- -- 96 %   04/05/23 2125 97/50 98.6  F (37  C) Oral 110 16 (!) 83 %   04/05/23 1729 107/56 98.9  F (37.2  C) Oral 108 18 96 %   04/05/23 1351 -- 98.2  F (36.8  C) Oral 101 16 94 %     General Appearance: in no apparent distress.   Skin: " normal, warm  Heart: perfused  Lungs: Nonlabored resps, on 1L  Abdomen: The abdomen is soft, nontender to light palpation. Point tenderness RLQ. Drain serosanguinous, non bilious.  : livingston is present. Urine has no gross hematuria.   Extremities: edema: absent.   Neurologic: awake, alert and oriented. Tremor absent..     Data:   CMP  Recent Labs   Lab 04/06/23  0623 04/05/23  0355    140   POTASSIUM 3.7 4.0   CHLORIDE 112* 108*   CO2 26 20*   * 165*   BUN 18.9 13.4   CR 1.36* 1.05*   GFRESTIMATED 43* 59*   NASRIN 8.3* 8.7*   MAG 1.7 1.7   PHOS 2.3* 4.4   ALBUMIN 2.1* 2.6*   BILITOTAL 0.6 0.7   ALKPHOS 92 131*   AST 46* 128*   ALT 73* 142*     CBC  Recent Labs   Lab 04/06/23  0744 04/06/23  0623 04/05/23  0355   HGB 6.9* 6.8* 10.4*   WBC  --  12.0* 21.8*   PLT  --  133* 264     COAGS  Recent Labs   Lab 04/04/23  1323 04/03/23  1518   INR 1.33* 1.07   PTT 29  --       Urinalysis  No lab results found.  Cultures:     Attestation:  Patient has been seen with team and evaluated by me.   Vital signs, labs, medications and orders were reviewed.   When obtained, diagnostic images were reviewed by me and interpreted as above.    The care plan was discussed with the multidisciplinary team and I agree with the findings and plan in this note, with any differences recorded in blue.    .

## 2023-04-06 NOTE — PLAN OF CARE
"BP 96/60 (BP Location: Left arm)   Pulse 100   Temp 98.3  F (36.8  C) (Oral)   Resp 16   Ht 1.575 m (5' 2\")   Wt 68 kg (149 lb 14.6 oz)   SpO2 97%   BMI 27.42 kg/m      Shift: 9745-1461  Isolation Status: none  VS: VSS on 1L via nasal cannula, afebrile. BPs running on the softer side. Intermittently tachy.  Neuro: Aox4  Behaviors: calm, pleasant   Labs: hgb 6.9, 1 unit of RBC given. Hgb recheck ordered  Respiratory: minimal CHU  Cardiac: intermittently tachy. On Tele NSR  Pain/Nausea: abdominal pain managed with dilaudid PCA, scheduled robaxin, lidocaine patches and tylenol. No complaints of nausea  Diet: clear liquid diet, tolerating well  IV Access: PIV x2; left running PCA, right running MIVF  Lines/Drains: MELITA CDI, minimal output  GI/: livingston catheter in place. No BM  Skin: abdominal incision CDI  Mobility: up with assist x1, gaitbelt and walker  Events/Education: educated on abdominal precautions. Abdominal US to be completed   Plan: Will continue with POC and notify provider with changes/concerns    "

## 2023-04-06 NOTE — PLAN OF CARE
"VS: /56 (BP Location: Left arm)   Pulse 108   Temp 98.9  F (37.2  C) (Oral)   Resp 18   Ht 1.575 m (5' 2\")   Wt 68 kg (149 lb 14.6 oz)   SpO2 96%   BMI 27.42 kg/m        Neuro: alert and oriented X4  Cardio: Tach in the 100's and 110's   Respiratory: RA  GI/: has a livingston and was having Low UOP X2 500 ml boluses was given   Skin:has a midline incision covered with dressing and old drainage is outlined    Diet: NPO   ice chips   Labs: pending AM   BG: none   LDA: X1 PIV, 1 has PCA with a TKO and other has D5 LR at 100, NG to LCS, MELITA with serosanguineous output.   Mobility: has not gotten out of bed, plan to get out of bed tomorrow with PT.    Pain: stated pain is constant is increased when patient hiccups   PRN medications: none   Changes:   Plan of Care:  promote rest NOC and will get oob tomorrow                         "

## 2023-04-06 NOTE — PLAN OF CARE
"/51 (BP Location: Left arm)   Pulse 103   Temp 98.6  F (37  C) (Oral)   Resp 16   Ht 1.575 m (5' 2\")   Wt 68 kg (149 lb 14.6 oz)   SpO2 99%   BMI 27.42 kg/m   AVSS on 2L /NC. Patient continues to have incisional pain. Continues with Dilaudid PCA with 0.2 mg bumps every 10 minutes. Patient denies nausea. Urine Output - 250 ml + uziel. Bowel Function - has not had BM since before surgery. Nutrition - ice chips only. Drains - MELITA x 1 with minimal output. NGT with 50 ml output so far this shift. Activity -  Not OOB this shift. Pt slept well in between cares.                          "

## 2023-04-06 NOTE — CONSULTS
Care Management Initial Consult    General Information  Assessment completed with: Patient, Care Team Member, -chart review, Spouse or significant other,    Type of CM/SW Visit: Initial Assessment    Primary Care Provider verified and updated as needed: Yes   Readmission within the last 30 days: other (see comments)   Return Category: Planned Surgery  Reason for Consult:  (Elevated readmission risk score)  Advance Care Planning:            Communication Assessment  Patient's communication style: spoken language (English or Bilingual)    Hearing Difficulty or Deaf: no   Wear Glasses or Blind: yes    Cognitive  Cognitive/Neuro/Behavioral: WDL                      Living Environment:   People in home: spouse     Current living Arrangements: house      Able to return to prior arrangements: yes       Family/Social Support:  Care provided by: spouse/significant other  Provides care for: no one  Marital Status:             Description of Support System: Supportive, Involved    Support Assessment: Adequate family and caregiver support    Current Resources:   Patient receiving home care services: No     Community Resources: None  Equipment currently used at home: none  Supplies currently used at home:  (drain care supplies)    Employment/Financial:  Employment Status: retired        Financial Concerns: No concerns identified           Lifestyle & Psychosocial Needs:  Social Determinants of Health     Tobacco Use: Low Risk  (4/5/2023)    Patient History      Smoking Tobacco Use: Never      Smokeless Tobacco Use: Never      Passive Exposure: Not on file   Alcohol Use: Not on file   Financial Resource Strain: Not on file   Food Insecurity: Not on file   Transportation Needs: Not on file   Physical Activity: Not on file   Stress: Not on file   Social Connections: Not on file   Intimate Partner Violence: Not on file   Depression: Not at risk (2/2/2023)    PHQ-2      PHQ-2 Score: 0   Housing Stability: Not on file        Functional Status:  Prior to admission patient needed assistance:   Dependent ADLs:: Ambulation-walker  Dependent IADLs:: Independent       Mental Health Status:  Mental Health Status: No Current Concerns       Chemical Dependency Status:  Chemical Dependency Status: No Current Concerns     Additional Information:  Patient readmitted with history of laparoscopic cholecystectomy on 12/11/22 at OSH complicated by bile duct injury s/p ex lap, washout, and drain placement 12/28/22. Admitted (1/1-1/11/23) to transplant liver surgery team for management of bile duct injury and intra abdominal fluid collection. Patient had drain placement x 2 and unsuccessful PTC placement. Also admitted 3/19-3/24 for obstructed drain, elevated LFTs, and low grade fever now  S/p Lap sri complicated by bile duct injury, bile leak, intra-abdominal Infection now s/p hepaticojejunostomy with antecolic Ger en Y reconstruction on 4/4.  CMA for elevated readmission risk score.  Pt well know to CM from recent hospitalizations.    Met with pt and spouse.   Pt inquired that if home care PT is recommended she would like [facility] services again.  Pt notes no other concerns or questions at this time.    Shabnam Ludwig RN BSN, PHN, ACM-RN  7A RN Care Coordinator  Phone: [phone]  Pager [phone]    To contact the weekend RNCC  [place] ([phone]) Saturday and Sunday    Units: 4A, 4C, 4E, 5A and 5B- Pager 1: [phone]    Units: 6A, 6B, 6C, 6D- Pager 2: [phone]    Units: 7A, 7B, 7C, 7D, and 5C-Pager 3: [phone]    [facility] ([phone]) Saturday and Sunday    Units: 5 Ortho, 8A, 10 ICU, & Pediatric Units-Pager 4: [phone]    4/6/2023 3:49 PM

## 2023-04-06 NOTE — PROGRESS NOTES
Antimicrobial Stewardship Team Note    Antimicrobial Stewardship Program - A joint venture between [facility] and  Physicians to optimize antibiotic management.  NOT a formal consult - Restricted Antimicrobial Review     Patient: Zeinab Bermudez  MRN: [id number]  Allergies: Patient has no known allergies.    Brief Summary: Zeinab Bermudez is a 67 year-old female with a PMH of laparoscopic cholecystectomy on 12/11/22 (OSH) complicated by bile duct injury s/p ex lap, washout, and drain placement 12/28/22, 1/1-23-1/11/23 admission for management of bile duct injury and intra-abdominal, fluid collection with two drains placed, 3/19/23-3/24/23 readmission for obstructed drain, elevated LFTs, and low grade fever who presented to [facility] on 4[facility]4/23 for hepaticojejunostomy with antecolic  Ger-en-Y reconstruction.    History of Present Illness: On presentation, patient was afebrile (temp 98.7), HR 82, normotensive (/74), tachypneic (RR 24) but satting % on room air. Labs were notable for increased liver enzymes (, , ), D bili 0.89, T bili 1.4, and WBC 23.2. Patient was started empirically on vancomycin and Zosyn. Patient underwent hepaticojejunostomy with antecolic Ger-en-Y reconstruction with no complications. Prior intra-abdominal drains removed which were traced to the biloma, and noted the jac had dense adhesions related to the biloma and prior surgery. Cultures sent from this site which are growing 4+ Enterococcus faecalis with susceptibilities pending. New drains were placed in the R and L hepatic ducts and routed into the Ger. Drain routed to posterior to HJ reconstruction. Abdomen closed, subcutaneous tissue and skin closed.         Active Anti-infective Medications   (From admission, onward)                 Start     Stop    04/06/23 1200  piperacillin-tazobactam  2.25 g,   Intravenous,   EVERY 6 HOURS        Intra-Abdominal Infection        --    04/06/23 0939   vancomycin  1,250 mg,   Intravenous,   EVERY 24 HOURS        Intra-Abdominal Infection        --                  Assessment: Laparoscopic cholecystectomy c/b bile duct injury, bile leak, intra-abdominal fluid collection with prior drain placement s/p hepaticojejunostomy with antecolic Ger-en-Y reconstruction and new drain placement on 4/4/23  Today, patient is afebrile (temp 97.3), BP on the lower end, requiring 1L of supplemental oxygen for comfort, but otherwise appears stable.  WBC is 12, downtrending from 23.2 on admission. ALP 92, ALT 73, AST 46, D bili 0.34, and T bili 0.6, all downtrending. E. faecalis susceptibilities from intra-abdominal cultures are still pending and patient does remain on vancomycin and Zosyn. Noted MITZY with an uptrending SCr of 1.36 (0.62 on admission, with baseline  ~0.6-0.8). E. faecalis is generally susceptible to earlier generations penicillins, such as ampicillin. In setting of MITZY and identified E. faecalis, broad-spectrum antibiotics are no longer warranted. Recommend transitioning vancomycin and Zosyn to ampicillin/sulbactam given 100% susceptibility on local antibiogram. When appropriate, transitioning PO amoxicillin/clavulanate is a reasonable alternative. Recommend treating for total of four days post-source control (EOT through 4/8/23).    Recommendations:  Transition vancomycin and Zosyn to Unasyn 3 g IV q6h (dose appropriate for renal function), with transition to Augmentin 875/125 mg PO BID when appropriate  Duration of therapy 4 days post-source control (EOT through 4/8/23)    Reference:  Santhosh et al. N Engl J Med 2015; 372:9780-0531. DOI: 10.1056/[id number]    Pharmacy took the following actions: Called/paged provider, Electronic note created.    Discussed with ID Staff: Tanya Ireland MD and Madina Pinzon, PharmD, BCIDP    Aakash Vieira, PharmD, MPH  PGY2 ID Pharmacy Resident  Pager: [phone]    Vital Signs/Clinical Features:  Vitals         04/04  0700  04/05 0659 04/05 0700  04/06 0659 04/06 0700  04/06 1037   Most Recent      Temp ( F) 96 -  98.6    98.2 -  99.5    97.3 -  97.9     97.3 (36.3) 04/06 0906    Pulse 74 -  109    90 -  116    93 -  94     94 04/06 0906    Resp 0 -  21    16 -  21      16     16 04/06 0834    BP 80/45 -  138/95    97/50 -  129/76    90/51 -  93/53     93/53 04/06 0906    SpO2 (%) 94 -  100    83 -  99    99 -  100     99 04/06 0906            Labs  Estimated Creatinine Clearance: 37.3 mL/min (A) (based on SCr of 1.36 mg/dL (H)).  Recent Labs   Lab Test 03/23/23 0442 03/24/23  0601 04/03/23  1518 04/04/23  1323 04/05/23 0355 04/06/23  0623   CR 0.81 0.69 0.79 0.62 1.05* 1.36*       Recent Labs   Lab Test 03/23/23 0442 03/24/23  0601 04/03/23  1518 04/04/23  1323 04/04/23  1918 04/04/23  2142 04/05/23 0355 04/06/23  0623 04/06/23  0744   WBC 6.8 8.7 6.5 23.2*  --   --  21.8* 12.0*  --    HGB 9.2* 10.4* 12.1 11.3* 11.1* 10.7* 10.4* 6.8* 6.9*   HCT 29.4* 32.7* 37.1 34.0*  --   --  32.4* 21.8*  --    MCV 93 90 88 90  --   --  92 95  --     221 228 318  --   --  264 133*  --        Recent Labs   Lab Test 03/23/23 0442 03/24/23  0601 04/03/23  1518 04/04/23  1323 04/05/23  0355 04/06/23  0623   BILITOTAL 0.8 1.1 0.7 1.4* 0.7 0.6   ALKPHOS 246* 250* 299* 167* 131* 92   ALBUMIN 3.0* 3.3* 4.0 2.6* 2.6* 2.1*   AST 18 26 56* 287* 128* 46*   ALT 75* 63* 65* 163* 142* 73*       Recent Labs   Lab Test 02/06/23  1702 03/21/23  0023   LACT 1.5 1.0             Culture Results:  7-Day Micro Results       Procedure Component Value Units Date/Time    C. difficile Toxin B PCR with reflex to C. difficile Antigen and Toxins A/B EIA     Order Status: Canceled Lab Status: No result     Specimen: Stool from Per Rectum     Anaerobic Bacterial Culture Routine [[id number]] Collected: 04/04/23 1020    Order Status: Completed Lab Status: Preliminary result Updated: 04/05/23 1331    Specimen: Abscess from Abdomen      Culture No anaerobic  organisms isolated after 1 day    Narrative:      This specimen was received on a swab. Results may not be optimal. For maximum sensitivity of detection submit tissue, fluid or fine needle aspirate.    Gram Stain [[id number]]  (Abnormal) Collected: 04/04/23 1020    Order Status: Completed Lab Status: Final result Updated: 04/04/23 1352    Specimen: Abscess from Abdomen      Gram Stain Result 2+ Gram positive cocci      1+ Gram positive bacilli      1+ WBC seen    Fungal or Yeast Culture Routine [[id number]] Collected: 04/04/23 1020    Order Status: Completed Lab Status: Preliminary result Updated: 04/05/23 1331    Specimen: Abscess from Abdomen      Culture No growth after 1 day    Abscess Aerobic Bacterial Culture Routine [[id number]]  (Abnormal) Collected: 04/04/23 1020    Order Status: Completed Lab Status: Preliminary result Updated: 04/06/23 0951    Specimen: Abscess from Abdomen      Culture 4+ Enterococcus faecalis    Narrative:      This specimen was received on a swab. Results may not be optimal. For maximum sensitivity of detection submit tissue, fluid or fine needle aspirate.                                      Imaging: US Abdomen Limited w Abd/Pelvis Duplex Complete    Result Date: 4/5/2023  EXAMINATION: US ABDOMEN LIMITED WITH DOPPLER COMPLETE 4/5/2023 9:57 AM COMPARISON: Ultrasound abdomen 3/19/2023; CT abdomen 3/21/2023 HISTORY: Post resection of bile duct, normal post repair with elevation of bilirubin. Assess for perihepatic collection/abscess anterior hepatic artery with Doppler TECHNIQUE: The abdomen was scanned in standard fashion with specialized ultrasound transducer(s) using both gray-scale, color Doppler, and spectral flow techniques. Findings: Technically challenging to obtain midline images secondary to overlying bandages Liver: The liver demonstrates normal homogeneous echotexture. No evidence of a focal hepatic mass. Liver measures 16.4 cm in craniocaudal dimension. Extrahepatic  portal vein flow is antegrade at 29 cm/s. Right portal vein flow is antegrade, measuring 35 cm/s. Left portal vein flow is antegrade, measuring 43 cm/s. Flow in the hepatic artery is towards the liver and: 59/21 peak systolic 0.64 resistive index. The splenic vein is not visualized. The left, middle, and right hepatic veins are patent with flow towards the IVC. The IVC is patent with flow towards the heart.   The visualized aorta is not dilated. Gallbladder: Surgically absent Bile Ducts: Common bile duct is not well-visualized.. Pancreas: Pancreas is obscured Right kidney measures 9.6 cm. No hydronephrosis, mass or obstructing renal calculus demonstrated. Fluid: Trace ascites  Partially visualized hypoechogenicity along the inferior left lobe measuring about 3.6 x 3.6 cm, also seen on on cine images, indeterminate may represent collection versus adjacent bowel loop.     Impression: 1. Patent hepatic vasculature by Doppler evaluation. 2. Partially visualized complex appearing hypoechogenicity along the inferior left lobe, indeterminate, may represent possible collection, may consider CT abdomen for further evaluation as clinically desired. 3. Trace ascites. GIOVANY BARKLEY MD   SYSTEM ID:  [id number]    XR Abdomen Port 1 View    Result Date: 4/5/2023  EXAMINATION:  XR ABDOMEN PORT 1 VIEW 4/5/2023 12:11 AM. COMPARISON: Radiograph 4/4/2023, CT 3/21/2023. HISTORY:  NGT advancement FINDINGS: Portable AP view of the abdomen. The lower portion of the abdomen is not included in the field-of-view. Enteric tube tip and sidehole projected over the stomach. Nonobstructive bowel gas pattern. No pneumatosis or portal venous gas. Surgical clips, tubes and drains project in stable position. Surgical staples project to the right of midline. Minimal streaky bibasilar atelectasis. No acute osseous abnormality.     IMPRESSION: Enteric tube tip and sidehole projected over the stomach. I have personally reviewed the examination and  initial interpretation and I agree with the findings. KLAUDIA CONDON MD   SYSTEM ID:  [id number]    XR Abdomen Port 1 View    Result Date: 4/4/2023  Examination:  XR ABDOMEN PORT 1 VIEW Date:  4/4/2023 6:01 PM Clinical Information: Assess NG placement Comparison: CT 3/21/2023 Findings: One view Portable AP supine abdominal radiograph. Enteric tube tip projects over the stomach with sidehole and the gastroesophageal junction. Nonobstructive bowel gas pattern. Surgical staples, tubes and drains projecting over abdomen and pelvis.     Impression: Enteric tube tip projects over the stomach with sidehole near gastroesophageal junction. May consider advancement. I have personally reviewed the examination and initial interpretation and I agree with the findings. GIOVANY BARKLEY MD   SYSTEM ID:  [id number]

## 2023-04-07 ENCOUNTER — APPOINTMENT (OUTPATIENT)
Dept: PHYSICAL THERAPY | Facility: CLINIC | Age: 68
DRG: 907 | End: 2023-04-07
Attending: PHYSICIAN ASSISTANT
Payer: COMMERCIAL

## 2023-04-07 ENCOUNTER — APPOINTMENT (OUTPATIENT)
Dept: OCCUPATIONAL THERAPY | Facility: CLINIC | Age: 68
DRG: 907 | End: 2023-04-07
Attending: TRANSPLANT SURGERY
Payer: COMMERCIAL

## 2023-04-07 PROBLEM — N17.9 AKI (ACUTE KIDNEY INJURY) (H): Status: ACTIVE | Noted: 2023-04-05

## 2023-04-07 PROBLEM — K65.1 INTRA-ABDOMINAL ABSCESS (H): Status: ACTIVE | Noted: 2023-04-07

## 2023-04-07 PROBLEM — D62 ANEMIA DUE TO BLOOD LOSS, ACUTE: Status: ACTIVE | Noted: 2023-04-07

## 2023-04-07 PROBLEM — G89.18 ACUTE POST-OPERATIVE PAIN: Status: ACTIVE | Noted: 2023-04-07

## 2023-04-07 LAB
ALBUMIN SERPL BCG-MCNC: 2.1 G/DL (ref 3.5–5.2)
ALP SERPL-CCNC: 109 U/L (ref 35–104)
ALT SERPL W P-5'-P-CCNC: 56 U/L (ref 10–35)
ANION GAP SERPL CALCULATED.3IONS-SCNC: 8 MMOL/L (ref 7–15)
AST SERPL W P-5'-P-CCNC: 34 U/L (ref 10–35)
BILIRUB DIRECT SERPL-MCNC: 0.35 MG/DL (ref 0–0.3)
BILIRUB SERPL-MCNC: 0.6 MG/DL
BUN SERPL-MCNC: 13 MG/DL (ref 8–23)
CALCIUM SERPL-MCNC: 8.4 MG/DL (ref 8.8–10.2)
CHLORIDE SERPL-SCNC: 110 MMOL/L (ref 98–107)
CREAT SERPL-MCNC: 1.1 MG/DL (ref 0.51–0.95)
DEPRECATED HCO3 PLAS-SCNC: 25 MMOL/L (ref 22–29)
ERYTHROCYTE [DISTWIDTH] IN BLOOD BY AUTOMATED COUNT: 15.5 % (ref 10–15)
GFR SERPL CREATININE-BSD FRML MDRD: 55 ML/MIN/1.73M2
GLUCOSE BLDC GLUCOMTR-MCNC: 102 MG/DL (ref 70–99)
GLUCOSE SERPL-MCNC: 105 MG/DL (ref 70–99)
HCT VFR BLD AUTO: 23.9 % (ref 35–47)
HGB BLD-MCNC: 7.7 G/DL (ref 11.7–15.7)
HGB BLD-MCNC: 7.9 G/DL (ref 11.7–15.7)
HGB BLD-MCNC: 8.9 G/DL (ref 11.7–15.7)
MAGNESIUM SERPL-MCNC: 1.6 MG/DL (ref 1.7–2.3)
MCH RBC QN AUTO: 30.2 PG (ref 26.5–33)
MCHC RBC AUTO-ENTMCNC: 33.1 G/DL (ref 31.5–36.5)
MCV RBC AUTO: 91 FL (ref 78–100)
PHOSPHATE SERPL-MCNC: 2.6 MG/DL (ref 2.5–4.5)
PLATELET # BLD AUTO: 129 10E3/UL (ref 150–450)
POTASSIUM SERPL-SCNC: 3.5 MMOL/L (ref 3.4–5.3)
PROT SERPL-MCNC: 4.3 G/DL (ref 6.4–8.3)
RBC # BLD AUTO: 2.62 10E6/UL (ref 3.8–5.2)
SODIUM SERPL-SCNC: 143 MMOL/L (ref 136–145)
WBC # BLD AUTO: 8.5 10E3/UL (ref 4–11)

## 2023-04-07 PROCEDURE — 250N000011 HC RX IP 250 OP 636: Performed by: SURGERY

## 2023-04-07 PROCEDURE — 84100 ASSAY OF PHOSPHORUS: CPT | Performed by: SURGERY

## 2023-04-07 PROCEDURE — 80053 COMPREHEN METABOLIC PANEL: CPT | Performed by: SURGERY

## 2023-04-07 PROCEDURE — 97535 SELF CARE MNGMENT TRAINING: CPT | Mod: GO | Performed by: OCCUPATIONAL THERAPIST

## 2023-04-07 PROCEDURE — 250N000013 HC RX MED GY IP 250 OP 250 PS 637: Performed by: PHYSICIAN ASSISTANT

## 2023-04-07 PROCEDURE — 97161 PT EVAL LOW COMPLEX 20 MIN: CPT | Mod: GP

## 2023-04-07 PROCEDURE — 36415 COLL VENOUS BLD VENIPUNCTURE: CPT | Performed by: PHYSICIAN ASSISTANT

## 2023-04-07 PROCEDURE — 85027 COMPLETE CBC AUTOMATED: CPT | Performed by: SURGERY

## 2023-04-07 PROCEDURE — 83735 ASSAY OF MAGNESIUM: CPT | Performed by: SURGERY

## 2023-04-07 PROCEDURE — 99232 SBSQ HOSP IP/OBS MODERATE 35: CPT | Mod: 24 | Performed by: SURGERY

## 2023-04-07 PROCEDURE — 36415 COLL VENOUS BLD VENIPUNCTURE: CPT | Performed by: SURGERY

## 2023-04-07 PROCEDURE — 85018 HEMOGLOBIN: CPT | Performed by: PHYSICIAN ASSISTANT

## 2023-04-07 PROCEDURE — 82248 BILIRUBIN DIRECT: CPT | Performed by: SURGERY

## 2023-04-07 PROCEDURE — 120N000011 HC R&B TRANSPLANT UMMC

## 2023-04-07 PROCEDURE — 97530 THERAPEUTIC ACTIVITIES: CPT | Mod: GP

## 2023-04-07 PROCEDURE — 97116 GAIT TRAINING THERAPY: CPT | Mod: GP

## 2023-04-07 PROCEDURE — 250N000013 HC RX MED GY IP 250 OP 250 PS 637: Performed by: SURGERY

## 2023-04-07 PROCEDURE — 97110 THERAPEUTIC EXERCISES: CPT | Mod: GP

## 2023-04-07 PROCEDURE — 250N000011 HC RX IP 250 OP 636: Performed by: PHYSICIAN ASSISTANT

## 2023-04-07 PROCEDURE — 97530 THERAPEUTIC ACTIVITIES: CPT | Mod: GO | Performed by: OCCUPATIONAL THERAPIST

## 2023-04-07 PROCEDURE — 82310 ASSAY OF CALCIUM: CPT | Performed by: SURGERY

## 2023-04-07 RX ORDER — MAGNESIUM OXIDE 400 MG/1
400 TABLET ORAL ONCE
Status: COMPLETED | OUTPATIENT
Start: 2023-04-07 | End: 2023-04-07

## 2023-04-07 RX ORDER — PRAVASTATIN SODIUM 20 MG
40 TABLET ORAL EVERY EVENING
Status: DISCONTINUED | OUTPATIENT
Start: 2023-04-08 | End: 2023-04-09 | Stop reason: HOSPADM

## 2023-04-07 RX ORDER — SODIUM CHLORIDE 9 MG/ML
INJECTION, SOLUTION INTRAVENOUS CONTINUOUS
Status: DISCONTINUED | OUTPATIENT
Start: 2023-04-07 | End: 2023-04-08

## 2023-04-07 RX ORDER — BISACODYL 10 MG
10 SUPPOSITORY, RECTAL RECTAL ONCE
Status: COMPLETED | OUTPATIENT
Start: 2023-04-07 | End: 2023-04-07

## 2023-04-07 RX ADMIN — POLYETHYLENE GLYCOL 3350 17 G: 17 POWDER, FOR SOLUTION ORAL at 08:18

## 2023-04-07 RX ADMIN — ONDANSETRON 4 MG: 4 TABLET, ORALLY DISINTEGRATING ORAL at 08:20

## 2023-04-07 RX ADMIN — METHOCARBAMOL 500 MG: 500 TABLET ORAL at 12:14

## 2023-04-07 RX ADMIN — VENLAFAXINE HYDROCHLORIDE 112.5 MG: 75 CAPSULE, EXTENDED RELEASE ORAL at 08:20

## 2023-04-07 RX ADMIN — METHOCARBAMOL 500 MG: 500 TABLET ORAL at 08:19

## 2023-04-07 RX ADMIN — AMPICILLIN SODIUM AND SULBACTAM SODIUM 3 G: 2; 1 INJECTION, POWDER, FOR SOLUTION INTRAMUSCULAR; INTRAVENOUS at 04:46

## 2023-04-07 RX ADMIN — MAGNESIUM OXIDE TAB 400 MG (241.3 MG ELEMENTAL MG) 400 MG: 400 (241.3 MG) TAB at 10:44

## 2023-04-07 RX ADMIN — METHOCARBAMOL 500 MG: 500 TABLET ORAL at 23:38

## 2023-04-07 RX ADMIN — BISACODYL 10 MG RECTAL SUPPOSITORY 10 MG: at 17:21

## 2023-04-07 RX ADMIN — LIDOCAINE PATCH 4% 1 PATCH: 40 PATCH TOPICAL at 08:18

## 2023-04-07 RX ADMIN — ACETAMINOPHEN 975 MG: 325 TABLET, FILM COATED ORAL at 04:46

## 2023-04-07 RX ADMIN — AMPICILLIN SODIUM AND SULBACTAM SODIUM 3 G: 2; 1 INJECTION, POWDER, FOR SOLUTION INTRAMUSCULAR; INTRAVENOUS at 10:42

## 2023-04-07 RX ADMIN — AMPICILLIN SODIUM AND SULBACTAM SODIUM 3 G: 2; 1 INJECTION, POWDER, FOR SOLUTION INTRAMUSCULAR; INTRAVENOUS at 17:19

## 2023-04-07 RX ADMIN — ACETAMINOPHEN 975 MG: 325 TABLET, FILM COATED ORAL at 10:43

## 2023-04-07 RX ADMIN — AMPICILLIN SODIUM AND SULBACTAM SODIUM 3 G: 2; 1 INJECTION, POWDER, FOR SOLUTION INTRAMUSCULAR; INTRAVENOUS at 23:38

## 2023-04-07 RX ADMIN — SODIUM CHLORIDE, SODIUM LACTATE, POTASSIUM CHLORIDE, CALCIUM CHLORIDE AND DEXTROSE MONOHYDRATE: 5; 600; 310; 30; 20 INJECTION, SOLUTION INTRAVENOUS at 09:20

## 2023-04-07 RX ADMIN — SENNOSIDES AND DOCUSATE SODIUM 1 TABLET: 8.6; 5 TABLET ORAL at 08:19

## 2023-04-07 RX ADMIN — SENNOSIDES AND DOCUSATE SODIUM 1 TABLET: 8.6; 5 TABLET ORAL at 19:42

## 2023-04-07 RX ADMIN — METHOCARBAMOL 500 MG: 500 TABLET ORAL at 17:24

## 2023-04-07 ASSESSMENT — ACTIVITIES OF DAILY LIVING (ADL)
ADLS_ACUITY_SCORE: 29
ADLS_ACUITY_SCORE: 28
ADLS_ACUITY_SCORE: 29
ADLS_ACUITY_SCORE: 29

## 2023-04-07 NOTE — PROGRESS NOTES
Transplant Surgery  Inpatient Daily Progress Note  04/07/2023    Assessment & Plan: 67 year old female with a history of laparoscopic cholecystectomy on 12/11/22 at OSH complicated by bile duct injury s/p ex lap, washout, and drain placement 12/28/22. Admitted (1/1-1/11/23) to transplant liver surgery team for management of bile duct injury and intra abdominal fluid collection. Patient had drain placement x 2 and unsuccessful PTC placement. Also admitted 3/19-3/24 for obstructed drain, elevated LFTs, and low grade fever. Now admitted after Hepaticojejunostomy with antecolic Ger en Y reconstruction on 4/4 with Dr. Ruano.     GI/Nutrition:   S/p Lap sri complicated by bile duct injury, bile leak, intra-abdominal Infection now s/p hepaticojejunostomy with antecolic Ger en Y reconstruction on 4/4: LFTs improving overall since admission. Surgical drain appears non-bilious. NG tube removed, advancing diet.    Acute post op pain: Received TAP block. Switched to dilaudid PCA. Oxycodone and tylenol PRN. Lidocaine patches, robaxin     Hematology:   Anemia secondary to chronic infection/inflammation, acute blood loss anemia: Received 1 unit pRBCs intra-op. Hgb drop again 4/6 AM (though other labs also appeared dilutional). Hgb 6.8, 6.9 on recheck. Transfused 1 unit pRBC 4/6. Concern for possible bleeding at ger limb, will continue to monitor clinically. b/l Hgb 10-11.     Cardiorespiratory:   HLD: PTA pravastatin, will restart     Endocrine: No issues.      Fluid/Electrolytes: MIVF: Will discontinue IVF today with diet advancement  Acute kidney injury: Improvement today, Cr 1.1 (1.36) will continue to monitor     : No issues     Infectious disease: AF. WBC improved, 8.5 (12.0). Gram stain with gram + cocci and gram + bacilli. Now growing enterococcus faecalis. Will narrow IV vanc/zosyn to unasyn, can use augmentin for discharge if needed based on duration.     Prophylaxis: DVT, encourage ambulation, low  "risk    Disposition: 7A    JOANN/Fellow/Resident Provider: Lorna Cuadra PA-C [id number]    Faculty: Judd Patrick M.D.    Attestation: I saw and examined the patient with Lorna Cuadra PA-C, and the transplant team. I independently reviewed all pertinent laboratory and imaging results and made independent management decisions including immunosuppression adjustment. I agree with the findings and plan as documented in this note.  Judd Patrick MD    __________________________________________________________________  Transplant History: Admitted 4/4/2023 for Hepaticojejunostomy with antecolic Ger en Y reconstruction  N/A, Postoperative day: 1      Interval History: History is obtained from the patient, spouse, and EMR  Overnight events: Pain control slightly improved, good amount of effort to move from bed to chair.     ROS:   A 10-point review of systems was negative except as noted above.    Meds:    ampicillin-sulbactam  3 g Intravenous Q6H     bisacodyl  10 mg Rectal Once     lidocaine  1 patch Transdermal Q24H     lidocaine   Transdermal Q8H PRINCE     methocarbamol  500 mg Oral 4x Daily     polyethylene glycol  17 g Oral Daily     senna-docusate  1 tablet Oral BID     sodium chloride (PF)  3 mL Intravenous Q8H     venlafaxine  112.5 mg Oral Daily       Physical Exam:     Admit Weight: 70.9 kg (156 lb 4.9 oz)    Current vitals:   /70 (BP Location: Left arm)   Pulse 91   Temp 97.6  F (36.4  C) (Oral)   Resp 16   Ht 1.575 m (5' 2\")   Wt 68 kg (149 lb 14.6 oz)   SpO2 94%   BMI 27.42 kg/m        Vital sign ranges:    Temp:  [97.6  F (36.4  C)-98.3  F (36.8  C)] 97.6  F (36.4  C)  Pulse:  [] 91  Resp:  [16] 16  BP: ()/(60-70) 119/70  SpO2:  [94 %-100 %] 94 %  Patient Vitals for the past 24 hrs:   BP Temp Temp src Pulse Resp SpO2   04/07/23 1100 -- -- -- -- -- 94 %   04/07/23 1009 119/70 97.6  F (36.4  C) Oral 91 16 99 %   04/07/23 0149 102/65 98.2  F (36.8  C) Oral 91 16 100 % "   04/06/23 2144 102/63 98.3  F (36.8  C) Oral 97 16 99 %   04/06/23 2030 -- -- -- -- -- 99 %   04/06/23 2015 -- -- -- -- -- 99 %   04/06/23 2000 -- -- -- -- -- 99 %   04/06/23 1945 -- -- -- -- -- 100 %   04/06/23 1930 -- -- -- -- -- 98 %   04/06/23 1923 -- -- -- -- -- 98 %   04/06/23 1922 113/66 98  F (36.7  C) Oral 91 16 99 %   04/06/23 1915 113/66 -- -- -- -- 95 %   04/06/23 1900 -- -- -- -- -- 97 %   04/06/23 1845 -- -- -- -- -- 95 %   04/06/23 1830 -- -- -- -- -- 97 %   04/06/23 1815 -- -- -- -- -- 97 %   04/06/23 1800 -- -- -- -- -- 100 %   04/06/23 1730 -- -- -- -- -- 100 %   04/06/23 1700 -- -- -- -- -- 97 %   04/06/23 1630 -- -- -- -- -- 94 %   04/06/23 1600 105/65 -- -- 90 -- 98 %   04/06/23 1500 105/64 -- -- 88 -- 99 %   04/06/23 1400 106/62 -- -- 93 -- 97 %   04/06/23 1325 96/60 98.3  F (36.8  C) Oral 100 16 97 %   04/06/23 1300 100/60 -- -- 91 -- 98 %   04/06/23 1200 108/63 -- -- 98 -- 95 %     General Appearance: in no apparent distress.   Skin: normal, warm  Heart: perfused  Lungs: Nonlabored resps, on 1L  Abdomen: The abdomen is soft, nontender to light palpation. Midline incision stapled. Point tenderness RLQ. Drain serosanguinous, non bilious.  : livingston is present- will remove. Urine has no gross hematuria.   Extremities: edema: absent.   Neurologic: awake, alert and oriented. Tremor absent.     Data:   CMP  Recent Labs   Lab 04/07/23  0755 04/07/23  0554 04/06/23  0623   NA  --  143 144   POTASSIUM  --  3.5 3.7   CHLORIDE  --  110* 112*   CO2  --  25 26   * 105* 118*   BUN  --  13.0 18.9   CR  --  1.10* 1.36*   GFRESTIMATED  --  55* 43*   NASRIN  --  8.4* 8.3*   MAG  --  1.6* 1.7   PHOS  --  2.6 2.3*   ALBUMIN  --  2.1* 2.1*   BILITOTAL  --  0.6 0.6   ALKPHOS  --  109* 92   AST  --  34 46*   ALT  --  56* 73*     CBC  Recent Labs   Lab 04/07/23  0554 04/06/23  2101 04/06/23  0744 04/06/23  0623   HGB 7.9* 8.3*   < > 6.8*   WBC 8.5  --   --  12.0*   *  --   --  133*    < > = values in  this interval not displayed.     NATHALIEGS  Recent Labs   Lab 04/04/23  1323 04/03/23  1518   INR 1.33* 1.07   PTT 29  --       Urinalysis  No lab results found.  Cultures:

## 2023-04-07 NOTE — PLAN OF CARE
"/63 (BP Location: Left arm)   Pulse 97   Temp 98.3  F (36.8  C) (Oral)   Resp 16   Ht 1.575 m (5' 2\")   Wt 68 kg (149 lb 14.6 oz)   SpO2 99%   BMI 27.42 kg/m      Shift: 0219-8127  VS: Stable on Oxygen 2L via NC, afebrile.  Neuro: AOx4  BG: N/A  Labs: Hgb 8.7 after receiving 1 unit PRBC on day shift.  Respiratory: On O2 via NC.  Pain/Nausea/PRN: PCA with dilaudid for pain, also getting scheduled tylenol and robaxin. Pain reported nausea when getting Unasyn at 1600, gave PRN zofran. When getting Unasyn at 2200, pre medicated with PRN zofran.  Diet: Clear liquids.  LDA: L - PIV infusing MIVF D5LR at 100/hr. R - PIV infusing TKO NS with PCA, and Unasyn. R MELITA with 60 mL out this shift.   GI/: Upton catheter with 400 mL output this shift. No BM.  Skin: Abdominal incision CHARLES, covered by abdominal binder.   Mobility: Up with assist of 1.  Plan: Continue plan of care.     Handoff given to following RN.    "

## 2023-04-07 NOTE — PROGRESS NOTES
Care Management Follow Up    Length of Stay (days): 3    Expected Discharge Date: 04/10/2023     Concerns to be Addressed: discharge planning     Patient plan of care discussed at interdisciplinary rounds: Yes    Anticipated Discharge Disposition: Home with home care     Anticipated Discharge Services:  Home care  Anticipated Discharge DME: None    Patient/family educated on Medicare website which has current facility and service quality ratings: no  Education Provided on the Discharge Plan:  yes  Patient/Family in Agreement with the Plan: yes    Referrals Placed by CM/SW: External Care Coordination    Additional Information:  Per team, pt likely discharge on Monday. Current PT recs for home with assist vs. TCU. Pt stated preference for [facility] in [place] for possible home care. Referral to [facility] made by RNCC.     @4:17pm- Pt accepted by [facility] with SOC for 4/13    Contacts  [facility]  TYRESE Cabrera Office  [address] [place] [address]  [address]  [place] [address]    Tel: [phone] or [phone]  Fax: [phone]  Email: [email or web address]      Erin Baez RNCC  Covering for 7A  Phone [phone]    SEARCHABLE in [facility] - search CARE COORDINATOR    [place] [facility] [place] ([phone]) Saturday & Sunday; ([phone]) [facility] Recognized Holidays    Units: 4A, 4C, 4E, 5A & 5B   Pager: [phone]    Units: 6A & 6B    Pager: [phone]    Units: 6C & 6D   Pager: [phone]    Units: 7A, 7B, 7C, 7D & 5C    Pager: [phone]    Units: [facility] ED, 5 Ortho, 5 Med/Surg, 6 Med/Surg, 8A & 10 ICU

## 2023-04-07 NOTE — PROGRESS NOTES
"/70 (BP Location: Left arm)   Pulse 91   Temp 97.6  F (36.4  C) (Oral)   Resp 16   Ht 1.575 m (5' 2\")   Wt 68 kg (149 lb 14.6 oz)   SpO2 95%   BMI 27.42 kg/m      Shift: 4314-9905  Isolation Status:NA  VSS on 1 L O2, afebrile  Neuro: Aox4  Behaviors: pleasant & cooperative  BG: NA  Labs: Mag 1.6, with replacement  Respiratory: 1 L O2 using IS  Cardiac: Telemetry discontinued  Pain/Nausea: to abdominal site   PRN: PCA diauldid  Diet: Regular  IV Access: PIC/PICC  Infusion(s): LR discontinued  Lines/Drains: Livingston discontinued  GI/: Supp  Given, livingston pulled no results at this time 1830  Skin: Abdominal incision, no new concerns  Mobility: Assist of 1/PT  Plan: Continue POC    "

## 2023-04-07 NOTE — DISCHARGE SUMMARY
[facility]    Discharge Summary  Transplant Surgery    Date of Admission:  4/4/2023  Date of Discharge:  4/9/2023  Discharging Provider: Desmond Ruano MD / Rupa Rivas NP     Discharge Diagnoses   Principal Problem:    Injury of bile duct, sequela  Active Problems:    Bile leak, postoperative    Biliary drain displacement, initial encounter    Intra-abdominal abscess (H)    Acute post-operative pain    Anemia due to blood loss, acute    MITZY (acute kidney injury) (H)      History of Present Illness   Zeinab Bermudez is an 67 year old female with history of laparoscopic cholecystectomy on 12/11/22 at OSH complicated by bile duct injury s/p ex lap, washout, and drain placement 12/28/22. Admitted 1/1-1/11/23 for management of bile duct injury and bile leak. Also admitted 3/19-3/24 for obstructed drain, elevated LFTs, and low grade fever. Now admitted s/p hepaticojejunostomy anastamoses just below the bile duct bifurcation with antecolic Ger en Y reconstruction on 4/4/23.  A stent was placed into the R and L hepatic ducts.  The RHD stent passed early in hospital course.  Left is migrating at time of discharge.      Hospital Course   S/p Ger en Y hepaticojejunostomy 4/4/23 for bilary injury and leak: LFTs nearly normal. Surgical drain non-bilious, removed prior to discharge. Had one episode of low hemoglobin post-op, transfused with one unit.  Unknown reason.    Neuro:  Acute post op pain: Received TAP block. Will discharge on acetaminophen, Robaxin, and oral hydromorphone (#4).     Hematology:   Anemia secondary to chronic infection/inflammation, acute blood loss anemia: Minor post-operative bleeding requiring transfusion. Last transfused 4/6/23. Hgb 8.7 on day of discharge.     Fluid/Electrolytes:   Acute kidney injury: Cr peak 1.4, down to 0.9 by discharge.  Hypokalemia: Potassium replaced prior to discharge.     Infectious disease:   Intra-abdominal abscess secondary  to bile duct injury without intrahepatic control: Intra-op cultures grew enterococcus faecalis.  Zosyn 4/4-4/6  Unasyn 4/6-4/9  Vanco 4/4-4/6    Significant Results and Procedures   4/4/23 Procedure:        HEPATICOJEJUNOSTOMY through creation amy-[id number]  Surgeon:         Surgeon(s) and Role:     * Desmond Ruano MD - Primary     * Jose Tang MD - Fellow - Assisting    Code Status   Full    Primary Care Physician   Jairo Ocampo    Physical Exam   Temp: 97.5  F (36.4  C) Temp src: Oral BP: (!) 156/87 Pulse: 83   Resp: 18 SpO2: 94 % O2 Device: None (Room air)    Vitals:    04/04/23 0606 04/08/23 0830 04/09/23 0801   Weight: 68 kg (149 lb 14.6 oz) 81.2 kg (179 lb) 79.2 kg (174 lb 8 oz)     Vital Signs with Ranges  Temp:  [97.5  F (36.4  C)-98.4  F (36.9  C)] 97.5  F (36.4  C)  Pulse:  [83-90] 83  Resp:  [18-20] 18  BP: (120-156)/(64-87) 156/87  SpO2:  [90 %-94 %] 94 %  I/O last 3 completed shifts:  In: 851 [P.O.:850; I.V.:1]  Out: 1920 [Urine:1700; Drains:220]    General Appearance: in no apparent distress.   Skin: normal, warm  Heart: perfused  Lungs: Nonlabored resps  Abdomen: The abdomen is soft, nontender to light palpation. Midline incision C/D/I. MELITA removed.  : no livingston  Extremities: edema: absent.   Neurologic: awake, alert and oriented x4. Tremor absent.     Time Spent on this Encounter   I, TOMY Lino CNP, personally saw the patient today and spent greater than 30 minutes discharging this patient.    Discharge Disposition   Discharged to home  Condition at discharge: Stable    Consultations This Hospital Stay   PHARMACY TO DOSE VANCO  OCCUPATIONAL THERAPY ADULT IP CONSULT  NURSING TO CONSULT FOR VASCULAR ACCESS CARE IP CONSULT  CARE MANAGEMENT / SOCIAL WORK IP CONSULT  PHYSICAL THERAPY ADULT IP CONSULT    Discharge Orders       Reason for your hospital stay    Hepatocojejunostomy     Activity    Your activity upon discharge: Walk at least four times a day, lift no greater than 10  pounds for 8 weeks from the time of surgery.  No driving while taking narcotics or 3 weeks after surgery.     [facility]ult [facility] Follow-up and recommended labs and tests    1. Dr. Ruano, Transplant Clinic, 2 weeks     When to contact your care team    WHEN TO CONTACT:     Notify team if you have pain over your liver, increased redness or drainage from your incision, fever greater than 100F, or yellowing of skin or eyes.     To contact the Transplant Team, please call the hospital switchboard at [phone] and ask to have the Liver Transplant Surgery fellow paged. If you have a life-threatening emergency, go to the nearest emergency room.     Wound care and dressings    Instructions to care for your wound at home: keep wound clean and dry and may get incision wet in shower but do not soak or scrub.     Diet    Follow this diet upon discharge: Regular     Discharge Medications    Current Discharge Medication List        START taking these medications    Details   amoxicillin-clavulanate (AUGMENTIN) 875-125 MG tablet Take 1 tablet by mouth 2 times daily for 3 days  Qty: 6 tablet, Refills: 0    Associated Diagnoses: Intra-abdominal abscess (H)      HYDROmorphone (DILAUDID) 2 MG tablet Take 0.5-1 tablets (1-2 mg) by mouth every 3 hours as needed for moderate to severe pain  Qty: 4 tablet, Refills: 0    Associated Diagnoses: Acute post-operative pain      methocarbamol (ROBAXIN) 500 MG tablet Take 1 tablet (500 mg) by mouth 4 times daily as needed for muscle spasms  Qty: 20 tablet, Refills: 0    Associated Diagnoses: Acute post-operative pain      polyethylene glycol (MIRALAX) 17 GM/Dose powder Take 17 g (1 capful.) by mouth daily as needed for constipation  Qty: 527 g, Refills: 0    Associated Diagnoses: Acute post-operative pain      senna-docusate (SENOKOT-S/PERICOLACE) 8.6-50 MG tablet Take 1 tablet by mouth 2 times daily  Qty: 30 tablet, Refills: 0    Associated Diagnoses: Acute post-operative pain           CONTINUE  "these medications which have CHANGED    Details   acetaminophen (TYLENOL) 325 MG tablet Take 2 tablets (650 mg) by mouth every 4 hours as needed for other or pain  Qty: 100 tablet, Refills: 0    Associated Diagnoses: Acute post-operative pain           CONTINUE these medications which have NOT CHANGED    Details   calcium carbonate (OS-NASRIN) 500 MG tablet Take 1 tablet by mouth daily      diphenhydrAMINE (BENADRYL) 25 MG tablet Take 25 mg by mouth every 6 hours as needed for itching      LORazepam (ATIVAN) 0.5 MG tablet Take 0.5 mg by mouth daily as needed for anxiety      melatonin 5 MG sublingual tablet Place 1 tablet (5 mg) under the tongue At Bedtime  Qty: 30 tablet, Refills: 0    Associated Diagnoses: Injury of bile duct, initial encounter      Multiple Vitamins-Minerals (WOMENS MULTI PO) Take 2 tablets by mouth daily      ondansetron (ZOFRAN ODT) 4 MG ODT tab Take 1 tablet (4 mg) by mouth every 6 hours as needed for nausea or vomiting  Qty: 15 tablet, Refills: 0    Associated Diagnoses: Nausea      pravastatin (PRAVACHOL) 40 MG tablet Take 40 mg by mouth daily      Sennosides (SENNA) 8.6 MG CAPS Take 8.6 mg by mouth daily as needed      ursodiol (ACTIGALL) 300 MG capsule Take 1 capsule (300 mg) by mouth 2 times daily  Qty: 60 capsule, Refills: 1    Associated Diagnoses: Injury of bile duct, initial encounter      venlafaxine (EFFEXOR XR) 37.5 MG 24 hr capsule Take 112.5 mg by mouth daily      vitamin D3 (CHOLECALCIFEROL) 50 mcg (2000 units) tablet Take 1 tablet by mouth daily      !! Gauze Pads & Dressings (DERMACEA DRAIN SPONGES) 4\"X4\" PADS 1 pad. 2 times daily  Qty: 60 each, Refills: 1    Associated Diagnoses: Injury of bile duct, sequela      !! Gauze Pads & Dressings (EXCILON AMD DRAIN SPONGES) 4\"X4\" PADS 1 pad. 2 times daily  Qty: 60 each, Refills: 1    Associated Diagnoses: Injury of bile duct, sequela      !! Gauze Pads & Dressings (GAUZE PADS 4\"X4\") 4\"X4\" PADS 1 pad. 2 times daily  Qty: 60 each, " Refills: 1    Associated Diagnoses: Injury of bile duct, sequela       !! - Potential duplicate medications found. Please discuss with provider.        STOP taking these medications       sodium chloride, PF, 0.9% PF flush Comments:   Reason for Stopping:             Allergies   No Known Allergies  Data   Most Recent 3 CBC's:  Recent Labs   Lab Test 04/09/23  0652 04/08/23 0454 04/07/23  1934 04/07/23  1405 04/07/23  0554   WBC 5.8 7.2  --   --  8.5   HGB 8.7* 8.0* 7.7*   < > 7.9*   MCV 89 92  --   --  91    154  --   --  129*    < > = values in this interval not displayed.     Most Recent 3 BMP's:  Recent Labs   Lab Test 04/09/23 0652 04/08/23 0454 04/07/23  0755 04/07/23  0554    140  --  143   POTASSIUM 2.8* 3.1*  --  3.5   CHLORIDE 104 105  --  110*   CO2 26 23  --  25   BUN 7.8* 11.0  --  13.0   CR 0.88 0.96*  --  1.10*   ANIONGAP 11 12  --  8   NASRIN 8.6* 8.6*  --  8.4*   * 90 102* 105*     Most Recent 2 LFT's:  Recent Labs   Lab Test 04/09/23 0652 04/08/23  0454   AST 23 27   ALT 33 46*   ALKPHOS 166* 146*   BILITOTAL 0.6 0.6

## 2023-04-07 NOTE — PROGRESS NOTES
04/07/23 1310   Appointment Info   Signing Clinician's Name / Credentials (PT) Ellie Arguelles, PT, DPT   Rehab Comments (PT) abd precautions   Living Environment   People in Home spouse   Current Living Arrangements house   Home Accessibility stairs to enter home;stairs within home   Number of Stairs, Main Entrance 5   Stair Railings, Main Entrance railings on both sides of stairs   Number of Stairs, Within Home, Primary other (see comments);greater than 10 stairs   Stair Railings, Within Home, Primary railings on both sides of stairs   Transportation Anticipated family or friend will provide   Living Environment Comments Pt lives with spouse in a 3 level house with 5 ÁNGEL and bilat rail use. Pt reports room and bathroom are on main level and she doesnt have to go up or down once inside the home.   Self-Care   Usual Activity Tolerance good   Current Activity Tolerance moderate   Regular Exercise No   Equipment Currently Used at Home walker, standard;raised toilet seat   Fall history within last six months no   General Information   Onset of Illness/Injury or Date of Surgery 04/04/23   Referring Physician Desmond Ruano MD   Patient/Family Therapy Goals Statement (PT) to get stronger and stay active to go home   Pertinent History of Current Problem (include personal factors and/or comorbidities that impact the POC) per EMR:67 year old female with a history of laparoscopic cholecystectomy on 12/11/22 at OSH complicated by bile duct injury s/p ex lap, washout, and drain placement 12/28/22. Admitted (1/1-1/11/23) to transplant liver surgery team for management of bile duct injury and intra abdominal fluid collection. Patient had drain placement x 2 and unsuccessful PTC placement. Also admitted 3/19-3/24 for obstructed drain, elevated LFTs, and low grade fever. Now admitted after Hepaticojejunostomy with antecolic Ger en Y reconstruction on 4/4 with Dr. Ruano   Existing Precautions/Restrictions abdominal    Cognition   Affect/Mental Status (Cognition) WNL   Orientation Status (Cognition) oriented x 4   Follows Commands (Cognition) WNL   Pain Assessment   Patient Currently in Pain Yes, see Vital Sign flowsheet   Integumentary/Edema   Integumentary/Edema no deficits were identifed   Posture    Posture Forward head position   Range of Motion (ROM)   Range of Motion ROM is WFL   Strength (Manual Muscle Testing)   Strength (Manual Muscle Testing) strength is WFL   Bed Mobility   Bed Mobility supine-sit   Supine-Sit Glades (Bed Mobility) verbal cues;minimum assist (75% patient effort)   Bed Mobility Limitations decreased ability to use legs for bridging/pushing;impaired ability to control trunk for mobility   Impairments Contributing to Impaired Bed Mobility pain;decreased strength   Transfers   Transfers sit-stand transfer   Sit-Stand Transfer   Sit-Stand Glades (Transfers) verbal cues;contact guard   Assistive Device (Sit-Stand Transfers) walker, standard   Comment, (Sit-Stand Transfer) CGA to FWW with sit to stand   Gait/Stairs (Locomotion)   Glades Level (Gait) contact guard;verbal cues;1 person to manage equipment   Assistive Device (Gait) walker, standard   Balance   Balance no deficits were identified   Sensory Examination   Sensory Perception patient reports no sensory changes   Coordination   Coordination no deficits were identified   Muscle Tone   Muscle Tone no deficits were identified   Clinical Impression   Criteria for Skilled Therapeutic Intervention Yes, treatment indicated   PT Diagnosis (PT) impaired functional mobility   Influenced by the following impairments weakness, pain, activity tolerance deficits   Functional limitations due to impairments bed mobility, transfers, gait, stairs   Clinical Presentation (PT Evaluation Complexity) Stable/Uncomplicated   Clinical Presentation Rationale clinician judgement   Clinical Decision Making (Complexity) low complexity   Planned Therapy  Interventions (PT) bed mobility training;gait training;home exercise program;neuromuscular re-education;patient/family education;stair training;strengthening;transfer training;progressive activity/exercise;home program guidelines;risk factor education   Anticipated Equipment Needs at Discharge (PT) other (see comments)  (TBD)   Risk & Benefits of therapy have been explained evaluation/treatment results reviewed;care plan/treatment goals reviewed;risks/benefits reviewed;current/potential barriers reviewed;participants voiced agreement with care plan;participants included;patient   PT Total Evaluation Time   PT Eval, Low Complexity Minutes (84973) 8   Plan of Care Review   Plan of Care Reviewed With patient   Physical Therapy Goals   PT Frequency 5x/week   PT Predicted Duration/Target Date for Goal Attainment 05/01/23   PT Goals Gait;Stairs   PT: Gait Modified independent;Standard walker;Greater than 200 feet   PT: Stairs Supervision/stand-by assist;6 stairs   Interventions   Interventions Quick Adds Gait Training;Therapeutic Activity;Therapeutic Procedure   PT Discharge Planning   PT Plan bed mobility training, progress gait with chair follow, LE strenghtening   PT Discharge Recommendation (DC Rec) home with assist;home with home care physical therapy;Transitional Care Facility   PT Rationale for DC Rec Pt is currently below baseline in terms of functional mobility. anticipate she may progress to home with assist pending LOS and clearance on stair navigation. Pt may need short TCU stay. PT will continue to follow to update recs.   PT Brief overview of current status CGA with FWW   Total Session Time   Total Session Time (sum of timed and untimed services) 8

## 2023-04-08 ENCOUNTER — APPOINTMENT (OUTPATIENT)
Dept: OCCUPATIONAL THERAPY | Facility: CLINIC | Age: 68
DRG: 907 | End: 2023-04-08
Attending: TRANSPLANT SURGERY
Payer: COMMERCIAL

## 2023-04-08 LAB
ALBUMIN SERPL BCG-MCNC: 2.4 G/DL (ref 3.5–5.2)
ALP SERPL-CCNC: 146 U/L (ref 35–104)
ALT SERPL W P-5'-P-CCNC: 46 U/L (ref 10–35)
ANION GAP SERPL CALCULATED.3IONS-SCNC: 12 MMOL/L (ref 7–15)
AST SERPL W P-5'-P-CCNC: 27 U/L (ref 10–35)
BILIRUB DIRECT SERPL-MCNC: 0.25 MG/DL (ref 0–0.3)
BILIRUB SERPL-MCNC: 0.6 MG/DL
BUN SERPL-MCNC: 11 MG/DL (ref 8–23)
CALCIUM SERPL-MCNC: 8.6 MG/DL (ref 8.8–10.2)
CHLORIDE SERPL-SCNC: 105 MMOL/L (ref 98–107)
CREAT SERPL-MCNC: 0.96 MG/DL (ref 0.51–0.95)
DEPRECATED HCO3 PLAS-SCNC: 23 MMOL/L (ref 22–29)
ERYTHROCYTE [DISTWIDTH] IN BLOOD BY AUTOMATED COUNT: 14.8 % (ref 10–15)
GFR SERPL CREATININE-BSD FRML MDRD: 65 ML/MIN/1.73M2
GLUCOSE SERPL-MCNC: 90 MG/DL (ref 70–99)
HCT VFR BLD AUTO: 24.7 % (ref 35–47)
HGB BLD-MCNC: 8 G/DL (ref 11.7–15.7)
MAGNESIUM SERPL-MCNC: 1.7 MG/DL (ref 1.7–2.3)
MCH RBC QN AUTO: 29.6 PG (ref 26.5–33)
MCHC RBC AUTO-ENTMCNC: 32.4 G/DL (ref 31.5–36.5)
MCV RBC AUTO: 92 FL (ref 78–100)
PHOSPHATE SERPL-MCNC: 3.5 MG/DL (ref 2.5–4.5)
PLATELET # BLD AUTO: 154 10E3/UL (ref 150–450)
POTASSIUM SERPL-SCNC: 3.1 MMOL/L (ref 3.4–5.3)
PROT SERPL-MCNC: 4.8 G/DL (ref 6.4–8.3)
RBC # BLD AUTO: 2.7 10E6/UL (ref 3.8–5.2)
SODIUM SERPL-SCNC: 140 MMOL/L (ref 136–145)
WBC # BLD AUTO: 7.2 10E3/UL (ref 4–11)

## 2023-04-08 PROCEDURE — 97530 THERAPEUTIC ACTIVITIES: CPT | Mod: GO | Performed by: OCCUPATIONAL THERAPIST

## 2023-04-08 PROCEDURE — 250N000013 HC RX MED GY IP 250 OP 250 PS 637: Performed by: NURSE PRACTITIONER

## 2023-04-08 PROCEDURE — 80053 COMPREHEN METABOLIC PANEL: CPT | Performed by: SURGERY

## 2023-04-08 PROCEDURE — 84100 ASSAY OF PHOSPHORUS: CPT | Performed by: SURGERY

## 2023-04-08 PROCEDURE — 83735 ASSAY OF MAGNESIUM: CPT | Performed by: SURGERY

## 2023-04-08 PROCEDURE — 120N000011 HC R&B TRANSPLANT UMMC

## 2023-04-08 PROCEDURE — 82248 BILIRUBIN DIRECT: CPT | Performed by: SURGERY

## 2023-04-08 PROCEDURE — 250N000013 HC RX MED GY IP 250 OP 250 PS 637: Performed by: SURGERY

## 2023-04-08 PROCEDURE — 36415 COLL VENOUS BLD VENIPUNCTURE: CPT | Performed by: SURGERY

## 2023-04-08 PROCEDURE — 97535 SELF CARE MNGMENT TRAINING: CPT | Mod: GO | Performed by: OCCUPATIONAL THERAPIST

## 2023-04-08 PROCEDURE — 97110 THERAPEUTIC EXERCISES: CPT | Mod: GO | Performed by: OCCUPATIONAL THERAPIST

## 2023-04-08 PROCEDURE — 85027 COMPLETE CBC AUTOMATED: CPT | Performed by: SURGERY

## 2023-04-08 PROCEDURE — 250N000013 HC RX MED GY IP 250 OP 250 PS 637: Performed by: PHYSICIAN ASSISTANT

## 2023-04-08 PROCEDURE — 250N000011 HC RX IP 250 OP 636: Performed by: PHYSICIAN ASSISTANT

## 2023-04-08 RX ORDER — HYDROMORPHONE HCL IN WATER/PF 6 MG/30 ML
0.2 PATIENT CONTROLLED ANALGESIA SYRINGE INTRAVENOUS
Status: DISCONTINUED | OUTPATIENT
Start: 2023-04-08 | End: 2023-04-09

## 2023-04-08 RX ORDER — HYDROMORPHONE HYDROCHLORIDE 2 MG/1
2 TABLET ORAL
Status: DISCONTINUED | OUTPATIENT
Start: 2023-04-08 | End: 2023-04-09 | Stop reason: HOSPADM

## 2023-04-08 RX ORDER — HYDROMORPHONE HYDROCHLORIDE 2 MG/1
4 TABLET ORAL
Status: DISCONTINUED | OUTPATIENT
Start: 2023-04-08 | End: 2023-04-09 | Stop reason: HOSPADM

## 2023-04-08 RX ORDER — ACETAMINOPHEN 325 MG/1
975 TABLET ORAL EVERY 8 HOURS
Status: DISCONTINUED | OUTPATIENT
Start: 2023-04-08 | End: 2023-04-09 | Stop reason: HOSPADM

## 2023-04-08 RX ORDER — POTASSIUM CHLORIDE 750 MG/1
20 TABLET, EXTENDED RELEASE ORAL ONCE
Status: COMPLETED | OUTPATIENT
Start: 2023-04-08 | End: 2023-04-08

## 2023-04-08 RX ADMIN — METHOCARBAMOL 500 MG: 500 TABLET ORAL at 15:36

## 2023-04-08 RX ADMIN — METHOCARBAMOL 500 MG: 500 TABLET ORAL at 11:37

## 2023-04-08 RX ADMIN — METHOCARBAMOL 500 MG: 500 TABLET ORAL at 07:56

## 2023-04-08 RX ADMIN — AMPICILLIN SODIUM AND SULBACTAM SODIUM 3 G: 2; 1 INJECTION, POWDER, FOR SOLUTION INTRAMUSCULAR; INTRAVENOUS at 11:23

## 2023-04-08 RX ADMIN — VENLAFAXINE HYDROCHLORIDE 112.5 MG: 75 CAPSULE, EXTENDED RELEASE ORAL at 07:56

## 2023-04-08 RX ADMIN — AMPICILLIN SODIUM AND SULBACTAM SODIUM 3 G: 2; 1 INJECTION, POWDER, FOR SOLUTION INTRAMUSCULAR; INTRAVENOUS at 17:09

## 2023-04-08 RX ADMIN — SENNOSIDES AND DOCUSATE SODIUM 1 TABLET: 8.6; 5 TABLET ORAL at 07:57

## 2023-04-08 RX ADMIN — LIDOCAINE PATCH 4% 1 PATCH: 40 PATCH TOPICAL at 07:56

## 2023-04-08 RX ADMIN — ACETAMINOPHEN 975 MG: 325 TABLET ORAL at 17:05

## 2023-04-08 RX ADMIN — AMPICILLIN SODIUM AND SULBACTAM SODIUM 3 G: 2; 1 INJECTION, POWDER, FOR SOLUTION INTRAMUSCULAR; INTRAVENOUS at 06:18

## 2023-04-08 RX ADMIN — ACETAMINOPHEN 975 MG: 325 TABLET ORAL at 09:21

## 2023-04-08 RX ADMIN — SENNOSIDES AND DOCUSATE SODIUM 1 TABLET: 8.6; 5 TABLET ORAL at 19:33

## 2023-04-08 RX ADMIN — PRAVASTATIN SODIUM 40 MG: 20 TABLET ORAL at 19:33

## 2023-04-08 RX ADMIN — AMPICILLIN SODIUM AND SULBACTAM SODIUM 3 G: 2; 1 INJECTION, POWDER, FOR SOLUTION INTRAMUSCULAR; INTRAVENOUS at 23:12

## 2023-04-08 RX ADMIN — METHOCARBAMOL 500 MG: 500 TABLET ORAL at 19:33

## 2023-04-08 RX ADMIN — POTASSIUM CHLORIDE 20 MEQ: 750 TABLET, EXTENDED RELEASE ORAL at 09:21

## 2023-04-08 ASSESSMENT — ACTIVITIES OF DAILY LIVING (ADL)
ADLS_ACUITY_SCORE: 28
ADLS_ACUITY_SCORE: 24
ADLS_ACUITY_SCORE: 24
ADLS_ACUITY_SCORE: 28
ADLS_ACUITY_SCORE: 24
ADLS_ACUITY_SCORE: 28
ADLS_ACUITY_SCORE: 28
ADLS_ACUITY_SCORE: 24

## 2023-04-08 NOTE — PROGRESS NOTES
"/78 (BP Location: Left arm, Patient Position: Semi-Chaudhry's, Cuff Size: Adult Regular)   Pulse 81   Temp 98  F (36.7  C) (Oral)   Resp 20   Ht 1.575 m (5' 2\")   Wt 81.2 kg (179 lb)   SpO2 96%   BMI 32.74 kg/m      Shift: 4467-5881  VSS on 1 L O2, afebrile  Neuro: Aox4  Behaviors: pleasant & cooperative  BG: NA  Labs: K+ 3.1- po replacement  Respiratory: 1L O2 to maintain sats in 90s  Cardiac: WDL  Pain/Nausea: abdomin pain, managing with Tylenol and scheduled Robaxin  PRN: Dilaudid PCA discontniued, po/iv push available  Diet: regular  IV Access: PIV/PICC  Lines/Drains: MELITA/ serosang drainage  GI/: voiding adequately/ BM 4/7/2023  Skin: Stapled incision, CHARLES/CDI no new concerns  Mobility: Ax1 with walker  Plan: Discharge tomorrow.    "

## 2023-04-08 NOTE — PROGRESS NOTES
Transplant Surgery  Inpatient Daily Progress Note  04/08/2023    Assessment & Plan: 67 year old female with a history of laparoscopic cholecystectomy on 12/11/22 at OSH complicated by bile duct injury s/p ex lap, washout, and drain placement 12/28/22. Admitted (1/1-1/11/23) to transplant liver surgery team for management of bile duct injury and intra abdominal fluid collection. Patient had drain placement x 2 and unsuccessful PTC placement. Also admitted 3/19-3/24 for obstructed drain, elevated LFTs, and low grade fever. Now admitted after Hepaticojejunostomy with antecolic Amy en Y reconstruction on 4/4 with Dr. Ruano.     GI/Nutrition: POD #4  S/p Lap sri complicated by bile duct injury, bile leak, intra-abdominal Infection now s/p hepaticojejunostomy with antecolic Amy en Y reconstruction on 4/4: LFTs improving overall since admission. Surgical drain appears non-bilious. NG tube removed, advancing diet.    Acute post op pain: Received TAP block. Lidocaine patches, robaxin. Stop PCA and switch to PO dilaudid.     Hematology:   Anemia secondary to chronic infection/inflammation, acute blood loss anemia: Received 1 unit pRBCs intra-op. Transfused 1 unit pRBC again 4/6. Concern for possible bleeding at amy limb, will continue to monitor clinically. Hgb 8 today, stable.    Cardiorespiratory:   HLD: PTA pravastatin     Endocrine: No issues.      Fluid/Electrolytes:   Acute kidney injury: Improvement today, Cr 1 (peak 1.36), will continue to monitor.     : No issues     Infectious disease: Afebrile  Intra-abdominal culture + enterococcus faecalis: On Unasyn. Can use augmentin for discharge if needed based on duration.     Prophylaxis: DVT, encourage ambulation, low risk    Disposition: 7A    JOANN/Fellow/Resident Provider: Rupa Rivas NP     Faculty: Desmond Ruano M.D.    __________________________________________________________________    Interval History: History is obtained from the patient, spouse, and  "EMR  Overnight events: Pain controlled. Tolerating diet. BM x1    ROS:   A 10-point review of systems was negative except as noted above.    Meds:   acetaminophen  975 mg Oral Q8H    ampicillin-sulbactam  3 g Intravenous Q6H    lidocaine  1 patch Transdermal Q24H    lidocaine   Transdermal Q8H PRINCE    methocarbamol  500 mg Oral 4x Daily    polyethylene glycol  17 g Oral Daily    pravastatin  40 mg Oral QPM    senna-docusate  1 tablet Oral BID    sodium chloride (PF)  3 mL Intravenous Q8H    venlafaxine  112.5 mg Oral Daily       Physical Exam:     Admit Weight: 70.9 kg (156 lb 4.9 oz)    Current vitals:   /78 (BP Location: Left arm, Patient Position: Semi-Chaudhry's, Cuff Size: Adult Regular)   Pulse 81   Temp 98  F (36.7  C) (Oral)   Resp 20   Ht 1.575 m (5' 2\")   Wt 81.2 kg (179 lb)   SpO2 96%   BMI 32.74 kg/m        Vital sign ranges:    Temp:  [98  F (36.7  C)-98.1  F (36.7  C)] 98  F (36.7  C)  Pulse:  [81-95] 81  Resp:  [20] 20  BP: (130-137)/(77-78) 137/78  SpO2:  [96 %] 96 %  Patient Vitals for the past 24 hrs:   BP Temp Temp src Pulse Resp SpO2 Weight   04/08/23 0830 -- -- -- -- -- -- 81.2 kg (179 lb)   04/08/23 0624 137/78 98  F (36.7  C) Oral 81 20 96 % --   04/07/23 2227 130/77 98.1  F (36.7  C) Oral 95 20 96 % --     General Appearance: in no apparent distress.   Skin: normal, warm  Heart: perfused  Lungs: Nonlabored resps  Abdomen: The abdomen is soft, nontender to light palpation. Midline incision stapled. Point tenderness RLQ. Drain serosanguinous, non bilious.  : no livingston  Extremities: edema: absent.   Neurologic: awake, alert and oriented. Tremor absent.     Data:   CMP  Recent Labs   Lab 04/08/23  0454 04/07/23  0755 04/07/23  0554     --  143   POTASSIUM 3.1*  --  3.5   CHLORIDE 105  --  110*   CO2 23  --  25   GLC 90 102* 105*   BUN 11.0  --  13.0   CR 0.96*  --  1.10*   GFRESTIMATED 65  --  55*   NASRIN 8.6*  --  8.4*   MAG 1.7  --  1.6*   PHOS 3.5  --  2.6   ALBUMIN 2.4*  --  " 2.1*   BILITOTAL 0.6  --  0.6   ALKPHOS 146*  --  109*   AST 27  --  34   ALT 46*  --  56*     CBC  Recent Labs   Lab 04/08/23  0454 04/07/23  1934 04/07/23  1405 04/07/23  0554   HGB 8.0* 7.7*   < > 7.9*   WBC 7.2  --   --  8.5     --   --  129*    < > = values in this interval not displayed.     COAGS  Recent Labs   Lab 04/04/23  1323 04/03/23  1518   INR 1.33* 1.07   PTT 29  --       Urinalysis  No lab results found.  Cultures:     Attestation:  Patient has been seen with team and evaluated by me.   Vital signs, labs, medications and orders were reviewed.   When obtained, diagnostic images were reviewed by me and interpreted as above.    The care plan was discussed with the multidisciplinary team and I agree with the findings and plan in this note, with any differences recorded in blue.    .

## 2023-04-08 NOTE — PLAN OF CARE
"VS: /78 (BP Location: Left arm, Patient Position: Semi-Chaudhry's, Cuff Size: Adult Regular)   Pulse 81   Temp 98  F (36.7  C) (Oral)   Resp 20   Ht 1.575 m (5' 2\")   Wt 68 kg (149 lb 14.6 oz)   SpO2 96%   BMI 27.42 kg/m      Cares: 1900 - 0730     Neuro: Aox4   Cardio: WDL   Respiratory: 1L O2 to keep sats >92%  GI/: voiding adequately overnight, 2 PVRs done (10mL, 0mL). Multiple BMs overnight (mucous like)  Skin: midline abdominal incision stapled CHARLES  Diet: Regular   Labs: waiting on morning lab results   BG: none   LDA: Left PIV - infusing TKO w/ dilaudid PCA, Right PIV SL, Right MELITA - 150 out  Mobility: Ax1  Pain/Nausea: mild abdominal pain, denies nausea    PRN medications: pain managed with dilaudid PCA, no PRNs given.  Plan of Care: Possibly discharge to home on Monday w/ home care. Continue with current POC and update MD with any changes.       "

## 2023-04-09 ENCOUNTER — APPOINTMENT (OUTPATIENT)
Dept: GENERAL RADIOLOGY | Facility: CLINIC | Age: 68
DRG: 907 | End: 2023-04-09
Attending: NURSE PRACTITIONER
Payer: COMMERCIAL

## 2023-04-09 ENCOUNTER — HEALTH MAINTENANCE LETTER (OUTPATIENT)
Age: 68
End: 2023-04-09

## 2023-04-09 VITALS
BODY MASS INDEX: 32.11 KG/M2 | HEIGHT: 62 IN | TEMPERATURE: 97.5 F | HEART RATE: 83 BPM | RESPIRATION RATE: 18 BRPM | SYSTOLIC BLOOD PRESSURE: 156 MMHG | OXYGEN SATURATION: 94 % | DIASTOLIC BLOOD PRESSURE: 87 MMHG | WEIGHT: 174.5 LBS

## 2023-04-09 LAB
ALBUMIN SERPL BCG-MCNC: 2.5 G/DL (ref 3.5–5.2)
ALP SERPL-CCNC: 166 U/L (ref 35–104)
ALT SERPL W P-5'-P-CCNC: 33 U/L (ref 10–35)
ANION GAP SERPL CALCULATED.3IONS-SCNC: 11 MMOL/L (ref 7–15)
AST SERPL W P-5'-P-CCNC: 23 U/L (ref 10–35)
BACTERIA ABSC ANAEROBE+AEROBE CULT: ABNORMAL
BILIRUB DIRECT SERPL-MCNC: 0.23 MG/DL (ref 0–0.3)
BILIRUB SERPL-MCNC: 0.6 MG/DL
BUN SERPL-MCNC: 7.8 MG/DL (ref 8–23)
CALCIUM SERPL-MCNC: 8.6 MG/DL (ref 8.8–10.2)
CHLORIDE SERPL-SCNC: 104 MMOL/L (ref 98–107)
CREAT SERPL-MCNC: 0.88 MG/DL (ref 0.51–0.95)
DEPRECATED HCO3 PLAS-SCNC: 26 MMOL/L (ref 22–29)
ERYTHROCYTE [DISTWIDTH] IN BLOOD BY AUTOMATED COUNT: 14.4 % (ref 10–15)
GFR SERPL CREATININE-BSD FRML MDRD: 73 ML/MIN/1.73M2
GLUCOSE SERPL-MCNC: 100 MG/DL (ref 70–99)
HCT VFR BLD AUTO: 26 % (ref 35–47)
HGB BLD-MCNC: 8.7 G/DL (ref 11.7–15.7)
MAGNESIUM SERPL-MCNC: 1.7 MG/DL (ref 1.7–2.3)
MCH RBC QN AUTO: 29.8 PG (ref 26.5–33)
MCHC RBC AUTO-ENTMCNC: 33.5 G/DL (ref 31.5–36.5)
MCV RBC AUTO: 89 FL (ref 78–100)
PHOSPHATE SERPL-MCNC: 2.8 MG/DL (ref 2.5–4.5)
PLATELET # BLD AUTO: 179 10E3/UL (ref 150–450)
POTASSIUM SERPL-SCNC: 2.8 MMOL/L (ref 3.4–5.3)
PROT SERPL-MCNC: 4.8 G/DL (ref 6.4–8.3)
RBC # BLD AUTO: 2.92 10E6/UL (ref 3.8–5.2)
SODIUM SERPL-SCNC: 141 MMOL/L (ref 136–145)
WBC # BLD AUTO: 5.8 10E3/UL (ref 4–11)

## 2023-04-09 PROCEDURE — 250N000013 HC RX MED GY IP 250 OP 250 PS 637: Performed by: SURGERY

## 2023-04-09 PROCEDURE — 250N000013 HC RX MED GY IP 250 OP 250 PS 637: Performed by: PHYSICIAN ASSISTANT

## 2023-04-09 PROCEDURE — 83735 ASSAY OF MAGNESIUM: CPT | Performed by: SURGERY

## 2023-04-09 PROCEDURE — 80053 COMPREHEN METABOLIC PANEL: CPT | Performed by: SURGERY

## 2023-04-09 PROCEDURE — 74018 RADEX ABDOMEN 1 VIEW: CPT | Mod: 26 | Performed by: RADIOLOGY

## 2023-04-09 PROCEDURE — 82248 BILIRUBIN DIRECT: CPT | Performed by: SURGERY

## 2023-04-09 PROCEDURE — 84100 ASSAY OF PHOSPHORUS: CPT | Performed by: SURGERY

## 2023-04-09 PROCEDURE — 36415 COLL VENOUS BLD VENIPUNCTURE: CPT | Performed by: SURGERY

## 2023-04-09 PROCEDURE — 250N000011 HC RX IP 250 OP 636: Performed by: PHYSICIAN ASSISTANT

## 2023-04-09 PROCEDURE — 85027 COMPLETE CBC AUTOMATED: CPT | Performed by: SURGERY

## 2023-04-09 PROCEDURE — 74018 RADEX ABDOMEN 1 VIEW: CPT

## 2023-04-09 PROCEDURE — 250N000013 HC RX MED GY IP 250 OP 250 PS 637: Performed by: NURSE PRACTITIONER

## 2023-04-09 RX ORDER — ACETAMINOPHEN 325 MG/1
650 TABLET ORAL EVERY 4 HOURS PRN
Qty: 100 TABLET | Refills: 0 | Status: SHIPPED | OUTPATIENT
Start: 2023-04-09

## 2023-04-09 RX ORDER — METHOCARBAMOL 500 MG/1
500 TABLET, FILM COATED ORAL 4 TIMES DAILY PRN
Qty: 12 TABLET | Refills: 0 | Status: SHIPPED | OUTPATIENT
Start: 2023-04-09 | End: 2023-04-09

## 2023-04-09 RX ORDER — HYDROMORPHONE HYDROCHLORIDE 2 MG/1
1-2 TABLET ORAL
Qty: 4 TABLET | Refills: 0 | Status: SHIPPED | OUTPATIENT
Start: 2023-04-09

## 2023-04-09 RX ORDER — AMOXICILLIN 250 MG
1 CAPSULE ORAL 2 TIMES DAILY
Qty: 30 TABLET | Refills: 0 | Status: SHIPPED | OUTPATIENT
Start: 2023-04-09

## 2023-04-09 RX ORDER — POLYETHYLENE GLYCOL 3350 17 G/17G
1 POWDER, FOR SOLUTION ORAL DAILY PRN
Qty: 527 G | Refills: 0 | Status: SHIPPED | OUTPATIENT
Start: 2023-04-09

## 2023-04-09 RX ORDER — METHOCARBAMOL 500 MG/1
500 TABLET, FILM COATED ORAL 4 TIMES DAILY PRN
Qty: 20 TABLET | Refills: 0 | Status: SHIPPED | OUTPATIENT
Start: 2023-04-09

## 2023-04-09 RX ORDER — POTASSIUM CHLORIDE 1500 MG/1
60 TABLET, EXTENDED RELEASE ORAL ONCE
Status: COMPLETED | OUTPATIENT
Start: 2023-04-09 | End: 2023-04-09

## 2023-04-09 RX ADMIN — VENLAFAXINE HYDROCHLORIDE 112.5 MG: 75 CAPSULE, EXTENDED RELEASE ORAL at 07:56

## 2023-04-09 RX ADMIN — POLYETHYLENE GLYCOL 3350 17 G: 17 POWDER, FOR SOLUTION ORAL at 07:56

## 2023-04-09 RX ADMIN — ACETAMINOPHEN 975 MG: 325 TABLET ORAL at 00:51

## 2023-04-09 RX ADMIN — ACETAMINOPHEN 975 MG: 325 TABLET ORAL at 08:04

## 2023-04-09 RX ADMIN — AMPICILLIN SODIUM AND SULBACTAM SODIUM 3 G: 2; 1 INJECTION, POWDER, FOR SOLUTION INTRAMUSCULAR; INTRAVENOUS at 05:07

## 2023-04-09 RX ADMIN — METHOCARBAMOL 500 MG: 500 TABLET ORAL at 07:56

## 2023-04-09 RX ADMIN — POTASSIUM CHLORIDE 60 MEQ: 1500 TABLET, EXTENDED RELEASE ORAL at 09:49

## 2023-04-09 RX ADMIN — SENNOSIDES AND DOCUSATE SODIUM 1 TABLET: 8.6; 5 TABLET ORAL at 07:57

## 2023-04-09 ASSESSMENT — ACTIVITIES OF DAILY LIVING (ADL)
ADLS_ACUITY_SCORE: 24

## 2023-04-09 NOTE — PROGRESS NOTES
"BP (!) 140/85 (BP Location: Left arm)   Pulse 89   Temp 98.2  F (36.8  C) (Oral)   Resp 18   Ht 1.575 m (5' 2\")   Wt 81.2 kg (179 lb)   SpO2 90%   BMI 32.74 kg/m      Shift: 8109-9853  Reason for Admission: post-op hepaticojejunostomy with antecolic Ger en Y reconstruction on 4/4  Neuro: A/O x4   Behavior: appropriate   Activity: SBA   LDAs: PIV, RLQ MELITA   Cardiac: HTN    Respiratory: WDL on RA   GI/: voiding appropriately. LBM 4/8  Skin: abdominal incision   Pain: controlled with scheduled medications   Diet: regular   Plan: continue POC     "

## 2023-04-09 NOTE — DISCHARGE SUMMARY
"BP (!) 156/87 (BP Location: Left arm)   Pulse 86   Temp 97.9  F (36.6  C) (Oral)   Resp 20   Ht 1.575 m (5' 2\")   Wt 79.2 kg (174 lb 8 oz)   SpO2 92%   BMI 31.92 kg/m      Shift: 4511-7656  Isolation Status: NA  VSS on RA, afebrile  Neuro: Aox4  Behaviors: pleasant & cooperative  BG: NA  Labs:K+ 2.8 Replaced with 60 meq po  Respiratory: O2 sats 90s on RA  Cardiac: WDL  Pain/Nausea: Managed well with scheduled Tylenol and Robaxin  PRN: None used  Diet: Regular  IV Access: PIV both pulled before discharge  Lines/Drains: MELITA, was pulled before discharge today.  GI/: voiding adequately  Skin: Incision staples removed, replaced with steri strips, CHARLES, CDI, no new concerns  Mobility: Independent with walker  Plan: Discharge to home with . Pt and  verbalize understanding of all discharge education, appropriate paper work signed, pt picking up meds at Rx.     "

## 2023-04-10 NOTE — PLAN OF CARE
Physical Therapy Discharge Summary    Reason for therapy discharge:    Discharged to home.    Progress towards therapy goal(s). See goals on Care Plan in [facility] electronic health record for goal details.  Goals met    Therapy recommendation(s):    Continue home exercise program.

## 2023-04-11 ENCOUNTER — PATIENT OUTREACH (OUTPATIENT)
Dept: CARE COORDINATION | Facility: CLINIC | Age: 68
End: 2023-04-11
Payer: COMMERCIAL

## 2023-04-11 DIAGNOSIS — R94.5 ABNORMAL RESULTS OF LIVER FUNCTION STUDIES: Primary | ICD-10-CM

## 2023-04-11 LAB — BACTERIA ABSC ANAEROBE+AEROBE CULT: NORMAL

## 2023-04-11 NOTE — PROGRESS NOTES
[facility] Resource Center Contact  [facility]/Voicemail     Clinical Data: Transitional Care Management Outreach     Outreach attempted x 2.  Left message on patient's voicemail, providing [facility]'s 24/7 scheduling and nurse triage phone number [phone] ([phone]) for questions/concerns and/or to schedule an appt with an [facility] provider, if they do not have a PCP.      Plan:  [facility] will do no further outreaches at this time.       Hailey Greco  Community Health Worker  [facility], [facility]  [phone]      *Connected Care Resource Team does NOT follow patient ongoing. Referrals are identified based on internal discharge reports and the outreach is to ensure patient has an understanding of their discharge instructions.

## 2023-04-12 DIAGNOSIS — S36.13XS INJURY OF BILE DUCT, SEQUELA: Primary | ICD-10-CM

## 2023-04-13 ENCOUNTER — OFFICE VISIT (OUTPATIENT)
Dept: TRANSPLANT | Facility: CLINIC | Age: 68
End: 2023-04-13
Attending: TRANSPLANT SURGERY
Payer: COMMERCIAL

## 2023-04-13 ENCOUNTER — LAB (OUTPATIENT)
Dept: LAB | Facility: CLINIC | Age: 68
End: 2023-04-13
Payer: COMMERCIAL

## 2023-04-13 VITALS
HEART RATE: 91 BPM | DIASTOLIC BLOOD PRESSURE: 85 MMHG | WEIGHT: 153.4 LBS | BODY MASS INDEX: 28.06 KG/M2 | OXYGEN SATURATION: 97 % | SYSTOLIC BLOOD PRESSURE: 132 MMHG

## 2023-04-13 DIAGNOSIS — R94.5 ABNORMAL RESULTS OF LIVER FUNCTION STUDIES: ICD-10-CM

## 2023-04-13 DIAGNOSIS — S36.13XS INJURY OF BILE DUCT, SEQUELA: Primary | ICD-10-CM

## 2023-04-13 LAB
ALBUMIN SERPL BCG-MCNC: 3.3 G/DL (ref 3.5–5.2)
ALP SERPL-CCNC: 263 U/L (ref 35–104)
ALT SERPL W P-5'-P-CCNC: 23 U/L (ref 10–35)
ANION GAP SERPL CALCULATED.3IONS-SCNC: 9 MMOL/L (ref 7–15)
AST SERPL W P-5'-P-CCNC: 20 U/L (ref 10–35)
BASOPHILS # BLD AUTO: 0 10E3/UL (ref 0–0.2)
BASOPHILS NFR BLD AUTO: 0 %
BILIRUB SERPL-MCNC: 0.5 MG/DL
BUN SERPL-MCNC: 12.1 MG/DL (ref 8–23)
CALCIUM SERPL-MCNC: 9.2 MG/DL (ref 8.8–10.2)
CHLORIDE SERPL-SCNC: 101 MMOL/L (ref 98–107)
CREAT SERPL-MCNC: 0.88 MG/DL (ref 0.51–0.95)
DEPRECATED HCO3 PLAS-SCNC: 28 MMOL/L (ref 22–29)
EOSINOPHIL # BLD AUTO: 0.6 10E3/UL (ref 0–0.7)
EOSINOPHIL NFR BLD AUTO: 8 %
ERYTHROCYTE [DISTWIDTH] IN BLOOD BY AUTOMATED COUNT: 14.3 % (ref 10–15)
GFR SERPL CREATININE-BSD FRML MDRD: 73 ML/MIN/1.73M2
GLUCOSE SERPL-MCNC: 112 MG/DL (ref 70–99)
HCT VFR BLD AUTO: 29.6 % (ref 35–47)
HGB BLD-MCNC: 10.1 G/DL (ref 11.7–15.7)
IMM GRANULOCYTES # BLD: 0.1 10E3/UL
IMM GRANULOCYTES NFR BLD: 1 %
LYMPHOCYTES # BLD AUTO: 1.6 10E3/UL (ref 0.8–5.3)
LYMPHOCYTES NFR BLD AUTO: 22 %
MCH RBC QN AUTO: 29.7 PG (ref 26.5–33)
MCHC RBC AUTO-ENTMCNC: 34.1 G/DL (ref 31.5–36.5)
MCV RBC AUTO: 87 FL (ref 78–100)
MONOCYTES # BLD AUTO: 0.5 10E3/UL (ref 0–1.3)
MONOCYTES NFR BLD AUTO: 6 %
NEUTROPHILS # BLD AUTO: 4.7 10E3/UL (ref 1.6–8.3)
NEUTROPHILS NFR BLD AUTO: 63 %
NRBC # BLD AUTO: 0 10E3/UL
NRBC BLD AUTO-RTO: 0 /100
PLATELET # BLD AUTO: 352 10E3/UL (ref 150–450)
POTASSIUM SERPL-SCNC: 3.3 MMOL/L (ref 3.4–5.3)
PROT SERPL-MCNC: 6.5 G/DL (ref 6.4–8.3)
RBC # BLD AUTO: 3.4 10E6/UL (ref 3.8–5.2)
SODIUM SERPL-SCNC: 138 MMOL/L (ref 136–145)
WBC # BLD AUTO: 7.4 10E3/UL (ref 4–11)

## 2023-04-13 PROCEDURE — G0463 HOSPITAL OUTPT CLINIC VISIT: HCPCS | Performed by: TRANSPLANT SURGERY

## 2023-04-13 PROCEDURE — 85025 COMPLETE CBC W/AUTO DIFF WBC: CPT | Performed by: PATHOLOGY

## 2023-04-13 PROCEDURE — 80053 COMPREHEN METABOLIC PANEL: CPT | Performed by: PATHOLOGY

## 2023-04-13 PROCEDURE — 99024 POSTOP FOLLOW-UP VISIT: CPT | Performed by: TRANSPLANT SURGERY

## 2023-04-13 PROCEDURE — 36415 COLL VENOUS BLD VENIPUNCTURE: CPT | Performed by: PATHOLOGY

## 2023-04-13 ASSESSMENT — PAIN SCALES - GENERAL: PAINLEVEL: MILD PAIN (3)

## 2023-04-13 NOTE — PROGRESS NOTES
"Post op visit for H-J after CBD repair of surgical injury.  Doing well, no fever, eating well, activity increasing.  No jaundice or pain.  Xray before discharge: right stent out, left working out.  All labs down except Alkphos: as assymptomatic, likely periop edema at anast or residual reaction from inflammation during management.  Will follow.    Current Outpatient Medications   Medication     acetaminophen (TYLENOL) 325 MG tablet     calcium carbonate (OS-NASRIN) 500 MG tablet     diphenhydrAMINE (BENADRYL) 25 MG tablet     Gauze Pads & Dressings (DERMACEA DRAIN SPONGES) 4\"X4\" PADS     Gauze Pads & Dressings (EXCILON AMD DRAIN SPONGES) 4\"X4\" PADS     Gauze Pads & Dressings (GAUZE PADS 4\"X4\") 4\"X4\" PADS     HYDROmorphone (DILAUDID) 2 MG tablet     LORazepam (ATIVAN) 0.5 MG tablet     melatonin 5 MG sublingual tablet     methocarbamol (ROBAXIN) 500 MG tablet     Multiple Vitamins-Minerals (WOMENS MULTI PO)     ondansetron (ZOFRAN ODT) 4 MG ODT tab     polyethylene glycol (MIRALAX) 17 GM/Dose powder     pravastatin (PRAVACHOL) 40 MG tablet     senna-docusate (SENOKOT-S/PERICOLACE) 8.6-50 MG tablet     Sennosides (SENNA) 8.6 MG CAPS     ursodiol (ACTIGALL) 300 MG capsule     venlafaxine (EFFEXOR XR) 37.5 MG 24 hr capsule     vitamin D3 (CHOLECALCIFEROL) 50 mcg (2000 units) tablet     amoxicillin-clavulanate (AUGMENTIN) 875-125 MG tablet     No current facility-administered medications for this visit.       PE:  /85   Pulse 91   Wt 69.6 kg (153 lb 6.4 oz)   SpO2 97%   BMI 28.06 kg/m    Alert and pleasant  No jaundice  Abd soft and nontender  Wound healing appropriately.  No excessive weeping/erythema.        Latest Reference Range & Units 04/13/23 09:04   WBC 4.0 - 11.0 10e3/uL 7.4   Hemoglobin 11.7 - 15.7 g/dL 10.1 (L)   Hematocrit 35.0 - 47.0 % 29.6 (L)   Platelet Count 150 - 450 10e3/uL 352   (L): Data is abnormally low   Latest Reference Range & Units 04/13/23 09:04   Alkaline Phosphatase 35 - 104 U/L 263 " (H)   ALT 10 - 35 U/L 23   AST 10 - 35 U/L 20   Bilirubin Total <=1.2 mg/dL 0.5   (H): Data is abnormally high    I/P  Doing well, no issues to address.  Return visit one month with CBC, comp chem and KUB.

## 2023-04-13 NOTE — LETTER
"    4/13/2023         RE: Zeinab Bermudez  [address] [address] [facility] [address]        Dear Colleague,    Thank you for referring your patient, Zeinab Bermudez, to the [facility]. Please see a copy of my visit note below.    Post op visit for H-J after CBD repair of surgical injury.  Doing well, no fever, eating well, activity increasing.  No jaundice or pain.  Xray before discharge: right stent out, left working out.  All labs down except Alkphos: as assymptomatic, likely periop edema at anast or residual reaction from inflammation during management.  Will follow.    Current Outpatient Medications   Medication     acetaminophen (TYLENOL) 325 MG tablet     calcium carbonate (OS-NASRIN) 500 MG tablet     diphenhydrAMINE (BENADRYL) 25 MG tablet     Gauze Pads & Dressings (DERMACEA DRAIN SPONGES) 4\"X4\" PADS     Gauze Pads & Dressings (EXCILON AMD DRAIN SPONGES) 4\"X4\" PADS     Gauze Pads & Dressings (GAUZE PADS 4\"X4\") 4\"X4\" PADS     HYDROmorphone (DILAUDID) 2 MG tablet     LORazepam (ATIVAN) 0.5 MG tablet     melatonin 5 MG sublingual tablet     methocarbamol (ROBAXIN) 500 MG tablet     Multiple Vitamins-Minerals (WOMENS MULTI PO)     ondansetron (ZOFRAN ODT) 4 MG ODT tab     polyethylene glycol (MIRALAX) 17 GM/Dose powder     pravastatin (PRAVACHOL) 40 MG tablet     senna-docusate (SENOKOT-S/PERICOLACE) 8.6-50 MG tablet     Sennosides (SENNA) 8.6 MG CAPS     ursodiol (ACTIGALL) 300 MG capsule     venlafaxine (EFFEXOR XR) 37.5 MG 24 hr capsule     vitamin D3 (CHOLECALCIFEROL) 50 mcg (2000 units) tablet     amoxicillin-clavulanate (AUGMENTIN) 875-125 MG tablet     No current facility-administered medications for this visit.       PE:  /85   Pulse 91   Wt 69.6 kg (153 lb 6.4 oz)   SpO2 97%   BMI 28.06 kg/m    Alert and pleasant  No jaundice  Abd soft and nontender  Wound healing appropriately.  No excessive weeping/erythema.        Latest Reference Range & Units 04/13/23 09:04   WBC 4.0 " - 11.0 10e3/uL 7.4   Hemoglobin 11.7 - 15.7 g/dL 10.1 (L)   Hematocrit 35.0 - 47.0 % 29.6 (L)   Platelet Count 150 - 450 10e3/uL 352   (L): Data is abnormally low   Latest Reference Range & Units 04/13/23 09:04   Alkaline Phosphatase 35 - 104 U/L 263 (H)   ALT 10 - 35 U/L 23   AST 10 - 35 U/L 20   Bilirubin Total <=1.2 mg/dL 0.5   (H): Data is abnormally high    I/P  Doing well, no issues to address.  Return visit one month with CBC, comp chem and KUB.      Again, thank you for allowing me to participate in the care of your patient.        Sincerely,        Desmond Ruano MD

## 2023-04-18 LAB — BACTERIA ABSC ANAEROBE+AEROBE CULT: NO GROWTH

## 2023-04-24 ENCOUNTER — TELEPHONE (OUTPATIENT)
Dept: TRANSPLANT | Facility: CLINIC | Age: 68
End: 2023-04-24
Payer: COMMERCIAL

## 2023-04-24 DIAGNOSIS — S36.13XS INJURY OF BILE DUCT, SEQUELA: Primary | ICD-10-CM

## 2023-04-24 NOTE — TELEPHONE ENCOUNTER
Called and got the pt scheduled for imaging test per sot email 4.12.23 per Kenzie MACDONALD Patient is aware of appt date and time. 4.24.23 AM

## 2023-04-25 ENCOUNTER — NURSE TRIAGE (OUTPATIENT)
Dept: NURSING | Facility: CLINIC | Age: 68
End: 2023-04-25
Payer: COMMERCIAL

## 2023-04-25 NOTE — TELEPHONE ENCOUNTER
Nurse Triage SBAR    Is this a 2nd Level Triage? NO    Situation: Patient calling with questions and concerns about her surgical incision opening.  Consent: not needed    Background: Patient had surgery on 4/4/23 and has a 10 inch incision on her abdomen - one steri strip has come off yesterday and it is open along with another smaller opening    Assessment:   Wound dehiscing - opening 2.4 x 0.8 x 0.8  No redness around   No swelling or warmth  No fever  No bleeding or drainage    Protocol Recommended Disposition:   Discuss with PCP and callback by nurse today    Recommendation: Advised patient to call surgeon today. Could also send a Fliqq message and upload an image. Care advice given. Patient verbalized understanding and agreed with plan.   Suze Nevarez RN [facility] 4/25/2023 11:28 AM    Reason for Disposition    Suture or staple came out early and caller wants wound checked    Additional Information    Negative: Major abdominal surgical incision and wound gaping open with visible internal organs    Negative: Sounds like a life-threatening emergency to the triager    Negative: Bleeding from incision and won't stop after 10 minutes of direct pressure    Negative: Bleeding (more than a few drops) from incision and after blood vessel surgery (e.g., carotidendarterectomy, femoral bypass graft, kidney dialysis fistula, tracheostomy)    Negative: Bright red, wide-spread, sunburn-like rash    Negative: SEVERE pain in the incision    Negative: Incision gaping open and < 2 days (48 hours) since wound re-opened    Negative: Incision gaping open and length of opening > 2 inches (5 cm)    Negative: Patient sounds very sick or weak to the triager    Negative: Sounds like a serious complication to the triager    Negative: Fever > 100.4 F (38.0 C)    Negative: Incision looks infected (spreading redness, pain)    Negative: Red streak runs from the incision and longer than 1 inch (2.5 cm)    Negative: Scant  bleeding (e.g., few drops) from incision and after blood vessel surgery (e.g., carotid endarterectomy, femoral bypass graft, kidney dialysis fistula    Negative: Caller has URGENT question and triager unable to answer question    Negative: Pus or bad-smelling fluid draining from incision    Negative: Dressing soaked with blood or body fluid (e.g., drainage)    Negative: Raised bruise and size > 2 inches (5 cm) and expanding    Negative: Incision gaping open and length of opening > 1/4 inch (6 mm) and on the face and over 2 days since wound re-opened    Negative: Incision gaping open and length of opening > 1/2 inch (1 cm) and over 2 days since wound re-opened    Negative: Clear or blood-tinged fluid draining from wound and no fever    Negative: Suture or staple removal is overdue    Negative: Patient wants to be seen    Negative: INCREASING pain in incision and over 2 days (48 hours) since surgery    Protocols used: POST-OP INCISION SYMPTOMS AND [id number]

## 2023-04-26 ENCOUNTER — ANCILLARY PROCEDURE (OUTPATIENT)
Dept: GENERAL RADIOLOGY | Facility: CLINIC | Age: 68
End: 2023-04-26
Attending: NURSE PRACTITIONER
Payer: COMMERCIAL

## 2023-04-26 DIAGNOSIS — S36.13XS INJURY OF BILE DUCT, SEQUELA: ICD-10-CM

## 2023-04-26 DIAGNOSIS — R79.1 ABNORMAL COAGULATION PROFILE: ICD-10-CM

## 2023-04-26 DIAGNOSIS — S36.13XS INJURY OF BILE DUCT, SEQUELA: Primary | ICD-10-CM

## 2023-04-26 PROCEDURE — 74019 RADEX ABDOMEN 2 VIEWS: CPT | Mod: GC | Performed by: RADIOLOGY

## 2023-04-27 ENCOUNTER — LAB (OUTPATIENT)
Dept: LAB | Facility: CLINIC | Age: 68
End: 2023-04-27
Payer: COMMERCIAL

## 2023-04-27 ENCOUNTER — OFFICE VISIT (OUTPATIENT)
Dept: TRANSPLANT | Facility: CLINIC | Age: 68
End: 2023-04-27
Attending: INTERNAL MEDICINE
Payer: COMMERCIAL

## 2023-04-27 VITALS
BODY MASS INDEX: 28.37 KG/M2 | SYSTOLIC BLOOD PRESSURE: 149 MMHG | OXYGEN SATURATION: 99 % | WEIGHT: 155.1 LBS | HEART RATE: 86 BPM | DIASTOLIC BLOOD PRESSURE: 89 MMHG

## 2023-04-27 DIAGNOSIS — S36.13XS INJURY OF BILE DUCT, SEQUELA: Primary | ICD-10-CM

## 2023-04-27 DIAGNOSIS — R79.1 ABNORMAL COAGULATION PROFILE: ICD-10-CM

## 2023-04-27 DIAGNOSIS — S36.13XS INJURY OF BILE DUCT, SEQUELA: ICD-10-CM

## 2023-04-27 DIAGNOSIS — T81.89XA DELAYED SURGICAL WOUND HEALING, INITIAL ENCOUNTER: ICD-10-CM

## 2023-04-27 LAB
ALBUMIN SERPL BCG-MCNC: 3.8 G/DL (ref 3.5–5.2)
ALP SERPL-CCNC: 170 U/L (ref 35–104)
ALT SERPL W P-5'-P-CCNC: 26 U/L (ref 10–35)
ANION GAP SERPL CALCULATED.3IONS-SCNC: 7 MMOL/L (ref 7–15)
AST SERPL W P-5'-P-CCNC: 28 U/L (ref 10–35)
BASOPHILS # BLD AUTO: 0 10E3/UL (ref 0–0.2)
BASOPHILS NFR BLD AUTO: 0 %
BILIRUB DIRECT SERPL-MCNC: 0.25 MG/DL (ref 0–0.3)
BILIRUB SERPL-MCNC: 0.6 MG/DL
BUN SERPL-MCNC: 18.4 MG/DL (ref 8–23)
CALCIUM SERPL-MCNC: 10.2 MG/DL (ref 8.8–10.2)
CHLORIDE SERPL-SCNC: 104 MMOL/L (ref 98–107)
CREAT SERPL-MCNC: 0.84 MG/DL (ref 0.51–0.95)
DEPRECATED HCO3 PLAS-SCNC: 28 MMOL/L (ref 22–29)
EOSINOPHIL # BLD AUTO: 0 10E3/UL (ref 0–0.7)
EOSINOPHIL NFR BLD AUTO: 1 %
ERYTHROCYTE [DISTWIDTH] IN BLOOD BY AUTOMATED COUNT: 13.6 % (ref 10–15)
GFR SERPL CREATININE-BSD FRML MDRD: 77 ML/MIN/1.73M2
GLUCOSE SERPL-MCNC: 102 MG/DL (ref 70–99)
HCT VFR BLD AUTO: 32.5 % (ref 35–47)
HGB BLD-MCNC: 10.5 G/DL (ref 11.7–15.7)
IMM GRANULOCYTES # BLD: 0 10E3/UL
IMM GRANULOCYTES NFR BLD: 0 %
LYMPHOCYTES # BLD AUTO: 1.3 10E3/UL (ref 0.8–5.3)
LYMPHOCYTES NFR BLD AUTO: 25 %
MCH RBC QN AUTO: 29 PG (ref 26.5–33)
MCHC RBC AUTO-ENTMCNC: 32.3 G/DL (ref 31.5–36.5)
MCV RBC AUTO: 90 FL (ref 78–100)
MONOCYTES # BLD AUTO: 0.4 10E3/UL (ref 0–1.3)
MONOCYTES NFR BLD AUTO: 9 %
NEUTROPHILS # BLD AUTO: 3.3 10E3/UL (ref 1.6–8.3)
NEUTROPHILS NFR BLD AUTO: 65 %
NRBC # BLD AUTO: 0 10E3/UL
NRBC BLD AUTO-RTO: 0 /100
PLATELET # BLD AUTO: 245 10E3/UL (ref 150–450)
POTASSIUM SERPL-SCNC: 4.3 MMOL/L (ref 3.4–5.3)
PROT SERPL-MCNC: 7 G/DL (ref 6.4–8.3)
RBC # BLD AUTO: 3.62 10E6/UL (ref 3.8–5.2)
SODIUM SERPL-SCNC: 139 MMOL/L (ref 136–145)
WBC # BLD AUTO: 5.1 10E3/UL (ref 4–11)

## 2023-04-27 PROCEDURE — 85025 COMPLETE CBC W/AUTO DIFF WBC: CPT | Performed by: PATHOLOGY

## 2023-04-27 PROCEDURE — 82248 BILIRUBIN DIRECT: CPT | Performed by: PATHOLOGY

## 2023-04-27 PROCEDURE — G0463 HOSPITAL OUTPT CLINIC VISIT: HCPCS | Performed by: NURSE PRACTITIONER

## 2023-04-27 PROCEDURE — 99024 POSTOP FOLLOW-UP VISIT: CPT | Performed by: NURSE PRACTITIONER

## 2023-04-27 PROCEDURE — 80053 COMPREHEN METABOLIC PANEL: CPT | Performed by: PATHOLOGY

## 2023-04-27 PROCEDURE — 36415 COLL VENOUS BLD VENIPUNCTURE: CPT | Performed by: PATHOLOGY

## 2023-04-27 NOTE — LETTER
4/27/2023         RE: Zeinab Bermudez  [address] [facility] [address]        Dear Colleague,    Thank you for referring your patient, Zeinab Bermudez, to the [facility]. Please see a copy of my visit note below.    Transplant Surgery Progress Note     Medical record number: [id number]  YOB: 1955,   Date of Visit:  04/27/2023  For followup     Assessment and Recommendations: Ms. Bermudez is doing well after  s/p hepaticojejunostomy anastamoses just below the bile duct bifurcation with antecolic Ger en Y reconstruction on 4/4/23.   \  1. Lifting restrictions: 10 lbs until 8 weeks post surgery    Labs stable   2. Pack open are of incision daily.  Daily shower.      Cover area of incision after packed.       Total time: 15 minutes  Counselling time: 10 minutes        ------------------------------------------------------------------------------------------    S: Zeinab Bermudez is an 67 year old female with history of laparoscopic cholecystectomy on 12/11/22 at [facility]SH complicated by bile duct injury s/p ex lap, washout, and drain placement 12/28/22. Admitted 1/1-1/11/23 for management of bile duct injury and bile leak. Also admitted 3/19-3/24 for obstructed drain, elevated LFTs, and low grade fever. Now admitted s/p hepaticojejunostomy anastamoses just below the bile duct bifurcation with antecolic Ger en Y reconstruction on 4/4/23.  A stent was placed into the R and L hepatic ducts.  The RHD stent passed early in hospital course.     Has been doing well.  Noted to have small open area of her incision and states it was draining.      Medications:  Prescription Medications as of 4/28/2023         Rx Number Disp Refills Start End Last Dispensed Date Next Fill Date Owning Pharmacy    acetaminophen (TYLENOL) 325 MG tablet  100 tablet 0 4/9/2023    [facility] - [place], [place] - [address]    Sig: Take 2 tablets (650 mg) by mouth every 4 hours  "as needed for other or pain    Class: E-Prescribe    Route: Oral    Renewals       Renewal requests to authorizing provider (Rupa Rivas APRN CNP) <b>prohibited</b>            amoxicillin-clavulanate (AUGMENTIN) 875-125 MG tablet  6 tablet 0 2023   [address]    Sig: Take 1 tablet by mouth 2 times daily for 3 days    Class: E-Prescribe    Route: Oral    Renewals       Renewal requests to authorizing provider (Rupa Rivas APRN CNP) <b>prohibited</b>            calcium carbonate (OS-NASRIN) 500 MG tablet            Sig: Take 1 tablet by mouth daily    Class: Historical    Route: Oral    diphenhydrAMINE (BENADRYL) 25 MG tablet            Sig: Take 25 mg by mouth every 6 hours as needed for itching    Class: Historical    Route: Oral    Gauze Pads & Dressings (DERMACEA DRAIN SPONGES) 4\"X4\" PADS  60 each 1 3/2/2023    [facility] #[id number] [address] AT SEC OF [facility]    Si pad. 2 times daily    Class: E-Prescribe    Route: Does not apply    Gauze Pads & Dressings (EXCILON AMD DRAIN SPONGES) 4\"X4\" PADS  60 each 1 3/2/2023    [facility] #[id number] [address] AT [address] [facility]    Si pad. 2 times daily    Class: E-Prescribe    Route: Does not apply    Gauze Pads & Dressings (GAUZE PADS 4\"X4\") 4\"X4\" PADS  60 each 1 3/2/2023    [facility] #[id number] [address] AT [address] [facility]    Si pad. 2 times daily    Class: E-Prescribe    Route: Does not apply    HYDROmorphone (DILAUDID) 2 MG tablet  4 tablet 0 2023    [place], [place] - [address] [place] [address]    Sig: Take 0.5-1 tablets (1-2 mg) by mouth every 3 hours as needed for moderate to severe pain    Class: E-Prescribe    Earliest Fill Date: 2023    Route: Oral    LORazepam (ATIVAN) 0.5 MG tablet      "       Sig: Take 0.5 mg by mouth daily as needed for anxiety    Class: Historical    Route: Oral    melatonin 5 MG sublingual tablet  30 tablet 0 1/11/2023    [address]    Sig: Place 1 tablet (5 mg) under the tongue At Bedtime    Class: E-Prescribe    Route: Sublingual    methocarbamol (ROBAXIN) 500 MG tablet  20 tablet 0 4/9/2023    [address]    Sig: Take 1 tablet (500 mg) by mouth 4 times daily as needed for muscle spasms    Class: E-Prescribe    Route: Oral    Multiple Vitamins-Minerals (WOMENS MULTI PO)            Sig: Take 2 tablets by mouth daily    Class: Historical    Route: Oral    ondansetron (ZOFRAN ODT) 4 MG ODT tab  15 tablet 0 1/11/2023    [address]    Sig: Take 1 tablet (4 mg) by mouth every 6 hours as needed for nausea or vomiting    Class: E-Prescribe    Route: Oral    polyethylene glycol (MIRALAX) 17 GM/Dose powder  527 g 0 4/9/2023    [address]    Sig: Take 17 g (1 capful.) by mouth daily as needed for constipation    Class: E-Prescribe    Route: Oral    pravastatin (PRAVACHOL) 40 MG tablet            Sig: Take 40 mg by mouth daily    Class: Historical    Route: Oral    senna-docusate (SENOKOT-S/PERICOLACE) 8.6-50 MG tablet  30 tablet 0 4/9/2023    [address]    Sig: Take 1 tablet by mouth 2 times daily    Class: E-Prescribe    Route: Oral    Renewals       Renewal requests to authorizing provider (Rupa Rivas, APRN CNP) <b>prohibited</b>            Sennosides (SENNA) 8.6 MG CAPS            Sig: Take 8.6 mg by mouth daily as needed    Class: Historical    Route: Oral    ursodiol (ACTIGALL) 300 MG capsule  60 capsule 1 3/24/2023    [place] [address]  [facility]    Sig: Take 1 capsule (300 mg) by mouth 2 times daily    Class: E-Prescribe    Route: Oral    venlafaxine (EFFEXOR XR) 37.5 MG 24 hr capsule            Sig: Take 112.5 mg by mouth daily    Class: Historical    Route: Oral    vitamin D3 (CHOLECALCIFEROL) 50 mcg (2000 units) tablet            Sig: Take 1 tablet by mouth daily    Class: Historical    Route: Oral            Exam:      Appearance: in no apparent distress.   Skin: normal  Head and Neck: Normal, no rashes or jaundice  Respiratory: normal respiratory excursions, no audible wheeze  Cardiovascular: RRR  Abdomen: rounded, small opening in incision  Area cleansed and packing placed   Extremeties: Edema, none  Neuro: without deficit       Labs:   Lab on 04/27/2023   Component Date Value Ref Range Status    Sodium 04/27/2023 139  136 - 145 mmol/L Final    Potassium 04/27/2023 4.3  3.4 - 5.3 mmol/L Final    Chloride 04/27/2023 104  98 - 107 mmol/L Final    Carbon Dioxide (CO2) 04/27/2023 28  22 - 29 mmol/L Final    Anion Gap 04/27/2023 7  7 - 15 mmol/L Final    Urea Nitrogen 04/27/2023 18.4  8.0 - 23.0 mg/dL Final    Creatinine 04/27/2023 0.84  0.51 - 0.95 mg/dL Final    Calcium 04/27/2023 10.2  8.8 - 10.2 mg/dL Final    Glucose 04/27/2023 102 (H)  70 - 99 mg/dL Final    Alkaline Phosphatase 04/27/2023 170 (H)  35 - 104 U/L Final    AST 04/27/2023 28  10 - 35 U/L Final    ALT 04/27/2023 26  10 - 35 U/L Final    Protein Total 04/27/2023 7.0  6.4 - 8.3 g/dL Final    Albumin 04/27/2023 3.8  3.5 - 5.2 g/dL Final    Bilirubin Total 04/27/2023 0.6  <=1.2 mg/dL Final    GFR Estimate 04/27/2023 77  >60 mL/min/1.73m2 Final    eGFR calculated using 2021 CKD-EPI equation.    WBC Count 04/27/2023 5.1  4.0 - 11.0 10e3/uL Final    RBC Count 04/27/2023 3.62 (L)  3.80 - 5.20 10e6/uL Final    Hemoglobin 04/27/2023 10.5 (L)  11.7 - 15.7 g/dL Final    Hematocrit 04/27/2023 32.5 (L)  35.0 - 47.0 % Final    MCV 04/27/2023 90  78 - 100 fL Final    MCH 04/27/2023 29.0   26.5 - 33.0 pg Final    MCHC 04/27/2023 32.3  31.5 - 36.5 g/dL Final    RDW 04/27/2023 13.6  10.0 - 15.0 % Final    Platelet Count 04/27/2023 245  150 - 450 10e3/uL Final    % Neutrophils 04/27/2023 65  % Final    % Lymphocytes 04/27/2023 25  % Final    % Monocytes 04/27/2023 9  % Final    % Eosinophils 04/27/2023 1  % Final    % Basophils 04/27/2023 0  % Final    % Immature Granulocytes 04/27/2023 0  % Final    NRBCs per 100 WBC 04/27/2023 0  <1 /100 Final    Absolute Neutrophils 04/27/2023 3.3  1.6 - 8.3 10e3/uL Final    Absolute Lymphocytes 04/27/2023 1.3  0.8 - 5.3 10e3/uL Final    Absolute Monocytes 04/27/2023 0.4  0.0 - 1.3 10e3/uL Final    Absolute Eosinophils 04/27/2023 0.0  0.0 - 0.7 10e3/uL Final    Absolute Basophils 04/27/2023 0.0  0.0 - 0.2 10e3/uL Final    Absolute Immature Granulocytes 04/27/2023 0.0  <=0.4 10e3/uL Final    Absolute NRBCs 04/27/2023 0.0  10e3/uL Final    Bilirubin Direct 04/27/2023 0.25  0.00 - 0.30 mg/dL Final       Again, thank you for allowing me to participate in the care of your patient.        Sincerely,        Lacy Lopez, NP

## 2023-04-28 NOTE — PROGRESS NOTES
Transplant Surgery Progress Note     Medical record number: [id number]  YOB: 1955,   Date of Visit:  04/27/2023  For followup     Assessment and Recommendations: Ms. Bermudez is doing well after  s/p hepaticojejunostomy anastamoses just below the bile duct bifurcation with antecolic Ger en Y reconstruction on 4/4/23.   \  1. Lifting restrictions: 10 lbs until 8 weeks post surgery    Labs stable   2. Pack open are of incision daily.  Daily shower.      Cover area of incision after packed.       Total time: 15 minutes  Counselling time: 10 minutes        ------------------------------------------------------------------------------------------    S: Zeinab Bermudez is an 67 year old female with history of laparoscopic cholecystectomy on 12/11/22 at [facility] complicated by bile duct injury s/p ex lap, washout, and drain placement 12/28/22. Admitted 1/1-1/11/23 for management of bile duct injury and bile leak. Also admitted 3/19-3/24 for obstructed drain, elevated LFTs, and low grade fever. Now admitted s/p hepaticojejunostomy anastamoses just below the bile duct bifurcation with antecolic Ger en Y reconstruction on 4/4/23.  A stent was placed into the R and L hepatic ducts.  The RHD stent passed early in hospital course.     Has been doing well.  Noted to have small open area of her incision and states it was draining.      Medications:  Prescription Medications as of 4/28/2023       Rx Number Disp Refills Start End Last Dispensed Date Next Fill Date Owning Pharmacy    acetaminophen (TYLENOL) 325 MG tablet  100 tablet 0 4/9/2023    [facility] - [place], [place] - [address] [facility]    Sig: Take 2 tablets (650 mg) by mouth every 4 hours as needed for other or pain    Class: E-Prescribe    Route: Oral    Renewals     Renewal requests to authorizing provider (Rupa Rivas, APRN CNP) <b>prohibited</b>          amoxicillin-clavulanate (AUGMENTIN) 875-125 MG tablet  6 tablet 0 4/9/2023  "2023   [facility] [address]    Sig: Take 1 tablet by mouth 2 times daily for 3 days    Class: E-Prescribe    Route: Oral    Renewals     Renewal requests to authorizing provider (Rupa Rivas, TOMY MONTERO) <b>prohibited</b>          calcium carbonate (OS-NASRIN) 500 MG tablet            Sig: Take 1 tablet by mouth daily    Class: Historical    Route: Oral    diphenhydrAMINE (BENADRYL) 25 MG tablet            Sig: Take 25 mg by mouth every 6 hours as needed for itching    Class: Historical    Route: Oral    Gauze Pads & Dressings (DERMACEA DRAIN SPONGES) 4\"X4\" PADS  60 each 1 3/2/2023    [facility] #[id number] [address] [facility]    Si pad. 2 times daily    Class: E-Prescribe    Route: Does not apply    Gauze Pads & Dressings (EXCILON AMD DRAIN SPONGES) 4\"X4\" PADS  60 each 1 3/2/2023    [facility] #[id number] [address] AT [address] [facility]    Si pad. 2 times daily    Class: E-Prescribe    Route: Does not apply    Gauze Pads & Dressings (GAUZE PADS 4\"X4\") 4\"X4\" PADS  60 each 1 3/2/2023    [facility] [employer] [facility] #[id number] [address] AT [address] [facility]    Si pad. 2 times daily    Class: E-Prescribe    Route: Does not apply    HYDROmorphone (DILAUDID) 2 MG tablet  4 tablet 0 2023    [address]    Sig: Take 0.5-1 tablets (1-2 mg) by mouth every 3 hours as needed for moderate to severe pain    Class: E-Prescribe    Earliest Fill Date: 2023    Route: Oral    LORazepam (ATIVAN) 0.5 MG tablet            Sig: Take 0.5 mg by mouth daily as needed for anxiety    Class: Historical    Route: Oral    melatonin 5 MG sublingual tablet  30 tablet 0 2023    [facility] - [place], [place] - [address] [facility]    Sig: Place 1 tablet (5 mg) under " the tongue At Bedtime    Class: E-Prescribe    Route: Sublingual    methocarbamol (ROBAXIN) 500 MG tablet  20 tablet 0 4/9/2023    [address]    Sig: Take 1 tablet (500 mg) by mouth 4 times daily as needed for muscle spasms    Class: E-Prescribe    Route: Oral    Multiple Vitamins-Minerals (WOMENS MULTI PO)            Sig: Take 2 tablets by mouth daily    Class: Historical    Route: Oral    ondansetron (ZOFRAN ODT) 4 MG ODT tab  15 tablet 0 1/11/2023    [address]    Sig: Take 1 tablet (4 mg) by mouth every 6 hours as needed for nausea or vomiting    Class: E-Prescribe    Route: Oral    polyethylene glycol (MIRALAX) 17 GM/Dose powder  527 g 0 4/9/2023    [address]    Sig: Take 17 g (1 capful.) by mouth daily as needed for constipation    Class: E-Prescribe    Route: Oral    pravastatin (PRAVACHOL) 40 MG tablet            Sig: Take 40 mg by mouth daily    Class: Historical    Route: Oral    senna-docusate (SENOKOT-S/PERICOLACE) 8.6-50 MG tablet  30 tablet 0 4/9/2023    [address]    Sig: Take 1 tablet by mouth 2 times daily    Class: E-Prescribe    Route: Oral    Renewals     Renewal requests to authorizing provider (Rupa Rivas, TOMY CNP) <b>prohibited</b>          Sennosides (SENNA) 8.6 MG CAPS            Sig: Take 8.6 mg by mouth daily as needed    Class: Historical    Route: Oral    ursodiol (ACTIGALL) 300 MG capsule  60 capsule 1 3/24/2023    [address]    Sig: Take 1 capsule (300 mg) by mouth 2 times daily    Class: E-Prescribe    Route: Oral    venlafaxine (EFFEXOR XR) 37.5 MG 24 hr capsule            Sig: Take 112.5 mg by mouth daily    Class: Historical    Route: Oral    vitamin D3 (CHOLECALCIFEROL) 50  mcg (2000 units) tablet            Sig: Take 1 tablet by mouth daily    Class: Historical    Route: Oral          Exam:      Appearance: in no apparent distress.   Skin: normal  Head and Neck: Normal, no rashes or jaundice  Respiratory: normal respiratory excursions, no audible wheeze  Cardiovascular: RRR  Abdomen: rounded, small opening in incision  Area cleansed and packing placed   Extremeties: Edema, none  Neuro: without deficit       Labs:   Lab on 04/27/2023   Component Date Value Ref Range Status     Sodium 04/27/2023 139  136 - 145 mmol/L Final     Potassium 04/27/2023 4.3  3.4 - 5.3 mmol/L Final     Chloride 04/27/2023 104  98 - 107 mmol/L Final     Carbon Dioxide (CO2) 04/27/2023 28  22 - 29 mmol/L Final     Anion Gap 04/27/2023 7  7 - 15 mmol/L Final     Urea Nitrogen 04/27/2023 18.4  8.0 - 23.0 mg/dL Final     Creatinine 04/27/2023 0.84  0.51 - 0.95 mg/dL Final     Calcium 04/27/2023 10.2  8.8 - 10.2 mg/dL Final     Glucose 04/27/2023 102 (H)  70 - 99 mg/dL Final     Alkaline Phosphatase 04/27/2023 170 (H)  35 - 104 U/L Final     AST 04/27/2023 28  10 - 35 U/L Final     ALT 04/27/2023 26  10 - 35 U/L Final     Protein Total 04/27/2023 7.0  6.4 - 8.3 g/dL Final     Albumin 04/27/2023 3.8  3.5 - 5.2 g/dL Final     Bilirubin Total 04/27/2023 0.6  <=1.2 mg/dL Final     GFR Estimate 04/27/2023 77  >60 mL/min/1.73m2 Final    eGFR calculated using 2021 CKD-EPI equation.     WBC Count 04/27/2023 5.1  4.0 - 11.0 10e3/uL Final     RBC Count 04/27/2023 3.62 (L)  3.80 - 5.20 10e6/uL Final     Hemoglobin 04/27/2023 10.5 (L)  11.7 - 15.7 g/dL Final     Hematocrit 04/27/2023 32.5 (L)  35.0 - 47.0 % Final     MCV 04/27/2023 90  78 - 100 fL Final     MCH 04/27/2023 29.0  26.5 - 33.0 pg Final     MCHC 04/27/2023 32.3  31.5 - 36.5 g/dL Final     RDW 04/27/2023 13.6  10.0 - 15.0 % Final     Platelet Count 04/27/2023 245  150 - 450 10e3/uL Final     % Neutrophils 04/27/2023 65  % Final     % Lymphocytes 04/27/2023 25  %  Final     % Monocytes 04/27/2023 9  % Final     % Eosinophils 04/27/2023 1  % Final     % Basophils 04/27/2023 0  % Final     % Immature Granulocytes 04/27/2023 0  % Final     NRBCs per 100 WBC 04/27/2023 0  <1 /100 Final     Absolute Neutrophils 04/27/2023 3.3  1.6 - 8.3 10e3/uL Final     Absolute Lymphocytes 04/27/2023 1.3  0.8 - 5.3 10e3/uL Final     Absolute Monocytes 04/27/2023 0.4  0.0 - 1.3 10e3/uL Final     Absolute Eosinophils 04/27/2023 0.0  0.0 - 0.7 10e3/uL Final     Absolute Basophils 04/27/2023 0.0  0.0 - 0.2 10e3/uL Final     Absolute Immature Granulocytes 04/27/2023 0.0  <=0.4 10e3/uL Final     Absolute NRBCs 04/27/2023 0.0  10e3/uL Final     Bilirubin Direct 04/27/2023 0.25  0.00 - 0.30 mg/dL Final

## 2023-05-02 LAB — BACTERIA ABSC ANAEROBE+AEROBE CULT: NO GROWTH

## 2023-06-15 DIAGNOSIS — S36.13XD: ICD-10-CM

## 2023-06-15 DIAGNOSIS — S36.13XS INJURY OF BILE DUCT, SEQUELA: Primary | ICD-10-CM

## 2023-06-15 DIAGNOSIS — S36.129D: ICD-10-CM

## 2023-07-06 ENCOUNTER — LAB (OUTPATIENT)
Dept: LAB | Facility: CLINIC | Age: 68
End: 2023-07-06
Attending: NURSE PRACTITIONER
Payer: COMMERCIAL

## 2023-07-06 ENCOUNTER — ANCILLARY PROCEDURE (OUTPATIENT)
Dept: GENERAL RADIOLOGY | Facility: CLINIC | Age: 68
End: 2023-07-06
Attending: NURSE PRACTITIONER
Payer: COMMERCIAL

## 2023-07-06 ENCOUNTER — OFFICE VISIT (OUTPATIENT)
Dept: TRANSPLANT | Facility: CLINIC | Age: 68
End: 2023-07-06
Attending: NURSE PRACTITIONER
Payer: COMMERCIAL

## 2023-07-06 VITALS
WEIGHT: 164.7 LBS | HEART RATE: 81 BPM | DIASTOLIC BLOOD PRESSURE: 84 MMHG | SYSTOLIC BLOOD PRESSURE: 150 MMHG | BODY MASS INDEX: 30.12 KG/M2 | OXYGEN SATURATION: 99 %

## 2023-07-06 DIAGNOSIS — S36.13XS INJURY OF BILE DUCT, SEQUELA: ICD-10-CM

## 2023-07-06 DIAGNOSIS — S36.13XD INJURY OF BILE DUCT, SUBSEQUENT ENCOUNTER: Primary | ICD-10-CM

## 2023-07-06 DIAGNOSIS — S36.13XD: ICD-10-CM

## 2023-07-06 DIAGNOSIS — S36.129D: ICD-10-CM

## 2023-07-06 LAB
ALBUMIN SERPL BCG-MCNC: 4.1 G/DL (ref 3.5–5.2)
ALP SERPL-CCNC: 154 U/L (ref 35–104)
ALT SERPL W P-5'-P-CCNC: 37 U/L (ref 0–50)
ANION GAP SERPL CALCULATED.3IONS-SCNC: 7 MMOL/L (ref 7–15)
AST SERPL W P-5'-P-CCNC: 40 U/L (ref 0–45)
BILIRUB DIRECT SERPL-MCNC: <0.2 MG/DL (ref 0–0.3)
BILIRUB SERPL-MCNC: 0.4 MG/DL
BUN SERPL-MCNC: 20.9 MG/DL (ref 8–23)
CALCIUM SERPL-MCNC: 9.7 MG/DL (ref 8.8–10.2)
CHLORIDE SERPL-SCNC: 105 MMOL/L (ref 98–107)
CREAT SERPL-MCNC: 0.85 MG/DL (ref 0.51–0.95)
DEPRECATED HCO3 PLAS-SCNC: 27 MMOL/L (ref 22–29)
ERYTHROCYTE [DISTWIDTH] IN BLOOD BY AUTOMATED COUNT: 12.4 % (ref 10–15)
GFR SERPL CREATININE-BSD FRML MDRD: 76 ML/MIN/1.73M2
GLUCOSE SERPL-MCNC: 93 MG/DL (ref 70–99)
HCT VFR BLD AUTO: 36.7 % (ref 35–47)
HGB BLD-MCNC: 12.4 G/DL (ref 11.7–15.7)
MCH RBC QN AUTO: 29.6 PG (ref 26.5–33)
MCHC RBC AUTO-ENTMCNC: 33.8 G/DL (ref 31.5–36.5)
MCV RBC AUTO: 88 FL (ref 78–100)
PLATELET # BLD AUTO: 153 10E3/UL (ref 150–450)
POTASSIUM SERPL-SCNC: 4.4 MMOL/L (ref 3.4–5.3)
PROT SERPL-MCNC: 6.8 G/DL (ref 6.4–8.3)
RBC # BLD AUTO: 4.19 10E6/UL (ref 3.8–5.2)
SODIUM SERPL-SCNC: 139 MMOL/L (ref 136–145)
WBC # BLD AUTO: 4.8 10E3/UL (ref 4–11)

## 2023-07-06 PROCEDURE — 80053 COMPREHEN METABOLIC PANEL: CPT | Performed by: PATHOLOGY

## 2023-07-06 PROCEDURE — 99213 OFFICE O/P EST LOW 20 MIN: CPT | Performed by: TRANSPLANT SURGERY

## 2023-07-06 PROCEDURE — 36415 COLL VENOUS BLD VENIPUNCTURE: CPT | Performed by: PATHOLOGY

## 2023-07-06 PROCEDURE — 74019 RADEX ABDOMEN 2 VIEWS: CPT | Mod: GC | Performed by: RADIOLOGY

## 2023-07-06 PROCEDURE — 85027 COMPLETE CBC AUTOMATED: CPT | Performed by: PATHOLOGY

## 2023-07-06 PROCEDURE — G0463 HOSPITAL OUTPT CLINIC VISIT: HCPCS | Performed by: TRANSPLANT SURGERY

## 2023-07-06 PROCEDURE — 82248 BILIRUBIN DIRECT: CPT | Performed by: PATHOLOGY

## 2023-07-06 NOTE — LETTER
"    7/6/2023         RE: Zeinab Bermudez  [address] [facility] [id number]        Dear Colleague,    Thank you for referring your patient, Zeinab Bermudez, to the [facility]. Please see a copy of my visit note below.    SP HJ for transected BD 4/2023.  No prob post op, but left biliary stent.  Needed to RV to assure out of bile ducts.  No fever, chills. Has resumed normal activities.  No jaundice.  Only complaint, <5 sec \"catch\" intermittently in upper abd.    BP (!) 150/84   Pulse 81   Wt 74.7 kg (164 lb 11.2 oz)   SpO2 99%   BMI 30.12 kg/m    Incision healing normally.  Abd non tender    Abd XR: no stent   Latest Reference Range & Units 07/06/23 08:35   Alkaline Phosphatase 35 - 104 U/L 154 (H)   ALT 0 - 50 U/L 37   AST 0 - 45 U/L 40   Bilirubin Direct 0.00 - 0.30 mg/dL <0.20   Bilirubin Total <=1.2 mg/dL 0.4   (H): Data is abnormally high   Latest Reference Range & Units 07/06/23 08:35   WBC 4.0 - 11.0 10e3/uL 4.8   Hemoglobin 11.7 - 15.7 g/dL 12.4   Hematocrit 35.0 - 47.0 % 36.7   Platelet Count 150 - 450 10e3/uL 153      Latest Reference Range & Units 07/06/23 08:35   Sodium 136 - 145 mmol/L 139   Potassium 3.4 - 5.3 mmol/L 4.4   Chloride 98 - 107 mmol/L 105   Carbon Dioxide (CO2) 22 - 29 mmol/L 27   Urea Nitrogen 8.0 - 23.0 mg/dL 20.9   Creatinine 0.51 - 0.95 mg/dL 0.85       I/P  Alk phos gradually decreasing.  Otherwise, normal LFTs.  Xray: no stent.  Ok to release from care.  20 min: 5 prep, 15 f2f, 5 doc      Sincerely,        Desmond Ruano MD    "

## 2023-07-06 NOTE — PROGRESS NOTES
"SP HJ for transected BD 4/2023.  No prob post op, but left biliary stent.  Needed to RV to assure out of bile ducts.  No fever, chills. Has resumed normal activities.  No jaundice.  Only complaint, <5 sec \"catch\" intermittently in upper abd.    BP (!) 150/84   Pulse 81   Wt 74.7 kg (164 lb 11.2 oz)   SpO2 99%   BMI 30.12 kg/m    Incision healing normally.  Abd non tender    Abd XR: no stent   Latest Reference Range & Units 07/06/23 08:35   Alkaline Phosphatase 35 - 104 U/L 154 (H)   ALT 0 - 50 U/L 37   AST 0 - 45 U/L 40   Bilirubin Direct 0.00 - 0.30 mg/dL <0.20   Bilirubin Total <=1.2 mg/dL 0.4   (H): Data is abnormally high   Latest Reference Range & Units 07/06/23 08:35   WBC 4.0 - 11.0 10e3/uL 4.8   Hemoglobin 11.7 - 15.7 g/dL 12.4   Hematocrit 35.0 - 47.0 % 36.7   Platelet Count 150 - 450 10e3/uL 153      Latest Reference Range & Units 07/06/23 08:35   Sodium 136 - 145 mmol/L 139   Potassium 3.4 - 5.3 mmol/L 4.4   Chloride 98 - 107 mmol/L 105   Carbon Dioxide (CO2) 22 - 29 mmol/L 27   Urea Nitrogen 8.0 - 23.0 mg/dL 20.9   Creatinine 0.51 - 0.95 mg/dL 0.85       I/P  Alk phos gradually decreasing.  Otherwise, normal LFTs.  Xray: no stent.  Ok to release from care.  20 min: 5 prep, 15 f2f, 5 doc  "

## 2024-02-06 NOTE — PROGRESS NOTES
Spoke to pt about CPAP orders. Per pt, she doesn't wear machine at home and is sleeping comfortably without one. RT will discontinue orders.    Fritz Bach, RRT   no hematuria/no dysuria

## 2024-06-16 ENCOUNTER — HEALTH MAINTENANCE LETTER (OUTPATIENT)
Age: 69
End: 2024-06-16

## 2025-06-21 ENCOUNTER — HEALTH MAINTENANCE LETTER (OUTPATIENT)
Age: 70
End: 2025-06-21

## (undated) DEVICE — CLIP HORIZON MED BLUE 002200

## (undated) DEVICE — STPL RELOAD LINEAR CUT 55MM TCR55

## (undated) DEVICE — SU PDS II 4-0 SH 27" Z315H

## (undated) DEVICE — STPL SKIN 35W ROTATING HEAD PRW35

## (undated) DEVICE — APPLICATOR COTTON TIP 6"X2 STERILE LF 6012

## (undated) DEVICE — RETR VISCERA FISH MED 3204

## (undated) DEVICE — SU PDS II 1 CTX 36" Z371T

## (undated) DEVICE — ENDO EXTRACTOR BALLOON 09-12MM 4690

## (undated) DEVICE — DRSG MEDIPORE 3 1/2X13 3/4" 3573

## (undated) DEVICE — STPL LINEAR CUT 55MM TLC55

## (undated) DEVICE — SU SILK 3-0 TIE 12X30" A304H

## (undated) DEVICE — SU SILK 3-0 SH CR 8X18" C013D

## (undated) DEVICE — SU VICRYL 3-0 SH 27" UND J416H

## (undated) DEVICE — SPHINCTEROTOME 5.5FRX25MM OMNI-TOME FS-OMNI-35 G31911

## (undated) DEVICE — SU SILK 0 TIE 6X18" A186H

## (undated) DEVICE — ESU LIGASURE IMPACT OPEN SEALER/DVDR CVD LG JAW LF4418

## (undated) DEVICE — DRAIN JACKSON PRATT CHANNEL 19FR ROUND HUBLESS SIL JP-2230

## (undated) DEVICE — SUCTION MANIFOLD NEPTUNE 2 SYS 4 PORT 0702-020-000

## (undated) DEVICE — SU PDS II 6-0 RB-2DA 30" Z149H

## (undated) DEVICE — ENDO BITE BLOCK ADULT OMNI-BLOC

## (undated) DEVICE — SURGICEL ABSORBABLE HEMOSTAT SNOW 4"X4" 2083

## (undated) DEVICE — SU SILK 4-0 TIE 12X30" A303H

## (undated) DEVICE — ESU ELEC NDL 1" COATED/INSULATED E1465

## (undated) DEVICE — WIRE GUIDE 0.025"X270CM SHORT ANG VISIGLIDE 2 G-260-2527A

## (undated) DEVICE — DRAPE POUCH INSTRUMENT 1018

## (undated) DEVICE — SU MONOCRYL 4-0 PS-2 27" UND Y426H

## (undated) DEVICE — BIOPSY VALVE BIOSHIELD 00711135

## (undated) DEVICE — DRAIN JACKSON PRATT RESERVOIR 100ML SU130-1305

## (undated) DEVICE — SU PROLENE 4-0 SHDA 36" 8521H

## (undated) DEVICE — SOL NACL 0.9% IRRIG 1000ML BOTTLE 2F7124

## (undated) DEVICE — SU SILK 3-0 SH 30" K832H

## (undated) DEVICE — KIT ENDO FIRST STEP DISINFECTANT 200ML W/POUCH EP-4

## (undated) DEVICE — DRAPE IOBAN INCISE 23X17" 6650EZ

## (undated) DEVICE — ESU GROUND PAD ADULT W/CORD E7507

## (undated) DEVICE — SU SILK 0 TIE 6X30" A306H

## (undated) DEVICE — SU SILK 2-0 TIE 12X30" A305H

## (undated) DEVICE — Device

## (undated) DEVICE — ENDO TUBING CO2 SMARTCAP STERILE DISP 100145CO2EXT

## (undated) DEVICE — SU PROLENE 6-0 RB-2DA 30" 8711H

## (undated) DEVICE — SU SILK 3-0 RB-1 CR 8X18" C053D

## (undated) DEVICE — DRAPE STERI FLUOROSCOPE 35X43"1012 LATEX FREE

## (undated) DEVICE — PREP CHLORAPREP 26ML TINTED HI-LITE ORANGE 930815

## (undated) DEVICE — SOL WATER IRRIG 1000ML BOTTLE 2F7114

## (undated) DEVICE — ESU PENCIL W/SMOKE EVAC ROCKER E2350HS

## (undated) DEVICE — TEST TUBE W/SCREW CAP 17361

## (undated) DEVICE — SUCTION TIP YANKAUER W/O VENT K86

## (undated) DEVICE — LINEN TOWEL PACK X30 5481

## (undated) DEVICE — LINEN TOWEL PACK X6 WHITE 5487

## (undated) DEVICE — SU PDS II 5-0 RB-1 DA 30" Z320H

## (undated) DEVICE — ADH SKIN CLOSURE PREMIERPRO EXOFIN 1.0ML 3470

## (undated) DEVICE — ENDO DEVICE LOCKING AND BIOPSY CAP M00545261

## (undated) DEVICE — SU PDS II 0 TP-1 60" Z991G

## (undated) DEVICE — PACK ENDOSCOPY GI CUSTOM UMMC

## (undated) DEVICE — SU SILK 1 TIE 6X30" A307H

## (undated) DEVICE — SPONGE LAP 18X18" X8435

## (undated) DEVICE — ESU HANDPIECE ABC BEND-A-BEAM 6" 134006

## (undated) DEVICE — SU ETHILON 3-0 PS-1 18" 1663H

## (undated) RX ORDER — AMPICILLIN AND SULBACTAM 2; 1 G/1; G/1
INJECTION, POWDER, FOR SOLUTION INTRAMUSCULAR; INTRAVENOUS
Status: DISPENSED
Start: 2023-02-10

## (undated) RX ORDER — PROPOFOL 10 MG/ML
INJECTION, EMULSION INTRAVENOUS
Status: DISPENSED
Start: 2023-01-06

## (undated) RX ORDER — FENTANYL CITRATE 50 UG/ML
INJECTION, SOLUTION INTRAMUSCULAR; INTRAVENOUS
Status: DISPENSED
Start: 2023-03-21

## (undated) RX ORDER — LORAZEPAM 2 MG/ML
INJECTION INTRAMUSCULAR
Status: DISPENSED
Start: 2023-01-06

## (undated) RX ORDER — HYDROMORPHONE HYDROCHLORIDE 1 MG/ML
INJECTION, SOLUTION INTRAMUSCULAR; INTRAVENOUS; SUBCUTANEOUS
Status: DISPENSED
Start: 2023-04-04

## (undated) RX ORDER — AMPICILLIN AND SULBACTAM 2; 1 G/1; G/1
INJECTION, POWDER, FOR SOLUTION INTRAMUSCULAR; INTRAVENOUS
Status: DISPENSED
Start: 2023-01-06

## (undated) RX ORDER — IOPAMIDOL 510 MG/ML
INJECTION, SOLUTION INTRAVASCULAR
Status: DISPENSED
Start: 2023-04-04

## (undated) RX ORDER — PROPOFOL 10 MG/ML
INJECTION, EMULSION INTRAVENOUS
Status: DISPENSED
Start: 2023-02-10

## (undated) RX ORDER — DEXAMETHASONE SODIUM PHOSPHATE 4 MG/ML
INJECTION, SOLUTION INTRA-ARTICULAR; INTRALESIONAL; INTRAMUSCULAR; INTRAVENOUS; SOFT TISSUE
Status: DISPENSED
Start: 2023-01-06

## (undated) RX ORDER — ONDANSETRON 2 MG/ML
INJECTION INTRAMUSCULAR; INTRAVENOUS
Status: DISPENSED
Start: 2023-04-04

## (undated) RX ORDER — OXYCODONE HYDROCHLORIDE 5 MG/1
TABLET ORAL
Status: DISPENSED
Start: 2023-04-04

## (undated) RX ORDER — FENTANYL CITRATE 50 UG/ML
INJECTION, SOLUTION INTRAMUSCULAR; INTRAVENOUS
Status: DISPENSED
Start: 2023-04-04

## (undated) RX ORDER — FENTANYL CITRATE-0.9 % NACL/PF 10 MCG/ML
PLASTIC BAG, INJECTION (ML) INTRAVENOUS
Status: DISPENSED
Start: 2023-01-06

## (undated) RX ORDER — LIDOCAINE HYDROCHLORIDE 10 MG/ML
INJECTION, SOLUTION EPIDURAL; INFILTRATION; INTRACAUDAL; PERINEURAL
Status: DISPENSED
Start: 2023-03-21

## (undated) RX ORDER — SODIUM CHLORIDE, SODIUM LACTATE, POTASSIUM CHLORIDE, CALCIUM CHLORIDE 600; 310; 30; 20 MG/100ML; MG/100ML; MG/100ML; MG/100ML
INJECTION, SOLUTION INTRAVENOUS
Status: DISPENSED
Start: 2023-04-04

## (undated) RX ORDER — DIPHENHYDRAMINE HYDROCHLORIDE 50 MG/ML
INJECTION INTRAMUSCULAR; INTRAVENOUS
Status: DISPENSED
Start: 2023-03-21

## (undated) RX ORDER — ONDANSETRON 2 MG/ML
INJECTION INTRAMUSCULAR; INTRAVENOUS
Status: DISPENSED
Start: 2023-01-06

## (undated) RX ORDER — APREPITANT 40 MG/1
CAPSULE ORAL
Status: DISPENSED
Start: 2023-04-04

## (undated) RX ORDER — GLYCOPYRROLATE 0.2 MG/ML
INJECTION, SOLUTION INTRAMUSCULAR; INTRAVENOUS
Status: DISPENSED
Start: 2023-01-06

## (undated) RX ORDER — VERAPAMIL HYDROCHLORIDE 2.5 MG/ML
INJECTION, SOLUTION INTRAVENOUS
Status: DISPENSED
Start: 2023-04-04

## (undated) RX ORDER — PIPERACILLIN SODIUM, TAZOBACTAM SODIUM 3; .375 G/15ML; G/15ML
INJECTION, POWDER, LYOPHILIZED, FOR SOLUTION INTRAVENOUS
Status: DISPENSED
Start: 2023-04-04

## (undated) RX ORDER — PAPAVERINE HYDROCHLORIDE 30 MG/ML
INJECTION INTRAMUSCULAR; INTRAVENOUS
Status: DISPENSED
Start: 2023-04-04

## (undated) RX ORDER — HEPARIN SODIUM 1000 [USP'U]/ML
INJECTION, SOLUTION INTRAVENOUS; SUBCUTANEOUS
Status: DISPENSED
Start: 2023-04-04

## (undated) RX ORDER — FENTANYL CITRATE 50 UG/ML
INJECTION, SOLUTION INTRAMUSCULAR; INTRAVENOUS
Status: DISPENSED
Start: 2023-02-10

## (undated) RX ORDER — ONDANSETRON 2 MG/ML
INJECTION INTRAMUSCULAR; INTRAVENOUS
Status: DISPENSED
Start: 2023-02-10

## (undated) RX ORDER — ACETAMINOPHEN 325 MG/1
TABLET ORAL
Status: DISPENSED
Start: 2023-04-04

## (undated) RX ORDER — OXYCODONE HYDROCHLORIDE 5 MG/1
TABLET ORAL
Status: DISPENSED
Start: 2023-02-10

## (undated) RX ORDER — LIDOCAINE HYDROCHLORIDE 10 MG/ML
INJECTION, SOLUTION EPIDURAL; INFILTRATION; INTRACAUDAL; PERINEURAL
Status: DISPENSED
Start: 2023-01-06

## (undated) RX ORDER — FENTANYL CITRATE-0.9 % NACL/PF 10 MCG/ML
PLASTIC BAG, INJECTION (ML) INTRAVENOUS
Status: DISPENSED
Start: 2023-02-10

## (undated) RX ORDER — ACETAMINOPHEN 325 MG/1
TABLET ORAL
Status: DISPENSED
Start: 2023-02-10

## (undated) RX ORDER — FENTANYL CITRATE 50 UG/ML
INJECTION, SOLUTION INTRAMUSCULAR; INTRAVENOUS
Status: DISPENSED
Start: 2023-01-03

## (undated) RX ORDER — DEXTROSE, SODIUM CHLORIDE, SODIUM LACTATE, POTASSIUM CHLORIDE, AND CALCIUM CHLORIDE 5; .6; .31; .03; .02 G/100ML; G/100ML; G/100ML; G/100ML; G/100ML
INJECTION, SOLUTION INTRAVENOUS
Status: DISPENSED
Start: 2023-04-04

## (undated) RX ORDER — DEXAMETHASONE SODIUM PHOSPHATE 4 MG/ML
INJECTION, SOLUTION INTRA-ARTICULAR; INTRALESIONAL; INTRAMUSCULAR; INTRAVENOUS; SOFT TISSUE
Status: DISPENSED
Start: 2023-02-10

## (undated) RX ORDER — HYDROMORPHONE HYDROCHLORIDE 1 MG/ML
INJECTION, SOLUTION INTRAMUSCULAR; INTRAVENOUS; SUBCUTANEOUS
Status: DISPENSED
Start: 2023-01-06

## (undated) RX ORDER — FENTANYL CITRATE 50 UG/ML
INJECTION, SOLUTION INTRAMUSCULAR; INTRAVENOUS
Status: DISPENSED
Start: 2023-01-06

## (undated) RX ORDER — LIDOCAINE HYDROCHLORIDE 10 MG/ML
INJECTION, SOLUTION EPIDURAL; INFILTRATION; INTRACAUDAL; PERINEURAL
Status: DISPENSED
Start: 2023-02-10